# Patient Record
Sex: MALE | Race: WHITE | NOT HISPANIC OR LATINO | URBAN - METROPOLITAN AREA
[De-identification: names, ages, dates, MRNs, and addresses within clinical notes are randomized per-mention and may not be internally consistent; named-entity substitution may affect disease eponyms.]

---

## 2017-05-19 ENCOUNTER — INPATIENT (INPATIENT)
Facility: HOSPITAL | Age: 82
LOS: 6 days | Discharge: NURSING, REHAB OR LONG-TERM CARE | DRG: 603 | End: 2017-05-26
Attending: FAMILY MEDICINE | Admitting: FAMILY MEDICINE
Payer: COMMERCIAL

## 2017-05-19 VITALS
HEIGHT: 72 IN | TEMPERATURE: 98 F | HEART RATE: 88 BPM | SYSTOLIC BLOOD PRESSURE: 105 MMHG | DIASTOLIC BLOOD PRESSURE: 70 MMHG | WEIGHT: 190.04 LBS | OXYGEN SATURATION: 96 % | RESPIRATION RATE: 18 BRPM

## 2017-05-19 LAB
ALBUMIN SERPL ELPH-MCNC: 2.5 G/DL — LOW (ref 3.5–5)
ALP SERPL-CCNC: 81 U/L — SIGNIFICANT CHANGE UP (ref 40–120)
ALT FLD-CCNC: 17 U/L DA — SIGNIFICANT CHANGE UP (ref 10–60)
ANION GAP SERPL CALC-SCNC: 8 MMOL/L — SIGNIFICANT CHANGE UP (ref 5–17)
AST SERPL-CCNC: 18 U/L — SIGNIFICANT CHANGE UP (ref 10–40)
BILIRUB SERPL-MCNC: 0.3 MG/DL — SIGNIFICANT CHANGE UP (ref 0.2–1.2)
BUN SERPL-MCNC: 44 MG/DL — HIGH (ref 7–18)
CALCIUM SERPL-MCNC: 8.3 MG/DL — LOW (ref 8.4–10.5)
CHLORIDE SERPL-SCNC: 101 MMOL/L — SIGNIFICANT CHANGE UP (ref 96–108)
CO2 SERPL-SCNC: 26 MMOL/L — SIGNIFICANT CHANGE UP (ref 22–31)
CREAT SERPL-MCNC: 2.25 MG/DL — HIGH (ref 0.5–1.3)
GLUCOSE SERPL-MCNC: 130 MG/DL — HIGH (ref 70–99)
HCT VFR BLD CALC: 31.7 % — LOW (ref 39–50)
HGB BLD-MCNC: 11.2 G/DL — LOW (ref 13–17)
MCHC RBC-ENTMCNC: 30.7 PG — SIGNIFICANT CHANGE UP (ref 27–34)
MCHC RBC-ENTMCNC: 35.2 GM/DL — SIGNIFICANT CHANGE UP (ref 32–36)
MCV RBC AUTO: 87.1 FL — SIGNIFICANT CHANGE UP (ref 80–100)
PLATELET # BLD AUTO: 313 K/UL — SIGNIFICANT CHANGE UP (ref 150–400)
POTASSIUM SERPL-MCNC: 4.9 MMOL/L — SIGNIFICANT CHANGE UP (ref 3.5–5.3)
POTASSIUM SERPL-SCNC: 4.9 MMOL/L — SIGNIFICANT CHANGE UP (ref 3.5–5.3)
PROT SERPL-MCNC: 6.2 G/DL — SIGNIFICANT CHANGE UP (ref 6–8.3)
RBC # BLD: 3.64 M/UL — LOW (ref 4.2–5.8)
RBC # FLD: 12.7 % — SIGNIFICANT CHANGE UP (ref 10.3–14.5)
SODIUM SERPL-SCNC: 135 MMOL/L — SIGNIFICANT CHANGE UP (ref 135–145)
WBC # BLD: 12.6 K/UL — HIGH (ref 3.8–10.5)
WBC # FLD AUTO: 12.6 K/UL — HIGH (ref 3.8–10.5)

## 2017-05-19 NOTE — ED ADULT NURSE NOTE - OBJECTIVE STATEMENT
82 years old male received lying in bed alert and oriented x3, breathing spontaneously on room air. Patient unkempt, incontinent of feces. Relative at bedside reported generalized weakness x1 week, inability to move out of bed. Relative also indicated that patient has bed sores and same is getting worse. O/E sores noted to the buttocks and upper third of both of patient's legs and redness noted between the groin. Patient was tidied and left as comfortable as condition allows.

## 2017-05-19 NOTE — ED ADULT TRIAGE NOTE - CHIEF COMPLAINT QUOTE
BIBA for weakness, pt has been in sitting position for long period with incontinence and has developed ulcers

## 2017-05-20 DIAGNOSIS — Z29.9 ENCOUNTER FOR PROPHYLACTIC MEASURES, UNSPECIFIED: ICD-10-CM

## 2017-05-20 DIAGNOSIS — N17.9 ACUTE KIDNEY FAILURE, UNSPECIFIED: ICD-10-CM

## 2017-05-20 DIAGNOSIS — I48.91 UNSPECIFIED ATRIAL FIBRILLATION: ICD-10-CM

## 2017-05-20 DIAGNOSIS — R26.2 DIFFICULTY IN WALKING, NOT ELSEWHERE CLASSIFIED: ICD-10-CM

## 2017-05-20 DIAGNOSIS — L89.109 PRESSURE ULCER OF UNSPECIFIED PART OF BACK, UNSPECIFIED STAGE: ICD-10-CM

## 2017-05-20 DIAGNOSIS — R60.0 LOCALIZED EDEMA: ICD-10-CM

## 2017-05-20 DIAGNOSIS — G91.2 (IDIOPATHIC) NORMAL PRESSURE HYDROCEPHALUS: ICD-10-CM

## 2017-05-20 LAB
APTT BLD: 40.4 SEC — HIGH (ref 27.5–37.4)
CK SERPL-CCNC: 178 U/L — SIGNIFICANT CHANGE UP (ref 35–232)
HBA1C BLD-MCNC: 5.8 % — HIGH (ref 4–5.6)
INR BLD: 3.15 RATIO — HIGH (ref 0.88–1.16)
NT-PROBNP SERPL-SCNC: 1288 PG/ML — HIGH (ref 0–450)
PROTHROM AB SERPL-ACNC: 35.2 SEC — HIGH (ref 9.8–12.7)
TROPONIN I SERPL-MCNC: <0.015 NG/ML — SIGNIFICANT CHANGE UP (ref 0–0.04)

## 2017-05-20 PROCEDURE — 99285 EMERGENCY DEPT VISIT HI MDM: CPT

## 2017-05-20 RX ORDER — MUPIROCIN 20 MG/G
1 OINTMENT TOPICAL THREE TIMES A DAY
Refills: 0 | Status: DISCONTINUED | OUTPATIENT
Start: 2017-05-20 | End: 2017-05-21

## 2017-05-20 RX ORDER — ASCORBIC ACID 60 MG
500 TABLET,CHEWABLE ORAL DAILY
Refills: 0 | Status: DISCONTINUED | OUTPATIENT
Start: 2017-05-20 | End: 2017-05-26

## 2017-05-20 RX ORDER — VANCOMYCIN HCL 1 G
1000 VIAL (EA) INTRAVENOUS EVERY 24 HOURS
Refills: 0 | Status: DISCONTINUED | OUTPATIENT
Start: 2017-05-20 | End: 2017-05-22

## 2017-05-20 RX ORDER — SODIUM CHLORIDE 9 MG/ML
750 INJECTION INTRAMUSCULAR; INTRAVENOUS; SUBCUTANEOUS ONCE
Refills: 0 | Status: COMPLETED | OUTPATIENT
Start: 2017-05-20 | End: 2017-05-20

## 2017-05-20 RX ORDER — ACETAMINOPHEN 500 MG
650 TABLET ORAL EVERY 6 HOURS
Refills: 0 | Status: DISCONTINUED | OUTPATIENT
Start: 2017-05-20 | End: 2017-05-26

## 2017-05-20 RX ORDER — DOCUSATE SODIUM 100 MG
100 CAPSULE ORAL
Refills: 0 | Status: DISCONTINUED | OUTPATIENT
Start: 2017-05-20 | End: 2017-05-26

## 2017-05-20 RX ORDER — AZTREONAM 2 G
1000 VIAL (EA) INJECTION EVERY 12 HOURS
Refills: 0 | Status: DISCONTINUED | OUTPATIENT
Start: 2017-05-20 | End: 2017-05-21

## 2017-05-20 RX ORDER — DEXTROSE 50 % IN WATER 50 %
12.5 SYRINGE (ML) INTRAVENOUS ONCE
Refills: 0 | Status: DISCONTINUED | OUTPATIENT
Start: 2017-05-20 | End: 2017-05-26

## 2017-05-20 RX ORDER — ZINC OXIDE 200 MG/G
1 OINTMENT TOPICAL THREE TIMES A DAY
Refills: 0 | Status: DISCONTINUED | OUTPATIENT
Start: 2017-05-20 | End: 2017-05-26

## 2017-05-20 RX ORDER — ZINC GLUCONATE 30 MG
50 TABLET ORAL DAILY
Refills: 0 | Status: DISCONTINUED | OUTPATIENT
Start: 2017-05-20 | End: 2017-05-20

## 2017-05-20 RX ORDER — DEXTROSE 50 % IN WATER 50 %
1 SYRINGE (ML) INTRAVENOUS ONCE
Refills: 0 | Status: DISCONTINUED | OUTPATIENT
Start: 2017-05-20 | End: 2017-05-26

## 2017-05-20 RX ORDER — DEXTROSE 50 % IN WATER 50 %
25 SYRINGE (ML) INTRAVENOUS ONCE
Refills: 0 | Status: DISCONTINUED | OUTPATIENT
Start: 2017-05-20 | End: 2017-05-26

## 2017-05-20 RX ORDER — SENNA PLUS 8.6 MG/1
2 TABLET ORAL AT BEDTIME
Refills: 0 | Status: DISCONTINUED | OUTPATIENT
Start: 2017-05-20 | End: 2017-05-26

## 2017-05-20 RX ORDER — SODIUM CHLORIDE 9 MG/ML
1000 INJECTION, SOLUTION INTRAVENOUS
Refills: 0 | Status: DISCONTINUED | OUTPATIENT
Start: 2017-05-20 | End: 2017-05-26

## 2017-05-20 RX ORDER — AZTREONAM 2 G
1000 VIAL (EA) INJECTION ONCE
Refills: 0 | Status: COMPLETED | OUTPATIENT
Start: 2017-05-20 | End: 2017-05-20

## 2017-05-20 RX ORDER — MORPHINE SULFATE 50 MG/1
2 CAPSULE, EXTENDED RELEASE ORAL EVERY 6 HOURS
Refills: 0 | Status: DISCONTINUED | OUTPATIENT
Start: 2017-05-20 | End: 2017-05-24

## 2017-05-20 RX ORDER — INSULIN LISPRO 100/ML
VIAL (ML) SUBCUTANEOUS
Refills: 0 | Status: DISCONTINUED | OUTPATIENT
Start: 2017-05-20 | End: 2017-05-26

## 2017-05-20 RX ORDER — GLUCAGON INJECTION, SOLUTION 0.5 MG/.1ML
1 INJECTION, SOLUTION SUBCUTANEOUS ONCE
Refills: 0 | Status: DISCONTINUED | OUTPATIENT
Start: 2017-05-20 | End: 2017-05-26

## 2017-05-20 RX ADMIN — Medication 1: at 17:57

## 2017-05-20 RX ADMIN — Medication 100 MILLIGRAM(S): at 17:57

## 2017-05-20 RX ADMIN — Medication 500 MILLIGRAM(S): at 14:50

## 2017-05-20 RX ADMIN — ZINC OXIDE 1 APPLICATION(S): 200 OINTMENT TOPICAL at 14:49

## 2017-05-20 RX ADMIN — MUPIROCIN 1 APPLICATION(S): 20 OINTMENT TOPICAL at 08:07

## 2017-05-20 RX ADMIN — SODIUM CHLORIDE 750 MILLILITER(S): 9 INJECTION INTRAMUSCULAR; INTRAVENOUS; SUBCUTANEOUS at 01:20

## 2017-05-20 RX ADMIN — SENNA PLUS 2 TABLET(S): 8.6 TABLET ORAL at 21:49

## 2017-05-20 RX ADMIN — Medication 50 MILLIGRAM(S): at 01:19

## 2017-05-20 RX ADMIN — Medication 250 MILLIGRAM(S): at 07:02

## 2017-05-20 RX ADMIN — MUPIROCIN 1 APPLICATION(S): 20 OINTMENT TOPICAL at 21:49

## 2017-05-20 RX ADMIN — ZINC OXIDE 1 APPLICATION(S): 200 OINTMENT TOPICAL at 21:49

## 2017-05-20 RX ADMIN — MORPHINE SULFATE 2 MILLIGRAM(S): 50 CAPSULE, EXTENDED RELEASE ORAL at 21:52

## 2017-05-20 RX ADMIN — MORPHINE SULFATE 2 MILLIGRAM(S): 50 CAPSULE, EXTENDED RELEASE ORAL at 22:15

## 2017-05-20 RX ADMIN — MUPIROCIN 1 APPLICATION(S): 20 OINTMENT TOPICAL at 14:49

## 2017-05-20 RX ADMIN — Medication 50 MILLIGRAM(S): at 17:57

## 2017-05-20 NOTE — H&P ADULT - PROBLEM SELECTOR PLAN 1
infected bed soar  wbc count of 12.8  frequent turning  mupriocin ointment   c/w vancomycin and azactem for now to cover pseudomonas and vancocmyin  f/u blood culture  ID jess Lala

## 2017-05-20 NOTE — ED PROVIDER NOTE - CARE PLAN
Principal Discharge DX:	Ambulatory dysfunction  Secondary Diagnosis:	Pressure ulcer of back  Secondary Diagnosis:	NPH (normal pressure hydrocephalus)

## 2017-05-20 NOTE — ED PROVIDER NOTE - OBJECTIVE STATEMENT
81 y/o M w/ PMHx of NPH & Afib (on Coumadin) presents to the ED c/o generalized weakness x1 week. Pt states he is unable to get out of bed w/ worsening sores to his lower back & upper thighs. Pt denies fever, chills, vomiting, diarrhea, SOB, or any other complaints. Pt is allergic to Penicillin.

## 2017-05-20 NOTE — H&P ADULT - HISTORY OF PRESENT ILLNESS
81 y/o M w/ PMHx of NPH ( unsteady gait used to ambulate with walker now bedbound for a couple of months) & Afib (on Coumadin) presents to the ED c/o generalized weakness x1 week. Pt states he is unable to get out of bed w/ worsening sores to his lower back & upper thighs with wet discharge and pain . patient is AAO times 2 but is forgetful and poor histoiran , is unable to provide detailed information states his wife dropped him off to the hospital becuase the soars in his thighs and back were getting worse and she has been unable to help him . denies fever, chills, vomiting, diarrhea, SOB, or any other complaints , endorses chronic leg edema , does not remember his medications , states he believes he is on coumadin but cant recall dosage , tired to call wife multiple times without any response . ROS is negative except above.    in the ER patient's vs T  98 , hr 88 , rr 18 pulse ox of 98 on room air , labs were pertinent for wbc count of 12.6 , BUN/cr of 44/2,25 , INR 3.15 , ekg showed RBBBafib 80 bpm with PVC.patient was given  750 cc bolus and one dose of azactem in the ER.

## 2017-05-20 NOTE — H&P ADULT - PROBLEM SELECTOR PLAN 3
afib: INR 3.14 , Hold lopressor as blood pressure is borderline low  f/u iNR  Unknown coumadin dosage ekg rate controlled afib RBBB

## 2017-05-20 NOTE — PATIENT PROFILE ADULT. - LOCATION
Sacral sores on lower back buttocks and upper posterior tights, dry crusty feet and left heal wound noted (0.5 cm by .5 cm)

## 2017-05-20 NOTE — H&P ADULT - ASSESSMENT
81 y/o M w/ PMHx of NPH ( unsteady gait used to ambulate with walker now bedbound for a couple of months) & Afib (on Coumadin) presents to the ED c/o generalized weakness x1 week. Pt states he is unable to get out of bed w/ worsening sores to his lower back & upper thighs with wet discharge and pain is being  admitted for  infected bed ulcer with superimposed cellulitis

## 2017-05-21 LAB
ALBUMIN SERPL ELPH-MCNC: 2.4 G/DL — LOW (ref 3.5–5)
ALP SERPL-CCNC: 80 U/L — SIGNIFICANT CHANGE UP (ref 40–120)
ALT FLD-CCNC: 15 U/L DA — SIGNIFICANT CHANGE UP (ref 10–60)
ANION GAP SERPL CALC-SCNC: 10 MMOL/L — SIGNIFICANT CHANGE UP (ref 5–17)
APTT BLD: 34.1 SEC — SIGNIFICANT CHANGE UP (ref 27.5–37.4)
AST SERPL-CCNC: 15 U/L — SIGNIFICANT CHANGE UP (ref 10–40)
BASOPHILS # BLD AUTO: 0.1 K/UL — SIGNIFICANT CHANGE UP (ref 0–0.2)
BASOPHILS NFR BLD AUTO: 0.7 % — SIGNIFICANT CHANGE UP (ref 0–2)
BILIRUB SERPL-MCNC: 0.4 MG/DL — SIGNIFICANT CHANGE UP (ref 0.2–1.2)
BUN SERPL-MCNC: 27 MG/DL — HIGH (ref 7–18)
CALCIUM SERPL-MCNC: 8.5 MG/DL — SIGNIFICANT CHANGE UP (ref 8.4–10.5)
CHLORIDE SERPL-SCNC: 106 MMOL/L — SIGNIFICANT CHANGE UP (ref 96–108)
CO2 SERPL-SCNC: 25 MMOL/L — SIGNIFICANT CHANGE UP (ref 22–31)
CREAT SERPL-MCNC: 1.76 MG/DL — HIGH (ref 0.5–1.3)
EOSINOPHIL # BLD AUTO: 0.5 K/UL — SIGNIFICANT CHANGE UP (ref 0–0.5)
EOSINOPHIL NFR BLD AUTO: 6 % — SIGNIFICANT CHANGE UP (ref 0–6)
FOLATE SERPL-MCNC: 6.7 NG/ML — SIGNIFICANT CHANGE UP (ref 4.8–24.2)
GLUCOSE SERPL-MCNC: 110 MG/DL — HIGH (ref 70–99)
HCT VFR BLD CALC: 30.5 % — LOW (ref 39–50)
HGB BLD-MCNC: 10.3 G/DL — LOW (ref 13–17)
INR BLD: 1.99 RATIO — HIGH (ref 0.88–1.16)
LYMPHOCYTES # BLD AUTO: 2.9 K/UL — SIGNIFICANT CHANGE UP (ref 1–3.3)
LYMPHOCYTES # BLD AUTO: 34.6 % — SIGNIFICANT CHANGE UP (ref 13–44)
MAGNESIUM SERPL-MCNC: 1.6 MG/DL — SIGNIFICANT CHANGE UP (ref 1.6–2.6)
MCHC RBC-ENTMCNC: 29.7 PG — SIGNIFICANT CHANGE UP (ref 27–34)
MCHC RBC-ENTMCNC: 33.7 GM/DL — SIGNIFICANT CHANGE UP (ref 32–36)
MCV RBC AUTO: 88.3 FL — SIGNIFICANT CHANGE UP (ref 80–100)
MONOCYTES # BLD AUTO: 0.7 K/UL — SIGNIFICANT CHANGE UP (ref 0–0.9)
MONOCYTES NFR BLD AUTO: 8.6 % — SIGNIFICANT CHANGE UP (ref 2–14)
NEUTROPHILS # BLD AUTO: 4.2 K/UL — SIGNIFICANT CHANGE UP (ref 1.8–7.4)
NEUTROPHILS NFR BLD AUTO: 50 % — SIGNIFICANT CHANGE UP (ref 43–77)
PHOSPHATE SERPL-MCNC: 3.3 MG/DL — SIGNIFICANT CHANGE UP (ref 2.5–4.5)
PLATELET # BLD AUTO: 251 K/UL — SIGNIFICANT CHANGE UP (ref 150–400)
POTASSIUM SERPL-MCNC: 4.4 MMOL/L — SIGNIFICANT CHANGE UP (ref 3.5–5.3)
POTASSIUM SERPL-SCNC: 4.4 MMOL/L — SIGNIFICANT CHANGE UP (ref 3.5–5.3)
PROT SERPL-MCNC: 5.7 G/DL — LOW (ref 6–8.3)
PROTHROM AB SERPL-ACNC: 22 SEC — HIGH (ref 9.8–12.7)
RBC # BLD: 3.46 M/UL — LOW (ref 4.2–5.8)
RBC # FLD: 11.4 % — SIGNIFICANT CHANGE UP (ref 10.3–14.5)
SODIUM SERPL-SCNC: 141 MMOL/L — SIGNIFICANT CHANGE UP (ref 135–145)
TSH SERPL-MCNC: 0.71 UU/ML — SIGNIFICANT CHANGE UP (ref 0.34–4.82)
VIT B12 SERPL-MCNC: 385 PG/ML — SIGNIFICANT CHANGE UP (ref 243–894)
WBC # BLD: 8.3 K/UL — SIGNIFICANT CHANGE UP (ref 3.8–10.5)
WBC # FLD AUTO: 8.3 K/UL — SIGNIFICANT CHANGE UP (ref 3.8–10.5)

## 2017-05-21 RX ORDER — METOPROLOL TARTRATE 50 MG
50 TABLET ORAL
Refills: 0 | Status: DISCONTINUED | OUTPATIENT
Start: 2017-05-21 | End: 2017-05-25

## 2017-05-21 RX ORDER — WARFARIN SODIUM 2.5 MG/1
1 TABLET ORAL ONCE
Refills: 0 | Status: DISCONTINUED | OUTPATIENT
Start: 2017-05-21 | End: 2017-05-21

## 2017-05-21 RX ORDER — PANTOPRAZOLE SODIUM 20 MG/1
40 TABLET, DELAYED RELEASE ORAL
Refills: 0 | Status: DISCONTINUED | OUTPATIENT
Start: 2017-05-21 | End: 2017-05-26

## 2017-05-21 RX ORDER — CALAMINE 8% AND ZINC OXIDE 8% 160 MG/ML
1 LOTION TOPICAL
Refills: 0 | Status: DISCONTINUED | OUTPATIENT
Start: 2017-05-21 | End: 2017-05-23

## 2017-05-21 RX ORDER — FUROSEMIDE 40 MG
40 TABLET ORAL DAILY
Refills: 0 | Status: DISCONTINUED | OUTPATIENT
Start: 2017-05-21 | End: 2017-05-21

## 2017-05-21 RX ORDER — ZINC OXIDE 200 MG/G
1 OINTMENT TOPICAL
Refills: 0 | Status: DISCONTINUED | OUTPATIENT
Start: 2017-05-21 | End: 2017-05-21

## 2017-05-21 RX ORDER — WARFARIN SODIUM 2.5 MG/1
3 TABLET ORAL ONCE
Refills: 0 | Status: COMPLETED | OUTPATIENT
Start: 2017-05-21 | End: 2017-05-21

## 2017-05-21 RX ORDER — ZINC OXIDE 200 MG/G
1 OINTMENT TOPICAL
Refills: 0 | Status: DISCONTINUED | OUTPATIENT
Start: 2017-05-21 | End: 2017-05-23

## 2017-05-21 RX ORDER — LISINOPRIL 2.5 MG/1
10 TABLET ORAL DAILY
Refills: 0 | Status: DISCONTINUED | OUTPATIENT
Start: 2017-05-21 | End: 2017-05-21

## 2017-05-21 RX ADMIN — CALAMINE 8% AND ZINC OXIDE 8% 1 APPLICATION(S): 160 LOTION TOPICAL at 20:51

## 2017-05-21 RX ADMIN — Medication 100 MILLIGRAM(S): at 06:20

## 2017-05-21 RX ADMIN — Medication 500 MILLIGRAM(S): at 11:38

## 2017-05-21 RX ADMIN — Medication 50 MILLIGRAM(S): at 17:35

## 2017-05-21 RX ADMIN — PANTOPRAZOLE SODIUM 40 MILLIGRAM(S): 20 TABLET, DELAYED RELEASE ORAL at 22:19

## 2017-05-21 RX ADMIN — Medication 50 MILLIGRAM(S): at 06:20

## 2017-05-21 RX ADMIN — Medication 1: at 11:38

## 2017-05-21 RX ADMIN — Medication 100 MILLIGRAM(S): at 17:34

## 2017-05-21 RX ADMIN — MUPIROCIN 1 APPLICATION(S): 20 OINTMENT TOPICAL at 06:20

## 2017-05-21 RX ADMIN — WARFARIN SODIUM 3 MILLIGRAM(S): 2.5 TABLET ORAL at 22:19

## 2017-05-21 RX ADMIN — ZINC OXIDE 1 APPLICATION(S): 200 OINTMENT TOPICAL at 06:20

## 2017-05-21 RX ADMIN — SENNA PLUS 2 TABLET(S): 8.6 TABLET ORAL at 22:19

## 2017-05-21 RX ADMIN — Medication 250 MILLIGRAM(S): at 06:20

## 2017-05-21 NOTE — DIETITIAN INITIAL EVALUATION ADULT. - OTHER INFO
Nutrition consult requested for questions on prescribed diet. Patient from home lives with wife. Visited pt. reports, appetite fair, eats well some days & not so well on certain days due to his medical condition, denies nausea/vomiting or diarrhea PTA, stated MON412 Lbs >1 month & unsure of wt. gain/loss presently. Per pt. in house consuming 50% of meals & tolerating & stated dx. as diabetic 4 months age & on metformin at home, declined diet/diabetic education, received PTA. Pressure ulcer (stage II - sacrum) noted, pedal edema pitting 1+ noted.

## 2017-05-21 NOTE — CONSULT NOTE ADULT - ASSESSMENT
1. Cellulitis of buttocks and posterior aspect of thighs   2. Leukocytosis - improved    Plan:  Continue Vancomycin 1 gram IV q 24 hours  Continue Azactam 1 gram IV q 12 hours 1. Mild cellulitis of buttocks and posterior aspect of thighs   2. Leukocytosis - improved    Plan:  Continue Vancomycin 1 gram IV q 24 hours  discontinue Azactam 1 gram IV q 12 hours  Start zinc oxide 20% bid to buttocks and posterior aspect of thighs.

## 2017-05-21 NOTE — CONSULT NOTE ADULT - CONSULT REASON
Multiple skin excoriations / cellulitis of back Multiple skin excoriations / cellulitis of buttocks and thighs

## 2017-05-21 NOTE — CHART NOTE - NSCHARTNOTEFT_GEN_A_CORE
PGY1 on called asked to F/up INR  INR today is 1.99, just below theraputic range  patient dosent know home dose of coumadin and dosent know pharmacy either  will give 1mg of coumadin  Primary team to follow up  f/up INR in AM

## 2017-05-21 NOTE — CONSULT NOTE ADULT - RS GEN PE MLT RESP DETAILS PC
clear to auscultation bilaterally normal/breath sounds equal/good air movement/clear to auscultation bilaterally

## 2017-05-21 NOTE — DIETITIAN INITIAL EVALUATION ADULT. - FACTORS AFF FOOD INTAKE
Weakness, bedbound/difficulty with food procurement/preparation/pain/persistent constipation/Shinto/ethnic/cultural/personal food preferences/other (specify)

## 2017-05-21 NOTE — PROGRESS NOTE ADULT - SUBJECTIVE AND OBJECTIVE BOX
CHIEF COMPLAINT:Patient is a 82y old  Male who presents with a chief complaint of   	  REVIEW OF SYSTEMS:  CONSTITUTIONAL: No fever, weight loss, or fatigue  EYES: No eye pain, visual disturbances, or discharge  ENMT:  No difficulty hearing, tinnitus, vertigo; No sinus or throat pain  NECK: No pain or stiffness  RESPIRATORY: No cough, wheezing, chills or hemoptysis; No Shortness of Breath  CARDIOVASCULAR: No chest pain, palpitations, passing out, dizziness, or leg swelling  GASTROINTESTINAL: No abdominal or epigastric pain. No nausea, vomiting, or hematemesis; No diarrhea or constipation. No melena or hematochezia.  GENITOURINARY: No dysuria, frequency, hematuria, or incontinence  NEUROLOGICAL: No headaches, memory loss, loss of strength, numbness, or tremors  SKIN: No itching, burning, rashes, or lesions   LYMPH Nodes: No enlarged glands  ENDOCRINE: No heat or cold intolerance; No hair loss  MUSCULOSKELETAL: No joint pain or swelling; No muscle, back, or extremity pain  PSYCHIATRIC: No depression, anxiety, mood swings, or difficulty sleeping  HEME/LYMPH: No easy bruising, or bleeding gums  ALLERY AND IMMUNOLOGIC: No hives or eczema	    [ ] All others negative	  [ ] Unable to obtain    PHYSICAL EXAM:  T(C): 37.1, Max: 37.1 (05-21 @ 16:17)  HR: 88 (78 - 96)  BP: 111/58 (99/52 - 119/60)  RR: 16 (16 - 16)  SpO2: 98% (97% - 98%)  Wt(kg): --  I&O's Summary    I & Os for current day (as of 22 May 2017 00:17)  =============================================  IN: 0 ml / OUT: 1800 ml / NET: -1800 ml      Appearance: Normal	  HEENT:   Normal oral mucosa, PERRL, EOMI	  Lymphatic: No lymphadenopathy  Cardiovascular: Normal S1 S2, No JVD, No murmurs, No edema  Respiratory: Lungs clear to auscultation	  Psychiatry: A & O x 3, Mood & affect appropriate  Gastrointestinal:  Soft, Non-tender, + BS	  Skin: pressure ulcer on butock and upper thighs with mild serosanguinous 	discharge.  Neurologic: Non-focal  Extremities: Normal range of motion, +1 edema  Vascular: Peripheral pulses palpable 2+ bilaterally    MEDICATIONS  (STANDING):  insulin lispro (HumaLOG) corrective regimen sliding scale  SubCutaneous three times a day before meals  dextrose 5%. 1000milliLiter(s) IV Continuous <Continuous>  dextrose 50% Injectable 12.5Gram(s) IV Push once  dextrose 50% Injectable 25Gram(s) IV Push once  dextrose 50% Injectable 25Gram(s) IV Push once  ascorbic acid 500milliGRAM(s) Oral daily  docusate sodium 100milliGRAM(s) Oral two times a day  senna 2Tablet(s) Oral at bedtime  vancomycin  IVPB 1000milliGRAM(s) IV Intermittent every 24 hours  zinc oxide 20% Ointment 1Application(s) Topical three times a day  zinc oxide 20% Ointment 1Application(s) Topical two times a day  pantoprazole    Tablet 40milliGRAM(s) Oral before breakfast  metoprolol 50milliGRAM(s) Oral two times a day  calamine Lotion 1Application(s) Topical two times a day      TELEMETRY: 	    ECG:  	  RADIOLOGY:  OTHER: 	  	  LABS:	 	    CARDIAC MARKERS:  CARDIAC MARKERS ( 20 May 2017 07:02 )  <0.015 ng/mL / x     / 178 U/L / x     / x                                    10.3   8.3   )-----------( 251      ( 21 May 2017 06:55 )             30.5     05-21    141  |  106  |  27<H>  ----------------------------<  110<H>  4.4   |  25  |  1.76<H>    Ca    8.5      21 May 2017 06:55  Phos  3.3     05-21  Mg     1.6     05-21    TPro  5.7<L>  /  Alb  2.4<L>  /  TBili  0.4  /  DBili  x   /  AST  15  /  ALT  15  /  AlkPhos  80  05-21          HgA1c: Hemoglobin A1C, Whole Blood: 5.8 % (05-20 @ 09:38)    TSH: Thyroid Stimulating Hormone, Serum: 0.71 uU/mL (05-21 @ 06:55)

## 2017-05-21 NOTE — CONSULT NOTE ADULT - MUSCULOSKELETAL COMMENTS
pt complains that he no longer able to pull himself up and ambulate with walker, no longer can put lotion on his back, complains of generalized weakness in his body

## 2017-05-21 NOTE — CONSULT NOTE ADULT - SUBJECTIVE AND OBJECTIVE BOX
HPI:  83 y/o M w/ PMHx of NPH ( unsteady gait used to ambulate with walker now bedbound for a couple of months) & Afib (on Coumadin), Diabetes, Hypertension, CAD, dementia (reported by wife), hydrocephalus, presents to the ED c/o generalized weakness x1 week. Pt states he is unable to get out of bed w/ worsening sores to his lower back & upper thighs with wet discharge and pain . patient is A&O x 2 but is forgetful and poor historian, is unable to provide detailed information, states his wife brought him to the hospital due to worsening status of his bedsores of thighs and back and that his wife was no longer able to help him.     In ER patient's vs T  98 , hr 88 , respiratory rate 18 pulse ox of 98 on room air, labs were pertinent for wbc count of 12.6 , BUN/cr of 44/2,25 , INR 3.15 , EKG showed RBBB afib 80 bpm with PVC, patient was given  750 cc bolus and one dose of Azactam in the ER. 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 1000milliLiter(s) IV Continuous <Continuous>  dextrose 50% Injectable 12.5Gram(s) IV Push once  dextrose 50% Injectable 25Gram(s) IV Push once  dextrose 50% Injectable 25Gram(s) IV Push once  ascorbic acid 500milliGRAM(s) Oral daily  mupirocin 2% Ointment 1Application(s) Topical three times a day  docusate sodium 100milliGRAM(s) Oral two times a day  senna 2Tablet(s) Oral at bedtime  vancomycin  IVPB 1000milliGRAM(s) IV Intermittent every 24 hours  aztreonam  IVPB 1000milliGRAM(s) IV Intermittent every 12 hours  zinc oxide 20% Ointment 1Application(s) Topical three times a day  warfarin 1milliGRAM(s) Oral once    MEDICATIONS  (PRN):  dextrose Gel 1Dose(s) Oral once PRN Blood Glucose LESS THAN 70 milliGRAM(s)/deciLiter  glucagon  Injectable 1milliGRAM(s) IntraMuscular once PRN Glucose <70 milliGRAM(s)/deciLiter  morphine  - Injectable 2milliGRAM(s) IV Push every 6 hours PRN Severe Pain (7 - 10)  oxyCODONE  5 mG/acetaminophen 325 mG 1Tablet(s) Oral every 6 hours PRN Moderate Pain (4 - 6)  acetaminophen   Tablet. 650milliGRAM(s) Oral every 6 hours PRN Mild Pain (1 - 3)    SOCIAL HISTORY:  Pt is former smoker, quit 15 years ago, smoked for 25 years 1 ppd        PACK YEARS:    25              Family History: Mother - Alzheimer's disease, Father - cancer (type unknown)          VITALS:    Vital Signs Last 24 Hrs  T(C): 36.9, Max: 37.2 (05-20 @ 23:55)  T(F): 98.4, Max: 98.9 (05-20 @ 23:55)  HR: 78 (78 - 103)  BP: 117/50 (112/53 - 120/69)  BP(mean): --  RR: 16 (16 - 16)  SpO2: 98% (98% - 98%)    LABS/DIAGNOSTIC TESTS:                        10.3   8.3   )-----------( 251      ( 21 May 2017 06:55 )             30.5     WBC Count: 8.3 K/uL (05-21 @ 06:55)  WBC Count: 12.6 K/uL (05-19 @ 22:06)    05-21    141  |  106  |  27<H>  ----------------------------<  110<H>  4.4   |  25  |  1.76<H>    Ca    8.5      21 May 2017 06:55  Phos  3.3     05-21  Mg     1.6     05-21    TPro  5.7<L>  /  Alb  2.4<L>  /  TBili  0.4  /  DBili  x   /  AST  15  /  ALT  15  /  AlkPhos  80  05-21          LIVER FUNCTIONS - ( 21 May 2017 06:55 )  Alb: 2.4 g/dL / Pro: 5.7 g/dL / ALK PHOS: 80 U/L / ALT: 15 U/L DA / AST: 15 U/L / GGT: x             PT/INR - ( 21 May 2017 06:55 )   PT: 22.0 sec;   INR: 1.99 ratio         PTT - ( 21 May 2017 06:55 )  PTT:34.1 sec    Hemoglobin A1C, Whole Blood (05.20.17 @ 09:38)  Hemoglobin A1C, Whole Blood: 5.8      LACTATE:    ABG -     CULTURES:   .Blood Blood  05-20 @ 01:02   No growth to date.  --  --    ECHO pending reading HPI:  81 y/o M w/ PMHx of NPH ( unsteady gait used to ambulate with walker now bedbound for a couple of months) & Afib (on Coumadin), Diabetes, Hypertension, CAD, dementia, hydrocephalus, presents to the ED c/o generalized weakness x1 week. Pt states he is unable to get out of bed w/ worsening sores to his lower back & upper thighs with wet discharge and pain. Patient is A&O x 2 but is forgetful and poor historian, is unable to provide detailed information, states his wife brought him to the hospital due to worsening status of his bedsores of thighs and back and that his wife was no longer able to help him.     In ER patient's vs T  98 , hr 88 , respiratory rate 18 pulse ox of 98 on room air, labs were pertinent for wbc count of 12.6 , BUN/cr of 44/2,25 , INR 3.15 , EKG showed RBBB afib 80 bpm with PVC, patient was given  750 cc bolus and one dose of Azactam in the ER. 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 1000milliLiter(s) IV Continuous <Continuous>  dextrose 50% Injectable 12.5Gram(s) IV Push once  dextrose 50% Injectable 25Gram(s) IV Push once  dextrose 50% Injectable 25Gram(s) IV Push once  ascorbic acid 500milliGRAM(s) Oral daily  mupirocin 2% Ointment 1Application(s) Topical three times a day  docusate sodium 100milliGRAM(s) Oral two times a day  senna 2Tablet(s) Oral at bedtime  vancomycin  IVPB 1000milliGRAM(s) IV Intermittent every 24 hours  aztreonam  IVPB 1000milliGRAM(s) IV Intermittent every 12 hours  zinc oxide 20% Ointment 1Application(s) Topical three times a day  warfarin 1milliGRAM(s) Oral once    MEDICATIONS  (PRN):  dextrose Gel 1Dose(s) Oral once PRN Blood Glucose LESS THAN 70 milliGRAM(s)/deciLiter  glucagon  Injectable 1milliGRAM(s) IntraMuscular once PRN Glucose <70 milliGRAM(s)/deciLiter  morphine  - Injectable 2milliGRAM(s) IV Push every 6 hours PRN Severe Pain (7 - 10)  oxyCODONE  5 mG/acetaminophen 325 mG 1Tablet(s) Oral every 6 hours PRN Moderate Pain (4 - 6)  acetaminophen   Tablet. 650milliGRAM(s) Oral every 6 hours PRN Mild Pain (1 - 3)    SOCIAL HISTORY:  Pt is former smoker, quit 15 years ago, smoked for 25 years 1 ppd        PACK YEARS:    25              Family History: Mother - Alzheimer's disease, Father - cancer (type unknown)          VITALS:    Vital Signs Last 24 Hrs  T(C): 36.9, Max: 37.2 (05-20 @ 23:55)  T(F): 98.4, Max: 98.9 (05-20 @ 23:55)  HR: 78 (78 - 103)  BP: 117/50 (112/53 - 120/69)  BP(mean): --  RR: 16 (16 - 16)  SpO2: 98% (98% - 98%)    LABS/DIAGNOSTIC TESTS:                        10.3   8.3   )-----------( 251      ( 21 May 2017 06:55 )             30.5     WBC Count: 8.3 K/uL (05-21 @ 06:55)  WBC Count: 12.6 K/uL (05-19 @ 22:06)    05-21    141  |  106  |  27<H>  ----------------------------<  110<H>  4.4   |  25  |  1.76<H>    Ca    8.5      21 May 2017 06:55  Phos  3.3     05-21  Mg     1.6     05-21    TPro  5.7<L>  /  Alb  2.4<L>  /  TBili  0.4  /  DBili  x   /  AST  15  /  ALT  15  /  AlkPhos  80  05-21          LIVER FUNCTIONS - ( 21 May 2017 06:55 )  Alb: 2.4 g/dL / Pro: 5.7 g/dL / ALK PHOS: 80 U/L / ALT: 15 U/L DA / AST: 15 U/L / GGT: x             PT/INR - ( 21 May 2017 06:55 )   PT: 22.0 sec;   INR: 1.99 ratio         PTT - ( 21 May 2017 06:55 )  PTT:34.1 sec    Hemoglobin A1C, Whole Blood (05.20.17 @ 09:38)  Hemoglobin A1C, Whole Blood: 5.8      LACTATE:    ABG -     CULTURES:   .Blood Blood  05-20 @ 01:02   No growth to date.  --  --    ECHO pending reading

## 2017-05-21 NOTE — CONSULT NOTE ADULT - SKIN/BREAST COMMENTS
multiple bedsores on back and upper thighs, non-pruritic - noticed about a month ago, getting progressively worse, not addressed as an outpatient

## 2017-05-22 DIAGNOSIS — I09.9 RHEUMATIC HEART DISEASE, UNSPECIFIED: ICD-10-CM

## 2017-05-22 DIAGNOSIS — L03.90 CELLULITIS, UNSPECIFIED: ICD-10-CM

## 2017-05-22 LAB
24R-OH-CALCIDIOL SERPL-MCNC: 24.6 NG/ML — LOW (ref 30–100)
ALBUMIN SERPL ELPH-MCNC: 2.6 G/DL — LOW (ref 3.5–5)
ALP SERPL-CCNC: 82 U/L — SIGNIFICANT CHANGE UP (ref 40–120)
ALT FLD-CCNC: 17 U/L DA — SIGNIFICANT CHANGE UP (ref 10–60)
ANION GAP SERPL CALC-SCNC: 8 MMOL/L — SIGNIFICANT CHANGE UP (ref 5–17)
APTT BLD: 31.4 SEC — SIGNIFICANT CHANGE UP (ref 27.5–37.4)
AST SERPL-CCNC: 21 U/L — SIGNIFICANT CHANGE UP (ref 10–40)
BASOPHILS # BLD AUTO: 0.1 K/UL — SIGNIFICANT CHANGE UP (ref 0–0.2)
BASOPHILS NFR BLD AUTO: 1.3 % — SIGNIFICANT CHANGE UP (ref 0–2)
BILIRUB SERPL-MCNC: 0.3 MG/DL — SIGNIFICANT CHANGE UP (ref 0.2–1.2)
BUN SERPL-MCNC: 24 MG/DL — HIGH (ref 7–18)
CALCIUM SERPL-MCNC: 8.6 MG/DL — SIGNIFICANT CHANGE UP (ref 8.4–10.5)
CHLORIDE SERPL-SCNC: 105 MMOL/L — SIGNIFICANT CHANGE UP (ref 96–108)
CO2 SERPL-SCNC: 27 MMOL/L — SIGNIFICANT CHANGE UP (ref 22–31)
CREAT SERPL-MCNC: 1.71 MG/DL — HIGH (ref 0.5–1.3)
EOSINOPHIL # BLD AUTO: 0.6 K/UL — HIGH (ref 0–0.5)
EOSINOPHIL NFR BLD AUTO: 5.8 % — SIGNIFICANT CHANGE UP (ref 0–6)
GLUCOSE SERPL-MCNC: 128 MG/DL — HIGH (ref 70–99)
HCT VFR BLD CALC: 31.7 % — LOW (ref 39–50)
HGB BLD-MCNC: 10.7 G/DL — LOW (ref 13–17)
INR BLD: 1.47 RATIO — HIGH (ref 0.88–1.16)
LYMPHOCYTES # BLD AUTO: 3.6 K/UL — HIGH (ref 1–3.3)
LYMPHOCYTES # BLD AUTO: 33.7 % — SIGNIFICANT CHANGE UP (ref 13–44)
MAGNESIUM SERPL-MCNC: 1.7 MG/DL — SIGNIFICANT CHANGE UP (ref 1.6–2.6)
MCHC RBC-ENTMCNC: 29.5 PG — SIGNIFICANT CHANGE UP (ref 27–34)
MCHC RBC-ENTMCNC: 33.7 GM/DL — SIGNIFICANT CHANGE UP (ref 32–36)
MCV RBC AUTO: 87.6 FL — SIGNIFICANT CHANGE UP (ref 80–100)
MONOCYTES # BLD AUTO: 1 K/UL — HIGH (ref 0–0.9)
MONOCYTES NFR BLD AUTO: 9.3 % — SIGNIFICANT CHANGE UP (ref 2–14)
NEUTROPHILS # BLD AUTO: 5.3 K/UL — SIGNIFICANT CHANGE UP (ref 1.8–7.4)
NEUTROPHILS NFR BLD AUTO: 50 % — SIGNIFICANT CHANGE UP (ref 43–77)
PHOSPHATE SERPL-MCNC: 3.3 MG/DL — SIGNIFICANT CHANGE UP (ref 2.5–4.5)
PLATELET # BLD AUTO: 275 K/UL — SIGNIFICANT CHANGE UP (ref 150–400)
POTASSIUM SERPL-MCNC: 4.3 MMOL/L — SIGNIFICANT CHANGE UP (ref 3.5–5.3)
POTASSIUM SERPL-SCNC: 4.3 MMOL/L — SIGNIFICANT CHANGE UP (ref 3.5–5.3)
PROT SERPL-MCNC: 6.1 G/DL — SIGNIFICANT CHANGE UP (ref 6–8.3)
PROTHROM AB SERPL-ACNC: 16.2 SEC — HIGH (ref 9.8–12.7)
RBC # BLD: 3.62 M/UL — LOW (ref 4.2–5.8)
RBC # FLD: 11.8 % — SIGNIFICANT CHANGE UP (ref 10.3–14.5)
SODIUM SERPL-SCNC: 140 MMOL/L — SIGNIFICANT CHANGE UP (ref 135–145)
VANCOMYCIN TROUGH SERPL-MCNC: 10 UG/ML — SIGNIFICANT CHANGE UP (ref 10–20)
WBC # BLD: 10.6 K/UL — HIGH (ref 3.8–10.5)
WBC # FLD AUTO: 10.6 K/UL — HIGH (ref 3.8–10.5)

## 2017-05-22 RX ORDER — WARFARIN SODIUM 2.5 MG/1
4 TABLET ORAL ONCE
Refills: 0 | Status: COMPLETED | OUTPATIENT
Start: 2017-05-22 | End: 2017-05-22

## 2017-05-22 RX ORDER — VANCOMYCIN HCL 1 G
1250 VIAL (EA) INTRAVENOUS EVERY 24 HOURS
Refills: 0 | Status: DISCONTINUED | OUTPATIENT
Start: 2017-05-23 | End: 2017-05-25

## 2017-05-22 RX ORDER — ASPIRIN/CALCIUM CARB/MAGNESIUM 324 MG
81 TABLET ORAL DAILY
Refills: 0 | Status: DISCONTINUED | OUTPATIENT
Start: 2017-05-22 | End: 2017-05-26

## 2017-05-22 RX ADMIN — ZINC OXIDE 1 APPLICATION(S): 200 OINTMENT TOPICAL at 18:31

## 2017-05-22 RX ADMIN — Medication 100 MILLIGRAM(S): at 18:30

## 2017-05-22 RX ADMIN — Medication 2: at 09:32

## 2017-05-22 RX ADMIN — SENNA PLUS 2 TABLET(S): 8.6 TABLET ORAL at 21:20

## 2017-05-22 RX ADMIN — WARFARIN SODIUM 4 MILLIGRAM(S): 2.5 TABLET ORAL at 21:20

## 2017-05-22 RX ADMIN — Medication 100 MILLIGRAM(S): at 06:14

## 2017-05-22 RX ADMIN — CALAMINE 8% AND ZINC OXIDE 8% 1 APPLICATION(S): 160 LOTION TOPICAL at 06:15

## 2017-05-22 RX ADMIN — PANTOPRAZOLE SODIUM 40 MILLIGRAM(S): 20 TABLET, DELAYED RELEASE ORAL at 06:14

## 2017-05-22 RX ADMIN — Medication 250 MILLIGRAM(S): at 06:14

## 2017-05-22 RX ADMIN — ZINC OXIDE 1 APPLICATION(S): 200 OINTMENT TOPICAL at 13:18

## 2017-05-22 RX ADMIN — Medication 500 MILLIGRAM(S): at 13:18

## 2017-05-22 RX ADMIN — ZINC OXIDE 1 APPLICATION(S): 200 OINTMENT TOPICAL at 21:20

## 2017-05-22 RX ADMIN — CALAMINE 8% AND ZINC OXIDE 8% 1 APPLICATION(S): 160 LOTION TOPICAL at 18:30

## 2017-05-22 RX ADMIN — Medication 50 MILLIGRAM(S): at 18:30

## 2017-05-22 NOTE — PROGRESS NOTE ADULT - PROBLEM SELECTOR PLAN 4
leg edema: hold fluids  f/u proBNP and ECHO -Noted in TTE, may need outpatient cardio work-up  -Continue with cardiac meds.

## 2017-05-22 NOTE — PROGRESS NOTE ADULT - SUBJECTIVE AND OBJECTIVE BOX
Patient is a 82y old  Male who presents with a chief complaint of thigh and lower back pain and generalized weakness.    INTERVAL HPI/OVERNIGHT EVENTS: No specific complaints.    T(C): 36.5, Max: 37.1 (05-21 @ 16:17)  HR: 73 (73 - 105)  BP: 135/90 (91/60 - 135/90)  RR: 16 (16 - 16)  SpO2: 98% (97% - 100%)      I & Os for current day (as of 22 May 2017 15:29)  =============================================  IN: 0 ml / OUT: 1000 ml / NET: -1000 ml      LABS:                        10.7   10.6  )-----------( 275      ( 22 May 2017 05:17 )             31.7     05-22    140  |  105  |  24<H>  ----------------------------<  128<H>  4.3   |  27  |  1.71<H>    Ca    8.6      22 May 2017 05:17  Phos  3.3     05-22  Mg     1.7     05-22    TPro  6.1  /  Alb  2.6<L>  /  TBili  0.3  /  DBili  x   /  AST  21  /  ALT  17  /  AlkPhos  82  05-22    PT/INR - ( 22 May 2017 05:17 )   PT: 16.2 sec;   INR: 1.47 ratio         PTT - ( 22 May 2017 05:17 )  PTT:31.4 sec    CAPILLARY BLOOD GLUCOSE  115 (22 May 2017 12:02)  246 (22 May 2017 08:24)  210 (21 May 2017 21:19)  110 (21 May 2017 16:17)    LIVER FUNCTIONS - ( 22 May 2017 05:17 )  Alb: 2.6 g/dL / Pro: 6.1 g/dL / ALK PHOS: 82 U/L / ALT: 17 U/L DA / AST: 21 U/L / GGT: x                   MEDICATIONS  (STANDING):  insulin lispro (HumaLOG) corrective regimen sliding scale  SubCutaneous three times a day before meals  ascorbic acid 500milliGRAM(s) Oral daily  docusate sodium 100milliGRAM(s) Oral two times a day  senna 2Tablet(s) Oral at bedtime  zinc oxide 20% Ointment 1Application(s) Topical three times a day  zinc oxide 20% Ointment 1Application(s) Topical two times a day  pantoprazole    Tablet 40milliGRAM(s) Oral before breakfast  metoprolol 50milliGRAM(s) Oral two times a day  calamine Lotion 1Application(s) Topical two times a day  warfarin 4milliGRAM(s) Oral once    MEDICATIONS  (PRN):  dextrose Gel 1Dose(s) Oral once PRN Blood Glucose LESS THAN 70 milliGRAM(s)/deciLiter  glucagon  Injectable 1milliGRAM(s) IntraMuscular once PRN Glucose <70 milliGRAM(s)/deciLiter  morphine  - Injectable 2milliGRAM(s) IV Push every 6 hours PRN Severe Pain (7 - 10)  oxyCODONE  5 mG/acetaminophen 325 mG 1Tablet(s) Oral every 6 hours PRN Moderate Pain (4 - 6)  acetaminophen   Tablet. 650milliGRAM(s) Oral every 6 hours PRN Mild Pain (1 - 3)      RADIOLOGY & ADDITIONAL TESTS:    RO 5/21/17  CONCLUSIONS:  1. Doming and thickening of the tips of the mitral valve  consistent with rheumatic mitral valve disease. Mild mitral  regurgitation.  Moderate-severe stenosis.  2. Normal trileaflet aortic valve.  3. Normal aortic root.  4. Severe left atrial enlargement.  5. Normal left ventricular internal dimensions and wall  thicknesses.  6. Normal Left Ventricular Systolic Function,  (EF = 55 to  60%)  7. Grade II diastolic dysfunction  8. Normal right atrium.  9. Normal right ventricular size and function.  10. RA Pressure is 10 mm Hg.  11. RVS Pressure is 41 mm Hg. Mild pulmonary hypertension.  12. There is mild tricuspid regurgitation.  13. Normal pericardium with no pericardial effusion.  14. Consider further evaluation of the mitral valve      Imaging Personally Reviewed:  [x ] YES  [ ] NO    Consultant(s) Notes Reviewed:  [x ] YES  [ ] NO    REVIEW OF SYSTEMS:  CONSTITUTIONAL: No fever, weight loss, or fatigue  EYES: No eye pain, visual disturbances, or discharge  ENMT:  No difficulty hearing, tinnitus, vertigo; No sinus or throat pain  NECK: No pain or stiffness  BREASTS: No pain, masses, or nipple discharge  RESPIRATORY: No cough, wheezing, chills or hemoptysis; No shortness of breath  CARDIOVASCULAR: No chest pain, palpitations, dizziness, or leg swelling  GASTROINTESTINAL: No abdominal or epigastric pain. No nausea, vomiting, or hematemesis; No diarrhea or constipation. No melena or hematochezia.  GENITOURINARY: No dysuria, frequency, hematuria, or incontinence  NEUROLOGICAL: No headaches, memory loss, loss of strength, numbness, or tremors  SKIN: Dry skin on the buttock.  MUSCULOSKELETAL: No joint pain or swelling; No muscle, back, or extremity pain  PSYCHIATRIC: No depression, anxiety, mood swings, or difficulty sleeping      PHYSICAL EXAM:  GENERAL: NAD, long toenails noted, not groomed properly.  HEAD:  Atraumatic, Normocephalic  EYES: EOMI, PERRLA, conjunctiva and sclera clear  ENMT: No tonsillar erythema, exudates, or enlargement  NECK: Supple, No JVD, Normal thyroid  NERVOUS SYSTEM:  Alert & Oriented X3  CHEST/LUNG: Clear to percussion bilaterally; No rales, rhonchi, wheezing, or rubs  HEART: Irregular rate and rhythm; systolic murmurs, no rubs, or gallops  ABDOMEN: Soft, Nontender, Nondistended; Bowel sounds present  EXTREMITIES:  2+ Peripheral Pulses bilaterally, No clubbing, cyanosis, or edema  LYMPH: No lymphadenopathy noted  SKIN: No rashes or lesions    Care Discussed with Consultants/Other Providers [x ] YES  [ ] NO Patient is a 82y old  Male who presents with a chief complaint of thigh and lower back pain and generalized weakness.    INTERVAL HPI/OVERNIGHT EVENTS: No specific complaints.    T(C): 36.5, Max: 37.1 (05-21 @ 16:17)  HR: 73 (73 - 105)  BP: 135/90 (91/60 - 135/90)  RR: 16 (16 - 16)  SpO2: 98% (97% - 100%)      I & Os for current day (as of 22 May 2017 15:29)  =============================================  IN: 0 ml / OUT: 1000 ml / NET: -1000 ml      LABS:                        10.7   10.6  )-----------( 275      ( 22 May 2017 05:17 )             31.7     05-22    140  |  105  |  24<H>  ----------------------------<  128<H>  4.3   |  27  |  1.71<H>    Ca    8.6      22 May 2017 05:17  Phos  3.3     05-22  Mg     1.7     05-22    TPro  6.1  /  Alb  2.6<L>  /  TBili  0.3  /  DBili  x   /  AST  21  /  ALT  17  /  AlkPhos  82  05-22    PT/INR - ( 22 May 2017 05:17 )   PT: 16.2 sec;   INR: 1.47 ratio         PTT - ( 22 May 2017 05:17 )  PTT:31.4 sec    CAPILLARY BLOOD GLUCOSE  115 (22 May 2017 12:02)  246 (22 May 2017 08:24)  210 (21 May 2017 21:19)  110 (21 May 2017 16:17)    LIVER FUNCTIONS - ( 22 May 2017 05:17 )  Alb: 2.6 g/dL / Pro: 6.1 g/dL / ALK PHOS: 82 U/L / ALT: 17 U/L DA / AST: 21 U/L / GGT: x                   MEDICATIONS  (STANDING):  insulin lispro (HumaLOG) corrective regimen sliding scale  SubCutaneous three times a day before meals  ascorbic acid 500milliGRAM(s) Oral daily  docusate sodium 100milliGRAM(s) Oral two times a day  senna 2Tablet(s) Oral at bedtime  zinc oxide 20% Ointment 1Application(s) Topical three times a day  zinc oxide 20% Ointment 1Application(s) Topical two times a day  pantoprazole    Tablet 40milliGRAM(s) Oral before breakfast  metoprolol 50milliGRAM(s) Oral two times a day  calamine Lotion 1Application(s) Topical two times a day  warfarin 4milliGRAM(s) Oral once    MEDICATIONS  (PRN):  dextrose Gel 1Dose(s) Oral once PRN Blood Glucose LESS THAN 70 milliGRAM(s)/deciLiter  glucagon  Injectable 1milliGRAM(s) IntraMuscular once PRN Glucose <70 milliGRAM(s)/deciLiter  morphine  - Injectable 2milliGRAM(s) IV Push every 6 hours PRN Severe Pain (7 - 10)  oxyCODONE  5 mG/acetaminophen 325 mG 1Tablet(s) Oral every 6 hours PRN Moderate Pain (4 - 6)  acetaminophen   Tablet. 650milliGRAM(s) Oral every 6 hours PRN Mild Pain (1 - 3)      RADIOLOGY & ADDITIONAL TESTS:    RO 5/21/17  CONCLUSIONS:  1. Doming and thickening of the tips of the mitral valve  consistent with rheumatic mitral valve disease. Mild mitral  regurgitation.  Moderate-severe stenosis.  2. Normal trileaflet aortic valve.  3. Normal aortic root.  4. Severe left atrial enlargement.  5. Normal left ventricular internal dimensions and wall  thicknesses.  6. Normal Left Ventricular Systolic Function,  (EF = 55 to  60%)  7. Grade II diastolic dysfunction  8. Normal right atrium.  9. Normal right ventricular size and function.  10. RA Pressure is 10 mm Hg.  11. RVS Pressure is 41 mm Hg. Mild pulmonary hypertension.  12. There is mild tricuspid regurgitation.  13. Normal pericardium with no pericardial effusion.  14. Consider further evaluation of the mitral valve      Imaging Personally Reviewed:  [x ] YES  [ ] NO    Consultant(s) Notes Reviewed:  [x ] YES  [ ] NO    REVIEW OF SYSTEMS:  CONSTITUTIONAL: No fever, weight loss, or fatigue  EYES: No eye pain, visual disturbances, or discharge  ENMT:  No difficulty hearing, tinnitus, vertigo; No sinus or throat pain  NECK: No pain or stiffness  BREASTS: No pain, masses, or nipple discharge  RESPIRATORY: No cough, wheezing, chills or hemoptysis; No shortness of breath  CARDIOVASCULAR: No chest pain, palpitations, dizziness, or leg swelling  GASTROINTESTINAL: No abdominal or epigastric pain. No nausea, vomiting, or hematemesis; No diarrhea or constipation.   GENITOURINARY: No dysuria, frequency, hematuria, or incontinence  NEUROLOGICAL: No headaches, memory loss, loss of strength, numbness, or tremors  SKIN: Dry skin on the buttock.  MUSCULOSKELETAL: back pain      PHYSICAL EXAM:  GENERAL: NAD, long toenails noted, not groomed properly.  HEAD:  Atraumatic, Normocephalic  EYES: EOMI, PERRLA, conjunctiva and sclera clear  ENMT: No tonsillar erythema, exudates, or enlargement  NECK: Supple, No JVD, Normal thyroid  NERVOUS SYSTEM:  Alert & Oriented X3  CHEST/LUNG: Clear to percussion bilaterally; No rales, rhonchi, wheezing, or rubs  HEART: Irregular rate and rhythm; systolic murmurs, no rubs, or gallops  ABDOMEN: Soft, Nontender, Nondistended; Bowel sounds present  EXTREMITIES:  2+ Peripheral Pulses bilaterally, No clubbing, cyanosis, trace pitting edema on the legs B/L  LYMPH: No lymphadenopathy noted  SKIN: extensive excoriations with skin break noted on the posterior buttock and thigh.    Care Discussed with Consultants/Other Providers [x ] YES  [ ] NO

## 2017-05-22 NOTE — PROGRESS NOTE ADULT - PROBLEM SELECTOR PLAN 5
MATHEUS: bun/cr of 44/2.35   unknown baseline likely underlying CKD  hold lasix -Improving Cr level  -Adjust vancomycin dose for low trough, will monitor daily renal function and check vancomycin trough.

## 2017-05-22 NOTE — PHYSICAL THERAPY INITIAL EVALUATION ADULT - PLANNED THERAPY INTERVENTIONS, PT EVAL
balance training/bed mobility training/gait training/neuromuscular re-education/postural re-education/ROM/strengthening/transfer training

## 2017-05-22 NOTE — PHYSICAL THERAPY INITIAL EVALUATION ADULT - ADDITIONAL COMMENTS
Pt reports the last time he ambulated Independently was one month ago with rolling walker, both inside and outside of the home. Within the past month, pt has been mostly in bed with wife assisting in transfers and household ambulation distances with rolling walker. Pt reports no HHA. Pt owns shower chair; no additional equipment.

## 2017-05-22 NOTE — PROGRESS NOTE ADULT - PROBLEM SELECTOR PLAN 2
NPH: hx of NPH  fall precautions -PT evaluation and fall precautions.  -Will need long term care placement given insufficient family support.

## 2017-05-22 NOTE — PHYSICAL THERAPY INITIAL EVALUATION ADULT - GENERAL OBSERVATIONS, REHAB EVAL
Consult received, chart reviewed. Patient received supine in bed, NAD. Patient agreed to EVALUATION from Physical Therapist. Consult received, chart reviewed. Patient received supine in bed, NAD, +cabrera. Patient agreed to EVALUATION from Physical Therapist.

## 2017-05-22 NOTE — PROGRESS NOTE ADULT - PROBLEM SELECTOR PLAN 3
afib: INR 3.14 , Hold lopressor as blood pressure is borderline low  f/u iNR  Unknown coumadin dosage ekg rate controlled afib RBBB -Monitor VS, daily INR.  -Will give 4mg of warfarin today for low INR.

## 2017-05-22 NOTE — PROGRESS NOTE ADULT - PROBLEM SELECTOR PLAN 1
infected bed soar  wbc count of 12.8  frequent turning  mupriocin ointment   c/w vancomycin and azactem for now to cover pseudomonas and vancocmyin  f/u blood culture  ID jess Lala -Lesions looks more likely dermatitis than pressure ulcer.  -Patient was seen by wound care nurse, who recommends dermatology eval.  -Currently on Vanco only for suspected underlying cellulitis.  -Moisturize and apply zinc oint for the lesions, frequent turn.

## 2017-05-22 NOTE — CONSULT NOTE ADULT - SUBJECTIVE AND OBJECTIVE BOX
CHIEF COMPLAINT:Patient is a 82y old  Male who presents with a chief complaint of  generalized weakness and sob.    HPI:  81 y/o M w/ PMHx of NPH ( unsteady gait used to ambulate with walker now bedbound for a couple of months) & Afib (on Coumadin) presents to the ED c/o generalized weakness x1 week. Pt states he is unable to get out of bed w/ worsening sores to his lower back & upper thighs with wet discharge and pain . patient is AAO times 2 but is forgetful and poor histoiran , is unable to provide detailed information states his wife dropped him off to the hospital becuase the soars in his thighs and back were getting worse and she has been unable to help him . denies fever, chills, vomiting, diarrhea, SOB, or any other complaints , endorses chronic leg edema , does not remember his medications , states he believes he is on coumadin but cant recall dosage , tired to call wife multiple times without any response . ROS is negative except above.    in the ER patient's vs T  98 , hr 88 , rr 18 pulse ox of 98 on room air , labs were pertinent for wbc count of 12.6 , BUN/cr of 44/2,25 , INR 3.15 , ekg showed RBBBafib 80 bpm with PVC.patient was given  750 cc bolus and one dose of azactem in the ER. (20 May 2017 07:03)      PAST MEDICAL & SURGICAL HISTORY:  No pertinent past medical history  No significant past surgical history      MEDICATIONS  (STANDING):  insulin lispro (HumaLOG) corrective regimen sliding scale  SubCutaneous three times a day before meals  dextrose 5%. 1000milliLiter(s) IV Continuous <Continuous>  dextrose 50% Injectable 12.5Gram(s) IV Push once  dextrose 50% Injectable 25Gram(s) IV Push once  dextrose 50% Injectable 25Gram(s) IV Push once  ascorbic acid 500milliGRAM(s) Oral daily  docusate sodium 100milliGRAM(s) Oral two times a day  senna 2Tablet(s) Oral at bedtime  zinc oxide 20% Ointment 1Application(s) Topical three times a day  zinc oxide 20% Ointment 1Application(s) Topical two times a day  pantoprazole    Tablet 40milliGRAM(s) Oral before breakfast  metoprolol 50milliGRAM(s) Oral two times a day  calamine Lotion 1Application(s) Topical two times a day  warfarin 4milliGRAM(s) Oral once    MEDICATIONS  (PRN):  dextrose Gel 1Dose(s) Oral once PRN Blood Glucose LESS THAN 70 milliGRAM(s)/deciLiter  glucagon  Injectable 1milliGRAM(s) IntraMuscular once PRN Glucose <70 milliGRAM(s)/deciLiter  morphine  - Injectable 2milliGRAM(s) IV Push every 6 hours PRN Severe Pain (7 - 10)  oxyCODONE  5 mG/acetaminophen 325 mG 1Tablet(s) Oral every 6 hours PRN Moderate Pain (4 - 6)  acetaminophen   Tablet. 650milliGRAM(s) Oral every 6 hours PRN Mild Pain (1 - 3)      FAMILY HISTORY:      SOCIAL HISTORY:    [X ] Non-smoker  [ ] Smoker  [ ] Alcohol    Allergies    penicillin (Unknown)    Intolerances    	    REVIEW OF SYSTEMS:  CONSTITUTIONAL: No fever, weight loss, pos  fatigue,malaise  EYES: No eye pain, visual disturbances, or discharge  ENT:  No difficulty hearing, tinnitus, vertigo; No sinus or throat pain  NECK: No pain or stiffness  RESPIRATORY: No cough, wheezing, chills or hemoptysis; No Shortness of Breath  CARDIOVASCULAR: No chest pain, palpitations, passing out, dizziness, or leg swelling  GASTROINTESTINAL: No abdominal or epigastric pain. No nausea, vomiting, or hematemesis; No diarrhea or constipation. No melena or hematochezia.  GENITOURINARY: No dysuria, frequency, hematuria, or incontinence  NEUROLOGICAL: No headaches, memory loss, loss of strength, numbness, or tremors  SKIN: No itching, burning, rashes, or lesions   LYMPH Nodes: No enlarged glands  ENDOCRINE: No heat or cold intolerance; No hair loss  MUSCULOSKELETAL: No joint pain or swelling; No muscle, back, or extremity pain  PSYCHIATRIC: No depression, anxiety, mood swings, or difficulty sleeping  HEME/LYMPH: No easy bruising, or bleeding gums  ALLERGY AND IMMUNOLOGIC: No hives or eczema	    [ ] All others negative	  [ ] Unable to obtain    PHYSICAL EXAM:  T(C): 36.9, Max: 37.1 (05-21 @ 23:41)  HR: 99 (73 - 105)  BP: 114/57 (91/60 - 135/90)  RR: 18 (16 - 18)  SpO2: 97% (97% - 100%)  Wt(kg): --  I&O's Summary    I & Os for current day (as of 22 May 2017 16:22)  =============================================  IN: 0 ml / OUT: 1000 ml / NET: -1000 ml      Appearance: Normal	  HEENT:   Normal oral mucosa, PERRL, EOMI	  Lymphatic: No lymphadenopathy  Cardiovascular: Normal S1 S2, No JVD,diastolic  murmurs, No edema  Respiratory: barney rhonchi	  Psychiatry: A & O x 3, Mood & affect appropriate  Gastrointestinal:  Soft, Non-tender, + BS	  Skin: No rashes, No ecchymoses, No cyanosis	  Neurologic: Non-focal  Extremities: Normal range of motion, No clubbing, cyanosis or edema  Vascular: Peripheral pulses palpable 2+ bilaterally    TELEMETRY: 	    ECG:  	a fib,rbbb  RADIOLOGY:Right bundle branch block  Left anterior fascicular block  *** Bifascicular block ***  OTHER: 	  	  LABS:	 	    CARDIAC MARKERS:                              10.7   10.6  )-----------( 275      ( 22 May 2017 05:17 )             31.7     05-22    140  |  105  |  24<H>  ----------------------------<  128<H>  4.3   |  27  |  1.71<H>    Ca    8.6      22 May 2017 05:17  Phos  3.3     05-22  Mg     1.7     05-22    TPro  6.1  /  Alb  2.6<L>  /  TBili  0.3  /  DBili  x   /  AST  21  /  ALT  17  /  AlkPhos  82  05-22    proBNP:   Lipid Profile:   HgA1c:   TSH:     PREVIOUS DIAGNOSTIC TESTING:    [ ] Echocardiogram:  [ ]  Catheterization:  [ ] Stress Test:      pt with malaise,fatigue and decrease bp  ro sepsis  tsh  echo  sed rate,crp  fu inr  ivf fu renal function CHIEF COMPLAINT:Patient is a 82y old  Male who presents with a chief complaint of  generalized weakness and sob.    HPI:  83 y/o M w/ PMHx of NPH ( unsteady gait used to ambulate with walker now bedbound for a couple of months) & Afib (on Coumadin) presents to the ED c/o generalized weakness x1 week. Pt states he is unable to get out of bed w/ worsening sores to his lower back & upper thighs with wet discharge and pain . patient is AAO times 2 but is forgetful and poor histoiran , is unable to provide detailed information states his wife dropped him off to the hospital becuase the soars in his thighs and back were getting worse and she has been unable to help him . denies fever, chills, vomiting, diarrhea, SOB, or any other complaints , endorses chronic leg edema , does not remember his medications , states he believes he is on coumadin but cant recall dosage , tired to call wife multiple times without any response . ROS is negative except above.    in the ER patient's vs T  98 , hr 88 , rr 18 pulse ox of 98 on room air , labs were pertinent for wbc count of 12.6 , BUN/cr of 44/2,25 , INR 3.15 , ekg showed RBBBafib 80 bpm with PVC.patient was given  750 cc bolus and one dose of azactem in the ER. (20 May 2017 07:03)      PAST MEDICAL & SURGICAL HISTORY:  No pertinent past medical history  No significant past surgical history      MEDICATIONS  (STANDING):  insulin lispro (HumaLOG) corrective regimen sliding scale  SubCutaneous three times a day before meals  dextrose 5%. 1000milliLiter(s) IV Continuous <Continuous>  dextrose 50% Injectable 12.5Gram(s) IV Push once  dextrose 50% Injectable 25Gram(s) IV Push once  dextrose 50% Injectable 25Gram(s) IV Push once  ascorbic acid 500milliGRAM(s) Oral daily  docusate sodium 100milliGRAM(s) Oral two times a day  senna 2Tablet(s) Oral at bedtime  zinc oxide 20% Ointment 1Application(s) Topical three times a day  zinc oxide 20% Ointment 1Application(s) Topical two times a day  pantoprazole    Tablet 40milliGRAM(s) Oral before breakfast  metoprolol 50milliGRAM(s) Oral two times a day  calamine Lotion 1Application(s) Topical two times a day  warfarin 4milliGRAM(s) Oral once    MEDICATIONS  (PRN):  dextrose Gel 1Dose(s) Oral once PRN Blood Glucose LESS THAN 70 milliGRAM(s)/deciLiter  glucagon  Injectable 1milliGRAM(s) IntraMuscular once PRN Glucose <70 milliGRAM(s)/deciLiter  morphine  - Injectable 2milliGRAM(s) IV Push every 6 hours PRN Severe Pain (7 - 10)  oxyCODONE  5 mG/acetaminophen 325 mG 1Tablet(s) Oral every 6 hours PRN Moderate Pain (4 - 6)  acetaminophen   Tablet. 650milliGRAM(s) Oral every 6 hours PRN Mild Pain (1 - 3)      FAMILY HISTORY:      SOCIAL HISTORY:    [X ] Non-smoker  [ ] Smoker  [ ] Alcohol    Allergies    penicillin (Unknown)    Intolerances    	    REVIEW OF SYSTEMS:  CONSTITUTIONAL: No fever, weight loss, pos  fatigue,malaise  EYES: No eye pain, visual disturbances, or discharge  ENT:  No difficulty hearing, tinnitus, vertigo; No sinus or throat pain  NECK: No pain or stiffness  RESPIRATORY: No cough, wheezing, chills or hemoptysis; No Shortness of Breath  CARDIOVASCULAR: No chest pain, palpitations, passing out, dizziness, or leg swelling  GASTROINTESTINAL: No abdominal or epigastric pain. No nausea, vomiting, or hematemesis; No diarrhea or constipation. No melena or hematochezia.  GENITOURINARY: No dysuria, frequency, hematuria, or incontinence  NEUROLOGICAL: No headaches, memory loss, loss of strength, numbness, or tremors  SKIN: No itching, burning, rashes, or lesions   LYMPH Nodes: No enlarged glands  ENDOCRINE: No heat or cold intolerance; No hair loss  MUSCULOSKELETAL: No joint pain or swelling; No muscle, back, or extremity pain  PSYCHIATRIC: No depression, anxiety, mood swings, or difficulty sleeping  HEME/LYMPH: No easy bruising, or bleeding gums  ALLERGY AND IMMUNOLOGIC: No hives or eczema	    [ ] All others negative	  [ ] Unable to obtain    PHYSICAL EXAM:  T(C): 36.9, Max: 37.1 (05-21 @ 23:41)  HR: 99 (73 - 105)  BP: 114/57 (91/60 - 135/90)  RR: 18 (16 - 18)  SpO2: 97% (97% - 100%)  Wt(kg): --  I&O's Summary    I & Os for current day (as of 22 May 2017 16:22)  =============================================  IN: 0 ml / OUT: 1000 ml / NET: -1000 ml      Appearance: Normal	  HEENT:   Normal oral mucosa, PERRL, EOMI	  Lymphatic: No lymphadenopathy  Cardiovascular: Normal S1 S2, No JVD,diastolic  murmurs, No edema  Respiratory: barney rhonchi	  Psychiatry: A & O x 3, Mood & affect appropriate  Gastrointestinal:  Soft, Non-tender, + BS	  Skin: No rashes, No ecchymoses, No cyanosis	  Neurologic: Non-focal  Extremities: Normal range of motion, No clubbing, cyanosis or edema  Vascular: Peripheral pulses palpable 2+ bilaterally    TELEMETRY: 	    ECG:  	a fib,rbbb  RADIOLOGY:Right bundle branch block  Left anterior fascicular block  *** Bifascicular block ***  OTHER: 	  	  LABS:	 	    CARDIAC MARKERS:                              10.7   10.6  )-----------( 275      ( 22 May 2017 05:17 )             31.7     05-22    140  |  105  |  24<H>  ----------------------------<  128<H>  4.3   |  27  |  1.71<H>    Ca    8.6      22 May 2017 05:17  Phos  3.3     05-22  Mg     1.7     05-22    TPro  6.1  /  Alb  2.6<L>  /  TBili  0.3  /  DBili  x   /  AST  21  /  ALT  17  /  AlkPhos  82  05-22    proBNP:   Lipid Profile:   HgA1c:   TSH:     PREVIOUS DIAGNOSTIC TESTING:    [ ] Echocardiogram:  [ ]  Catheterization:  [ ] Stress Test:      pt with malaise,fatigue and decrease bp  ro sepsis  tsh  echo evidence of mitral stenosis continue beta blocker,fu hr will consider possible brittany  sed rate,crp  fu inr  ivf fu renal function

## 2017-05-22 NOTE — ADVANCED PRACTICE NURSE CONSULT - RECOMMEDATIONS
-Please consider a Dermatology Consult for further evaluation  -Clean the Bilateral Lower Extremity and Gluteal Fold wounds with warm water, mild soap, and pat dry  -Apply skin prep to the skin and apply Zinc Oxide Barrier Cream  -Encourage the patient not to scratch the site and reposition Q 2hrs

## 2017-05-22 NOTE — PHYSICAL THERAPY INITIAL EVALUATION ADULT - ACTIVE RANGE OF MOTION EXAMINATION, REHAB EVAL
b/l LE ~1/2 range/barney. upper extremity Active ROM was WNL (within normal limits)/bilateral  lower extremity Active ROM was WFL (within functional limits)

## 2017-05-22 NOTE — ADVANCED PRACTICE NURSE CONSULT - ASSESSMENT
This is a 82yr old male patient admitted for Difficulty Walking, presenting with open and intact Dermatological lesions to the Bilateral Lower Extremities and Gluteal Fold.

## 2017-05-23 LAB
ANION GAP SERPL CALC-SCNC: 9 MMOL/L — SIGNIFICANT CHANGE UP (ref 5–17)
BASOPHILS # BLD AUTO: 0.1 K/UL — SIGNIFICANT CHANGE UP (ref 0–0.2)
BASOPHILS NFR BLD AUTO: 0.8 % — SIGNIFICANT CHANGE UP (ref 0–2)
BUN SERPL-MCNC: 21 MG/DL — HIGH (ref 7–18)
CALCIUM SERPL-MCNC: 8.9 MG/DL — SIGNIFICANT CHANGE UP (ref 8.4–10.5)
CHLORIDE SERPL-SCNC: 105 MMOL/L — SIGNIFICANT CHANGE UP (ref 96–108)
CO2 SERPL-SCNC: 26 MMOL/L — SIGNIFICANT CHANGE UP (ref 22–31)
CREAT SERPL-MCNC: 1.7 MG/DL — HIGH (ref 0.5–1.3)
EOSINOPHIL # BLD AUTO: 0.6 K/UL — HIGH (ref 0–0.5)
EOSINOPHIL NFR BLD AUTO: 6.8 % — HIGH (ref 0–6)
GLUCOSE SERPL-MCNC: 117 MG/DL — HIGH (ref 70–99)
HCT VFR BLD CALC: 32.2 % — LOW (ref 39–50)
HGB BLD-MCNC: 10.7 G/DL — LOW (ref 13–17)
INR BLD: 1.43 RATIO — HIGH (ref 0.88–1.16)
LYMPHOCYTES # BLD AUTO: 2.8 K/UL — SIGNIFICANT CHANGE UP (ref 1–3.3)
LYMPHOCYTES # BLD AUTO: 31.3 % — SIGNIFICANT CHANGE UP (ref 13–44)
MAGNESIUM SERPL-MCNC: 1.8 MG/DL — SIGNIFICANT CHANGE UP (ref 1.6–2.6)
MCHC RBC-ENTMCNC: 29.4 PG — SIGNIFICANT CHANGE UP (ref 27–34)
MCHC RBC-ENTMCNC: 33.3 GM/DL — SIGNIFICANT CHANGE UP (ref 32–36)
MCV RBC AUTO: 88.2 FL — SIGNIFICANT CHANGE UP (ref 80–100)
MONOCYTES # BLD AUTO: 0.9 K/UL — SIGNIFICANT CHANGE UP (ref 0–0.9)
MONOCYTES NFR BLD AUTO: 9.4 % — SIGNIFICANT CHANGE UP (ref 2–14)
NEUTROPHILS # BLD AUTO: 4.7 K/UL — SIGNIFICANT CHANGE UP (ref 1.8–7.4)
NEUTROPHILS NFR BLD AUTO: 51.7 % — SIGNIFICANT CHANGE UP (ref 43–77)
PHOSPHATE SERPL-MCNC: 2.9 MG/DL — SIGNIFICANT CHANGE UP (ref 2.5–4.5)
PLATELET # BLD AUTO: 265 K/UL — SIGNIFICANT CHANGE UP (ref 150–400)
POTASSIUM SERPL-MCNC: 4.4 MMOL/L — SIGNIFICANT CHANGE UP (ref 3.5–5.3)
POTASSIUM SERPL-SCNC: 4.4 MMOL/L — SIGNIFICANT CHANGE UP (ref 3.5–5.3)
PROTHROM AB SERPL-ACNC: 15.7 SEC — HIGH (ref 9.8–12.7)
RBC # BLD: 3.65 M/UL — LOW (ref 4.2–5.8)
RBC # FLD: 11.2 % — SIGNIFICANT CHANGE UP (ref 10.3–14.5)
SODIUM SERPL-SCNC: 140 MMOL/L — SIGNIFICANT CHANGE UP (ref 135–145)
WBC # BLD: 9.1 K/UL — SIGNIFICANT CHANGE UP (ref 3.8–10.5)
WBC # FLD AUTO: 9.1 K/UL — SIGNIFICANT CHANGE UP (ref 3.8–10.5)

## 2017-05-23 PROCEDURE — 99223 1ST HOSP IP/OBS HIGH 75: CPT

## 2017-05-23 RX ORDER — ENOXAPARIN SODIUM 100 MG/ML
60 INJECTION SUBCUTANEOUS EVERY 12 HOURS
Refills: 0 | Status: DISCONTINUED | OUTPATIENT
Start: 2017-05-23 | End: 2017-05-25

## 2017-05-23 RX ORDER — WARFARIN SODIUM 2.5 MG/1
4 TABLET ORAL ONCE
Refills: 0 | Status: COMPLETED | OUTPATIENT
Start: 2017-05-23 | End: 2017-05-23

## 2017-05-23 RX ORDER — TAMSULOSIN HYDROCHLORIDE 0.4 MG/1
0.4 CAPSULE ORAL AT BEDTIME
Refills: 0 | Status: DISCONTINUED | OUTPATIENT
Start: 2017-05-23 | End: 2017-05-26

## 2017-05-23 RX ORDER — NYSTATIN CREAM 100000 [USP'U]/G
1 CREAM TOPICAL THREE TIMES A DAY
Refills: 0 | Status: DISCONTINUED | OUTPATIENT
Start: 2017-05-23 | End: 2017-05-26

## 2017-05-23 RX ADMIN — Medication 100 MILLIGRAM(S): at 06:20

## 2017-05-23 RX ADMIN — SENNA PLUS 2 TABLET(S): 8.6 TABLET ORAL at 23:01

## 2017-05-23 RX ADMIN — ZINC OXIDE 1 APPLICATION(S): 200 OINTMENT TOPICAL at 23:04

## 2017-05-23 RX ADMIN — Medication 1: at 09:06

## 2017-05-23 RX ADMIN — Medication 100 MILLIGRAM(S): at 18:23

## 2017-05-23 RX ADMIN — TAMSULOSIN HYDROCHLORIDE 0.4 MILLIGRAM(S): 0.4 CAPSULE ORAL at 23:02

## 2017-05-23 RX ADMIN — Medication 50 MILLIGRAM(S): at 18:23

## 2017-05-23 RX ADMIN — ENOXAPARIN SODIUM 60 MILLIGRAM(S): 100 INJECTION SUBCUTANEOUS at 18:23

## 2017-05-23 RX ADMIN — CALAMINE 8% AND ZINC OXIDE 8% 1 APPLICATION(S): 160 LOTION TOPICAL at 06:19

## 2017-05-23 RX ADMIN — NYSTATIN CREAM 1 APPLICATION(S): 100000 CREAM TOPICAL at 23:01

## 2017-05-23 RX ADMIN — Medication 81 MILLIGRAM(S): at 12:13

## 2017-05-23 RX ADMIN — Medication 166.67 MILLIGRAM(S): at 06:20

## 2017-05-23 RX ADMIN — WARFARIN SODIUM 4 MILLIGRAM(S): 2.5 TABLET ORAL at 23:01

## 2017-05-23 RX ADMIN — ZINC OXIDE 1 APPLICATION(S): 200 OINTMENT TOPICAL at 14:42

## 2017-05-23 RX ADMIN — PANTOPRAZOLE SODIUM 40 MILLIGRAM(S): 20 TABLET, DELAYED RELEASE ORAL at 06:20

## 2017-05-23 RX ADMIN — Medication 500 MILLIGRAM(S): at 12:13

## 2017-05-23 RX ADMIN — Medication 1: at 12:13

## 2017-05-23 NOTE — CONSULT NOTE ADULT - SUBJECTIVE AND OBJECTIVE BOX
CC:    HPI: HPI:  81 y/o M w/ PMHx of NPH ( unsteady gait used to ambulate with walker now bedbound for a couple of months) & Afib (on Coumadin) presents to the ED c/o generalized weakness x1 week. Pt states he is unable to get out of bed w/ worsening sores to his lower back & upper thighs with wet discharge and pain . patient is AAO times 2 but is forgetful and poor histoiran , is unable to provide detailed information states his wife dropped him off to the hospital becuase the soars in his thighs and back were getting worse and she has been unable to help him . denies fever, chills, vomiting, diarrhea, SOB, or any other complaints , endorses chronic leg edema , does not remember his medications , states he believes he is on coumadin but cant recall dosage   in the ER patient's vs T  98 , hr 88 , rr 18 pulse ox of 98 on room air , labs were pertinent for wbc count of 12.6 , BUN/cr of 44/2,25 , INR 3.15 , ekg showed RBBBafib 80 bpm with PVC.patient was given  750 cc bolus and one dose of azactem in the ER. (20 May 2017 07:03)      ROS:  Constitutional, Neurological, Psychiatric, Eyes, ENT, Cardiovascular, Respiratory, Gastrointestinal, Genitourinary, Musculoskeletal, Integumentary, Endocrine and Heme/Lymph are otherwise negative. Except for    Vital Signs Last 24 Hrs  T(C): 36.8, Max: 37.2 (05-23 @ 00:00)  T(F): 98.3, Max: 98.9 (05-23 @ 00:00)  HR: 89 (71 - 99)  BP: 113/69 (100/61 - 146/66)  BP(mean): --  RR: 18 (18 - 18)  SpO2: 98% (94% - 98%)    Physical Exam:  Constitutional: alert and in no acute distress.  Eyes: the sclera and conjunctiva were normal, pupils were equal in size, round, reactive to light, with normal accommodation and extraocular movements were intact.     Neuro Exam:  Orientation: oriented to person, oriented to place and oriented to time.   Attention: normal concentrating ability and visual attention was not decreased.   Language: no difficulty naming common objects, no difficulty repeating a phrase, no difficulty writing a sentence, fluency intact, comprehension intact and reading intact.   Fund of knowledge: displays adequate knowledge of personal past history.   Cranial Nerves: visual acuity intact bilaterally, visual fields full to confrontation, pupils equal round and reactive to light, extraocular motion intact, facial sensation intact symmetrically, face symmetrical, hearing was intact bilaterally, tongue and palate midline, head turning and shoulder shrug symmetric and there was no tongue deviation with protrusion.   Motor: muscle tone was normal in all four extremities, muscle strength was normal in all four extremities and normal bulk in all four extremities. mild right hand decreased facilitation of of fine movements.   Sensory exam: light touch was intact.   Coordination:. normal gait. balance was intact. there was no past-pointing. no tremor present.   Deep tendon reflexes:   Biceps right 2+. Biceps left 2+.    Triceps right 2+. Triceps left 2+.  LOC  Brachioradialis right 2+. Brachioradialis left 2+.    Patella right 2+. Patella left 2+.    Ankle jerk right 2+. Ankle jerk left 2+.   Plantar responses normal on the right, normal on the left.          Allergies    penicillin (Unknown)    Intolerances      MEDICATIONS  (STANDING):  insulin lispro (HumaLOG) corrective regimen sliding scale  SubCutaneous three times a day before meals  dextrose 5%. 1000milliLiter(s) IV Continuous <Continuous>  dextrose 50% Injectable 12.5Gram(s) IV Push once  dextrose 50% Injectable 25Gram(s) IV Push once  dextrose 50% Injectable 25Gram(s) IV Push once  ascorbic acid 500milliGRAM(s) Oral daily  docusate sodium 100milliGRAM(s) Oral two times a day  senna 2Tablet(s) Oral at bedtime  zinc oxide 20% Ointment 1Application(s) Topical three times a day  zinc oxide 20% Ointment 1Application(s) Topical two times a day  pantoprazole    Tablet 40milliGRAM(s) Oral before breakfast  metoprolol 50milliGRAM(s) Oral two times a day  calamine Lotion 1Application(s) Topical two times a day  vancomycin  IVPB 1250milliGRAM(s) IV Intermittent every 24 hours  aspirin enteric coated 81milliGRAM(s) Oral daily  warfarin 4milliGRAM(s) Oral once  enoxaparin Injectable 60milliGRAM(s) SubCutaneous every 12 hours    MEDICATIONS  (PRN):  dextrose Gel 1Dose(s) Oral once PRN Blood Glucose LESS THAN 70 milliGRAM(s)/deciLiter  glucagon  Injectable 1milliGRAM(s) IntraMuscular once PRN Glucose <70 milliGRAM(s)/deciLiter  morphine  - Injectable 2milliGRAM(s) IV Push every 6 hours PRN Severe Pain (7 - 10)  oxyCODONE  5 mG/acetaminophen 325 mG 1Tablet(s) Oral every 6 hours PRN Moderate Pain (4 - 6)  acetaminophen   Tablet. 650milliGRAM(s) Oral every 6 hours PRN Mild Pain (1 - 3)      LABS:                        10.7   9.1   )-----------( 265      ( 23 May 2017 06:43 )             32.2     05-23    140  |  105  |  21<H>  ----------------------------<  117<H>  4.4   |  26  |  1.70<H>    Ca    8.9      23 May 2017 06:43  Phos  2.9     05-23  Mg     1.8     05-23    TPro  6.1  /  Alb  2.6<L>  /  TBili  0.3  /  DBili  x   /  AST  21  /  ALT  17  /  AlkPhos  82  05-22    PT/INR - ( 23 May 2017 06:43 )   PT: 15.7 sec;   INR: 1.43 ratio         PTT - ( 22 May 2017 05:17 )  PTT:31.4 sec  Hemoglobin A1C, Whole Blood: 5.8 % (05-20 @ 09:38)        Neuro Imaging: CC:    HPI: HPI:  83 y/o M w/ PMHx of NPH ( unsteady gait used to ambulate with walker now bedbound for a couple of months) & Afib (on Coumadin) presents to the ED c/o generalized weakness x1 week. Pt states he is unable to get out of bed w/ worsening sores to his lower back & upper thighs with wet discharge and pain . patient is AAO times 2 but is forgetful and poor histoiran , is unable to provide detailed information states his wife dropped him off to the hospital becuase the soars in his thighs and back were getting worse and she has been unable to help him . denies fever, chills, vomiting, diarrhea, SOB, or any other complaints , endorses chronic leg edema , does not remember his medications , states he believes he is on coumadin but cant recall dosage   in the ER patient's vs T  98 , hr 88 , rr 18 pulse ox of 98 on room air , labs were pertinent for wbc count of 12.6 , BUN/cr of 44/2,25 , INR 3.15 , ekg showed RBBBafib 80 bpm with PVC.patient was given  750 cc bolus and one dose of azactem in the ER. (20 May 2017 07:03)      ROS:  Constitutional, Neurological, Psychiatric, Eyes, ENT, Cardiovascular, Respiratory, Gastrointestinal, Genitourinary, Musculoskeletal, Integumentary, Endocrine and Heme/Lymph are otherwise negative. Except for above mentioned in HPI    Vital Signs Last 24 Hrs  T(C): 36.8, Max: 37.2 (05-23 @ 00:00)  T(F): 98.3, Max: 98.9 (05-23 @ 00:00)  HR: 89 (71 - 99)  BP: 113/69 (100/61 - 146/66)  BP(mean): --  RR: 18 (18 - 18)  SpO2: 98% (94% - 98%)    Physical Exam:  Constitutional: alert and in no acute distress.  Eyes: the sclera and conjunctiva were normal, pupils were equal in size, round, reactive to light, with normal accommodation and extraocular movements were intact.     Neuro Exam:  Orientation: oriented to person, oriented to place and oriented to month.   Attention: normal concentrating ability and visual attention was not decreased.   Language: no difficulty naming common objects, no difficulty repeating a phrase, no difficulty writing a sentence, fluency intact, comprehension intact and reading intact.   Fund of knowledge: displays adequate knowledge of personal past history.   Cranial Nerves: visual acuity intact bilaterally, visual fields full to confrontation, pupils equal round and reactive to light, extraocular motion intact, facial sensation intact symmetrically, face symmetrical, hearing was intact bilaterally, tongue and palate midline, head turning and shoulder shrug symmetric and there was no tongue deviation with protrusion.   Motor: muscle tone was normal in all four extremities, muscle strength was normal in all four extremities and normal bulk in all four extremities. mild right hand decreased facilitation of of fine movements.   Sensory exam: light touch was intact.   Coordination:.deferred gait assessment balance was intact. there was no past-pointing. no tremor present.   Deep tendon reflexes:   Biceps right 2+. Biceps left 2+.    Triceps right 2+. Triceps left 2+.  LOC  Brachioradialis right 2+. Brachioradialis left 2+.    Patella right 2+. Patella left 2+.    Ankle jerk right 2+. Ankle jerk left 2+.   Plantar responses normal on the right, normal on the left.          Allergies    penicillin (Unknown)    Intolerances      MEDICATIONS  (STANDING):  insulin lispro (HumaLOG) corrective regimen sliding scale  SubCutaneous three times a day before meals  dextrose 5%. 1000milliLiter(s) IV Continuous <Continuous>  dextrose 50% Injectable 12.5Gram(s) IV Push once  dextrose 50% Injectable 25Gram(s) IV Push once  dextrose 50% Injectable 25Gram(s) IV Push once  ascorbic acid 500milliGRAM(s) Oral daily  docusate sodium 100milliGRAM(s) Oral two times a day  senna 2Tablet(s) Oral at bedtime  zinc oxide 20% Ointment 1Application(s) Topical three times a day  zinc oxide 20% Ointment 1Application(s) Topical two times a day  pantoprazole    Tablet 40milliGRAM(s) Oral before breakfast  metoprolol 50milliGRAM(s) Oral two times a day  calamine Lotion 1Application(s) Topical two times a day  vancomycin  IVPB 1250milliGRAM(s) IV Intermittent every 24 hours  aspirin enteric coated 81milliGRAM(s) Oral daily  warfarin 4milliGRAM(s) Oral once  enoxaparin Injectable 60milliGRAM(s) SubCutaneous every 12 hours    MEDICATIONS  (PRN):  dextrose Gel 1Dose(s) Oral once PRN Blood Glucose LESS THAN 70 milliGRAM(s)/deciLiter  glucagon  Injectable 1milliGRAM(s) IntraMuscular once PRN Glucose <70 milliGRAM(s)/deciLiter  morphine  - Injectable 2milliGRAM(s) IV Push every 6 hours PRN Severe Pain (7 - 10)  oxyCODONE  5 mG/acetaminophen 325 mG 1Tablet(s) Oral every 6 hours PRN Moderate Pain (4 - 6)  acetaminophen   Tablet. 650milliGRAM(s) Oral every 6 hours PRN Mild Pain (1 - 3)      LABS:                        10.7   9.1   )-----------( 265      ( 23 May 2017 06:43 )             32.2     05-23    140  |  105  |  21<H>  ----------------------------<  117<H>  4.4   |  26  |  1.70<H>    Ca    8.9      23 May 2017 06:43  Phos  2.9     05-23  Mg     1.8     05-23    TPro  6.1  /  Alb  2.6<L>  /  TBili  0.3  /  DBili  x   /  AST  21  /  ALT  17  /  AlkPhos  82  05-22    PT/INR - ( 23 May 2017 06:43 )   PT: 15.7 sec;   INR: 1.43 ratio         PTT - ( 22 May 2017 05:17 )  PTT:31.4 sec  Hemoglobin A1C, Whole Blood: 5.8 % (05-20 @ 09:38)        Neuro Imaging: No neuroimaging availabe

## 2017-05-23 NOTE — PROGRESS NOTE ADULT - PROBLEM SELECTOR PLAN 3
-Monitor VS, daily INR.  -Will give 4mg of warfarin today for low INR. -F/U with Dr. Bruce Navarrete.  -Will give 4mg of warfarin today for low INR, bridge with full dose Lovenox as per Dr. Herzog.  -F/U INR daily.

## 2017-05-23 NOTE — PROGRESS NOTE ADULT - SUBJECTIVE AND OBJECTIVE BOX
CHIEF COMPLAINT:Patient is a 82y old  Male who presents with a chief complaint of sob.  doing better,no complain.  	  REVIEW OF SYSTEMS:  CONSTITUTIONAL: No fever, weight loss, or fatigue  EYES: No eye pain, visual disturbances, or discharge  ENT:  No difficulty hearing, tinnitus, vertigo; No sinus or throat pain  NECK: No pain or stiffness  RESPIRATORY: No cough, wheezing, chills or hemoptysis; No Shortness of Breath  CARDIOVASCULAR: No chest pain, palpitations, passing out, dizziness, or leg swelling  GASTROINTESTINAL: No abdominal or epigastric pain. No nausea, vomiting, or hematemesis; No diarrhea or constipation. No melena or hematochezia.  GENITOURINARY: No dysuria, frequency, hematuria, or incontinence  NEUROLOGICAL: No headaches, memory loss, loss of strength, numbness, or tremors  SKIN: No itching, burning, rashes, or lesions   LYMPH Nodes: No enlarged glands  ENDOCRINE: No heat or cold intolerance; No hair loss  MUSCULOSKELETAL: No joint pain or swelling; No muscle, back, or extremity pain  PSYCHIATRIC: No depression, anxiety, mood swings, or difficulty sleeping  HEME/LYMPH: No easy bruising, or bleeding gums  ALLERGY AND IMMUNOLOGIC: No hives or eczema	    [ ] All others negative	  [ ] Unable to obtain    PHYSICAL EXAM:  T(C): 36.8, Max: 37.2 (05-23 @ 00:00)  HR: 89 (71 - 99)  BP: 113/69 (100/61 - 146/66)  RR: 18 (18 - 18)  SpO2: 98% (94% - 98%)  Wt(kg): --  I&O's Summary    I & Os for current day (as of 23 May 2017 08:11)  =============================================  IN: 0 ml / OUT: 1720 ml / NET: -1720 ml      Appearance: Normal	  HEENT:   Normal oral mucosa, PERRL, EOMI	  Lymphatic: No lymphadenopathy  Cardiovascular: Normal S1 S2, No JVD, No murmurs, No edema  Respiratory: Lungs clear to auscultation	  Psychiatry: A & O x 3, Mood & affect appropriate  Gastrointestinal:  Soft, Non-tender, + BS	  Skin: No rashes, No ecchymoses, No cyanosis	  Neurologic: Non-focal  Extremities: Normal range of motion, No clubbing, cyanosis or edema  Vascular: Peripheral pulses palpable 2+ bilaterally    MEDICATIONS  (STANDING):  insulin lispro (HumaLOG) corrective regimen sliding scale  SubCutaneous three times a day before meals  dextrose 5%. 1000milliLiter(s) IV Continuous <Continuous>  dextrose 50% Injectable 12.5Gram(s) IV Push once  dextrose 50% Injectable 25Gram(s) IV Push once  dextrose 50% Injectable 25Gram(s) IV Push once  ascorbic acid 500milliGRAM(s) Oral daily  docusate sodium 100milliGRAM(s) Oral two times a day  senna 2Tablet(s) Oral at bedtime  zinc oxide 20% Ointment 1Application(s) Topical three times a day  zinc oxide 20% Ointment 1Application(s) Topical two times a day  pantoprazole    Tablet 40milliGRAM(s) Oral before breakfast  metoprolol 50milliGRAM(s) Oral two times a day  calamine Lotion 1Application(s) Topical two times a day  vancomycin  IVPB 1250milliGRAM(s) IV Intermittent every 24 hours  aspirin enteric coated 81milliGRAM(s) Oral daily  warfarin 4milliGRAM(s) Oral once      TELEMETRY: 	    ECG:  	  RADIOLOGY:  OTHER: 	  	  LABS:	 	    CARDIAC MARKERS:                                10.7   9.1   )-----------( 265      ( 23 May 2017 06:43 )             32.2     05-23    140  |  105  |  21<H>  ----------------------------<  117<H>  4.4   |  26  |  1.70<H>    Ca    8.9      23 May 2017 06:43  Phos  2.9     05-23  Mg     1.8     05-23    TPro  6.1  /  Alb  2.6<L>  /  TBili  0.3  /  DBili  x   /  AST  21  /  ALT  17  /  AlkPhos  82  05-22    proBNP: Serum Pro-Brain Natriuretic Peptide: 1288 pg/mL (05-20 @ 07:02)    Lipid Profile:   HgA1c: Hemoglobin A1C, Whole Blood: 5.8 % (05-20 @ 09:38)    TSH: Thyroid Stimulating Hormone, Serum: 0.71 uU/mL (05-21 @ 06:55)

## 2017-05-23 NOTE — CONSULT NOTE ADULT - ASSESSMENT
1.NPH  2.Afib, on anticoagulation 1.NPH : based on h/o incontinence, gait ataxia  presumptive diagnosis, per primary team has NPH for which he has an appointment to see Neurology at [facility].  recommend CT non contrast if there is concern for new neurological findings.  will need a diagnostic tap, MRI Brain, Neuropsychological evaluation as a part of NPH work up    2.Afib, on anticoagulation - continue Coumadin

## 2017-05-23 NOTE — PROGRESS NOTE ADULT - PROBLEM SELECTOR PLAN 1
-Lesions looks more likely dermatitis than pressure ulcer.  -Patient was seen by wound care nurse, who recommends dermatology eval.  -Currently on Vanco only for suspected underlying cellulitis.  -Moisturize and apply zinc oint for the lesions, frequent turn. -Continue with Vancomycin, 1.25g q 24hrs IV day #4.  -ID Dr. Lala.  -Moisturize and apply zinc oint for the lesions, frequent position change  -Out of bed to chair. -Continue with Vancomycin, 1.25g q 24hrs IV day #4.  -F/U with ID Dr. Lala regarding antibiotics plan.  -Moisturize and apply zinc oint for the lesions, frequent position change  -Out of bed to chair.  -[place]rell[place] catheter was placed to help wound healing since [place] catheter does not fit well and patient leaks urine.

## 2017-05-23 NOTE — PROGRESS NOTE ADULT - PROBLEM SELECTOR PLAN 2
-PT evaluation and fall precautions.  -Will need long term care placement given insufficient family support. -PT evaluation: [facility]  -Neuro Dr. Kapadia was called for f/u NPH, as patient's wife's request.  -Will need long term care placement given insufficient family support. -PT evaluation: [facility]  -Neuro Dr. Kapadia was called for f/u NPH, as patient's wife's request.  -Arrange long term care placement given insufficient family support, f/u with social work.

## 2017-05-23 NOTE — PROGRESS NOTE ADULT - PROBLEM SELECTOR PLAN 4
-Noted in TTE, may need outpatient cardio work-up  -Continue with cardiac meds. -Noted in TTE, will need outpatient cardio work-up with RO.  -Continue with current cardiac meds.

## 2017-05-23 NOTE — PROGRESS NOTE ADULT - SUBJECTIVE AND OBJECTIVE BOX
Patient is a 82y old  Male who presents with a chief complaint of generalized weakness and back pain.    INTERVAL HPI/OVERNIGHT EVENTS: Back pain intermittently same from yesterday.  T(C): 36.8, Max: 37.2 (05-23 @ 00:00)  HR: 89 (71 - 99)  BP: 113/69 (100/61 - 146/66)  RR: 18 (18 - 18)  SpO2: 98% (94% - 98%)      I & Os for current day (as of 23 May 2017 08:54)  =============================================  IN: 0 ml / OUT: 1720 ml / NET: -1720 ml      LABS:                        10.7   9.1   )-----------( 265      ( 23 May 2017 06:43 )             32.2     05-23    140  |  105  |  21<H>  ----------------------------<  117<H>  4.4   |  26  |  1.70<H>    Ca    8.9      23 May 2017 06:43  Phos  2.9     05-23  Mg     1.8     05-23    TPro  6.1  /  Alb  2.6<L>  /  TBili  0.3  /  DBili  x   /  AST  21  /  ALT  17  /  AlkPhos  82  05-22    PT/INR - ( 23 May 2017 06:43 )   PT: 15.7 sec;   INR: 1.43 ratio         PTT - ( 22 May 2017 05:17 )  PTT:31.4 sec    CAPILLARY BLOOD GLUCOSE  168 (22 May 2017 21:50)  128 (22 May 2017 15:57)  115 (22 May 2017 12:02)              MEDICATIONS  (STANDING):  insulin lispro (HumaLOG) corrective regimen sliding scale  SubCutaneous three times a day before meals  ascorbic acid 500milliGRAM(s) Oral daily  docusate sodium 100milliGRAM(s) Oral two times a day  senna 2Tablet(s) Oral at bedtime  zinc oxide 20% Ointment 1Application(s) Topical three times a day  zinc oxide 20% Ointment 1Application(s) Topical two times a day  pantoprazole    Tablet 40milliGRAM(s) Oral before breakfast  metoprolol 50milliGRAM(s) Oral two times a day  calamine Lotion 1Application(s) Topical two times a day  vancomycin  IVPB 1250milliGRAM(s) IV Intermittent every 24 hours  aspirin enteric coated 81milliGRAM(s) Oral daily  warfarin 4milliGRAM(s) Oral once  enoxaparin Injectable 60milliGRAM(s) SubCutaneous every 12 hours    MEDICATIONS  (PRN):  dextrose Gel 1Dose(s) Oral once PRN Blood Glucose LESS THAN 70 milliGRAM(s)/deciLiter  glucagon  Injectable 1milliGRAM(s) IntraMuscular once PRN Glucose <70 milliGRAM(s)/deciLiter  morphine  - Injectable 2milliGRAM(s) IV Push every 6 hours PRN Severe Pain (7 - 10)  oxyCODONE  5 mG/acetaminophen 325 mG 1Tablet(s) Oral every 6 hours PRN Moderate Pain (4 - 6)  acetaminophen   Tablet. 650milliGRAM(s) Oral every 6 hours PRN Mild Pain (1 - 3)      RADIOLOGY & ADDITIONAL TESTS:    Imaging Personally Reviewed:  [x ] YES  [ ] NO    Consultant(s) Notes Reviewed:  [x ] YES  [ ] NO    REVIEW OF SYSTEMS:  CONSTITUTIONAL: No fever, weight loss, or fatigue  EYES: No eye pain, visual disturbances, or discharge  ENMT:  No difficulty hearing, tinnitus, vertigo; No sinus or throat pain  NECK: No pain or stiffness  BREASTS: No pain, masses, or nipple discharge  RESPIRATORY: No cough, wheezing, chills or hemoptysis; No shortness of breath  CARDIOVASCULAR: No chest pain, palpitations, dizziness, or leg swelling  GASTROINTESTINAL: No abdominal or epigastric pain. No nausea, vomiting, or hematemesis; No diarrhea or constipation. No melena or hematochezia.  GENITOURINARY: No dysuria, frequency, hematuria, or incontinence  NEUROLOGICAL: No headaches, memory loss, loss of strength, numbness, or tremors  SKIN: No itching, burning, rashes, or lesions   LYMPH NODES: No enlarged glands  ENDOCRINE: No heat or cold intolerance; No hair loss  MUSCULOSKELETAL: No joint pain or swelling; No muscle, back, or extremity pain  PSYCHIATRIC: No depression, anxiety, mood swings, or difficulty sleeping  HEME/LYMPH: No easy bruising, or bleeding gums  ALLERY AND IMMUNOLOGIC: No hives or eczema    PHYSICAL EXAM:  GENERAL: NAD, well-groomed, well-developed  HEAD:  Atraumatic, Normocephalic  EYES: EOMI, PERRLA, conjunctiva and sclera clear  ENMT: No tonsillar erythema, exudates, or enlargement; Moist mucous membranes, Good dentition, No lesions  NECK: Supple, No JVD, Normal thyroid  NERVOUS SYSTEM:  Alert & Oriented X3, Good concentration; Motor Strength 5/5 B/L upper and lower extremities; DTRs 2+ intact and symmetric  CHEST/LUNG: Clear to percussion bilaterally; No rales, rhonchi, wheezing, or rubs  HEART: Regular rate and rhythm; No murmurs, rubs, or gallops  ABDOMEN: Soft, Nontender, Nondistended; Bowel sounds present  EXTREMITIES:  2+ Peripheral Pulses bilaterally, No clubbing, cyanosis, or edema  LYMPH: No lymphadenopathy noted  SKIN: No rashes or lesions    Care Discussed with Consultants/Other Providers [ ] YES  [ ] NO Patient is a 82y old  Male who presents with a chief complaint of generalized weakness and back pain.    INTERVAL HPI/OVERNIGHT EVENTS: Back pain intermittently same from yesterday.  T(C): 36.8, Max: 37.2 (05-23 @ 00:00)  HR: 89 (71 - 99)  BP: 113/69 (100/61 - 146/66)  RR: 18 (18 - 18)  SpO2: 98% (94% - 98%)      I & Os for current day (as of 23 May 2017 08:54)  =============================================  IN: 0 ml / OUT: 1720 ml / NET: -1720 ml      LABS:                        10.7   9.1   )-----------( 265      ( 23 May 2017 06:43 )             32.2     05-23    140  |  105  |  21<H>  ----------------------------<  117<H>  4.4   |  26  |  1.70<H>    Ca    8.9      23 May 2017 06:43  Phos  2.9     05-23  Mg     1.8     05-23    TPro  6.1  /  Alb  2.6<L>  /  TBili  0.3  /  DBili  x   /  AST  21  /  ALT  17  /  AlkPhos  82  05-22    PT/INR - ( 23 May 2017 06:43 )   PT: 15.7 sec;   INR: 1.43 ratio         PTT - ( 22 May 2017 05:17 )  PTT:31.4 sec    CAPILLARY BLOOD GLUCOSE  168 (22 May 2017 21:50)  128 (22 May 2017 15:57)  115 (22 May 2017 12:02)          MEDICATIONS  (STANDING):  insulin lispro (HumaLOG) corrective regimen sliding scale  SubCutaneous three times a day before meals  ascorbic acid 500milliGRAM(s) Oral daily  docusate sodium 100milliGRAM(s) Oral two times a day  senna 2Tablet(s) Oral at bedtime  zinc oxide 20% Ointment 1Application(s) Topical three times a day  zinc oxide 20% Ointment 1Application(s) Topical two times a day  pantoprazole  Tablet 40milliGRAM(s) Oral before breakfast  metoprolol 50milliGRAM(s) Oral two times a day  calamine Lotion 1Application(s) Topical two times a day  vancomycin  IVPB 1250milliGRAM(s) IV Intermittent every 24 hours, total antibiotics day #4  aspirin enteric coated 81milliGRAM(s) Oral daily  warfarin 4milliGRAM(s) Oral once  enoxaparin Injectable 60milliGRAM(s) SubCutaneous every 12 hours    MEDICATIONS  (PRN):  dextrose Gel 1Dose(s) Oral once PRN Blood Glucose LESS THAN 70 milliGRAM(s)/deciLiter  glucagon  Injectable 1milliGRAM(s) IntraMuscular once PRN Glucose <70 milliGRAM(s)/deciLiter  morphine  - Injectable 2milliGRAM(s) IV Push every 6 hours PRN Severe Pain (7 - 10)  oxyCODONE  5 mG/acetaminophen 325 mG 1Tablet(s) Oral every 6 hours PRN Moderate Pain (4 - 6)  acetaminophen   Tablet. 650milliGRAM(s) Oral every 6 hours PRN Mild Pain (1 - 3)      RADIOLOGY & ADDITIONAL TESTS:    Imaging Personally Reviewed:  [x ] YES  [ ] NO    Consultant(s) Notes Reviewed:  [x ] YES  [ ] NO    REVIEW OF SYSTEMS:  CONSTITUTIONAL: No fever.  EYES: No eye pain, visual disturbances, or discharge  ENMT:  No difficulty hearing, tinnitus, vertigo; No sinus or throat pain  NECK: No pain or stiffness  BREASTS: No pain, masses, or nipple discharge  RESPIRATORY: No cough, wheezing, chills or hemoptysis; No shortness of breath  CARDIOVASCULAR: No chest pain, palpitations, dizziness, or leg swelling  GASTROINTESTINAL: No abdominal or epigastric pain. No nausea, vomiting, or hematemesis; No diarrhea or constipation. No melena or hematochezia.  GENITOURINARY: No dysuria, frequency, hematuria, or incontinence  NEUROLOGICAL: No headaches, memory loss, loss of strength, numbness, or tremors. Back pain.  SKIN: No itching, burning, rashes, or lesions   LYMPH NODES: No enlarged glands  MUSCULOSKELETAL: No joint pain or swelling; No muscle, back, or extremity pain  PSYCHIATRIC: No depression, anxiety, mood swings, or difficulty sleeping      PHYSICAL EXAM:  GENERAL: NAD, well-groomed, well-developed  HEAD:  Atraumatic, Normocephalic  EYES: EOMI, PERRLA, conjunctiva and sclera clear  ENMT: No tonsillar erythema, exudates, or enlargement; Moist mucous membranes, Good dentition, No lesions  NECK: Supple, No JVD, Normal thyroid  NERVOUS SYSTEM:  Alert & Oriented X3  CHEST/LUNG: Clear to percussion bilaterally; No rales, rhonchi, wheezing, or rubs  HEART: Irregular rate and rhythm; systolic murmurs, no rubs, or gallops  ABDOMEN: Soft, Nontender, Nondistended; Bowel sounds present  EXTREMITIES:  2+ Peripheral Pulses bilaterally, No clubbing, cyanosis, or edema  LYMPH: No lymphadenopathy noted  SKIN: Dermatitis with excoriations on the bilateral buttock    Care Discussed with Consultants/Other Providers [ ] YES  [ ] NO

## 2017-05-23 NOTE — PROGRESS NOTE ADULT - PROBLEM SELECTOR PLAN 5
-Improving Cr level  -Adjust vancomycin dose for low trough, will monitor daily renal function and check vancomycin trough. -Improving Cr level, 1.7  -monitor daily renal function and check vancomycin trough.

## 2017-05-24 LAB
ANION GAP SERPL CALC-SCNC: 7 MMOL/L — SIGNIFICANT CHANGE UP (ref 5–17)
BUN SERPL-MCNC: 18 MG/DL — SIGNIFICANT CHANGE UP (ref 7–18)
CALCIUM SERPL-MCNC: 8.8 MG/DL — SIGNIFICANT CHANGE UP (ref 8.4–10.5)
CHLORIDE SERPL-SCNC: 105 MMOL/L — SIGNIFICANT CHANGE UP (ref 96–108)
CO2 SERPL-SCNC: 27 MMOL/L — SIGNIFICANT CHANGE UP (ref 22–31)
CREAT SERPL-MCNC: 1.65 MG/DL — HIGH (ref 0.5–1.3)
GLUCOSE SERPL-MCNC: 137 MG/DL — HIGH (ref 70–99)
HCT VFR BLD CALC: 32 % — LOW (ref 39–50)
HGB BLD-MCNC: 10.7 G/DL — LOW (ref 13–17)
INR BLD: 1.65 RATIO — HIGH (ref 0.88–1.16)
MAGNESIUM SERPL-MCNC: 2 MG/DL — SIGNIFICANT CHANGE UP (ref 1.6–2.6)
MCHC RBC-ENTMCNC: 29.4 PG — SIGNIFICANT CHANGE UP (ref 27–34)
MCHC RBC-ENTMCNC: 33.4 GM/DL — SIGNIFICANT CHANGE UP (ref 32–36)
MCV RBC AUTO: 88 FL — SIGNIFICANT CHANGE UP (ref 80–100)
PHOSPHATE SERPL-MCNC: 2.8 MG/DL — SIGNIFICANT CHANGE UP (ref 2.5–4.5)
PLATELET # BLD AUTO: 283 K/UL — SIGNIFICANT CHANGE UP (ref 150–400)
POTASSIUM SERPL-MCNC: 4.5 MMOL/L — SIGNIFICANT CHANGE UP (ref 3.5–5.3)
POTASSIUM SERPL-SCNC: 4.5 MMOL/L — SIGNIFICANT CHANGE UP (ref 3.5–5.3)
PROTHROM AB SERPL-ACNC: 18.2 SEC — HIGH (ref 9.8–12.7)
RBC # BLD: 3.64 M/UL — LOW (ref 4.2–5.8)
RBC # FLD: 11.7 % — SIGNIFICANT CHANGE UP (ref 10.3–14.5)
SODIUM SERPL-SCNC: 139 MMOL/L — SIGNIFICANT CHANGE UP (ref 135–145)
WBC # BLD: 12.8 K/UL — HIGH (ref 3.8–10.5)
WBC # FLD AUTO: 12.8 K/UL — HIGH (ref 3.8–10.5)

## 2017-05-24 RX ORDER — NYSTATIN CREAM 100000 [USP'U]/G
1 CREAM TOPICAL
Qty: 0 | Refills: 0 | DISCHARGE
Start: 2017-05-24

## 2017-05-24 RX ORDER — WARFARIN SODIUM 2.5 MG/1
5 TABLET ORAL ONCE
Refills: 0 | Status: COMPLETED | OUTPATIENT
Start: 2017-05-24 | End: 2017-05-24

## 2017-05-24 RX ORDER — SENNA PLUS 8.6 MG/1
2 TABLET ORAL
Qty: 0 | Refills: 0 | DISCHARGE
Start: 2017-05-24

## 2017-05-24 RX ORDER — ASPIRIN/CALCIUM CARB/MAGNESIUM 324 MG
1 TABLET ORAL
Qty: 0 | Refills: 0 | DISCHARGE
Start: 2017-05-24

## 2017-05-24 RX ORDER — ASCORBIC ACID 60 MG
1 TABLET,CHEWABLE ORAL
Qty: 0 | Refills: 0 | DISCHARGE
Start: 2017-05-24

## 2017-05-24 RX ORDER — DOCUSATE SODIUM 100 MG
1 CAPSULE ORAL
Qty: 0 | Refills: 0 | DISCHARGE
Start: 2017-05-24

## 2017-05-24 RX ORDER — ZINC OXIDE 200 MG/G
1 OINTMENT TOPICAL
Qty: 0 | Refills: 0 | DISCHARGE
Start: 2017-05-24

## 2017-05-24 RX ADMIN — Medication 100 MILLIGRAM(S): at 06:43

## 2017-05-24 RX ADMIN — ZINC OXIDE 1 APPLICATION(S): 200 OINTMENT TOPICAL at 21:45

## 2017-05-24 RX ADMIN — TAMSULOSIN HYDROCHLORIDE 0.4 MILLIGRAM(S): 0.4 CAPSULE ORAL at 21:45

## 2017-05-24 RX ADMIN — Medication 500 MILLIGRAM(S): at 12:25

## 2017-05-24 RX ADMIN — PANTOPRAZOLE SODIUM 40 MILLIGRAM(S): 20 TABLET, DELAYED RELEASE ORAL at 06:43

## 2017-05-24 RX ADMIN — Medication 81 MILLIGRAM(S): at 12:25

## 2017-05-24 RX ADMIN — SENNA PLUS 2 TABLET(S): 8.6 TABLET ORAL at 21:45

## 2017-05-24 RX ADMIN — WARFARIN SODIUM 5 MILLIGRAM(S): 2.5 TABLET ORAL at 21:45

## 2017-05-24 RX ADMIN — ZINC OXIDE 1 APPLICATION(S): 200 OINTMENT TOPICAL at 06:44

## 2017-05-24 RX ADMIN — NYSTATIN CREAM 1 APPLICATION(S): 100000 CREAM TOPICAL at 18:13

## 2017-05-24 RX ADMIN — Medication 166.67 MILLIGRAM(S): at 06:45

## 2017-05-24 RX ADMIN — NYSTATIN CREAM 1 APPLICATION(S): 100000 CREAM TOPICAL at 21:45

## 2017-05-24 RX ADMIN — ENOXAPARIN SODIUM 60 MILLIGRAM(S): 100 INJECTION SUBCUTANEOUS at 18:59

## 2017-05-24 RX ADMIN — ENOXAPARIN SODIUM 60 MILLIGRAM(S): 100 INJECTION SUBCUTANEOUS at 06:43

## 2017-05-24 RX ADMIN — NYSTATIN CREAM 1 APPLICATION(S): 100000 CREAM TOPICAL at 06:44

## 2017-05-24 RX ADMIN — Medication 100 MILLIGRAM(S): at 18:12

## 2017-05-24 RX ADMIN — ZINC OXIDE 1 APPLICATION(S): 200 OINTMENT TOPICAL at 14:37

## 2017-05-24 NOTE — DISCHARGE NOTE ADULT - CARE PLAN
Principal Discharge DX:	Cellulitis  Secondary Diagnosis:	NPH (normal pressure hydrocephalus)  Secondary Diagnosis:	Rheumatic heart disease  Secondary Diagnosis:	Atrial fibrillation  Secondary Diagnosis:	MATHEUS (acute kidney injury) Principal Discharge DX:	Cellulitis  Goal:	Treat infection  Instructions for follow-up, activity and diet:	Take doxycycline 100mg 2 times a day for 3 more days orally and continue wound care daily.  Secondary Diagnosis:	NPH (normal pressure hydrocephalus)  Instructions for follow-up, activity and diet:	Continue with physical therapy.  Secondary Diagnosis:	Rheumatic heart disease  Instructions for follow-up, activity and diet:	Continue with physical therapy, pain control with Percocet PRN.  Secondary Diagnosis:	Atrial fibrillation  Goal:	Rate control and anticoagulation  Instructions for follow-up, activity and diet:	Continue with warfarin and metoprolol 50mg at home dose.  Secondary Diagnosis:	MATHEUS (acute kidney injury)  Instructions for follow-up, activity and diet:	Likely from dehydration. Cr improving, patient has unknown baseline creatinine level. Monitor renal function in a regular basis and avoid nephrotoxin. Maintain good oral hydration. Principal Discharge DX:	Cellulitis  Goal:	Treat infection  Instructions for follow-up, activity and diet:	Take doxycycline 100mg 2 times a day for 2 more days orally and continue wound care daily.  Secondary Diagnosis:	NPH (normal pressure hydrocephalus)  Instructions for follow-up, activity and diet:	Continue with physical therapy.  Secondary Diagnosis:	Rheumatic heart disease  Instructions for follow-up, activity and diet:	Continue with physical therapy, pain control with Percocet PRN.  Secondary Diagnosis:	Atrial fibrillation  Goal:	Rate control and anticoagulation  Instructions for follow-up, activity and diet:	Continue with warfarin 3mg once daily and metoprolol 25mg 2 times a day.  Secondary Diagnosis:	MATHEUS (acute kidney injury)  Instructions for follow-up, activity and diet:	Likely from dehydration. Cr improving, patient has unknown baseline creatinine level. Monitor renal function in a regular basis and avoid nephrotoxin. Maintain good oral hydration.  Continue with lasix 20mg once a day(dose decreased) for moderate pulmonary hypertension.

## 2017-05-24 NOTE — PROGRESS NOTE ADULT - PROBLEM SELECTOR PLAN 2
-PT evaluation: [facility]  -Neuro Dr. Kapadia was called for f/u NPH, as patient's wife's request: no acute intervention for now, outpatient neuro f/u and head CT if new neuro symptoms develop.  -Arrange long term care placement.

## 2017-05-24 NOTE — PROGRESS NOTE ADULT - PROBLEM SELECTOR PLAN 3
-F/U with Dr. Bruce Navarrete.  -Will give 5mg of warfarin today for low INR, bridge with full dose Lovenox as per Dr. Herzog.  -F/U INR daily.

## 2017-05-24 NOTE — PROGRESS NOTE ADULT - SUBJECTIVE AND OBJECTIVE BOX
Patient is a 82y old  Male who presents with a chief complaint of buttock and back pain    INTERVAL HPI/OVERNIGHT EVENTS:  T(C): 37.1, Max: 37.1 (05-24 @ 07:57)  HR: 110 (81 - 110)  BP: 103/55 (103/55 - 125/79)  RR: 18 (16 - 18)  SpO2: 98% (96% - 100%)    I & Os for current day (as of 24 May 2017 09:52)  =============================================  IN: 0 ml / OUT: 1750 ml / NET: -1750 ml      LABS:                        10.7   12.8  )-----------( 283      ( 24 May 2017 07:42 )             32.0     05-24    139  |  105  |  18  ----------------------------<  137<H>  4.5   |  27  |  1.65<H>    Ca    8.8      24 May 2017 07:42  Phos  2.8     05-24  Mg     2.0     05-24      PT/INR - ( 24 May 2017 07:42 )   PT: 18.2 sec;   INR: 1.65 ratio             CAPILLARY BLOOD GLUCOSE  235 (23 May 2017 21:04)  126 (23 May 2017 16:23)  167 (23 May 2017 12:00)              MEDICATIONS  (STANDING):  insulin lispro (HumaLOG) corrective regimen sliding scale  SubCutaneous three times a day before meals  ascorbic acid 500milliGRAM(s) Oral daily  docusate sodium 100milliGRAM(s) Oral two times a day  senna 2Tablet(s) Oral at bedtime  zinc oxide 20% Ointment 1Application(s) Topical three times a day  pantoprazole    Tablet 40milliGRAM(s) Oral before breakfast  metoprolol 50milliGRAM(s) Oral two times a day  vancomycin  IVPB 1250milliGRAM(s) IV Intermittent every 24 hours  aspirin enteric coated 81milliGRAM(s) Oral daily  enoxaparin Injectable 60milliGRAM(s) SubCutaneous every 12 hours  nystatin Powder 1Application(s) Topical three times a day  tamsulosin 0.4milliGRAM(s) Oral at bedtime  warfarin 5milliGRAM(s) Oral once    MEDICATIONS  (PRN):  dextrose Gel 1Dose(s) Oral once PRN Blood Glucose LESS THAN 70 milliGRAM(s)/deciLiter  glucagon  Injectable 1milliGRAM(s) IntraMuscular once PRN Glucose <70 milliGRAM(s)/deciLiter  oxyCODONE  5 mG/acetaminophen 325 mG 1Tablet(s) Oral every 6 hours PRN Moderate Pain (4 - 6)  acetaminophen   Tablet. 650milliGRAM(s) Oral every 6 hours PRN Mild Pain (1 - 3)      RADIOLOGY & ADDITIONAL TESTS:    Consultant(s) Notes Reviewed:  [x ] YES  [ ] NO    REVIEW OF SYSTEMS:  CONSTITUTIONAL: No fever, weight loss, or fatigue  EYES: No eye pain, visual disturbances, or discharge  ENMT:  No difficulty hearing, tinnitus, vertigo; No sinus or throat pain  NECK: No pain or stiffness  BREASTS: No pain, masses, or nipple discharge  RESPIRATORY: No cough, wheezing, chills or hemoptysis; No shortness of breath  CARDIOVASCULAR: No chest pain, palpitations, dizziness, or leg swelling  GASTROINTESTINAL: No abdominal or epigastric pain. No nausea, vomiting, or hematemesis; No diarrhea or constipation.  GENITOURINARY: [place](+)  NEUROLOGICAL: No headaches, memory loss, loss of strength, numbness, or tremors  SKIN: No itching, burning, rashes, or lesions   LYMPH NODES: No enlarged glands  ENDOCRINE: No heat or cold intolerance; No hair loss  MUSCULOSKELETAL: No joint pain or swelling; No muscle, back, or extremity pain  PSYCHIATRIC: No depression, anxiety, mood swings, or difficulty sleeping      PHYSICAL EXAM:  GENERAL: NAD  HEAD:  Atraumatic, Normocephalic  EYES: EOMI, PERRLA, conjunctiva and sclera clear  ENMT: No tonsillar erythema, exudates, or enlargement; Moist mucous membranes.  NECK: Supple, No JVD, Normal thyroid  NERVOUS SYSTEM:  Alert & Oriented X3  CHEST/LUNG: Clear to percussion bilaterally; No rales, rhonchi, wheezing, or rubs  HEART: Irregular rate and rhythm; No murmurs, rubs, or gallops  ABDOMEN: Soft, Nontender, Nondistended; Bowel sounds present  EXTREMITIES:  2+ Peripheral Pulses bilaterally, No clubbing, cyanosis, or edema  LYMPH: No lymphadenopathy noted  SKIN: Excoriations and eczematous lesion on the buttock    Care Discussed with Consultants/Other Providers [ x] YES  [ ] NO

## 2017-05-24 NOTE — DISCHARGE NOTE ADULT - PLAN OF CARE
Treat infection Take doxycycline 100mg 2 times a day for 3 more days orally and continue wound care daily. Continue with physical therapy. Continue with physical therapy, pain control with Percocet PRN. Rate control and anticoagulation Continue with warfarin and metoprolol 50mg at home dose. Likely from dehydration. Cr improving, patient has unknown baseline creatinine level. Monitor renal function in a regular basis and avoid nephrotoxin. Maintain good oral hydration. Take doxycycline 100mg 2 times a day for 2 more days orally and continue wound care daily. Continue with warfarin 3mg once daily and metoprolol 25mg 2 times a day. Likely from dehydration. Cr improving, patient has unknown baseline creatinine level. Monitor renal function in a regular basis and avoid nephrotoxin. Maintain good oral hydration.  Continue with lasix 20mg once a day(dose decreased) for moderate pulmonary hypertension.

## 2017-05-24 NOTE — PROGRESS NOTE ADULT - SUBJECTIVE AND OBJECTIVE BOX
no complain.  	  REVIEW OF SYSTEMS:  CONSTITUTIONAL: No fever, weight loss, or fatigue  EYES: No eye pain, visual disturbances, or discharge  ENT:  No difficulty hearing, tinnitus, vertigo; No sinus or throat pain  NECK: No pain or stiffness  RESPIRATORY: No cough, wheezing, chills or hemoptysis; No Shortness of Breath  CARDIOVASCULAR: No chest pain, palpitations, passing out, dizziness, or leg swelling  GASTROINTESTINAL: No abdominal or epigastric pain. No nausea, vomiting, or hematemesis; No diarrhea or constipation. No melena or hematochezia.  GENITOURINARY: No dysuria, frequency, hematuria, or incontinence  NEUROLOGICAL: No headaches, memory loss, loss of strength, numbness, or tremors  SKIN: No itching, burning, rashes, or lesions   LYMPH Nodes: No enlarged glands  ENDOCRINE: No heat or cold intolerance; No hair loss  MUSCULOSKELETAL: No joint pain or swelling; No muscle, back, or extremity pain  PSYCHIATRIC: No depression, anxiety, mood swings, or difficulty sleeping  HEME/LYMPH: No easy bruising, or bleeding gums  ALLERGY AND IMMUNOLOGIC: No hives or eczema	    [ ] All others negative	  [ ] Unable to obtain    PHYSICAL EXAM:  T(C): 37.1, Max: 37.1 (05-24 @ 07:57)  HR: 110 (81 - 110)  BP: 103/55 (103/55 - 125/79)  RR: 18 (16 - 18)  SpO2: 98% (96% - 100%)  Wt(kg): --  I&O's Summary    I & Os for current day (as of 24 May 2017 08:58)  =============================================  IN: 0 ml / OUT: 1750 ml / NET: -1750 ml      Appearance: Normal	  HEENT:   Normal oral mucosa, PERRL, EOMI	  Lymphatic: No lymphadenopathy  Cardiovascular: Normal S1 S2, No JVD, No murmurs, No edema  Respiratory: Lungs clear to auscultation	  Psychiatry: A & O x 3, Mood & affect appropriate  Gastrointestinal:  Soft, Non-tender, + BS	  Skin: No rashes, No ecchymoses, No cyanosis	  Neurologic: Non-focal  Extremities: Normal range of motion, No clubbing, cyanosis or edema  Vascular: Peripheral pulses palpable 2+ bilaterally    MEDICATIONS  (STANDING):  insulin lispro (HumaLOG) corrective regimen sliding scale  SubCutaneous three times a day before meals  dextrose 5%. 1000milliLiter(s) IV Continuous <Continuous>  dextrose 50% Injectable 12.5Gram(s) IV Push once  dextrose 50% Injectable 25Gram(s) IV Push once  dextrose 50% Injectable 25Gram(s) IV Push once  ascorbic acid 500milliGRAM(s) Oral daily  docusate sodium 100milliGRAM(s) Oral two times a day  senna 2Tablet(s) Oral at bedtime  zinc oxide 20% Ointment 1Application(s) Topical three times a day  pantoprazole    Tablet 40milliGRAM(s) Oral before breakfast  metoprolol 50milliGRAM(s) Oral two times a day  vancomycin  IVPB 1250milliGRAM(s) IV Intermittent every 24 hours  aspirin enteric coated 81milliGRAM(s) Oral daily  enoxaparin Injectable 60milliGRAM(s) SubCutaneous every 12 hours  nystatin Powder 1Application(s) Topical three times a day  tamsulosin 0.4milliGRAM(s) Oral at bedtime  warfarin 5milliGRAM(s) Oral once      TELEMETRY: 	    ECG:  	  RADIOLOGY:  OTHER: 	  	  LABS:	 	    CARDIAC MARKERS:                                10.7   x     )-----------( 283      ( 24 May 2017 07:42 )             32.0     05-24    139  |  105  |  18  ----------------------------<  137<H>  4.5   |  27  |  1.65<H>    Ca    8.8      24 May 2017 07:42  Phos  2.8     05-24  Mg     2.0     05-24      proBNP: Serum Pro-Brain Natriuretic Peptide: 1288 pg/mL (05-20 @ 07:02)    Lipid Profile:   HgA1c: Hemoglobin A1C, Whole Blood: 5.8 % (05-20 @ 09:38)    TSH: Thyroid Stimulating Hormone, Serum: 0.71 uU/mL (05-21 @ 06:55)

## 2017-05-24 NOTE — DISCHARGE NOTE ADULT - HOSPITAL COURSE
82 year-old Male with PMH of NPH (unsteady gait used to ambulate with walker now bedbound for a couple of months) & Afib (on Coumadin) presented to the ED c/o generalized weakness x1 week. Pt stated he was unable to get out of bed w/ worsening sores to his lower back & upper thighs with wet discharge and pain. His wife dropped him off to the hospital becuase the sores in his thighs and back were getting worse and she has been unable to help him.     In the ER patient's vs T  98 , hr 88 , rr 18 pulse ox of 98 on room air, labs were pertinent for wbc count of 12.6 , BUN/Cr of 44/2,25 , INR 3.15 , ekg showed RBBB afib 80 bpm with PVC. Patient was given  750 cc bolus and one dose of azactem in the ER. Admitted to the medical floor for suspected cellulitis of the buttocks.    ID Dr. Lala was consulted and patient was started on Vancomycin IV. Patient remained afebrile and vital signs were stable during hospital stay. Patient was also seen by wound care nurse, who said that patient has dermatitis. Zinc oxide ointment was applied daily. Orellana cath was placed to help wound healing, since condom catheter did not fit. Patient was also started on Flomax.    Patient also was found to have rheumatic heart disease by TTE. Dr. Bruce Navarrete was consulted and recommended further evaluation of rheumatic heart disease as an outpatient with RO. Currently patient will be remain on the same cardiac meds for Afib.    Neurology Dr. Kapadia was also consulted. **    Social work was consulted as patient and wife wished long term placement for better wound care. Patient was evaluated by physical therapy, who recommended subacute rehab. Will be discharged to *** upon discharge, with 3 more days of PO doxycycline. 82 year-old Male with PMH of NPH (unsteady gait used to ambulate with walker now bedbound for a couple of months) & Afib (on Coumadin) presented to the ED c/o generalized weakness x1 week. Pt stated he was unable to get out of bed w/ worsening sores to his lower back & upper thighs with wet discharge and pain. His wife dropped him off to the hospital becuase the sores in his thighs and back were getting worse and she has been unable to help him.     In the ER patient's vs T  98 , hr 88 , rr 18 pulse ox of 98 on room air, labs were pertinent for wbc count of 12.6 , BUN/Cr of 44/2,25 , INR 3.15 , ekg showed RBBB afib 80 bpm with PVC. Patient was given  750 cc bolus and one dose of azactem in the ER. Admitted to the medical floor for suspected cellulitis of the buttocks.    ID Dr. Lala was consulted and patient was started on Vancomycin IV. Patient remained afebrile and vital signs were stable during hospital stay. Patient was also seen by wound care nurse, who said that patient has dermatitis. Zinc oxide ointment was applied daily. Orellana cath was placed to help wound healing, since condom catheter did not fit. Patient was also started on Flomax.    Patient also was found to have rheumatic heart disease by TTE. Dr. Bruce Navarrete was consulted and recommended further evaluation of rheumatic heart disease as an outpatient with RO. Currently patient will be remain on the same cardiac meds for Afib.    Neurology Dr. Kapadia was also consulted. He recommended CT of head non contrast if there is concern for new neurological findings. Patient will need to follow up with primary neurologist however no acute intervention is required at this moment.     Social work was consulted as patient and wife wished long term placement for better wound care. Patient was evaluated by physical therapy, who recommended subacute rehab. Will be discharged to nursing home upon discharge, with 3 more days of PO doxycycline. 82 year-old Male with PMH of NPH (unsteady gait used to ambulate with walker now bedbound for a couple of months) & Afib (on Coumadin) presented to the ED c/o generalized weakness x1 week. Pt stated he was unable to get out of bed w/ worsening sores to his lower back & upper thighs with wet discharge and pain. His wife dropped him off to the hospital becuase the sores in his thighs and back were getting worse and she has been unable to help him.     In the ER patient's vs was stable with- T  98 , hr 88 , RR 18 pulse Ox of 98 on room air, labs were pertinent for wbc count of 12.6 , BUN/Cr of 44/2,25 , INR 3.15, EKG showed RBBB afib 80 bpm with PVC. Patient was given  750 cc bolus and one dose of Azactem in the ER. Admitted to the medical floor for suspected cellulitis of the buttocks.    ID Dr. Lala was consulted and patient was started on Vancomycin IV. Patient remained afebrile and vital signs were stable during hospital stay. Patient was also seen by wound care nurse, who said that patient has dermatitis. Zinc oxide ointment was applied daily. Orellana cath was placed to help wound healing, since condom catheter did not fit. Patient was also started on Flomax.    Patient also was found to have rheumatic heart disease by TTE. Dr. Bruce Navarrete was consulted and recommended further evaluation of rheumatic heart disease as an outpatient with RO. Currently patient will be remain on the same cardiac meds for Afib, including warfarin and metoprolol. Patient is considered to be at the high risk of thrombus, so warfarin was bridged with Lovenox when INR was lower than 2.0.     Neurology Dr. Kapadia was also consulted for h/o NPH. He recommended CT of head non contrast if there is concern for new neurological findings. Patient will need to follow up with primary neurologist however no acute intervention is required at this moment.     Social work was consulted as patient and wife wished long term care placement. Patient was evaluated by physical therapy, who recommended rehab. Will be discharged to nursing home, with 3 more days of PO doxycycline as recommended by ID.     Case discussed with attending physician upon discharge. 82 year-old Male with PMH of NPH (unsteady gait used to ambulate with walker now bedbound for a couple of months) & Afib (on Coumadin) presented to the ED c/o generalized weakness x1 week. Pt stated he was unable to get out of bed w/ worsening sores to his lower back & upper thighs with wet discharge and pain. His wife dropped him off to the hospital becuase the sores in his thighs and back were getting worse and she has been unable to help him.     In the ER patient's vs was stable with- T  98 , hr 88 , RR 18 pulse Ox of 98 on room air, labs were pertinent for wbc count of 12.6 , BUN/Cr of 44/2,25 , INR 3.15, EKG showed RBBB afib 80 bpm with PVC. Patient was given  750 cc bolus and one dose of Azactem in the ER. Admitted to the medical floor for suspected cellulitis of the buttocks.    ID Dr. Lala was consulted and patient was started on Vancomycin IV. Patient remained afebrile and vital signs were stable during hospital stay. Patient was also seen by wound care nurse, who said that patient has dermatitis. Zinc oxide ointment was applied daily. Orellana cath was placed to help wound healing, since condom catheter did not fit. Patient was also started on Flomax.    Patient also was found to have rheumatic heart disease by TTE. Dr. Bruce Navarrete was consulted and recommended further evaluation of rheumatic heart disease as an outpatient with RO. Currently patient will be remain on the same cardiac meds for Afib, including warfarin and metoprolol(25mg q 12hrs). Patient is considered to be at the high risk of thrombus, so warfarin was bridged with Lovenox when INR was lower than 2.0.     Neurology Dr. Kapadia was also consulted for h/o NPH. He recommended CT of head non contrast if there is concern for new neurological findings. Patient will need to follow up with primary neurologist however no acute intervention is required at this moment.     Social work was consulted as patient and wife wished long term care placement. Patient was evaluated by physical therapy, who recommended rehab. Will be discharged to nursing home, with 2 more days of PO doxycycline as recommended by ID.     Case discussed with attending physician upon discharge. 82 year-old Male with PMH of NPH (unsteady gait used to ambulate with walker now bedbound for a couple of months) & Afib (on Coumadin) presented to the ED c/o generalized weakness x1 week. Pt stated he was unable to get out of bed w/ worsening sores to his lower back & upper thighs with wet discharge and pain. His wife dropped him off to the hospital becuase the sores in his thighs and back were getting worse and she has been unable to help him.     In the ER patient's vs was stable with- T  98 , hr 88 , RR 18 pulse Ox of 98 on room air, labs were pertinent for wbc count of 12.6 , BUN/Cr of 44/2,25 , INR 3.15, EKG showed RBBB afib 80 bpm with PVC. Patient was given  750 cc bolus and one dose of Azactem in the ER. Admitted to the medical floor for suspected cellulitis of the buttocks.    ID Dr. Lala was consulted and patient was started on Vancomycin IV. Patient remained afebrile and vital signs were stable during hospital stay. Patient was also seen by wound care nurse, who said that patient has dermatitis. Zinc oxide ointment was applied daily. Orellana cath was placed to help wound healing, since condom catheter did not fit. Patient was also started on Flomax.    Patient also was found to have rheumatic heart disease by TTE. Dr. Bruce Navarrete was consulted and recommended further evaluation of rheumatic heart disease as an outpatient with RO. Currently patient will be remain on the same cardiac meds for Afib, including warfarin and metoprolol(25mg q 12hrs). Patient is considered to be at the high risk of thrombus, so warfarin was bridged with Lovenox when INR was lower than 2.0.     Neurology Dr. Kapadia was also consulted for h/o NPH. He recommended CT of head non contrast if there is concern for new neurological findings. Patient will need to follow up with primary neurologist however no acute intervention is required at this moment.     Social work was consulted as patient and wife wished long term care placement. Patient was evaluated by physical therapy, who recommended rehab. Will be discharged to nursing home, with 2 more days of PO doxycycline as recommended by ID. Orellana catheter was removed upon discharge, Flomax will be continued for 5 more days.    Case discussed with attending physician upon discharge.

## 2017-05-24 NOTE — PROGRESS NOTE ADULT - PROBLEM SELECTOR PLAN 1
-Continue with Vancomycin, 1.25g q 24hrs IV day #5.  -F/U with ID Dr. Lala - continue vanco now, can switch to PO Doxycyclin upon discharge, for 3 more days.  -Moisturize and apply zinc oint for the lesions, frequent position change  -Out of bed to chair.  -Orellana catheter was placed to help wound healing. Started Flomax yesterday.

## 2017-05-24 NOTE — DISCHARGE NOTE ADULT - MEDICATION SUMMARY - MEDICATIONS TO STOP TAKING
I will STOP taking the medications listed below when I get home from the hospital:    lisinopril  --  by mouth    lisinopril  -- 10 milligram(s) by mouth

## 2017-05-24 NOTE — DISCHARGE NOTE ADULT - MEDICATION SUMMARY - MEDICATIONS TO CHANGE
I will SWITCH the dose or number of times a day I take the medications listed below when I get home from the hospital:    Lasix 40 mg oral tablet  -- 1 tab(s) by mouth once a day    metoprolol  -- 50 milligram(s) by mouth 2 times a day

## 2017-05-24 NOTE — DISCHARGE NOTE ADULT - PATIENT PORTAL LINK FT
“You can access the [facility] Patient Portal, offered by [facility], by registering with the following website: [email or web address]”

## 2017-05-24 NOTE — DISCHARGE NOTE ADULT - SECONDARY DIAGNOSIS.
NPH (normal pressure hydrocephalus) Rheumatic heart disease Atrial fibrillation MATHEUS (acute kidney injury)

## 2017-05-25 LAB
ANION GAP SERPL CALC-SCNC: 7 MMOL/L — SIGNIFICANT CHANGE UP (ref 5–17)
BASOPHILS # BLD AUTO: 0.1 K/UL — SIGNIFICANT CHANGE UP (ref 0–0.2)
BASOPHILS NFR BLD AUTO: 1.1 % — SIGNIFICANT CHANGE UP (ref 0–2)
BUN SERPL-MCNC: 16 MG/DL — SIGNIFICANT CHANGE UP (ref 7–18)
CALCIUM SERPL-MCNC: 8.4 MG/DL — SIGNIFICANT CHANGE UP (ref 8.4–10.5)
CHLORIDE SERPL-SCNC: 105 MMOL/L — SIGNIFICANT CHANGE UP (ref 96–108)
CO2 SERPL-SCNC: 28 MMOL/L — SIGNIFICANT CHANGE UP (ref 22–31)
CREAT SERPL-MCNC: 1.53 MG/DL — HIGH (ref 0.5–1.3)
CULTURE RESULTS: SIGNIFICANT CHANGE UP
CULTURE RESULTS: SIGNIFICANT CHANGE UP
EOSINOPHIL # BLD AUTO: 0.6 K/UL — HIGH (ref 0–0.5)
EOSINOPHIL NFR BLD AUTO: 6.2 % — HIGH (ref 0–6)
GLUCOSE SERPL-MCNC: 138 MG/DL — HIGH (ref 70–99)
HCT VFR BLD CALC: 28.2 % — LOW (ref 39–50)
HGB BLD-MCNC: 9.3 G/DL — LOW (ref 13–17)
INR BLD: 1.92 RATIO — HIGH (ref 0.88–1.16)
LYMPHOCYTES # BLD AUTO: 2.5 K/UL — SIGNIFICANT CHANGE UP (ref 1–3.3)
LYMPHOCYTES # BLD AUTO: 26.9 % — SIGNIFICANT CHANGE UP (ref 13–44)
MAGNESIUM SERPL-MCNC: 1.9 MG/DL — SIGNIFICANT CHANGE UP (ref 1.6–2.6)
MCHC RBC-ENTMCNC: 29.3 PG — SIGNIFICANT CHANGE UP (ref 27–34)
MCHC RBC-ENTMCNC: 33.1 GM/DL — SIGNIFICANT CHANGE UP (ref 32–36)
MCV RBC AUTO: 88.5 FL — SIGNIFICANT CHANGE UP (ref 80–100)
MONOCYTES # BLD AUTO: 0.9 K/UL — SIGNIFICANT CHANGE UP (ref 0–0.9)
MONOCYTES NFR BLD AUTO: 9.2 % — SIGNIFICANT CHANGE UP (ref 2–14)
NEUTROPHILS # BLD AUTO: 5.3 K/UL — SIGNIFICANT CHANGE UP (ref 1.8–7.4)
NEUTROPHILS NFR BLD AUTO: 56.7 % — SIGNIFICANT CHANGE UP (ref 43–77)
PHOSPHATE SERPL-MCNC: 2.8 MG/DL — SIGNIFICANT CHANGE UP (ref 2.5–4.5)
PLATELET # BLD AUTO: 239 K/UL — SIGNIFICANT CHANGE UP (ref 150–400)
POTASSIUM SERPL-MCNC: 4.1 MMOL/L — SIGNIFICANT CHANGE UP (ref 3.5–5.3)
POTASSIUM SERPL-SCNC: 4.1 MMOL/L — SIGNIFICANT CHANGE UP (ref 3.5–5.3)
PROTHROM AB SERPL-ACNC: 21.2 SEC — HIGH (ref 9.8–12.7)
RBC # BLD: 3.19 M/UL — LOW (ref 4.2–5.8)
RBC # FLD: 11.4 % — SIGNIFICANT CHANGE UP (ref 10.3–14.5)
SODIUM SERPL-SCNC: 140 MMOL/L — SIGNIFICANT CHANGE UP (ref 135–145)
SPECIMEN SOURCE: SIGNIFICANT CHANGE UP
SPECIMEN SOURCE: SIGNIFICANT CHANGE UP
WBC # BLD: 9.4 K/UL — SIGNIFICANT CHANGE UP (ref 3.8–10.5)
WBC # FLD AUTO: 9.4 K/UL — SIGNIFICANT CHANGE UP (ref 3.8–10.5)

## 2017-05-25 RX ORDER — TAMSULOSIN HYDROCHLORIDE 0.4 MG/1
1 CAPSULE ORAL
Qty: 0 | Refills: 0 | DISCHARGE
Start: 2017-05-25

## 2017-05-25 RX ORDER — FUROSEMIDE 40 MG
20 TABLET ORAL DAILY
Refills: 0 | Status: DISCONTINUED | OUTPATIENT
Start: 2017-05-25 | End: 2017-05-26

## 2017-05-25 RX ORDER — WARFARIN SODIUM 2.5 MG/1
3 TABLET ORAL ONCE
Refills: 0 | Status: DISCONTINUED | OUTPATIENT
Start: 2017-05-25 | End: 2017-05-25

## 2017-05-25 RX ORDER — METOPROLOL TARTRATE 50 MG
25 TABLET ORAL
Refills: 0 | Status: DISCONTINUED | OUTPATIENT
Start: 2017-05-25 | End: 2017-05-26

## 2017-05-25 RX ORDER — OXYCODONE HYDROCHLORIDE 5 MG/1
1 TABLET ORAL
Qty: 0 | Refills: 0 | DISCHARGE
Start: 2017-05-25

## 2017-05-25 RX ORDER — WARFARIN SODIUM 2.5 MG/1
3 TABLET ORAL ONCE
Refills: 0 | Status: COMPLETED | OUTPATIENT
Start: 2017-05-25 | End: 2017-05-25

## 2017-05-25 RX ADMIN — ENOXAPARIN SODIUM 60 MILLIGRAM(S): 100 INJECTION SUBCUTANEOUS at 05:38

## 2017-05-25 RX ADMIN — Medication 100 MILLIGRAM(S): at 18:26

## 2017-05-25 RX ADMIN — SENNA PLUS 2 TABLET(S): 8.6 TABLET ORAL at 21:12

## 2017-05-25 RX ADMIN — Medication 81 MILLIGRAM(S): at 13:15

## 2017-05-25 RX ADMIN — WARFARIN SODIUM 3 MILLIGRAM(S): 2.5 TABLET ORAL at 21:12

## 2017-05-25 RX ADMIN — Medication 166.67 MILLIGRAM(S): at 05:38

## 2017-05-25 RX ADMIN — Medication 1: at 13:15

## 2017-05-25 RX ADMIN — ZINC OXIDE 1 APPLICATION(S): 200 OINTMENT TOPICAL at 17:37

## 2017-05-25 RX ADMIN — Medication 100 MILLIGRAM(S): at 05:37

## 2017-05-25 RX ADMIN — Medication 2: at 09:08

## 2017-05-25 RX ADMIN — NYSTATIN CREAM 1 APPLICATION(S): 100000 CREAM TOPICAL at 21:12

## 2017-05-25 RX ADMIN — PANTOPRAZOLE SODIUM 40 MILLIGRAM(S): 20 TABLET, DELAYED RELEASE ORAL at 06:40

## 2017-05-25 RX ADMIN — ZINC OXIDE 1 APPLICATION(S): 200 OINTMENT TOPICAL at 21:13

## 2017-05-25 RX ADMIN — ZINC OXIDE 1 APPLICATION(S): 200 OINTMENT TOPICAL at 05:39

## 2017-05-25 RX ADMIN — TAMSULOSIN HYDROCHLORIDE 0.4 MILLIGRAM(S): 0.4 CAPSULE ORAL at 21:12

## 2017-05-25 RX ADMIN — NYSTATIN CREAM 1 APPLICATION(S): 100000 CREAM TOPICAL at 17:37

## 2017-05-25 RX ADMIN — NYSTATIN CREAM 1 APPLICATION(S): 100000 CREAM TOPICAL at 05:37

## 2017-05-25 RX ADMIN — Medication 500 MILLIGRAM(S): at 13:15

## 2017-05-25 NOTE — PROGRESS NOTE ADULT - PROBLEM SELECTOR PLAN 2
-no acute intervention for now, outpatient neuro f/u and head CT if new neuro symptoms develop.  -Arrange long term care placement. Pending placement.

## 2017-05-25 NOTE — PROGRESS NOTE ADULT - PROBLEM SELECTOR PLAN 1
-Continue with Vancomycin, 1.25g q 24hrs IV day #6  -F/U with ID Dr. Lala - continue vanco now, can switch to PO Doxycyclin upon discharge, for 3 more days.  -Moisturize and apply zinc oint for the lesions, frequent position change  -Out of bed to chair.

## 2017-05-25 NOTE — PROGRESS NOTE ADULT - SUBJECTIVE AND OBJECTIVE BOX
Patient is a 82y old  Male who presents with a chief complaint of cellulitis of the buttock (24 May 2017 12:46)      INTERVAL HPI/OVERNIGHT EVENTS: No specific complaints    T(C): 36.6, Max: 37 (05-24 @ 23:49)  HR: 97 (52 - 97)  BP: 114/66 (103/61 - 122/56)  RR: 16 (16 - 18)  SpO2: 99% (96% - 99%)      I & Os for current day (as of 25 May 2017 11:21)  =============================================  IN: 0 ml / OUT: 2135 ml / NET: -2135 ml      LABS:                        9.3    9.4   )-----------( 239      ( 25 May 2017 06:54 )             28.2     05-25    140  |  105  |  16  ----------------------------<  138<H>  4.1   |  28  |  1.53<H>    Ca    8.4      25 May 2017 06:54  Phos  2.8     05-25  Mg     1.9     05-25      PT/INR - ( 25 May 2017 06:54 )   PT: 21.2 sec;   INR: 1.92 ratio         CAPILLARY BLOOD GLUCOSE  205 (25 May 2017 08:00)  292 (24 May 2017 20:58)  119 (24 May 2017 15:56)  135 (24 May 2017 12:37)              MEDICATIONS  (STANDING):  insulin lispro (HumaLOG) corrective regimen sliding scale  SubCutaneous three times a day before meals  ascorbic acid 500milliGRAM(s) Oral daily  docusate sodium 100milliGRAM(s) Oral two times a day  senna 2Tablet(s) Oral at bedtime  zinc oxide 20% Ointment 1Application(s) Topical three times a day  pantoprazole    Tablet 40milliGRAM(s) Oral before breakfast  metoprolol 50milliGRAM(s) Oral two times a day  vancomycin  IVPB 1250milliGRAM(s) IV Intermittent every 24 hours  aspirin enteric coated 81milliGRAM(s) Oral daily  nystatin Powder 1Application(s) Topical three times a day  tamsulosin 0.4milliGRAM(s) Oral at bedtime  furosemide    Tablet 20milliGRAM(s) Oral daily  warfarin 3milliGRAM(s) Oral once    MEDICATIONS  (PRN):  dextrose Gel 1Dose(s) Oral once PRN Blood Glucose LESS THAN 70 milliGRAM(s)/deciLiter  glucagon  Injectable 1milliGRAM(s) IntraMuscular once PRN Glucose <70 milliGRAM(s)/deciLiter  oxyCODONE  5 mG/acetaminophen 325 mG 1Tablet(s) Oral every 6 hours PRN Moderate Pain (4 - 6)  acetaminophen   Tablet. 650milliGRAM(s) Oral every 6 hours PRN Mild Pain (1 - 3)      RADIOLOGY & ADDITIONAL TESTS:    Imaging Personally Reviewed:  [x ] YES  [ ] NO    Consultant(s) Notes Reviewed:  [x ] YES  [ ] NO    REVIEW OF SYSTEMS:  CONSTITUTIONAL: No fever, weight loss, or fatigue  EYES: No eye pain, visual disturbances, or discharge  ENMT:  No difficulty hearing, tinnitus, vertigo; No sinus or throat pain  NECK: No pain or stiffness  BREASTS: No pain, masses, or nipple discharge  RESPIRATORY: No cough, wheezing, chills or hemoptysis; No shortness of breath  CARDIOVASCULAR: No chest pain, palpitations, dizziness, or leg swelling  GASTROINTESTINAL: No abdominal or epigastric pain. No nausea, vomiting, or hematemesis; No diarrhea or constipation.   NEUROLOGICAL: No headaches, memory loss, loss of strength, numbness, or tremors  SKIN: No itching, burning, rashes, or lesions   LYMPH NODES: No enlarged glands  MUSCULOSKELETAL: No joint pain or swelling; No muscle, back, or extremity pain  PSYCHIATRIC: No depression, anxiety, mood swings, or difficulty sleeping      PHYSICAL EXAM:  GENERAL: NAD, well-groomed, well-developed  HEAD:  Atraumatic, Normocephalic  EYES: EOMI, PERRLA, conjunctiva and sclera clear  ENMT: No tonsillar erythema, exudates, or enlargement; Moist mucous membranes, Good dentition, No lesions  NECK: Supple, No JVD, Normal thyroid  NERVOUS SYSTEM:  Alert & Oriented X3(does not know exact date but know this is May 2017).  CHEST/LUNG: Clear to percussion bilaterally; No rales, rhonchi, wheezing, or rubs  HEART: Irregular rate and rhythm; No murmurs, rubs, or gallops  ABDOMEN: Soft, Nontender, Nondistended; Bowel sounds present  EXTREMITIES:  2+ Peripheral Pulses bilaterally, No clubbing, cyanosis, or edema  LYMPH: No lymphadenopathy noted  SKIN: buttock multiple area skin excoriations    Care Discussed with Consultants/Other Providers [x ] YES  [ ] NO

## 2017-05-25 NOTE — PROGRESS NOTE ADULT - PROBLEM SELECTOR PLAN 3
-F/U with Dr. Bruce Navarrete.  -Will give 3mg of warfarin today for low INR, bridge with full dose Lovenox as per Dr. Herzog.  -F/U INR daily. -F/U with Dr. Bruce Navarrete.  -Will give 3mg of warfarin today for low INR, bridge with full dose Lovenox as per Dr. Herzog. Metoprolol dose was decreased since patient's BP runs low.  -F/U INR daily.

## 2017-05-25 NOTE — PROGRESS NOTE ADULT - SUBJECTIVE AND OBJECTIVE BOX
82y Male    Meds:  vancomycin  IVPB 1250milliGRAM(s) IV Intermittent every 24 hours    Allergies    penicillin (Unknown)    Intolerances        VITALS:  Vital Signs Last 24 Hrs  T(C): 36.9, Max: 37 (05-24 @ 23:49)  T(F): 98.5, Max: 98.6 (05-24 @ 23:49)  HR: 84 (52 - 97)  BP: 108/63 (106/83 - 124/54)  BP(mean): --  RR: 18 (16 - 18)  SpO2: 97% (96% - 99%)    LABS/DIAGNOSTIC TESTS:                          9.3    9.4   )-----------( 239      ( 25 May 2017 06:54 )             28.2         05-25    140  |  105  |  16  ----------------------------<  138<H>  4.1   |  28  |  1.53<H>    Ca    8.4      25 May 2017 06:54  Phos  2.8     05-25  Mg     1.9     05-25            CULTURES: .Blood Blood  05-20 @ 01:02   No growth at 5 days.  --  --            RADIOLOGY:      ROS:  [  ] UNABLE TO ELICIT 82y Male who is doing better as far as his cellulitis and buttock ulcers go, he has no fevers or chills. he has less itching of skin.    Meds:  vancomycin  IVPB 1250milliGRAM(s) IV Intermittent every 24 hours    Allergies    penicillin (Unknown)    Intolerances        VITALS:  Vital Signs Last 24 Hrs  T(C): 36.9, Max: 37 (05-24 @ 23:49)  T(F): 98.5, Max: 98.6 (05-24 @ 23:49)  HR: 84 (52 - 97)  BP: 108/63 (106/83 - 124/54)  BP(mean): --  RR: 18 (16 - 18)  SpO2: 97% (96% - 99%)    LABS/DIAGNOSTIC TESTS:                          9.3    9.4   )-----------( 239      ( 25 May 2017 06:54 )             28.2         05-25    140  |  105  |  16  ----------------------------<  138<H>  4.1   |  28  |  1.53<H>    Ca    8.4      25 May 2017 06:54  Phos  2.8     05-25  Mg     1.9     05-25            CULTURES: .Blood Blood  05-20 @ 01:02   No growth at 5 days.  --  --            RADIOLOGY:      ROS:  [  ] UNABLE TO ELICIT

## 2017-05-25 NOTE — PROGRESS NOTE ADULT - SUBJECTIVE AND OBJECTIVE BOX
CHIEF COMPLAINT:Patient is a 82y old  Male who presents with a chief complaint of cellulitis of the buttock (24 May 2017 12:46)  doing better no complain.  	  REVIEW OF SYSTEMS:  CONSTITUTIONAL: No fever, weight loss, or fatigue  EYES: No eye pain, visual disturbances, or discharge  ENT:  No difficulty hearing, tinnitus, vertigo; No sinus or throat pain  NECK: No pain or stiffness  RESPIRATORY: No cough, wheezing, chills or hemoptysis; No Shortness of Breath  CARDIOVASCULAR: No chest pain, palpitations, passing out, dizziness, or leg swelling  GASTROINTESTINAL: No abdominal or epigastric pain. No nausea, vomiting, or hematemesis; No diarrhea or constipation. No melena or hematochezia.  GENITOURINARY: No dysuria, frequency, hematuria, or incontinence  NEUROLOGICAL: No headaches, memory loss, loss of strength, numbness, or tremors  SKIN: No itching, burning, rashes, or lesions   LYMPH Nodes: No enlarged glands  ENDOCRINE: No heat or cold intolerance; No hair loss  MUSCULOSKELETAL: No joint pain or swelling; No muscle, back, or extremity pain  PSYCHIATRIC: No depression, anxiety, mood swings, or difficulty sleeping  HEME/LYMPH: No easy bruising, or bleeding gums  ALLERGY AND IMMUNOLOGIC: No hives or eczema	    [ ] All others negative	  [ ] Unable to obtain    PHYSICAL EXAM:  T(C): 36.6, Max: 37 (05-24 @ 23:49)  HR: 97 (52 - 97)  BP: 114/66 (103/61 - 122/56)  RR: 16 (16 - 18)  SpO2: 99% (96% - 99%)  Wt(kg): --  I&O's Summary    I & Os for current day (as of 25 May 2017 08:48)  =============================================  IN: 0 ml / OUT: 2135 ml / NET: -2135 ml      Appearance: Normal	  HEENT:   Normal oral mucosa, PERRL, EOMI	  Lymphatic: No lymphadenopathy  Cardiovascular: Normal S1 S2, No JVD, systolic myurmur loud s1, No edema  Respiratory: Lungs clear to auscultation	  Psychiatry: A & O x 3, Mood & affect appropriate  Gastrointestinal:  Soft, Non-tender, + BS	  Skin: No rashes, No ecchymoses, No cyanosis	  Neurologic: Non-focal  Extremities: Normal range of motion, No clubbing, cyanosis or edema  Vascular: Peripheral pulses palpable 2+ bilaterally    MEDICATIONS  (STANDING):  insulin lispro (HumaLOG) corrective regimen sliding scale  SubCutaneous three times a day before meals  dextrose 5%. 1000milliLiter(s) IV Continuous <Continuous>  dextrose 50% Injectable 12.5Gram(s) IV Push once  dextrose 50% Injectable 25Gram(s) IV Push once  dextrose 50% Injectable 25Gram(s) IV Push once  ascorbic acid 500milliGRAM(s) Oral daily  docusate sodium 100milliGRAM(s) Oral two times a day  senna 2Tablet(s) Oral at bedtime  zinc oxide 20% Ointment 1Application(s) Topical three times a day  pantoprazole    Tablet 40milliGRAM(s) Oral before breakfast  metoprolol 50milliGRAM(s) Oral two times a day  vancomycin  IVPB 1250milliGRAM(s) IV Intermittent every 24 hours  aspirin enteric coated 81milliGRAM(s) Oral daily  enoxaparin Injectable 60milliGRAM(s) SubCutaneous every 12 hours  nystatin Powder 1Application(s) Topical three times a day  tamsulosin 0.4milliGRAM(s) Oral at bedtime  warfarin 3milliGRAM(s) Oral once      TELEMETRY: 	    ECG:  	  RADIOLOGY:  OTHER: 	  	  LABS:	 	    CARDIAC MARKERS:                                9.3    9.4   )-----------( 239      ( 25 May 2017 06:54 )             28.2     05-25    140  |  105  |  16  ----------------------------<  138<H>  4.1   |  28  |  1.53<H>    Ca    8.4      25 May 2017 06:54  Phos  2.8     05-25  Mg     1.9     05-25      proBNP: Serum Pro-Brain Natriuretic Peptide: 1288 pg/mL (05-20 @ 07:02)    Lipid Profile:   HgA1c: Hemoglobin A1C, Whole Blood: 5.8 % (05-20 @ 09:38)    TSH: Thyroid Stimulating Hormone, Serum: 0.71 uU/mL (05-21 @ 06:55)

## 2017-05-25 NOTE — PROGRESS NOTE ADULT - ATTENDING COMMENTS
patient seen and examined. case fully discussed with medical resident. reviewed chief complaint. reviewed review of systems. reviewed list of medication,  reviewed physical exam. reviewed assessment and plan. agree with above. patient was accepted to [place] [facility] for short term to long term rehab. DVT prophylaxis. patient doing clinically better.
patient seen and examined. case fully discussed with medical resident. reviewed chief complaint. reviewed review of systems. reviewed list of medication,  reviewed physical exam. reviewed assessment and plan. agree with above. wife requests long term placement. continue antibiotics for cellulitis.
patient seen and examined. case fully discussed with medical resident. reviewed chief complaint. reviewed review of systems. reviewed list of medication,  reviewed physical exam. reviewed assessment and plan. agree with above. spoke to neuro attending today, patient with full neurology team at [facility] who have been following patient. patient is stable at this time and can follow up with neuro team as out patient. will follow up clinically. will speak with neuro and wife again tomorrow and follow up. as per wife patients neurological status has not changed since their last visit with their neurologist. continue antibiotics, will start lovenox until the INR is therapeutic. patient for long term placement as per wife.
patient seen and examined. case fully discussed with medical resident. reviewed chief complaint. reviewed review of systems. reviewed list of medication,  reviewed physical exam. reviewed assessment and plan. agree with full above note. spoke to NP for wound care, the are around the butock is not pressure ulcer, but dermatitis, will continue antibiotics for cellulites and follow up wound. spoke to wife who states its hard for her to take care of patient at home and is requesting [facility]. spoke to . DVT and GI prophylaxis.

## 2017-05-26 VITALS
TEMPERATURE: 98 F | RESPIRATION RATE: 18 BRPM | HEART RATE: 74 BPM | OXYGEN SATURATION: 99 % | DIASTOLIC BLOOD PRESSURE: 65 MMHG | SYSTOLIC BLOOD PRESSURE: 109 MMHG

## 2017-05-26 LAB
ANION GAP SERPL CALC-SCNC: 7 MMOL/L — SIGNIFICANT CHANGE UP (ref 5–17)
BUN SERPL-MCNC: 14 MG/DL — SIGNIFICANT CHANGE UP (ref 7–18)
CALCIUM SERPL-MCNC: 8.3 MG/DL — LOW (ref 8.4–10.5)
CHLORIDE SERPL-SCNC: 106 MMOL/L — SIGNIFICANT CHANGE UP (ref 96–108)
CO2 SERPL-SCNC: 26 MMOL/L — SIGNIFICANT CHANGE UP (ref 22–31)
CREAT SERPL-MCNC: 1.49 MG/DL — HIGH (ref 0.5–1.3)
GLUCOSE SERPL-MCNC: 118 MG/DL — HIGH (ref 70–99)
INR BLD: 1.95 RATIO — HIGH (ref 0.88–1.16)
MAGNESIUM SERPL-MCNC: 2.1 MG/DL — SIGNIFICANT CHANGE UP (ref 1.6–2.6)
PHOSPHATE SERPL-MCNC: 2.6 MG/DL — SIGNIFICANT CHANGE UP (ref 2.5–4.5)
POTASSIUM SERPL-MCNC: 4.1 MMOL/L — SIGNIFICANT CHANGE UP (ref 3.5–5.3)
POTASSIUM SERPL-SCNC: 4.1 MMOL/L — SIGNIFICANT CHANGE UP (ref 3.5–5.3)
PROTHROM AB SERPL-ACNC: 21.6 SEC — HIGH (ref 9.8–12.7)
SODIUM SERPL-SCNC: 139 MMOL/L — SIGNIFICANT CHANGE UP (ref 135–145)
VANCOMYCIN TROUGH SERPL-MCNC: 15.5 UG/ML — SIGNIFICANT CHANGE UP (ref 10–20)

## 2017-05-26 PROCEDURE — 83735 ASSAY OF MAGNESIUM: CPT

## 2017-05-26 PROCEDURE — 80053 COMPREHEN METABOLIC PANEL: CPT

## 2017-05-26 PROCEDURE — 97163 PT EVAL HIGH COMPLEX 45 MIN: CPT

## 2017-05-26 PROCEDURE — 83880 ASSAY OF NATRIURETIC PEPTIDE: CPT

## 2017-05-26 PROCEDURE — 84484 ASSAY OF TROPONIN QUANT: CPT

## 2017-05-26 PROCEDURE — 84100 ASSAY OF PHOSPHORUS: CPT

## 2017-05-26 PROCEDURE — 85730 THROMBOPLASTIN TIME PARTIAL: CPT

## 2017-05-26 PROCEDURE — 83036 HEMOGLOBIN GLYCOSYLATED A1C: CPT

## 2017-05-26 PROCEDURE — 82746 ASSAY OF FOLIC ACID SERUM: CPT

## 2017-05-26 PROCEDURE — 87040 BLOOD CULTURE FOR BACTERIA: CPT

## 2017-05-26 PROCEDURE — 85610 PROTHROMBIN TIME: CPT

## 2017-05-26 PROCEDURE — 85027 COMPLETE CBC AUTOMATED: CPT

## 2017-05-26 PROCEDURE — 84443 ASSAY THYROID STIM HORMONE: CPT

## 2017-05-26 PROCEDURE — 82306 VITAMIN D 25 HYDROXY: CPT

## 2017-05-26 PROCEDURE — 99285 EMERGENCY DEPT VISIT HI MDM: CPT | Mod: 25

## 2017-05-26 PROCEDURE — 82550 ASSAY OF CK (CPK): CPT

## 2017-05-26 PROCEDURE — 93306 TTE W/DOPPLER COMPLETE: CPT

## 2017-05-26 PROCEDURE — 80048 BASIC METABOLIC PNL TOTAL CA: CPT

## 2017-05-26 PROCEDURE — 97116 GAIT TRAINING THERAPY: CPT

## 2017-05-26 PROCEDURE — 82607 VITAMIN B-12: CPT

## 2017-05-26 PROCEDURE — 93005 ELECTROCARDIOGRAM TRACING: CPT

## 2017-05-26 PROCEDURE — 96374 THER/PROPH/DIAG INJ IV PUSH: CPT

## 2017-05-26 PROCEDURE — 97530 THERAPEUTIC ACTIVITIES: CPT

## 2017-05-26 PROCEDURE — 80202 ASSAY OF VANCOMYCIN: CPT

## 2017-05-26 RX ORDER — WARFARIN SODIUM 2.5 MG/1
1 TABLET ORAL
Qty: 0 | Refills: 0 | DISCHARGE
Start: 2017-05-26

## 2017-05-26 RX ORDER — WARFARIN SODIUM 2.5 MG/1
3 TABLET ORAL ONCE
Refills: 0 | Status: DISCONTINUED | OUTPATIENT
Start: 2017-05-26 | End: 2017-05-26

## 2017-05-26 RX ORDER — WARFARIN SODIUM 2.5 MG/1
3 TABLET ORAL DAILY
Refills: 0 | Status: DISCONTINUED | OUTPATIENT
Start: 2017-05-26 | End: 2017-05-26

## 2017-05-26 RX ADMIN — NYSTATIN CREAM 1 APPLICATION(S): 100000 CREAM TOPICAL at 05:48

## 2017-05-26 RX ADMIN — TAMSULOSIN HYDROCHLORIDE 0.4 MILLIGRAM(S): 0.4 CAPSULE ORAL at 21:01

## 2017-05-26 RX ADMIN — Medication 500 MILLIGRAM(S): at 12:28

## 2017-05-26 RX ADMIN — WARFARIN SODIUM 3 MILLIGRAM(S): 2.5 TABLET ORAL at 21:00

## 2017-05-26 RX ADMIN — ZINC OXIDE 1 APPLICATION(S): 200 OINTMENT TOPICAL at 05:49

## 2017-05-26 RX ADMIN — Medication 100 MILLIGRAM(S): at 16:59

## 2017-05-26 RX ADMIN — Medication 100 MILLIGRAM(S): at 16:58

## 2017-05-26 RX ADMIN — Medication 2: at 12:29

## 2017-05-26 RX ADMIN — NYSTATIN CREAM 1 APPLICATION(S): 100000 CREAM TOPICAL at 14:25

## 2017-05-26 RX ADMIN — Medication 2: at 09:23

## 2017-05-26 RX ADMIN — SENNA PLUS 2 TABLET(S): 8.6 TABLET ORAL at 21:00

## 2017-05-26 RX ADMIN — PANTOPRAZOLE SODIUM 40 MILLIGRAM(S): 20 TABLET, DELAYED RELEASE ORAL at 05:49

## 2017-05-26 RX ADMIN — Medication 81 MILLIGRAM(S): at 12:28

## 2017-05-26 RX ADMIN — Medication 100 MILLIGRAM(S): at 05:48

## 2017-05-26 RX ADMIN — Medication 20 MILLIGRAM(S): at 05:48

## 2017-05-26 RX ADMIN — ZINC OXIDE 1 APPLICATION(S): 200 OINTMENT TOPICAL at 14:26

## 2017-05-26 NOTE — PROGRESS NOTE ADULT - PROBLEM SELECTOR PROBLEM 2
NPH (normal pressure hydrocephalus)

## 2017-05-26 NOTE — PROGRESS NOTE ADULT - PROBLEM SELECTOR PLAN 6
-improve score 2, patient is on coumadin and full dose Lovenox. -improve score 2, patient is on coumadin with therapeutic level of INR 1.9.

## 2017-05-26 NOTE — PROGRESS NOTE ADULT - PROBLEM SELECTOR PLAN 1
-Patient received vanco for 6 days and switched to PO doxy as per Dr. Lala's recommendation.  -Moisturize and apply zinc oint for the lesions, frequent position change  -Out of bed to chair.  -Awaiting placement to long term care.

## 2017-05-26 NOTE — PROGRESS NOTE ADULT - PROBLEM SELECTOR PLAN 3
-F/U with Dr. Bruce Navarrete.  -Will give 3mg of warfarin today. Metoprolol dose was decreased since patient's BP runs low.  -F/U INR daily.

## 2017-05-26 NOTE — PROGRESS NOTE ADULT - SUBJECTIVE AND OBJECTIVE BOX
CHIEF COMPLAINT:Patient is a 82y old  Male who presents with a chief complaint of cellulitis of the buttock (24 May 2017 12:46)    	  REVIEW OF SYSTEMS:  CONSTITUTIONAL: No fever, weight loss, or fatigue  EYES: No eye pain, visual disturbances, or discharge  ENT:  No difficulty hearing, tinnitus, vertigo; No sinus or throat pain  NECK: No pain or stiffness  RESPIRATORY: No cough, wheezing, chills or hemoptysis; No Shortness of Breath  CARDIOVASCULAR: No chest pain, palpitations, passing out, dizziness, or leg swelling  GASTROINTESTINAL: No abdominal or epigastric pain. No nausea, vomiting, or hematemesis; No diarrhea or constipation. No melena or hematochezia.  GENITOURINARY: No dysuria, frequency, hematuria, or incontinence  NEUROLOGICAL: No headaches, memory loss, loss of strength, numbness, or tremors  SKIN: No itching, burning, rashes, or lesions   LYMPH Nodes: No enlarged glands  ENDOCRINE: No heat or cold intolerance; No hair loss  MUSCULOSKELETAL: No joint pain or swelling; No muscle, back, or extremity pain  PSYCHIATRIC: No depression, anxiety, mood swings, or difficulty sleeping  HEME/LYMPH: No easy bruising, or bleeding gums  ALLERGY AND IMMUNOLOGIC: No hives or eczema	    [ ] All others negative	  [ ] Unable to obtain    PHYSICAL EXAM:  T(C): 37.1, Max: 37.1 (05-26 @ 07:55)  HR: 91 (66 - 93)  BP: 119/72 (102/53 - 124/54)  RR: 18 (18 - 18)  SpO2: 98% (97% - 99%)  Wt(kg): --  I&O's Summary    I & Os for current day (as of 26 May 2017 09:19)  =============================================  IN: 0 ml / OUT: 600 ml / NET: -600 ml      Appearance: Normal	  HEENT:   Normal oral mucosa, PERRL, EOMI	  Lymphatic: No lymphadenopathy  Cardiovascular: Normal S1 S2, No JVD, systolic murmur, No edema  Respiratory: Lungs clear to auscultation	  Psychiatry: A & O x 3, Mood & affect appropriate  Gastrointestinal:  Soft, Non-tender, + BS	  Skin: No rashes, No ecchymoses, No cyanosis	  Neurologic: Non-focal  Extremities: Normal range of motion, No clubbing, cyanosis or edema  Vascular: Peripheral pulses palpable 2+ bilaterally    MEDICATIONS  (STANDING):  insulin lispro (HumaLOG) corrective regimen sliding scale  SubCutaneous three times a day before meals  dextrose 5%. 1000milliLiter(s) IV Continuous <Continuous>  dextrose 50% Injectable 12.5Gram(s) IV Push once  dextrose 50% Injectable 25Gram(s) IV Push once  dextrose 50% Injectable 25Gram(s) IV Push once  ascorbic acid 500milliGRAM(s) Oral daily  docusate sodium 100milliGRAM(s) Oral two times a day  senna 2Tablet(s) Oral at bedtime  zinc oxide 20% Ointment 1Application(s) Topical three times a day  pantoprazole    Tablet 40milliGRAM(s) Oral before breakfast  aspirin enteric coated 81milliGRAM(s) Oral daily  nystatin Powder 1Application(s) Topical three times a day  tamsulosin 0.4milliGRAM(s) Oral at bedtime  furosemide    Tablet 20milliGRAM(s) Oral daily  metoprolol 25milliGRAM(s) Oral two times a day  doxycycline hyclate Capsule 100milliGRAM(s) Oral every 12 hours  warfarin 3milliGRAM(s) Oral once      TELEMETRY: 	    ECG:  	  RADIOLOGY:  OTHER: 	  	  LABS:	 	    CARDIAC MARKERS:                                9.3    9.4   )-----------( 239      ( 25 May 2017 06:54 )             28.2     05-26    139  |  106  |  14  ----------------------------<  118<H>  4.1   |  26  |  1.49<H>    Ca    8.3<L>      26 May 2017 06:58  Phos  2.6     05-26  Mg     2.1     05-26      proBNP: Serum Pro-Brain Natriuretic Peptide: 1288 pg/mL (05-20 @ 07:02)    Lipid Profile:   HgA1c: Hemoglobin A1C, Whole Blood: 5.8 % (05-20 @ 09:38)    TSH: Thyroid Stimulating Hormone, Serum: 0.71 uU/mL (05-21 @ 06:55)

## 2017-05-26 NOTE — PROGRESS NOTE ADULT - SUBJECTIVE AND OBJECTIVE BOX
Patient is a 82y old  Male who presents with a chief complaint of cellulitis of the buttock (24 May 2017 12:46)      INTERVAL HPI/OVERNIGHT EVENTS: no specific complaint.  T(C): 37.1, Max: 37.1 (05-26 @ 07:55)  HR: 91 (66 - 93)  BP: 119/72 (102/53 - 124/54)  RR: 18 (18 - 18)  SpO2: 98% (97% - 99%)    I & Os for current day (as of 26 May 2017 09:58)  =============================================  IN: 0 ml / OUT: 600 ml / NET: -600 ml      LABS:                        9.3    9.4   )-----------( 239      ( 25 May 2017 06:54 )             28.2     05-26    139  |  106  |  14  ----------------------------<  118<H>  4.1   |  26  |  1.49<H>    Ca    8.3<L>      26 May 2017 06:58  Phos  2.6     05-26  Mg     2.1     05-26      PT/INR - ( 26 May 2017 06:58 )   PT: 21.6 sec;   INR: 1.95 ratio             CAPILLARY BLOOD GLUCOSE  216 (26 May 2017 08:32)  192 (25 May 2017 22:45)  125 (25 May 2017 16:24)  189 (25 May 2017 11:29)        MEDICATIONS  (STANDING):  insulin lispro (HumaLOG) corrective regimen sliding scale  SubCutaneous three times a day before meals  ascorbic acid 500milliGRAM(s) Oral daily  docusate sodium 100milliGRAM(s) Oral two times a day  senna 2Tablet(s) Oral at bedtime  zinc oxide 20% Ointment 1Application(s) Topical three times a day  pantoprazole    Tablet 40milliGRAM(s) Oral before breakfast  aspirin enteric coated 81milliGRAM(s) Oral daily  nystatin Powder 1Application(s) Topical three times a day  tamsulosin 0.4milliGRAM(s) Oral at bedtime  furosemide    Tablet 20milliGRAM(s) Oral daily  metoprolol 25milliGRAM(s) Oral two times a day  doxycycline hyclate Capsule 100milliGRAM(s) Oral every 12 hours  warfarin 3milliGRAM(s) Oral once    MEDICATIONS  (PRN):  dextrose Gel 1Dose(s) Oral once PRN Blood Glucose LESS THAN 70 milliGRAM(s)/deciLiter  glucagon  Injectable 1milliGRAM(s) IntraMuscular once PRN Glucose <70 milliGRAM(s)/deciLiter  oxyCODONE  5 mG/acetaminophen 325 mG 1Tablet(s) Oral every 6 hours PRN Moderate Pain (4 - 6)  acetaminophen   Tablet. 650milliGRAM(s) Oral every 6 hours PRN Mild Pain (1 - 3)      RADIOLOGY & ADDITIONAL TESTS:    Consultant(s) Notes Reviewed:  [x ] YES  [ ] NO    REVIEW OF SYSTEMS:  CONSTITUTIONAL: No fever, weight loss, or fatigue  EYES: No eye pain, visual disturbances, or discharge  ENMT:  No difficulty hearing, tinnitus, vertigo; No sinus or throat pain  NECK: No pain or stiffness  BREASTS: No pain, masses, or nipple discharge  RESPIRATORY: No cough, wheezing, chills or hemoptysis; No shortness of breath  CARDIOVASCULAR: No chest pain, palpitations, dizziness, or leg swelling  GASTROINTESTINAL: No abdominal or epigastric pain. No nausea, vomiting, or hematemesis.  GENITOURINARY: No dysuria, frequency, hematuria, or incontinence  NEUROLOGICAL: No headaches, memory loss, loss of strength, numbness, or tremors  SKIN: No itching, burning, rashes, or lesions   LYMPH NODES: No enlarged glands  ENDOCRINE: No heat or cold intolerance; No hair loss  MUSCULOSKELETAL: No joint pain or swelling; No muscle, back, or extremity pain      PHYSICAL EXAM:  GENERAL: NAD.  HEAD:  Atraumatic, Normocephalic  EYES: EOMI, PERRLA, conjunctiva and sclera clear  ENMT: No tonsillar erythema, exudates, or enlargement; Moist mucous membranes.  NECK: Supple, No JVD, Normal thyroid  NERVOUS SYSTEM:  Alert & Oriented X3.  CHEST/LUNG: Clear to percussion bilaterally; No rales, rhonchi, wheezing, or rubs  HEART: Regular rate and rhythm; No murmurs, rubs, or gallops  ABDOMEN: Soft, Nontender, Nondistended; Bowel sounds present  EXTREMITIES:  2+ Peripheral Pulses bilaterally, No clubbing, cyanosis, or edema  LYMPH: No lymphadenopathy noted  SKIN: dermatitis, excoriation is improving.    Care Discussed with Consultants/Other Providers [x ] YES  [ ] NO

## 2017-05-26 NOTE — PROGRESS NOTE ADULT - PROBLEM SELECTOR PROBLEM 5
MATHEUS (acute kidney injury)

## 2017-05-26 NOTE — PROGRESS NOTE ADULT - ASSESSMENT
RHD/MS  continue current meds  ac dc lovenox when inr>2  fu h/h  wound consult  abx/id
81 y/o M w/ PMHx of NPH ( unsteady gait used to ambulate with walker now bedbound for a couple of months) & Afib (on Coumadin) presents to the ED c/o generalized weakness x1 week. Pt states he is unable to get out of bed w/ worsening sores to his lower back & upper thighs with wet discharge and pain is being  admitted for  infected bed ulcer with superimposed cellulitis
81 y/o M w/ PMHx of NPH ( unsteady gait used to ambulate with walker now bedbound for a couple of months) & Afib (on Coumadin) presents to the ED c/o generalized weakness x1 week. Pt states he is unable to get out of bed w/ worsening sores to his lower back & upper thighs with wet discharge and pain is being  admitted for  infected bed ulcer with superimposed cellulitis
83 y/o M w/ PMHx of NPH ( unsteady gait used to ambulate with walker now bedbound for a couple of months) & Afib (on Coumadin) presents to the ED c/o generalized weakness x1 week. Pt states he is unable to get out of bed w/ worsening sores to his lower back & upper thighs with wet discharge and pain is being  admitted for  infected bed ulcer with superimposed cellulitis
83 y/o M w/ PMHx of NPH ( unsteady gait used to ambulate with walker now bedbound for a couple of months) & Afib (on Coumadin) presents to the ED c/o generalized weakness x1 week. Pt states he is unable to get out of bed w/ worsening sores to his lower back & upper thighs with wet discharge and pain is being  admitted for  infected bed ulcer with superimposed cellulitis
RHD mitral stenosis  start lovenx till inr is theraputic  continue beta blocker  will consider RO as an outpt
RHD/MS  continue ac ,beta blocker  renal function is improving.
RHD/MS/A FIB CHRONIC  CONTINUE LOVENOX TILL INR IS THERA PUTIC  CONTINUE CURRENT MEDS  NEEDS FURTHER CARDIAC BARON AS AN OUTPT.
cellulitis - buttocks and posterior aspect of thighs bilaterally.  Continue Vancomycin 1 gram IV q 24 hours  discontinued  Azactam 1 gram IV q 12 hours  Start zinc oxide 20% bid to buttocks and posterior aspect of thighs.  will get physical therapy consult and follow up. spoke with wife . she is unable to take care to him at home and is requesting long term placement or home with home care.
cellulitis of buttocks and right thigh- mostly resolved  d/c vanco  start doxycycline 100mgs po bid x 3 days  reconsult prn
83 y/o M w/ PMHx of NPH ( unsteady gait used to ambulate with walker now bedbound for a couple of months) & Afib (on Coumadin) presents to the ED c/o generalized weakness x1 week. Pt states he is unable to get out of bed w/ worsening sores to his lower back & upper thighs with wet discharge and pain is being  admitted for  infected bed ulcer with superimposed cellulitis

## 2017-05-31 DIAGNOSIS — I48.2 CHRONIC ATRIAL FIBRILLATION: ICD-10-CM

## 2017-05-31 DIAGNOSIS — Z79.01 LONG TERM (CURRENT) USE OF ANTICOAGULANTS: ICD-10-CM

## 2017-05-31 DIAGNOSIS — G91.2 (IDIOPATHIC) NORMAL PRESSURE HYDROCEPHALUS: ICD-10-CM

## 2017-05-31 DIAGNOSIS — L03.317 CELLULITIS OF BUTTOCK: ICD-10-CM

## 2017-05-31 DIAGNOSIS — I09.9 RHEUMATIC HEART DISEASE, UNSPECIFIED: ICD-10-CM

## 2017-05-31 DIAGNOSIS — L89.109 PRESSURE ULCER OF UNSPECIFIED PART OF BACK, UNSPECIFIED STAGE: ICD-10-CM

## 2017-05-31 DIAGNOSIS — N17.9 ACUTE KIDNEY FAILURE, UNSPECIFIED: ICD-10-CM

## 2017-05-31 DIAGNOSIS — R60.0 LOCALIZED EDEMA: ICD-10-CM

## 2017-05-31 DIAGNOSIS — Z74.01 BED CONFINEMENT STATUS: ICD-10-CM

## 2017-05-31 DIAGNOSIS — D72.829 ELEVATED WHITE BLOOD CELL COUNT, UNSPECIFIED: ICD-10-CM

## 2017-10-05 ENCOUNTER — INPATIENT (INPATIENT)
Facility: HOSPITAL | Age: 82
LOS: 20 days | Discharge: NURSING, REHAB OR LONG-TERM CARE | DRG: 853 | End: 2017-10-26
Attending: INTERNAL MEDICINE | Admitting: INTERNAL MEDICINE
Payer: COMMERCIAL

## 2017-10-05 VITALS
WEIGHT: 179.9 LBS | HEIGHT: 70 IN | HEART RATE: 113 BPM | TEMPERATURE: 103 F | RESPIRATION RATE: 20 BRPM | OXYGEN SATURATION: 98 % | DIASTOLIC BLOOD PRESSURE: 69 MMHG | SYSTOLIC BLOOD PRESSURE: 108 MMHG

## 2017-10-05 DIAGNOSIS — I10 ESSENTIAL (PRIMARY) HYPERTENSION: ICD-10-CM

## 2017-10-05 DIAGNOSIS — T14.90 INJURY, UNSPECIFIED: ICD-10-CM

## 2017-10-05 DIAGNOSIS — D64.9 ANEMIA, UNSPECIFIED: ICD-10-CM

## 2017-10-05 DIAGNOSIS — J18.9 PNEUMONIA, UNSPECIFIED ORGANISM: ICD-10-CM

## 2017-10-05 DIAGNOSIS — I48.91 UNSPECIFIED ATRIAL FIBRILLATION: ICD-10-CM

## 2017-10-05 DIAGNOSIS — I50.9 HEART FAILURE, UNSPECIFIED: ICD-10-CM

## 2017-10-05 DIAGNOSIS — E11.9 TYPE 2 DIABETES MELLITUS WITHOUT COMPLICATIONS: ICD-10-CM

## 2017-10-05 DIAGNOSIS — A41.9 SEPSIS, UNSPECIFIED ORGANISM: ICD-10-CM

## 2017-10-05 DIAGNOSIS — Z29.9 ENCOUNTER FOR PROPHYLACTIC MEASURES, UNSPECIFIED: ICD-10-CM

## 2017-10-05 LAB
ABO RH CONFIRMATION: SIGNIFICANT CHANGE UP
ALBUMIN SERPL ELPH-MCNC: 2.1 G/DL — LOW (ref 3.5–5)
ALP SERPL-CCNC: 107 U/L — SIGNIFICANT CHANGE UP (ref 40–120)
ALT FLD-CCNC: 23 U/L DA — SIGNIFICANT CHANGE UP (ref 10–60)
ANION GAP SERPL CALC-SCNC: 11 MMOL/L — SIGNIFICANT CHANGE UP (ref 5–17)
APPEARANCE UR: CLEAR — SIGNIFICANT CHANGE UP
APTT BLD: 54 SEC — HIGH (ref 27.5–37.4)
AST SERPL-CCNC: 10 U/L — SIGNIFICANT CHANGE UP (ref 10–40)
BILIRUB SERPL-MCNC: 0.3 MG/DL — SIGNIFICANT CHANGE UP (ref 0.2–1.2)
BILIRUB UR-MCNC: NEGATIVE — SIGNIFICANT CHANGE UP
BUN SERPL-MCNC: 35 MG/DL — HIGH (ref 7–18)
CALCIUM SERPL-MCNC: 8.6 MG/DL — SIGNIFICANT CHANGE UP (ref 8.4–10.5)
CHLORIDE SERPL-SCNC: 103 MMOL/L — SIGNIFICANT CHANGE UP (ref 96–108)
CO2 SERPL-SCNC: 22 MMOL/L — SIGNIFICANT CHANGE UP (ref 22–31)
COLOR SPEC: YELLOW — SIGNIFICANT CHANGE UP
CREAT SERPL-MCNC: 1.1 MG/DL — SIGNIFICANT CHANGE UP (ref 0.5–1.3)
DIFF PNL FLD: ABNORMAL
GLUCOSE SERPL-MCNC: 118 MG/DL — HIGH (ref 70–99)
GLUCOSE UR QL: NEGATIVE — SIGNIFICANT CHANGE UP
HCT VFR BLD CALC: 25.1 % — LOW (ref 39–50)
HGB BLD-MCNC: 7.4 G/DL — LOW (ref 13–17)
INR BLD: 6.34 RATIO — CRITICAL HIGH (ref 0.88–1.16)
IRON SATN MFR SERPL: 19 UG/DL — LOW (ref 65–170)
IRON SATN MFR SERPL: 7 % — LOW (ref 20–55)
KETONES UR-MCNC: NEGATIVE — SIGNIFICANT CHANGE UP
LACTATE SERPL-SCNC: 1.9 MMOL/L — SIGNIFICANT CHANGE UP (ref 0.7–2)
LDH SERPL L TO P-CCNC: 212 U/L — SIGNIFICANT CHANGE UP (ref 120–225)
LEUKOCYTE ESTERASE UR-ACNC: ABNORMAL
LYMPHOCYTES # BLD AUTO: 8 % — LOW (ref 13–44)
MCHC RBC-ENTMCNC: 22.2 PG — LOW (ref 27–34)
MCHC RBC-ENTMCNC: 29.3 GM/DL — LOW (ref 32–36)
MCV RBC AUTO: 75.6 FL — LOW (ref 80–100)
MONOCYTES NFR BLD AUTO: 5 % — SIGNIFICANT CHANGE UP (ref 2–14)
NEUTROPHILS NFR BLD AUTO: 87 % — HIGH (ref 43–77)
NITRITE UR-MCNC: POSITIVE
OB PNL STL: NEGATIVE — SIGNIFICANT CHANGE UP
PH UR: 8 — SIGNIFICANT CHANGE UP (ref 5–8)
PLATELET # BLD AUTO: 497 K/UL — HIGH (ref 150–400)
POTASSIUM SERPL-MCNC: 4.4 MMOL/L — SIGNIFICANT CHANGE UP (ref 3.5–5.3)
POTASSIUM SERPL-SCNC: 4.4 MMOL/L — SIGNIFICANT CHANGE UP (ref 3.5–5.3)
PROT SERPL-MCNC: 6.4 G/DL — SIGNIFICANT CHANGE UP (ref 6–8.3)
PROT UR-MCNC: 30 MG/DL
PROTHROM AB SERPL-ACNC: 71.8 SEC — HIGH (ref 9.8–12.7)
RBC # BLD: 3.32 M/UL — LOW (ref 4.2–5.8)
RBC # FLD: 16.2 % — HIGH (ref 10.3–14.5)
SODIUM SERPL-SCNC: 136 MMOL/L — SIGNIFICANT CHANGE UP (ref 135–145)
SP GR SPEC: 1.01 — SIGNIFICANT CHANGE UP (ref 1.01–1.02)
TIBC SERPL-MCNC: 275 UG/DL — SIGNIFICANT CHANGE UP (ref 250–450)
UIBC SERPL-MCNC: 256 UG/DL — SIGNIFICANT CHANGE UP (ref 110–370)
UROBILINOGEN FLD QL: NEGATIVE — SIGNIFICANT CHANGE UP
WBC # BLD: 20.6 K/UL — HIGH (ref 3.8–10.5)
WBC # FLD AUTO: 20.6 K/UL — HIGH (ref 3.8–10.5)

## 2017-10-05 PROCEDURE — 70450 CT HEAD/BRAIN W/O DYE: CPT | Mod: 26

## 2017-10-05 PROCEDURE — 71010: CPT | Mod: 26

## 2017-10-05 PROCEDURE — 99291 CRITICAL CARE FIRST HOUR: CPT

## 2017-10-05 RX ORDER — PHYTONADIONE (VIT K1) 5 MG
5 TABLET ORAL ONCE
Refills: 0 | Status: COMPLETED | OUTPATIENT
Start: 2017-10-05 | End: 2017-10-05

## 2017-10-05 RX ORDER — POLYETHYLENE GLYCOL 3350 17 G/17G
17 POWDER, FOR SOLUTION ORAL DAILY
Refills: 0 | Status: DISCONTINUED | OUTPATIENT
Start: 2017-10-05 | End: 2017-10-09

## 2017-10-05 RX ORDER — SODIUM CHLORIDE 9 MG/ML
1000 INJECTION INTRAMUSCULAR; INTRAVENOUS; SUBCUTANEOUS ONCE
Refills: 0 | Status: COMPLETED | OUTPATIENT
Start: 2017-10-05 | End: 2017-10-05

## 2017-10-05 RX ORDER — ASPIRIN/CALCIUM CARB/MAGNESIUM 324 MG
81 TABLET ORAL DAILY
Refills: 0 | Status: DISCONTINUED | OUTPATIENT
Start: 2017-10-05 | End: 2017-10-09

## 2017-10-05 RX ORDER — TAMSULOSIN HYDROCHLORIDE 0.4 MG/1
0.4 CAPSULE ORAL AT BEDTIME
Refills: 0 | Status: DISCONTINUED | OUTPATIENT
Start: 2017-10-05 | End: 2017-10-09

## 2017-10-05 RX ORDER — VANCOMYCIN HCL 1 G
1000 VIAL (EA) INTRAVENOUS ONCE
Refills: 0 | Status: COMPLETED | OUTPATIENT
Start: 2017-10-05 | End: 2017-10-05

## 2017-10-05 RX ORDER — ACETAMINOPHEN 500 MG
650 TABLET ORAL EVERY 6 HOURS
Refills: 0 | Status: DISCONTINUED | OUTPATIENT
Start: 2017-10-05 | End: 2017-10-05

## 2017-10-05 RX ORDER — AZITHROMYCIN 500 MG/1
500 TABLET, FILM COATED ORAL ONCE
Refills: 0 | Status: COMPLETED | OUTPATIENT
Start: 2017-10-05 | End: 2017-10-05

## 2017-10-05 RX ORDER — AZTREONAM 2 G
2000 VIAL (EA) INJECTION ONCE
Refills: 0 | Status: COMPLETED | OUTPATIENT
Start: 2017-10-05 | End: 2017-10-05

## 2017-10-05 RX ORDER — FERROUS SULFATE 325(65) MG
325 TABLET ORAL DAILY
Refills: 0 | Status: DISCONTINUED | OUTPATIENT
Start: 2017-10-05 | End: 2017-10-09

## 2017-10-05 RX ORDER — SODIUM CHLORIDE 9 MG/ML
500 INJECTION INTRAMUSCULAR; INTRAVENOUS; SUBCUTANEOUS ONCE
Refills: 0 | Status: COMPLETED | OUTPATIENT
Start: 2017-10-05 | End: 2017-10-05

## 2017-10-05 RX ORDER — CEFEPIME 1 G/1
1000 INJECTION, POWDER, FOR SOLUTION INTRAMUSCULAR; INTRAVENOUS ONCE
Refills: 0 | Status: COMPLETED | OUTPATIENT
Start: 2017-10-05 | End: 2017-10-05

## 2017-10-05 RX ORDER — AZTREONAM 2 G
VIAL (EA) INJECTION
Refills: 0 | Status: DISCONTINUED | OUTPATIENT
Start: 2017-10-05 | End: 2017-10-06

## 2017-10-05 RX ORDER — ACETAMINOPHEN 500 MG
650 TABLET ORAL EVERY 6 HOURS
Refills: 0 | Status: DISCONTINUED | OUTPATIENT
Start: 2017-10-05 | End: 2017-10-09

## 2017-10-05 RX ORDER — VANCOMYCIN HCL 1 G
1000 VIAL (EA) INTRAVENOUS EVERY 12 HOURS
Refills: 0 | Status: DISCONTINUED | OUTPATIENT
Start: 2017-10-05 | End: 2017-10-09

## 2017-10-05 RX ORDER — IPRATROPIUM/ALBUTEROL SULFATE 18-103MCG
3 AEROSOL WITH ADAPTER (GRAM) INHALATION EVERY 6 HOURS
Refills: 0 | Status: DISCONTINUED | OUTPATIENT
Start: 2017-10-05 | End: 2017-10-07

## 2017-10-05 RX ORDER — AZTREONAM 2 G
2000 VIAL (EA) INJECTION EVERY 8 HOURS
Refills: 0 | Status: DISCONTINUED | OUTPATIENT
Start: 2017-10-06 | End: 2017-10-06

## 2017-10-05 RX ORDER — PIPERACILLIN AND TAZOBACTAM 4; .5 G/20ML; G/20ML
3.38 INJECTION, POWDER, LYOPHILIZED, FOR SOLUTION INTRAVENOUS EVERY 8 HOURS
Refills: 0 | Status: DISCONTINUED | OUTPATIENT
Start: 2017-10-05 | End: 2017-10-05

## 2017-10-05 RX ORDER — DOCUSATE SODIUM 100 MG
100 CAPSULE ORAL
Refills: 0 | Status: DISCONTINUED | OUTPATIENT
Start: 2017-10-05 | End: 2017-10-09

## 2017-10-05 RX ADMIN — SODIUM CHLORIDE 4000 MILLILITER(S): 9 INJECTION INTRAMUSCULAR; INTRAVENOUS; SUBCUTANEOUS at 17:00

## 2017-10-05 RX ADMIN — AZITHROMYCIN 250 MILLIGRAM(S): 500 TABLET, FILM COATED ORAL at 17:12

## 2017-10-05 RX ADMIN — Medication 101 MILLIGRAM(S): at 19:32

## 2017-10-05 RX ADMIN — Medication 100 MILLIGRAM(S): at 23:35

## 2017-10-05 RX ADMIN — CEFEPIME 100 MILLIGRAM(S): 1 INJECTION, POWDER, FOR SOLUTION INTRAMUSCULAR; INTRAVENOUS at 17:12

## 2017-10-05 RX ADMIN — Medication 250 MILLIGRAM(S): at 16:59

## 2017-10-05 RX ADMIN — TAMSULOSIN HYDROCHLORIDE 0.4 MILLIGRAM(S): 0.4 CAPSULE ORAL at 23:35

## 2017-10-05 RX ADMIN — Medication 650 MILLIGRAM(S): at 20:30

## 2017-10-05 RX ADMIN — SODIUM CHLORIDE 2000 MILLILITER(S): 9 INJECTION INTRAMUSCULAR; INTRAVENOUS; SUBCUTANEOUS at 17:28

## 2017-10-05 RX ADMIN — SODIUM CHLORIDE 4000 MILLILITER(S): 9 INJECTION INTRAMUSCULAR; INTRAVENOUS; SUBCUTANEOUS at 16:45

## 2017-10-05 NOTE — H&P ADULT - PROBLEM SELECTOR PLAN 4
-Patient has h/o CHF   -Echo in may 2017: EF = 55 to 60%. Grade II diastolic dysfunction.  -Hold Lasix because of sepsis -Patient has h/o CHF   -Echo in may 2017: EF = 55 to 60%. Grade II diastolic dysfunction.  -Hold Lasix because of sepsis  -Patient received 2.5L bolus in ED. Advice to be careful with fluids. -Patient has sacral wound, looks infected with yellowish discharge seen.   -F/u Wound care  -Please call surgical consult Dr Nichols in AM -Hold antihypertensives as patient is septic  -Restart as appropiate

## 2017-10-05 NOTE — ED PROVIDER NOTE - OBJECTIVE STATEMENT
81 y/o M pt with PMHx of Afib, CHF, and DM BIB EMS to ED from [facility]. Per EMS, pt has low hemoglobin levels. HPI limited due to unresponsiveness. Pt is allergic to penicillin.

## 2017-10-05 NOTE — H&P ADULT - ATTENDING COMMENTS
Patient seen and examined in ER, d/W ER MD, later on admitting resident and also with wife at bedside in detail  To continue azactam and iv vancomycin.

## 2017-10-05 NOTE — ED ADULT TRIAGE NOTE - BANDS:
REVIEW OF SYSTEMS: GENERAL:  Patient denies fevers, chills, night sweats, anorexia, weight loss, but complains of:  Fatigue. Patient states, My appetite is not there at all\"  ALLERGIC/IMMUNOLOGIC:Reviewed  EYES:  Patient denies  significant visual difficulties, diplopia, but complains of:   Lens implant on right eye in 2016   ENT/MOUTH:  Patient denies problems with hearing, sore throat, sinus drainage, mouth sores  ENDOCRINE:  Patient denies diabetes, thyroid disease, hormone replacement, hot flashes or night sweats  HEMATOLOGIC/LYMPHATIC: Patient denies easy bruising or bleeding, tender or palpable lymph nodes  RESPIRATORY:  Patient denies dyspnea on exertion, chest pain, cough, hemoptysis  CARDIOVASCULAR:  Patient denies anginal chest pain, palpitations, orthopnea, peripheral edema   GASTROINTESTINAL: Patient denies nausea, vomiting, diarrhea, GI bleeding, change in bowel habits, early satiety, but complains of:  constipation   (MALE): Patient denies hematuria, dysuria, increased frequency, urgency, hesitancy, incontinence, but complains of:   Patient complains of a slow stream when voiding  MUSCULOSKELETAL:  Patient denies swelling or redness, decreased range of motion, leg or ankle swelling, but complains of:  joint pain  SKIN:  Patient denies chronic rashes, inflammation, ulcerations or skin changes  NEUROLOGIC:  Patient denies headache, blurred vision, sensory problems, but complains of:  areas of focal weakness or numbness and abnormal gait as patient does use a can for balance  PSYCHIATRIC: Patient denies shyanne or mood swings, psychotropic drugs, but complains of:  insomnia and depression   Name band;

## 2017-10-05 NOTE — H&P ADULT - ASSESSMENT
83 y/o M from [facility] w/ PMHx of Rheumatic heart disease, NPH ( unsteady gait used to ambulate with walker now bedbound for a couple of months) & Afib (on Coumadin) was sent from [facility] for low Hb. 81 y/o M from [facility] w/ PMHx of Rheumatic heart disease, NPH ( unsteady gait used to ambulate with walker now bedbound for a couple of months) & Afib (on Coumadin) was sent from [facility] for low Hb. and pneumonia

## 2017-10-05 NOTE — H&P ADULT - PROBLEM SELECTOR PLAN 5
-Continue sliding scale  -Follow Hba1c -Continue sliding scale  -No basal insulin ordered as Blood glucose is 118.  -Follow Hba1c -Patient has h/o CHF   -Echo in may 2017: EF = 55 to 60%. Grade II diastolic dysfunction.  -Hold Lasix because of sepsis  -Patient received 2.5L bolus in ED. Advice to be careful with fluids. -Patient has sacral wound, looks infected with yellowish discharge seen.   -F/u Wound care  -Please call surgical consult Dr Cueva in AM

## 2017-10-05 NOTE — ED PROVIDER NOTE - CRITICAL CARE PROVIDED
direct patient care (not related to procedure)/additional history taking/interpretation of diagnostic studies/documentation/consultation with other physicians/consult w/ pt's family directly relating to pts condition/telephone consultation with the patient's family

## 2017-10-05 NOTE — ED PROVIDER NOTE - MUSCULOSKELETAL, MLM
Spine appears normal, range of motion is not limited, no muscle or joint tenderness. No radha tenderness to palpation. Cogwheel rigidity with contractions.

## 2017-10-05 NOTE — H&P ADULT - PROBLEM SELECTOR PLAN 2
-Hb 7.4. Baseline Hb is around 9-10  -FOBT -ve  -Follow Anemia panel, Reticulocyte cont, haptoglobin and LDH  -Transfuse 1 PRBC, follow post transfusion Hb -Hb 7.4. Baseline Hb is around 9-10  -FOBT -ve  -Follow Anemia panel, Reticulocyte cont, haptoglobin and LDH  -F/u CT abdomen with iv and oral contrast to rule out retroperitoneal hemorrhage in setting of increase INR and low Hb.   -Transfuse 1 PRBC, follow post transfusion Hb

## 2017-10-05 NOTE — H&P ADULT - NSHPLABSRESULTS_GEN_ALL_CORE
83 y/o M from [facility] w/ PMHx of Rheumatic heart disease, NPH ( unsteady gait used to ambulate with walker now bedbound for a couple of months) & Afib (on Coumadin) was sent from [facility] for low Hb.

## 2017-10-05 NOTE — H&P ADULT - HISTORY OF PRESENT ILLNESS
81 y/o M from [facility] w/ PMHx of Rheumatic heart disease, NPH ( unsteady gait used to ambulate with walker now bedbound for a couple of months) & Afib (on Coumadin) was sent from [facility] for low Hb.   Patient is poor historian, no evidence of blood in stool, blood in urine, sputum or dark color stool. Patient is being treated for pneumonia in NH.     SH: Non smoker, non alcoholic, denies illicit drug use. Lives at NH, mostly bed bound. 83 y/o M from [facility] w/ PMHx of Rheumatic heart disease, NPH ( unsteady gait used to ambulate with walker now bedbound for a couple of months) & Afib (on Coumadin) was sent from [facility] for low Hb.   Patient is poor historian and source of information is wife at bedside. Wife states that patient is having cough with yellowish sputum from last 4 weeks with occasional temp spikes. Denies h/o blood in stool, blood in urine, sputum or dark color stool.     Patient is being treated for pneumonia in NH.   SH: Non smoker, non alcoholic, denies illicit drug use. Lives at NH, mostly bed bound.   Fh: Not significant as per wife  Goals of care: Patient is full code. Wife Daly Tracy is HCP who can be reached at Ph. [phone]  Ed Course: Patient is AxO x1, unable to interact. T 102.8, Hr 113, /69. S/p Vancomycni/Cefepime and Zithromax

## 2017-10-05 NOTE — H&P ADULT - PROBLEM SELECTOR PLAN 6
-Improve score 2, no DVt prophylaxis because of low Hb. -Continue sliding scale  -No basal insulin ordered as Blood glucose is 118.  -Follow Hba1c -Patient has h/o CHF   -Echo in may 2017: EF = 55 to 60%. Grade II diastolic dysfunction.  -Hold Lasix because of sepsis  -Patient received 2.5L bolus in ED. Advice to be careful with fluids.

## 2017-10-05 NOTE — H&P ADULT - PROBLEM SELECTOR PLAN 1
-Patient had fever of 102.8  -SIRS 3/4, positive for HR, Temp and WBC  -Source of infection: Pneumonia  -Lactate 1.9, S/p 2.5 L fluids and Vanco/Cefepime/Azithro in ED.   -Will Treat as Health care associated Pneumonia as patient is from NH  -Start Vancomycin and Zosyn  -F/u Blood Cx -Patient had fever of 102.8  -SIRS 3/4, positive for HR, Temp and WBC  -Source of infection: Pneumonia  -CXR:  Interstitial prominence and opacity at the left lung base  -Lactate 1.9, S/p 2.5 L fluids and Vanco/Cefepime/Azithro in ED.   -Will Treat as Health care associated Pneumonia as patient is from NH  -Start Vancomycin and Azactam (penicillin allergic)  -F/u Blood Cx -Patient had fever of 102.8  -SIRS 3/4, positive for HR, Temp and WBC  -Source of infection: Pneumonia and UTI  -CXR:  Interstitial prominence and opacity at the left lung base  -Lactate 1.9, S/p 2.5 L fluids and Vanco/Cefepime/Azithro in ED.   -Will Treat as Health care associated Pneumonia as patient is from NH  -F/u Legionella antigen and strep antigen  -Start Vancomycin and Azactam (penicillin allergic)  -ID Dr Lala  -F/u Blood Cx -Patient had fever of 102.8  -SIRS 3/4, positive for HR, Temp and WBC  -Source of infection: Pneumonia and UTI  -CXR:  Interstitial prominence and opacity at the left lung base  -Lactate 1.9, S/p 2.5 L fluids and Vanco/Cefepime/Azithro in ED.   -Will Treat as Health care associated Pneumonia as patient is from NH  -F/u Legionella antigen and strep antigen  -Start Vancomycin and Azactam (penicillin allergic)  -ID Dr Lala  -F/u Speech and swallow for risk of aspiration  -F/u Blood Cx

## 2017-10-05 NOTE — ED ADULT NURSE NOTE - ED STAT RN HANDOFF DETAILS
received  pt.in bed at 1910  pt.is awake and responsive.denies pain. admitted to medicine for pneumonia.febrile T101.8 tylenol 650 mg supp given. urine sent to lab. pm labs done. transfer to  402 report given to amarjit diaz,.not in  distress

## 2017-10-05 NOTE — ED PROVIDER NOTE - MEDICAL DECISION MAKING DETAILS
sepsis / anemia, failing outpt abx, no gi bleeding, labs, ekg, xr chest, abx - re-eval, transfuse, sepsis bolus, reverse ac

## 2017-10-05 NOTE — ED PROVIDER NOTE - CONSTITUTIONAL, MLM
normal... Pt is not responsive to verbal stimuli, eyes open, nods yes or no to questions but it is uncertain if pt is answering correctly.

## 2017-10-05 NOTE — ED PROVIDER NOTE - PROGRESS NOTE DETAILS
rectal - brown stool tel consent to blood from wife. d/w her for weeks / months he has been less verbal, has hydrocephalus and buttock ulcer. last week was on abx. [facility] talked to her - did not mention fever.  At this point I don't think he is having significant active bleeding. will give only 1 unit prbc and ffp, no kcentra. will give vit k stable for floor admit

## 2017-10-05 NOTE — H&P ADULT - PROBLEM SELECTOR PLAN 7
-Improve score 2, no DVt prophylaxis because of low Hb. -Continue sliding scale  -No basal insulin ordered as Blood glucose is 118.  -Follow Hba1c

## 2017-10-05 NOTE — ED ADULT NURSE NOTE - OBJECTIVE STATEMENT
pt is here for h/h 6.7 from [facility].  pt calm at the bedside, no distress noted, alert but non verbal to the question.

## 2017-10-05 NOTE — ED PROVIDER NOTE - CARE PLAN
Principal Discharge DX:	Pneumonia due to infectious organism, unspecified laterality, unspecified part of lung  Secondary Diagnosis:	Anemia, unspecified type  Secondary Diagnosis:	INR (international normal ratio) abnormal

## 2017-10-05 NOTE — H&P ADULT - PROBLEM SELECTOR PLAN 3
-Patient was on warfarin with supra therapeutic INR, likely secondary to recent antibiotic use.   -Hold warfarin  -Goal INR 2-3  -Restart as indicated -Patient was on warfarin with supra therapeutic INR, likely secondary to recent antibiotic use.   -S/p 5 mg iv Vitamin K and 1 unit FFP in ED  -Hold warfarin  -Goal INR 2-3  -Restart as indicated

## 2017-10-06 DIAGNOSIS — Z71.89 OTHER SPECIFIED COUNSELING: ICD-10-CM

## 2017-10-06 DIAGNOSIS — R79.1 ABNORMAL COAGULATION PROFILE: ICD-10-CM

## 2017-10-06 DIAGNOSIS — N39.0 URINARY TRACT INFECTION, SITE NOT SPECIFIED: ICD-10-CM

## 2017-10-06 LAB
ANION GAP SERPL CALC-SCNC: 10 MMOL/L — SIGNIFICANT CHANGE UP (ref 5–17)
BASOPHILS # BLD AUTO: 0.1 K/UL — SIGNIFICANT CHANGE UP (ref 0–0.2)
BASOPHILS NFR BLD AUTO: 0.5 % — SIGNIFICANT CHANGE UP (ref 0–2)
BUN SERPL-MCNC: 25 MG/DL — HIGH (ref 7–18)
CALCIUM SERPL-MCNC: 8.4 MG/DL — SIGNIFICANT CHANGE UP (ref 8.4–10.5)
CHLORIDE SERPL-SCNC: 107 MMOL/L — SIGNIFICANT CHANGE UP (ref 96–108)
CHOLEST SERPL-MCNC: 127 MG/DL — SIGNIFICANT CHANGE UP (ref 10–199)
CO2 SERPL-SCNC: 22 MMOL/L — SIGNIFICANT CHANGE UP (ref 22–31)
CREAT SERPL-MCNC: 1.08 MG/DL — SIGNIFICANT CHANGE UP (ref 0.5–1.3)
EOSINOPHIL # BLD AUTO: 0 K/UL — SIGNIFICANT CHANGE UP (ref 0–0.5)
EOSINOPHIL NFR BLD AUTO: 0.2 % — SIGNIFICANT CHANGE UP (ref 0–6)
FERRITIN SERPL-MCNC: 55 NG/ML — SIGNIFICANT CHANGE UP (ref 30–400)
GLUCOSE SERPL-MCNC: 153 MG/DL — HIGH (ref 70–99)
GRAM STN FLD: SIGNIFICANT CHANGE UP
HAPTOGLOB SERPL-MCNC: 302 MG/DL — HIGH (ref 34–200)
HBA1C BLD-MCNC: 6.3 % — HIGH (ref 4–5.6)
HCT VFR BLD CALC: 25.7 % — LOW (ref 39–50)
HDLC SERPL-MCNC: 50 MG/DL — SIGNIFICANT CHANGE UP (ref 40–125)
HGB BLD-MCNC: 8 G/DL — LOW (ref 13–17)
INR BLD: 1.46 RATIO — HIGH (ref 0.88–1.16)
LACTATE SERPL-SCNC: 1.5 MMOL/L — SIGNIFICANT CHANGE UP (ref 0.7–2)
LIPID PNL WITH DIRECT LDL SERPL: 55 MG/DL — SIGNIFICANT CHANGE UP
LYMPHOCYTES # BLD AUTO: 0.8 K/UL — LOW (ref 1–3.3)
LYMPHOCYTES # BLD AUTO: 5.1 % — LOW (ref 13–44)
MAGNESIUM SERPL-MCNC: 1.8 MG/DL — SIGNIFICANT CHANGE UP (ref 1.6–2.6)
MCHC RBC-ENTMCNC: 24 PG — LOW (ref 27–34)
MCHC RBC-ENTMCNC: 31.1 GM/DL — LOW (ref 32–36)
MCV RBC AUTO: 77.4 FL — LOW (ref 80–100)
METHOD TYPE: SIGNIFICANT CHANGE UP
MONOCYTES # BLD AUTO: 0.5 K/UL — SIGNIFICANT CHANGE UP (ref 0–0.9)
MONOCYTES NFR BLD AUTO: 3.4 % — SIGNIFICANT CHANGE UP (ref 2–14)
NEUTROPHILS # BLD AUTO: 14.6 K/UL — HIGH (ref 1.8–7.4)
NEUTROPHILS NFR BLD AUTO: 90.8 % — HIGH (ref 43–77)
PHOSPHATE SERPL-MCNC: 2.9 MG/DL — SIGNIFICANT CHANGE UP (ref 2.5–4.5)
PLATELET # BLD AUTO: 398 K/UL — SIGNIFICANT CHANGE UP (ref 150–400)
POTASSIUM SERPL-MCNC: 4.1 MMOL/L — SIGNIFICANT CHANGE UP (ref 3.5–5.3)
POTASSIUM SERPL-SCNC: 4.1 MMOL/L — SIGNIFICANT CHANGE UP (ref 3.5–5.3)
PROTEUS SP DNA BLD POS QL NAA+NON-PROBE: SIGNIFICANT CHANGE UP
PROTHROM AB SERPL-ACNC: 16 SEC — HIGH (ref 9.8–12.7)
RBC # BLD: 3.22 M/UL — LOW (ref 4.2–5.8)
RBC # BLD: 3.32 M/UL — LOW (ref 4.2–5.8)
RBC # FLD: 16.8 % — HIGH (ref 10.3–14.5)
RETICS #: 94.1 K/UL — SIGNIFICANT CHANGE UP (ref 25–125)
RETICS/RBC NFR: 3 % — HIGH (ref 0.5–2.5)
SODIUM SERPL-SCNC: 139 MMOL/L — SIGNIFICANT CHANGE UP (ref 135–145)
SPECIMEN SOURCE: SIGNIFICANT CHANGE UP
TOTAL CHOLESTEROL/HDL RATIO MEASUREMENT: 2.5 RATIO — LOW (ref 3.4–9.6)
TRANSFERRIN SERPL-MCNC: 216 MG/DL — SIGNIFICANT CHANGE UP (ref 200–360)
TRIGL SERPL-MCNC: 111 MG/DL — SIGNIFICANT CHANGE UP (ref 10–149)
TSH SERPL-MCNC: 0.58 UU/ML — SIGNIFICANT CHANGE UP (ref 0.34–4.82)
WBC # BLD: 16.1 K/UL — HIGH (ref 3.8–10.5)
WBC # FLD AUTO: 16.1 K/UL — HIGH (ref 3.8–10.5)

## 2017-10-06 PROCEDURE — 74176 CT ABD & PELVIS W/O CONTRAST: CPT | Mod: 26

## 2017-10-06 PROCEDURE — 99497 ADVNCD CARE PLAN 30 MIN: CPT

## 2017-10-06 PROCEDURE — 71250 CT THORAX DX C-: CPT | Mod: 26

## 2017-10-06 RX ORDER — METRONIDAZOLE 500 MG
500 TABLET ORAL EVERY 8 HOURS
Refills: 0 | Status: DISCONTINUED | OUTPATIENT
Start: 2017-10-06 | End: 2017-10-09

## 2017-10-06 RX ORDER — DEXTROSE 50 % IN WATER 50 %
25 SYRINGE (ML) INTRAVENOUS ONCE
Refills: 0 | Status: DISCONTINUED | OUTPATIENT
Start: 2017-10-06 | End: 2017-10-09

## 2017-10-06 RX ORDER — ASCORBIC ACID 60 MG
500 TABLET,CHEWABLE ORAL DAILY
Refills: 0 | Status: DISCONTINUED | OUTPATIENT
Start: 2017-10-06 | End: 2017-10-09

## 2017-10-06 RX ORDER — SODIUM CHLORIDE 9 MG/ML
1000 INJECTION INTRAMUSCULAR; INTRAVENOUS; SUBCUTANEOUS
Refills: 0 | Status: DISCONTINUED | OUTPATIENT
Start: 2017-10-06 | End: 2017-10-09

## 2017-10-06 RX ORDER — DEXTROSE 50 % IN WATER 50 %
1 SYRINGE (ML) INTRAVENOUS ONCE
Refills: 0 | Status: DISCONTINUED | OUTPATIENT
Start: 2017-10-06 | End: 2017-10-09

## 2017-10-06 RX ORDER — ZINC SULFATE TAB 220 MG (50 MG ZINC EQUIVALENT) 220 (50 ZN) MG
220 TAB ORAL DAILY
Refills: 0 | Status: DISCONTINUED | OUTPATIENT
Start: 2017-10-06 | End: 2017-10-09

## 2017-10-06 RX ORDER — INFLUENZA VIRUS VACCINE 15; 15; 15; 15 UG/.5ML; UG/.5ML; UG/.5ML; UG/.5ML
0.5 SUSPENSION INTRAMUSCULAR ONCE
Refills: 0 | Status: COMPLETED | OUTPATIENT
Start: 2017-10-06 | End: 2017-10-23

## 2017-10-06 RX ORDER — GLUCAGON INJECTION, SOLUTION 0.5 MG/.1ML
1 INJECTION, SOLUTION SUBCUTANEOUS ONCE
Refills: 0 | Status: DISCONTINUED | OUTPATIENT
Start: 2017-10-06 | End: 2017-10-09

## 2017-10-06 RX ORDER — SODIUM CHLORIDE 9 MG/ML
1000 INJECTION, SOLUTION INTRAVENOUS
Refills: 0 | Status: DISCONTINUED | OUTPATIENT
Start: 2017-10-06 | End: 2017-10-09

## 2017-10-06 RX ORDER — AZTREONAM 2 G
VIAL (EA) INJECTION
Refills: 0 | Status: DISCONTINUED | OUTPATIENT
Start: 2017-10-06 | End: 2017-10-09

## 2017-10-06 RX ORDER — INSULIN LISPRO 100/ML
VIAL (ML) SUBCUTANEOUS
Refills: 0 | Status: DISCONTINUED | OUTPATIENT
Start: 2017-10-06 | End: 2017-10-09

## 2017-10-06 RX ORDER — WARFARIN SODIUM 2.5 MG/1
2 TABLET ORAL ONCE
Refills: 0 | Status: COMPLETED | OUTPATIENT
Start: 2017-10-06 | End: 2017-10-06

## 2017-10-06 RX ORDER — AZTREONAM 2 G
1000 VIAL (EA) INJECTION EVERY 8 HOURS
Refills: 0 | Status: DISCONTINUED | OUTPATIENT
Start: 2017-10-06 | End: 2017-10-09

## 2017-10-06 RX ORDER — AZTREONAM 2 G
1000 VIAL (EA) INJECTION ONCE
Refills: 0 | Status: COMPLETED | OUTPATIENT
Start: 2017-10-06 | End: 2017-10-06

## 2017-10-06 RX ORDER — DEXTROSE 50 % IN WATER 50 %
12.5 SYRINGE (ML) INTRAVENOUS ONCE
Refills: 0 | Status: DISCONTINUED | OUTPATIENT
Start: 2017-10-06 | End: 2017-10-09

## 2017-10-06 RX ADMIN — Medication 100 MILLIGRAM(S): at 05:50

## 2017-10-06 RX ADMIN — TAMSULOSIN HYDROCHLORIDE 0.4 MILLIGRAM(S): 0.4 CAPSULE ORAL at 22:12

## 2017-10-06 RX ADMIN — Medication 100 MILLIGRAM(S): at 17:15

## 2017-10-06 RX ADMIN — Medication 50 MILLIGRAM(S): at 22:12

## 2017-10-06 RX ADMIN — Medication 3 MILLILITER(S): at 02:19

## 2017-10-06 RX ADMIN — Medication 100 MILLIGRAM(S): at 14:16

## 2017-10-06 RX ADMIN — WARFARIN SODIUM 2 MILLIGRAM(S): 2.5 TABLET ORAL at 22:12

## 2017-10-06 RX ADMIN — POLYETHYLENE GLYCOL 3350 17 GRAM(S): 17 POWDER, FOR SOLUTION ORAL at 12:38

## 2017-10-06 RX ADMIN — Medication 250 MILLIGRAM(S): at 05:59

## 2017-10-06 RX ADMIN — Medication 81 MILLIGRAM(S): at 12:38

## 2017-10-06 RX ADMIN — Medication 3 MILLILITER(S): at 08:27

## 2017-10-06 RX ADMIN — Medication 3 MILLILITER(S): at 15:10

## 2017-10-06 RX ADMIN — Medication 1: at 17:14

## 2017-10-06 RX ADMIN — Medication 250 MILLIGRAM(S): at 17:21

## 2017-10-06 RX ADMIN — Medication 100 MILLIGRAM(S): at 05:51

## 2017-10-06 RX ADMIN — SODIUM CHLORIDE 40 MILLILITER(S): 9 INJECTION INTRAMUSCULAR; INTRAVENOUS; SUBCUTANEOUS at 12:37

## 2017-10-06 RX ADMIN — Medication 3 MILLILITER(S): at 20:01

## 2017-10-06 RX ADMIN — Medication 325 MILLIGRAM(S): at 12:38

## 2017-10-06 RX ADMIN — Medication 500 MILLIGRAM(S): at 22:12

## 2017-10-06 NOTE — DIETITIAN INITIAL EVALUATION ADULT. - PROBLEM SELECTOR PLAN 6
-Patient has h/o CHF   -Echo in may 2017: EF = 55 to 60%. Grade II diastolic dysfunction.  -Hold Lasix because of sepsis  -Patient received 2.5L bolus in ED. Advice to be careful with fluids.

## 2017-10-06 NOTE — CONSULT NOTE ADULT - SUBJECTIVE AND OBJECTIVE BOX
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   Patient is poor historian and source of information is wife at bedside. Wife states that patient is having cough with yellowish sputum from last 4 weeks with occasional temp spikes. Denies h/o blood in stool, blood in urine, sputum or dark color stool.     INTERVAL HPI:   History as above. Pt is unable to provide any additional history.     SH: Non smoker, non alcoholic, denies illicit drug use. Lives at NH, mostly bed bound.   Fh: Not significant as per wife  Goals of care: Patient is full code. Wife Daly Tracy is HCP who can be reached at . [phone]  Ed Course: Patient is AxO x1, unable to interact. T 102.8, Hr 113, /69. S/p Vancomycni/Cefepime and Zithromax (05 Oct 2017 19:36)      PAST MEDICAL & SURGICAL HISTORY:  DM (diabetes mellitus)  Afib  CHF (congestive heart failure)  No significant past surgical history      Allergies    penicillin (Unknown)          MEDICATIONS  (STANDING):  ALBUTerol/ipratropium for Nebulization 3 milliLiter(s) Nebulizer every 6 hours  aspirin enteric coated 81 milliGRAM(s) Oral daily  aztreonam  IVPB 2000 milliGRAM(s) IV Intermittent every 8 hours  aztreonam  IVPB      dextrose 5%. 1000 milliLiter(s) (50 mL/Hr) IV Continuous <Continuous>  dextrose 50% Injectable 12.5 Gram(s) IV Push once  dextrose 50% Injectable 25 Gram(s) IV Push once  dextrose 50% Injectable 25 Gram(s) IV Push once  docusate sodium 100 milliGRAM(s) Oral two times a day  ferrous    sulfate 325 milliGRAM(s) Oral daily  insulin lispro (HumaLOG) corrective regimen sliding scale   SubCutaneous three times a day before meals  polyethylene glycol 3350 17 Gram(s) Oral daily  sodium chloride 0.9%. 1000 milliLiter(s) (40 mL/Hr) IV Continuous <Continuous>  tamsulosin 0.4 milliGRAM(s) Oral at bedtime  vancomycin  IVPB 1000 milliGRAM(s) IV Intermittent every 12 hours  warfarin 2 milliGRAM(s) Oral once    MEDICATIONS  (PRN):  acetaminophen  Suppository 650 milliGRAM(s) Rectal every 6 hours PRN For Temp greater than 38 C (100.4 F)  dextrose Gel 1 Dose(s) Oral once PRN Blood Glucose LESS THAN 70 milliGRAM(s)/deciLiter  glucagon  Injectable 1 milliGRAM(s) IntraMuscular once PRN Glucose <70 milliGRAM(s)/deciLiter      Vital Signs Last 24 Hrs  T(C): 37.9 (06 Oct 2017 12:56), Max: 39.3 (05 Oct 2017 16:02)  T(F): 100.3 (06 Oct 2017 12:56), Max: 102.8 (05 Oct 2017 16:02)  HR: 122 (06 Oct 2017 12:56) (104 - 122)  BP: 105/72 (06 Oct 2017 12:56) (90/56 - 115/83)  BP(mean): --  RR: 24 (06 Oct 2017 12:56) (14 - 24)  SpO2: 95% (06 Oct 2017 12:56) (95% - 99%)    Physical Exam:    Gen: lethargic  Abd: soft NT ND, u  Back:   Pelvic:  Ext:    Labs:                          8.0    16.1  )-----------( 398      ( 06 Oct 2017 11:03 )             25.7     10-06    139  |  107  |  25<H>  ----------------------------<  153<H>  4.1   |  22  |  1.08    Ca    8.4      06 Oct 2017 11:03  Phos  2.9     10-06  Mg     1.8     10-06    TPro  6.4  /  Alb  2.1<L>  /  TBili  0.3  /  DBili  x   /  AST  10  /  ALT  23  /  AlkPhos  107  10-05    PT/INR - ( 06 Oct 2017 11:03 )   PT: 16.0 sec;   INR: 1.46 ratio         PTT - ( 05 Oct 2017 16:56 )  PTT:54.0 sec  Urinalysis Basic - ( 05 Oct 2017 20:42 )    Color: Yellow / Appearance: Clear / S.010 / pH: x  Gluc: x / Ketone: Negative  / Bili: Negative / Urobili: Negative   Blood: x / Protein: 30 mg/dL / Nitrite: Positive   Leuk Esterase: Moderate / RBC: 2-5 /HPF / WBC 6-10 /HPF   Sq Epi: x / Non Sq Epi: Few / Bacteria: Many /HPF        Cultures:    Radiological Exams: Service:  Consultation Note    Patient is a 82y old  Male who presents with a chief complaint of low hb level in NH (05 Oct 2017 19:36)      HPI:  81 y/o M from [facility] w/ PMHx of Rheumatic heart disease, NPH ( unsteady gait used to ambulate with walker now bedbound for a couple of months) & Afib (on Coumadin) was sent from [facility] for low Hb.   Patient is poor historian and source of information is wife at bedside. Wife states that patient is having cough with yellowish sputum from last 4 weeks with occasional temp spikes. Denies h/o blood in stool, blood in urine, sputum or dark color stool.     INTERVAL HPI:   History as above. Pt is unable to provide any additional history.     SH: Non smoker, non alcoholic, denies illicit drug use. Lives at NH, mostly bed bound.   Fh: Not significant as per wife  Goals of care: Patient is full code. Wife Daly Tracy is HCP who can be reached at . [phone]  Ed Course: Patient is AxO x1, unable to interact. T 102.8, Hr 113, /69. S/p Vancomycni/Cefepime and Zithromax (05 Oct 2017 19:36)      PAST MEDICAL & SURGICAL HISTORY:  DM (diabetes mellitus)  Afib  CHF (congestive heart failure)  No significant past surgical history      Allergies    penicillin (Unknown)          MEDICATIONS  (STANDING):  ALBUTerol/ipratropium for Nebulization 3 milliLiter(s) Nebulizer every 6 hours  aspirin enteric coated 81 milliGRAM(s) Oral daily  aztreonam  IVPB 2000 milliGRAM(s) IV Intermittent every 8 hours  aztreonam  IVPB      dextrose 5%. 1000 milliLiter(s) (50 mL/Hr) IV Continuous <Continuous>  dextrose 50% Injectable 12.5 Gram(s) IV Push once  dextrose 50% Injectable 25 Gram(s) IV Push once  dextrose 50% Injectable 25 Gram(s) IV Push once  docusate sodium 100 milliGRAM(s) Oral two times a day  ferrous    sulfate 325 milliGRAM(s) Oral daily  insulin lispro (HumaLOG) corrective regimen sliding scale   SubCutaneous three times a day before meals  polyethylene glycol 3350 17 Gram(s) Oral daily  sodium chloride 0.9%. 1000 milliLiter(s) (40 mL/Hr) IV Continuous <Continuous>  tamsulosin 0.4 milliGRAM(s) Oral at bedtime  vancomycin  IVPB 1000 milliGRAM(s) IV Intermittent every 12 hours  warfarin 2 milliGRAM(s) Oral once    MEDICATIONS  (PRN):  acetaminophen  Suppository 650 milliGRAM(s) Rectal every 6 hours PRN For Temp greater than 38 C (100.4 F)  dextrose Gel 1 Dose(s) Oral once PRN Blood Glucose LESS THAN 70 milliGRAM(s)/deciLiter  glucagon  Injectable 1 milliGRAM(s) IntraMuscular once PRN Glucose <70 milliGRAM(s)/deciLiter      Vital Signs Last 24 Hrs  T(C): 37.9 (06 Oct 2017 12:56), Max: 39.3 (05 Oct 2017 16:02)  T(F): 100.3 (06 Oct 2017 12:56), Max: 102.8 (05 Oct 2017 16:02)  HR: 122 (06 Oct 2017 12:56) (104 - 122)  BP: 105/72 (06 Oct 2017 12:56) (90/56 - 115/83)  BP(mean): --  RR: 24 (06 Oct 2017 12:56) (14 - 24)  SpO2: 95% (06 Oct 2017 12:56) (95% - 99%)    Physical Exam:    Gen: lethargic  Abd: soft NT ND, no suprapubic fullness, unable to elicit tenderness  Back:  unable to elicit CVA tenderness  Pelvic: nl genitalia, surrounding purple pad with urine    Labs:                          8.0    16.1  )-----------( 398      ( 06 Oct 2017 11:03 )             25.7     10-06    139  |  107  |  25<H>  ----------------------------<  153<H>  4.1   |  22  |  1.08    Ca    8.4      06 Oct 2017 11:03  Phos  2.9     10-06  Mg     1.8     10-    TPro  6.4  /  Alb  2.1<L>  /  TBili  0.3  /  DBili  x   /  AST  10  /  ALT  23  /  AlkPhos  107  10-05    PT/INR - ( 06 Oct 2017 11:03 )   PT: 16.0 sec;   INR: 1.46 ratio         PTT - ( 05 Oct 2017 16:56 )  PTT:54.0 sec  Urinalysis Basic - ( 05 Oct 2017 20:42 )    Color: Yellow / Appearance: Clear / S.010 / pH: x  Gluc: x / Ketone: Negative  / Bili: Negative / Urobili: Negative   Blood: x / Protein: 30 mg/dL / Nitrite: Positive   Leuk Esterase: Moderate / RBC: 2-5 /HPF / WBC 6-10 /HPF   Sq Epi: x / Non Sq Epi: Few / Bacteria: Many /HPF    Radiological Exams:    < from: CT Abdomen and Pelvis No Cont (10.06.17 @ 10:22) >  Asymmetric mild mural thickening at the right urinary bladder. Urinary   bladder cancer cannot be excluded. If clinically indicated, cystoscopy   may be pursued for further evaluation.      < end of copied text >

## 2017-10-06 NOTE — SWALLOW BEDSIDE ASSESSMENT ADULT - SLP PERTINENT HISTORY OF CURRENT PROBLEM
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 Patient is poor historian. Denies h/o blood in stool, blood in urine, sputum or dark color stool. CT Head (+) Chronic small vessel ischemic disease and age related parenchymal atrophy. Chest XR (+) Interstitial prominence and opacity at the left lung base.

## 2017-10-06 NOTE — DIETITIAN INITIAL EVALUATION ADULT. - PROBLEM SELECTOR PLAN 2
-Hb 7.4. Baseline Hb is around 9-10  -FOBT -ve  -Follow Anemia panel, Reticulocyte cont, haptoglobin and LDH  -F/u CT abdomen with iv and oral contrast to rule out retroperitoneal hemorrhage in setting of increase INR and low Hb.   -Transfuse 1 PRBC, follow post transfusion Hb

## 2017-10-06 NOTE — PROGRESS NOTE ADULT - PROBLEM SELECTOR PLAN 2
Hb 7.4. Baseline Hb is around 9-10  -FOBT -ve  -Pt given one unit of PRBC  -H&H monitored  -Hapatoglobin elevated at 302  -Ferrous sulfate s/p anemia panel  -CT abd/pelvis to r/o retroperitoneal hemorrhage showed:  "Impression: Small bilateral pleural effusions.   Small right lower lobe lung nodules.   Indeterminate small left renal lesions. Asymmetric mild mural thickening at the right urinary bladder. Urinary bladder cancer cannot be excluded. If clinically indicated, cystoscopy   may be pursued for further evaluation."  Dr. Yates, urology, consulted and recommended no acute urologic intervention at this time.  Pt to followup with Dr. Yates as outpatient for cystoscopy.

## 2017-10-06 NOTE — CONSULT NOTE ADULT - SUBJECTIVE AND OBJECTIVE BOX
83 y/o M from [facility] w/ PMHx of Rheumatic heart disease, NPH ( unsteady gait used to ambulate with walker now bedbound for a couple of months) & Afib (on Coumadin) was sent from [facility] for low Hb found to be septic with Pneumonia, UTI and unstageble and stage 3 sacral ulcer. Patient very lethargic, not answering questions or following commands.  At Baseline per wife (as per NP).       PMH/PSH: DM (diabetes mellitus)  Afib  CHF (congestive heart failure)  No pertinent past medical history  No significant past surgical history  No significant past surgical history  ,   Allergy: penicillin (Unknown)  ,   Meds at home:  Meds in hospital:  acetaminophen  Suppository 650 milliGRAM(s) Rectal every 6 hours PRN  ALBUTerol/ipratropium for Nebulization 3 milliLiter(s) Nebulizer every 6 hours  aspirin enteric coated 81 milliGRAM(s) Oral daily  aztreonam  IVPB 2000 milliGRAM(s) IV Intermittent every 8 hours  aztreonam  IVPB      docusate sodium 100 milliGRAM(s) Oral two times a day  ferrous    sulfate 325 milliGRAM(s) Oral daily  polyethylene glycol 3350 17 Gram(s) Oral daily  tamsulosin 0.4 milliGRAM(s) Oral at bedtime  vancomycin  IVPB 1000 milliGRAM(s) IV Intermittent every 12 hours      VITALS:  T(F): 98.4 (10-06-17 @ 05:14), Max: 102.8 (10-05-17 @ 16:02)  HR: 107 (10-06-17 @ 05:14)  BP: 94/70 (10-06-17 @ 05:14)  RR: 24 (10-06-17 @ 05:14)  SpO2: 99% (10-06-17 @ 05:14)  Wt(kg): --    10-05 @ 07:01  -  10-06 @ 07:00  --------------------------------------------------------  IN: 1147 mL / OUT: 0 mL / NET: 1147 mL      Height (cm): 177.8 (10-05 @ 16:02)  Weight (kg): 81.6 (10-05 @ 16:02)  BMI (kg/m2): 25.8 (10-05 @ 16:02)  BSA (m2): 2 (10-05 @ 16:02)    LABS:                        8.0    16.1  )-----------( 398      ( 06 Oct 2017 11:03 )             25.7     10-05    136  |  103  |  35<H>  ----------------------------<  118<H>  4.4   |  22  |  1.10    Ca    8.6      05 Oct 2017 16:56    TPro  6.4  /  Alb  2.1<L>  /  TBili  0.3  /  DBili  x   /  AST  10  /  ALT  23  /  AlkPhos  107  10-05    PT/INR - ( 06 Oct 2017 11:03 )   PT: 16.0 sec;   INR: 1.46 ratio         PTT - ( 05 Oct 2017 16:56 )  PTT:54.0 sec  Lactate, Blood: 1.9 mmol/L (10-05 @ 16:56)    CAPILLARY BLOOD GLUCOSE  157 (06 Oct 2017 11:25)  179 (06 Oct 2017 08:17)  175 (05 Oct 2017 21:22)    Urinalysis Basic - ( 05 Oct 2017 20:42 )    Color: Yellow / Appearance: Clear / S.010 / pH: x  Gluc: x / Ketone: Negative  / Bili: Negative / Urobili: Negative   Blood: x / Protein: 30 mg/dL / Nitrite: Positive   Leuk Esterase: Moderate / RBC: 2-5 /HPF / WBC 6-10 /HPF   Sq Epi: x / Non Sq Epi: Few / Bacteria: Many /HPF          REVIEW OF SYSTEMS: unable to get       RADIOLOGY & ADDITIONAL TESTS:    1. CXR:  Interstitial prominence and opacity atthe left lung base.     3. CT abdomen: mpression: Small bilateral pleural effusions.     2 small right lower lobe lung nodules. Dedicated chest CT may be pursued   for complete evaluation of both lungs.    Possible tiny gallstone in the gallbladder.    Indeterminate small left renal lesions. If clinically indicated,   abdominal MR without and with IV contrast may be pursued for further   evaluation on a nonemergent outpatient basis.    No evidence for a retroperitoneal hemorrhage.    Asymmetric mild mural thickening at the right urinary bladder. Urinary   bladder cancer cannot be excluded. If clinically indicated, cystoscopy   may be pursued for further evaluation.            Imaging Personally Reviewed:  [+ ] YES  [ ] NO    Consultant(s) Notes Reviewed:  [+ ] YES  [ ] NO    PHYSICAL EXAM: limited due to patient's contracted state.   GENERAL: patient sleeping comfortably in bed.   HEAD:  Atraumatic, Normocephalic  EYES: PERRL, conjunctiva and sclera clear  ENMT: unable to assess   NECK: Supple, No JVD, Normal thyroid  NERVOUS SYSTEM: lethargic, not following instructions   CHEST/LUNG: occasional rhonchi. No wheeze   HEART: Regular rate and rhythm;   ABDOMEN: Soft, Nontender, Nondistended;   EXTREMITIES:  2+ Peripheral Pulses, No clubbing, cyanosis, or edema  SKIN: Unstageable 16cm x 9cm purulent sacral wound with stage 3; 1x1cm ulcer present. Also b/l DTI present         Care Discussed with Consultants/Other Providers [ ] YES  [ ] NO 83 y/o M from [facility] w/ PMHx of Rheumatic heart disease, NPH ( unsteady gait used to ambulate with walker now bedbound for a couple of months) & Afib (on Coumadin) was sent from [facility] for low Hb found to be septic with Pneumonia, UTI and unstageble and stage 3 sacral ulcer. Patient very lethargic, not answering questions or following commands.  At Baseline per wife (as per NP).       PMH/PSH: DM (diabetes mellitus)  Afib  CHF (congestive heart failure)  No pertinent past medical history  No significant past surgical history  No significant past surgical history  ,   Allergy: penicillin (Unknown)  ,   Meds at home:  Meds in hospital:  acetaminophen  Suppository 650 milliGRAM(s) Rectal every 6 hours PRN  ALBUTerol/ipratropium for Nebulization 3 milliLiter(s) Nebulizer every 6 hours  aspirin enteric coated 81 milliGRAM(s) Oral daily  aztreonam  IVPB 2000 milliGRAM(s) IV Intermittent every 8 hours  aztreonam  IVPB      docusate sodium 100 milliGRAM(s) Oral two times a day  ferrous    sulfate 325 milliGRAM(s) Oral daily  polyethylene glycol 3350 17 Gram(s) Oral daily  tamsulosin 0.4 milliGRAM(s) Oral at bedtime  vancomycin  IVPB 1000 milliGRAM(s) IV Intermittent every 12 hours      VITALS:  T(F): 98.4 (10-06-17 @ 05:14), Max: 102.8 (10-05-17 @ 16:02)  HR: 107 (10-06-17 @ 05:14)  BP: 94/70 (10-06-17 @ 05:14)  RR: 24 (10-06-17 @ 05:14)  SpO2: 99% (10-06-17 @ 05:14)  Wt(kg): --    10-05 @ 07:01  -  10-06 @ 07:00  --------------------------------------------------------  IN: 1147 mL / OUT: 0 mL / NET: 1147 mL      Height (cm): 177.8 (10-05 @ 16:02)  Weight (kg): 81.6 (10-05 @ 16:02)  BMI (kg/m2): 25.8 (10-05 @ 16:02)  BSA (m2): 2 (10-05 @ 16:02)    LABS:                        8.0    16.1  )-----------( 398      ( 06 Oct 2017 11:03 )             25.7     10-05    136  |  103  |  35<H>  ----------------------------<  118<H>  4.4   |  22  |  1.10    Ca    8.6      05 Oct 2017 16:56    TPro  6.4  /  Alb  2.1<L>  /  TBili  0.3  /  DBili  x   /  AST  10  /  ALT  23  /  AlkPhos  107  10-05    PT/INR - ( 06 Oct 2017 11:03 )   PT: 16.0 sec;   INR: 1.46 ratio         PTT - ( 05 Oct 2017 16:56 )  PTT:54.0 sec  Lactate, Blood: 1.9 mmol/L (10-05 @ 16:56)    CAPILLARY BLOOD GLUCOSE  157 (06 Oct 2017 11:25)  179 (06 Oct 2017 08:17)  175 (05 Oct 2017 21:22)    Urinalysis Basic - ( 05 Oct 2017 20:42 )    Color: Yellow / Appearance: Clear / S.010 / pH: x  Gluc: x / Ketone: Negative  / Bili: Negative / Urobili: Negative   Blood: x / Protein: 30 mg/dL / Nitrite: Positive   Leuk Esterase: Moderate / RBC: 2-5 /HPF / WBC 6-10 /HPF   Sq Epi: x / Non Sq Epi: Few / Bacteria: Many /HPF          REVIEW OF SYSTEMS: unable to get       RADIOLOGY & ADDITIONAL TESTS:    1. CXR:  Interstitial prominence and opacity atthe left lung base.     3. CT abdomen: mpression: Small bilateral pleural effusions.     2 small right lower lobe lung nodules. Dedicated chest CT may be pursued   for complete evaluation of both lungs.    Possible tiny gallstone in the gallbladder.    Indeterminate small left renal lesions. If clinically indicated,   abdominal MR without and with IV contrast may be pursued for further   evaluation on a nonemergent outpatient basis.    No evidence for a retroperitoneal hemorrhage.    Asymmetric mild mural thickening at the right urinary bladder. Urinary   bladder cancer cannot be excluded. If clinically indicated, cystoscopy   may be pursued for further evaluation.            Imaging Personally Reviewed:  [+ ] YES  [ ] NO    Consultant(s) Notes Reviewed:  [+ ] YES  [ ] NO    PHYSICAL EXAM: limited due to patient's contracted state.   GENERAL: patient sleeping comfortably in bed.   HEAD:  Atraumatic, Normocephalic  EYES: PERRL, conjunctiva and sclera clear  ENMT: unable to assess   NECK: Supple, No JVD, Normal thyroid  NERVOUS SYSTEM: lethargic, not following instructions   CHEST/LUNG: occasional rhonchi. No wheeze   HEART: Irregular Regular rate and rhythm;   ABDOMEN: Soft, Nontender, Nondistended;   EXTREMITIES:  2+ Peripheral Pulses, No clubbing, cyanosis, or edema  SKIN: Unstageable 16cm x 9cm purulent sacral wound with stage 3; 1x1cm ulcer present. Also b/l DTI present         Care Discussed with Consultants/Other Providers [ ] YES  [ ] NO 81 y/o M from [facility] w/ PMHx of Rheumatic heart disease, NPH ( unsteady gait used to ambulate with walker now bedbound for a couple of months) & Afib (on Coumadin) was sent [facility] for low Hb found to be septic with Pneumonia, UTI and unstageble and stage 3 sacral ulcer. Patient very lethargic, pt very quiet but answers some questions, he denies having any coughing or sob, no chest pain, he is demented and offers very little  history, he came in with high fevers. He does have severe back when we examine him.      PMH/PSH: DM (diabetes mellitus)  Afib  CHF (congestive heart failure)  No pertinent past medical history  No significant past surgical history  No significant past surgical history  ,   Allergy: penicillin (Unknown)  ,   Meds at home:  Meds in hospital:  acetaminophen  Suppository 650 milliGRAM(s) Rectal every 6 hours PRN  ALBUTerol/ipratropium for Nebulization 3 milliLiter(s) Nebulizer every 6 hours  aspirin enteric coated 81 milliGRAM(s) Oral daily  aztreonam  IVPB 2000 milliGRAM(s) IV Intermittent every 8 hours  aztreonam  IVPB      docusate sodium 100 milliGRAM(s) Oral two times a day  ferrous    sulfate 325 milliGRAM(s) Oral daily  polyethylene glycol 3350 17 Gram(s) Oral daily  tamsulosin 0.4 milliGRAM(s) Oral at bedtime  vancomycin  IVPB 1000 milliGRAM(s) IV Intermittent every 12 hours      VITALS:  T(F): 98.4 (10-06-17 @ 05:14), Max: 102.8 (10-05-17 @ 16:02)  HR: 107 (10-06-17 @ 05:14)  BP: 94/70 (10-06-17 @ 05:14)  RR: 24 (10-06-17 @ 05:14)  SpO2: 99% (10-06-17 @ 05:14)  Wt(kg): --    10-05 @ 07:01  -  10-06 @ 07:00  --------------------------------------------------------  IN: 1147 mL / OUT: 0 mL / NET: 1147 mL      Height (cm): 177.8 (10-05 @ 16:02)  Weight (kg): 81.6 (10-05 @ 16:02)  BMI (kg/m2): 25.8 (10-05 @ 16:02)  BSA (m2): 2 (10-05 @ 16:02)    LABS:                        8.0    16.1  )-----------( 398      ( 06 Oct 2017 11:03 )             25.7     10-05    136  |  103  |  35<H>  ----------------------------<  118<H>  4.4   |  22  |  1.10    Ca    8.6      05 Oct 2017 16:56    TPro  6.4  /  Alb  2.1<L>  /  TBili  0.3  /  DBili  x   /  AST  10  /  ALT  23  /  AlkPhos  107  1005    PT/INR - ( 06 Oct 2017 11:03 )   PT: 16.0 sec;   INR: 1.46 ratio         PTT - ( 05 Oct 2017 16:56 )  PTT:54.0 sec  Lactate, Blood: 1.9 mmol/L (10-05 @ 16:56)    CAPILLARY BLOOD GLUCOSE  157 (06 Oct 2017 11:25)  179 (06 Oct 2017 08:17)  175 (05 Oct 2017 21:22)    Urinalysis Basic - ( 05 Oct 2017 20:42 )    Color: Yellow / Appearance: Clear / S.010 / pH: x  Gluc: x / Ketone: Negative  / Bili: Negative / Urobili: Negative   Blood: x / Protein: 30 mg/dL / Nitrite: Positive   Leuk Esterase: Moderate / RBC: 2-5 /HPF / WBC 6-10 /HPF   Sq Epi: x / Non Sq Epi: Few / Bacteria: Many /HPF          REVIEW OF SYSTEMS: very limited because of pts dementia      RADIOLOGY & ADDITIONAL TESTS:    1. CXR:  Interstitial prominence and opacity atthe left lung base.     3. CT abdomen: mpression: Small bilateral pleural effusions.     2 small right lower lobe lung nodules. Dedicated chest CT may be pursued   for complete evaluation of both lungs.    Possible tiny gallstone in the gallbladder.    Indeterminate small left renal lesions. If clinically indicated,   abdominal MR without and with IV contrast may be pursued for further   evaluation on a nonemergent outpatient basis.    No evidence for a retroperitoneal hemorrhage.    Asymmetric mild mural thickening at the right urinary bladder. Urinary   bladder cancer cannot be excluded. If clinically indicated, cystoscopy   may be pursued for further evaluation.            Imaging Personally Reviewed:  [+ ] YES  [ ] NO    Consultant(s) Notes Reviewed:  [+ ] YES  [ ] NO    PHYSICAL EXAM: limited due to patient's contracted state.   GENERAL: patient lying comfortably in bed.   HEAD:  Atraumatic, Normocephalic  EYES: PERRL, conjunctiva and sclera clear  ENMT: neg  NECK: Supple, No JVD, Normal thyroid  NERVOUS SYSTEM: awake, confused , follows some simple commands  CHEST/LUNG: occasional rhonchi. No wheeze   HEART: Irregular Regular rate and rhythm;   ABDOMEN: Soft, Nontender, Nondistended;   EXTREMITIES:  2+ Peripheral Pulses, No clubbing, cyanosis, or edema  SKIN: Unstageable 16cm x 9cm purulent sacral wound with has purulent drainage and is very foul smelling ; 1x1cm ulcer present over left hip. Also b/l DTI present         Care Discussed with Consultants/Other Providers [ ] YES  [ ] NO

## 2017-10-06 NOTE — CONSULT NOTE ADULT - ASSESSMENT
81 y/o M from [facility] w/ PMHx of Rheumatic heart disease, NPH ( unsteady gait used to ambulate with walker now bedbound for a couple of months) & Afib (on Coumadin) was sent [facility] for low Hb found to be septic with Pneumonia, UTI and unstageble and stage 3 sacral ulcer.   1. Sepsis   - resolved at present   2. Health care acquired LL Pneumonia  - continue vancomycin 1g q 12  and Azactam 2g q 8 hours   - f/u legionella, blood cultures and urine cultures   - Tmax 99.1   3. UTI   - continue  Azactam 2g q 8 hours   - f/u urine cultures   4. Sacral wound   - continue antibiotics and wound care; consider surgical debridement as patient is full code   5. leucocytosis: trended down 1 infected sacral decubitus  2. fever  3. leukocytosis - improving  no signs of pna or uti  plan - cont vanco 1 gm iv q12 hrs  decrease azactam to 1 gm iv q8hrs  start flagyl 500mgs po q8 hrs  needs sacral debridement on monday

## 2017-10-06 NOTE — SWALLOW BEDSIDE ASSESSMENT ADULT - COMMENTS
Pt received supine, HOB elevated to 45 degrees, awake, but unable to follow directives (i.e., open mouth) or verbally respond to queries, mostly communicated via nonverbal gestures (i.e., head shakes). Pt received supine, HOB elevated to 45 degrees, awake, but unable to follow directives during oral motor exam or verbally respond to queries; however, maintained eye contact w/ SLP during exam.

## 2017-10-06 NOTE — PROGRESS NOTE ADULT - ASSESSMENT
83 y/o M from [facility] w/ PMHx of Rheumatic heart disease, NPH  (unsteady gait used to ambulate with walker now bedbound for a couple of months) & Afib (on Coumadin) was sent from [facility] for low Hb.     Patient is poor historian and source of information is wife. Wife stated that patient is having cough with yellowish sputum for last 4 weeks with occasional temp spikes.    In the ED, patient is AxO x1, unable to interact. T 102.8, Hr 113, /69. S/p Vancomycni/Cefepime and Zithromax.  CXR showed interstitial prominence and opacity at the left lung base.     Patient was admitted to the medical floor with Sepsis 2/2 PNA vs. UTI

## 2017-10-06 NOTE — PROGRESS NOTE ADULT - SUBJECTIVE AND OBJECTIVE BOX
NP Note discussed with  Primary Attending    Patient is a 82y old  Male who presents with a chief complaint of low hb level in NH (05 Oct 2017 19:36)      INTERVAL HPI/OVERNIGHT EVENTS: no new complaints    MEDICATIONS  (STANDING):  ALBUTerol/ipratropium for Nebulization 3 milliLiter(s) Nebulizer every 6 hours  aspirin enteric coated 81 milliGRAM(s) Oral daily  aztreonam  IVPB      dextrose 5%. 1000 milliLiter(s) (50 mL/Hr) IV Continuous <Continuous>  dextrose 50% Injectable 12.5 Gram(s) IV Push once  dextrose 50% Injectable 25 Gram(s) IV Push once  dextrose 50% Injectable 25 Gram(s) IV Push once  docusate sodium 100 milliGRAM(s) Oral two times a day  ferrous    sulfate 325 milliGRAM(s) Oral daily  insulin lispro (HumaLOG) corrective regimen sliding scale   SubCutaneous three times a day before meals  metroNIDAZOLE    Tablet 500 milliGRAM(s) Oral every 8 hours  polyethylene glycol 3350 17 Gram(s) Oral daily  sodium chloride 0.9%. 1000 milliLiter(s) (40 mL/Hr) IV Continuous <Continuous>  tamsulosin 0.4 milliGRAM(s) Oral at bedtime  vancomycin  IVPB 1000 milliGRAM(s) IV Intermittent every 12 hours  warfarin 2 milliGRAM(s) Oral once    MEDICATIONS  (PRN):  acetaminophen  Suppository 650 milliGRAM(s) Rectal every 6 hours PRN For Temp greater than 38 C (100.4 F)  dextrose Gel 1 Dose(s) Oral once PRN Blood Glucose LESS THAN 70 milliGRAM(s)/deciLiter  glucagon  Injectable 1 milliGRAM(s) IntraMuscular once PRN Glucose <70 milliGRAM(s)/deciLiter        Vital Signs Last 24 Hrs  T(C): 37.9 (06 Oct 2017 12:56), Max: 38.8 (05 Oct 2017 20:10)  T(F): 100.3 (06 Oct 2017 12:56), Max: 101.8 (05 Oct 2017 20:10)  HR: 122 (06 Oct 2017 12:56) (104 - 122)  BP: 105/72 (06 Oct 2017 12:56) (90/56 - 115/83)  BP(mean): --  RR: 24 (06 Oct 2017 12:56) (14 - 24)  SpO2: 95% (06 Oct 2017 12:56) (95% - 99%)    ________________________________________________  PHYSICAL EXAM:  GENERAL: NAD  CHEST/LUNG: Posterior rhonchi to auscultation  HEART: S1 S2  regular; no murmurs, gallops or rubs  ABDOMEN: Soft, Nontender, Nondistended; Bowel sounds present  EXTREMITIES: no cyanosis; no edema; no calf tenderness  SKIN: warm and dry; no rash  NERVOUS SYSTEM:  Awake and lethargic    _________________________________________________  LABS:                        8.0    16.1  )-----------( 398      ( 06 Oct 2017 11:03 )             25.7     10-    139  |  107  |  25<H>  ----------------------------<  153<H>  4.1   |  22  |  1.08    Ca    8.4      06 Oct 2017 11:03  Phos  2.9     10-  Mg     1.8     10-    TPro  6.4  /  Alb  2.1<L>  /  TBili  0.3  /  DBili  x   /  AST  10  /  ALT  23  /  AlkPhos  107  10-05    PT/INR - ( 06 Oct 2017 11:03 )   PT: 16.0 sec;   INR: 1.46 ratio         PTT - ( 05 Oct 2017 16:56 )  PTT:54.0 sec  Urinalysis Basic - ( 05 Oct 2017 20:42 )    Color: Yellow / Appearance: Clear / S.010 / pH: x  Gluc: x / Ketone: Negative  / Bili: Negative / Urobili: Negative   Blood: x / Protein: 30 mg/dL / Nitrite: Positive   Leuk Esterase: Moderate / RBC: 2-5 /HPF / WBC 6-10 /HPF   Sq Epi: x / Non Sq Epi: Few / Bacteria: Many /HPF      CAPILLARY BLOOD GLUCOSE  157 (06 Oct 2017 11:25)  179 (06 Oct 2017 08:17)  175 (05 Oct 2017 21:22)            RADIOLOGY & ADDITIONAL TESTS:    Imaging Personally Reviewed:  YES/NO    Consultant(s) Notes Reviewed:   YES/ No    Care Discussed with Consultants :     Plan of care was discussed with patient and /or primary care giver; all questions and concerns were addressed and care was aligned with patient's wishes.

## 2017-10-06 NOTE — SWALLOW BEDSIDE ASSESSMENT ADULT - MODE OF PRESENTATION
w/ moderate assistance/cup/spoon/self fed w/ moderate assistance/cup/straw/self fed x3/spoon/self fed w/ moderate assistance/spoon/self fed x2/self fed

## 2017-10-06 NOTE — CONSULT NOTE ADULT - SUBJECTIVE AND OBJECTIVE BOX
82y Male with PMHx of DM, CHF, afib on coumadin admitted from nursing home with cough. He was also found to have a sacral decubitus ulcer. Pt is a poor historian.         pmhx: as above  pshx: none    meds:  acetaminophen  Suppository 650 milliGRAM(s) Rectal every 6 hours PRN  ALBUTerol/ipratropium for Nebulization 3 milliLiter(s) Nebulizer every 6 hours  aspirin enteric coated 81 milliGRAM(s) Oral daily  aztreonam  IVPB 2000 milliGRAM(s) IV Intermittent every 8 hours  aztreonam  IVPB      dextrose 5%. 1000 milliLiter(s) IV Continuous <Continuous>  dextrose 50% Injectable 12.5 Gram(s) IV Push once  dextrose 50% Injectable 25 Gram(s) IV Push once  dextrose 50% Injectable 25 Gram(s) IV Push once  dextrose Gel 1 Dose(s) Oral once PRN  docusate sodium 100 milliGRAM(s) Oral two times a day  ferrous    sulfate 325 milliGRAM(s) Oral daily  glucagon  Injectable 1 milliGRAM(s) IntraMuscular once PRN  insulin lispro (HumaLOG) corrective regimen sliding scale   SubCutaneous three times a day before meals  polyethylene glycol 3350 17 Gram(s) Oral daily  sodium chloride 0.9%. 1000 milliLiter(s) IV Continuous <Continuous>  tamsulosin 0.4 milliGRAM(s) Oral at bedtime  vancomycin  IVPB 1000 milliGRAM(s) IV Intermittent every 12 hours  warfarin 2 milliGRAM(s) Oral once    Allergies: penicillin (Unknown)    Gen:A&O x1, NAD      vitals:  T(C): 37.9 (10-06-17 @ 12:56), Max: 39.3 (10-05-17 @ 16:02)  HR: 122 (10-06-17 @ 12:56) (104 - 122)  BP: 105/72 (10-06-17 @ 12:56) (90/56 - 115/83)  RR: 24 (10-06-17 @ 12:56) (14 - 24)  SpO2: 95% (10-06-17 @ 12:56) (95% - 99%)    Back: Unstageable sacral ulcer(16cm x 9cm) slough present and purulent drainage present, +odor  Abdomen: soft, nontender, nondistended, no masses, no guarding, no rebound tenderness  Lower Extremities: no edema, no skin discoloration, nontender, warm extremities barney, no ulcers, palpable pedal pulses                          8.0    16.1  )-----------( 398      ( 06 Oct 2017 11:03 )             25.7   10-06    139  |  107  |  25<H>  ----------------------------<  153<H>  4.1   |  22  |  1.08    Ca    8.4      06 Oct 2017 11:03  Phos  2.9     10-06  Mg     1.8     10-06    TPro  6.4  /  Alb  2.1<L>  /  TBili  0.3  /  DBili  x   /  AST  10  /  ALT  23  /  AlkPhos  107  10-05

## 2017-10-06 NOTE — CHART NOTE - NSCHARTNOTEFT_GEN_A_CORE
Upon Nutritional Assessment by the Registered Dietitian your patient was determined to meet criteria / has evidence of the following diagnosis/diagnoses:          [ ]  Mild Protein Calorie Malnutrition        [x ]  Moderate Protein Calorie Malnutrition        [ ] Severe Protein Calorie Malnutrition        [ ] Unspecified Protein Calorie Malnutrition        [ ] Underweight / BMI <19        [ ] Morbid Obesity / BMI > 40      Findings as based on:  •  Comprehensive nutrition assessment and consultation  •  Calorie counts (nutrient intake analysis)  •  Food acceptance and intake status from observations by staff  •  Follow up  •  Patient education  •  Intervention secondary to interdisciplinary rounds  •   concerns      Treatment:    The following diet has been recommended:  Glucerna shakes tid.  Mvim,,vit c 500 mg bid,znso4 220 mg once a day.      PROVIDER Section:     By signing this assessment you are acknowledging and agree with the diagnosis/diagnoses assigned by the Registered Dietitian    Comments:

## 2017-10-06 NOTE — SWALLOW BEDSIDE ASSESSMENT ADULT - PHARYNGEAL PHASE
Delayed pharyngeal swallow/Decreased laryngeal elevation/Multiple swallows Delayed pharyngeal swallow/Decreased laryngeal elevation

## 2017-10-06 NOTE — SWALLOW BEDSIDE ASSESSMENT ADULT - ORAL PHASE
mild bolus holding/Decreased anterior-posterior movement of the bolus/Delayed oral transit time Decreased anterior-posterior movement of the bolus/Delayed oral transit time

## 2017-10-06 NOTE — ADVANCED PRACTICE NURSE CONSULT - RECOMMEDATIONS
-Clean all wounds with normal saline and apply skin prep to the surrounding skin  -Please consult Surgery for possible debridement of the Sacrococcyx Area wound  -Until then, apply saline moistened gauze to the wound bed and cover with a Foam dressing Daily PRN  -Apply Calcium Alginate (Aquacel) to the L. Hip wound bed and cover with a Foam dressing Q 72hrs PRN  -Leave the Bilateral Feet wounds open to air  -Elevate/float the patients heels using heel protectors and reposition the patient Q 2hrs using wedges or pillows

## 2017-10-06 NOTE — SWALLOW BEDSIDE ASSESSMENT ADULT - CONSISTENCIES ADMINISTERED
Water/nectar thick water/thin liquid applesauce/puree applesauce & juliana cracker/mech soft juliana cracker/soft solid

## 2017-10-06 NOTE — DIETITIAN INITIAL EVALUATION ADULT. - PROBLEM SELECTOR PLAN 5
-Patient has sacral wound, looks infected with yellowish discharge seen.   -F/u Wound care  -Please call surgical consult Dr Cueva in AM

## 2017-10-06 NOTE — DIETITIAN INITIAL EVALUATION ADULT. - PROBLEM SELECTOR PLAN 3
-Patient was on warfarin with supra therapeutic INR, likely secondary to recent antibiotic use.   -S/p 5 mg iv Vitamin K and 1 unit FFP in ED  -Hold warfarin  -Goal INR 2-3  -Restart as indicated

## 2017-10-06 NOTE — DIETITIAN INITIAL EVALUATION ADULT. - PROBLEM SELECTOR PLAN 1
-Patient had fever of 102.8  -SIRS 3/4, positive for HR, Temp and WBC  -Source of infection: Pneumonia and UTI  -CXR:  Interstitial prominence and opacity at the left lung base  -Lactate 1.9, S/p 2.5 L fluids and Vanco/Cefepime/Azithro in ED.   -Will Treat as Health care associated Pneumonia as patient is from NH  -F/u Legionella antigen and strep antigen  -Start Vancomycin and Azactam (penicillin allergic)  -ID Dr Lala  -F/u Speech and swallow for risk of aspiration  -F/u Blood Cx

## 2017-10-06 NOTE — ADVANCED PRACTICE NURSE CONSULT - ASSESSMENT
This is a 82yr old male patient admitted for Pneumonia, presenting with the following:  -There is an Unstageable Pressure Injury to the Sacrococcyx Area (16cm x 9cm) with adherent slough covering the entire wound bed and purulent drainage present  -There is a Stage 3 pressure Injury to the L. Hip (1cm x 1cm) with pink tissue, drainage, and hypopigmentation to the surrounding skin  -There is an intact DTI to the L. Lat Foot (0.5cm x 0.5cm) and R. Lat Foot (1cm x 5cm) without drainage

## 2017-10-06 NOTE — DIETITIAN INITIAL EVALUATION ADULT. - OTHER INFO
Pt visited.D/W daughter at bedside.Pt is from NH was on reg thin liquids,nora,ncs,low chol diet. per daughter pt appetite is fair 2 weeks.+ weight loss but not significant. Food choices  obtained..Pt with multiple pressure ulcer..Unsteagble @ sacrum,3 @ L hip,,suspected DTI @ r lat foot,stage 1 @ L foot..S/P SLp eval -Mech soft thin liquids .Offered Glucerna shakes daughter agreed to try.

## 2017-10-06 NOTE — PROGRESS NOTE ADULT - PROBLEM SELECTOR PLAN 1
Sepsis, likely 2/2 sacral wound vs. HCAP?  (from NH) vs. UTI   -SIRS 3/4, positive for HR, Temp and WBC  -Lactate 1.9, S/p 2.5 L fluids and Vanco/Cefepime/Azithro in ED.   -Patient spiking temps  -Repeat Lactate 1.5  -Legionella antigen pending  -Pt continued on Vancomycin and started on Azactam (penicillin allergy)  -Blood CX pending results  -UA negative/UC pending  -Swallow eval recommended mechanical soft diet with thin liuqds   -CT Chest showed:  "1.  Mild intralobular septal thickening and groundglass opacities may   reflect mild pulmonary edema.  2.  Indeterminate 1.2 cm and 0.8 cm nodular opacities in the right lower   lobe, as well as a 1 cm right apical nodule. Additional scattered   subcentimeter nodules bilaterally. Follow-up CT or PET/CT is recommended   in 3 months to ensure stability."  -Dr. Lala, ID, to consulted, and added Flagyl.

## 2017-10-07 LAB
-  AMIKACIN: SIGNIFICANT CHANGE UP
-  AMPICILLIN/SULBACTAM: SIGNIFICANT CHANGE UP
-  AMPICILLIN: SIGNIFICANT CHANGE UP
-  AZTREONAM: SIGNIFICANT CHANGE UP
-  CEFAZOLIN: SIGNIFICANT CHANGE UP
-  CEFEPIME: SIGNIFICANT CHANGE UP
-  CEFOXITIN: SIGNIFICANT CHANGE UP
-  CEFTAZIDIME: SIGNIFICANT CHANGE UP
-  CEFTRIAXONE: SIGNIFICANT CHANGE UP
-  CIPROFLOXACIN: SIGNIFICANT CHANGE UP
-  ERTAPENEM: SIGNIFICANT CHANGE UP
-  GENTAMICIN: SIGNIFICANT CHANGE UP
-  LEVOFLOXACIN: SIGNIFICANT CHANGE UP
-  MEROPENEM: SIGNIFICANT CHANGE UP
-  NITROFURANTOIN: SIGNIFICANT CHANGE UP
-  PIPERACILLIN/TAZOBACTAM: SIGNIFICANT CHANGE UP
-  STREPTOCOCCUS SP. (NOT GRP A, B OR S PNEUMONIAE): SIGNIFICANT CHANGE UP
-  TOBRAMYCIN: SIGNIFICANT CHANGE UP
-  TRIMETHOPRIM/SULFAMETHOXAZOLE: SIGNIFICANT CHANGE UP
ANION GAP SERPL CALC-SCNC: 9 MMOL/L — SIGNIFICANT CHANGE UP (ref 5–17)
APTT BLD: 28.6 SEC — SIGNIFICANT CHANGE UP (ref 27.5–37.4)
BASOPHILS # BLD AUTO: 0.1 K/UL — SIGNIFICANT CHANGE UP (ref 0–0.2)
BASOPHILS NFR BLD AUTO: 0.9 % — SIGNIFICANT CHANGE UP (ref 0–2)
BUN SERPL-MCNC: 24 MG/DL — HIGH (ref 7–18)
CALCIUM SERPL-MCNC: 8 MG/DL — LOW (ref 8.4–10.5)
CHLORIDE SERPL-SCNC: 105 MMOL/L — SIGNIFICANT CHANGE UP (ref 96–108)
CO2 SERPL-SCNC: 23 MMOL/L — SIGNIFICANT CHANGE UP (ref 22–31)
CREAT SERPL-MCNC: 1.02 MG/DL — SIGNIFICANT CHANGE UP (ref 0.5–1.3)
CULTURE RESULTS: SIGNIFICANT CHANGE UP
EOSINOPHIL # BLD AUTO: 0.1 K/UL — SIGNIFICANT CHANGE UP (ref 0–0.5)
EOSINOPHIL NFR BLD AUTO: 1 % — SIGNIFICANT CHANGE UP (ref 0–6)
GLUCOSE SERPL-MCNC: 171 MG/DL — HIGH (ref 70–99)
GRAM STN FLD: SIGNIFICANT CHANGE UP
GRAM STN FLD: SIGNIFICANT CHANGE UP
HCT VFR BLD CALC: 24.2 % — LOW (ref 39–50)
HGB BLD-MCNC: 7.2 G/DL — LOW (ref 13–17)
INR BLD: 1.28 RATIO — HIGH (ref 0.88–1.16)
LEGIONELLA AG UR QL: NEGATIVE — SIGNIFICANT CHANGE UP
LYMPHOCYTES # BLD AUTO: 1.1 K/UL — SIGNIFICANT CHANGE UP (ref 1–3.3)
LYMPHOCYTES # BLD AUTO: 12 % — LOW (ref 13–44)
MCHC RBC-ENTMCNC: 22.9 PG — LOW (ref 27–34)
MCHC RBC-ENTMCNC: 29.6 GM/DL — LOW (ref 32–36)
MCV RBC AUTO: 77.4 FL — LOW (ref 80–100)
METHOD TYPE: SIGNIFICANT CHANGE UP
METHOD TYPE: SIGNIFICANT CHANGE UP
MONOCYTES # BLD AUTO: 0.8 K/UL — SIGNIFICANT CHANGE UP (ref 0–0.9)
MONOCYTES NFR BLD AUTO: 8 % — SIGNIFICANT CHANGE UP (ref 2–14)
NEUTROPHILS # BLD AUTO: 7.4 K/UL — SIGNIFICANT CHANGE UP (ref 1.8–7.4)
NEUTROPHILS NFR BLD AUTO: 78.2 % — HIGH (ref 43–77)
ORGANISM # SPEC MICROSCOPIC CNT: SIGNIFICANT CHANGE UP
ORGANISM # SPEC MICROSCOPIC CNT: SIGNIFICANT CHANGE UP
PLATELET # BLD AUTO: 349 K/UL — SIGNIFICANT CHANGE UP (ref 150–400)
POTASSIUM SERPL-MCNC: 3.5 MMOL/L — SIGNIFICANT CHANGE UP (ref 3.5–5.3)
POTASSIUM SERPL-SCNC: 3.5 MMOL/L — SIGNIFICANT CHANGE UP (ref 3.5–5.3)
PROTHROM AB SERPL-ACNC: 14 SEC — HIGH (ref 9.8–12.7)
RBC # BLD: 3.13 M/UL — LOW (ref 4.2–5.8)
RBC # FLD: 17 % — HIGH (ref 10.3–14.5)
S PNEUM AG SER QL: SIGNIFICANT CHANGE UP
SODIUM SERPL-SCNC: 137 MMOL/L — SIGNIFICANT CHANGE UP (ref 135–145)
SPECIMEN SOURCE: SIGNIFICANT CHANGE UP
WBC # BLD: 9.4 K/UL — SIGNIFICANT CHANGE UP (ref 3.8–10.5)
WBC # FLD AUTO: 9.4 K/UL — SIGNIFICANT CHANGE UP (ref 3.8–10.5)

## 2017-10-07 RX ORDER — ALBUTEROL 90 UG/1
2 AEROSOL, METERED ORAL EVERY 6 HOURS
Refills: 0 | Status: DISCONTINUED | OUTPATIENT
Start: 2017-10-07 | End: 2017-10-09

## 2017-10-07 RX ORDER — TIOTROPIUM BROMIDE 18 UG/1
1 CAPSULE ORAL; RESPIRATORY (INHALATION) DAILY
Refills: 0 | Status: DISCONTINUED | OUTPATIENT
Start: 2017-10-07 | End: 2017-10-09

## 2017-10-07 RX ADMIN — ALBUTEROL 2 PUFF(S): 90 AEROSOL, METERED ORAL at 22:16

## 2017-10-07 RX ADMIN — Medication 500 MILLIGRAM(S): at 13:44

## 2017-10-07 RX ADMIN — Medication 250 MILLIGRAM(S): at 18:11

## 2017-10-07 RX ADMIN — Medication 2: at 11:30

## 2017-10-07 RX ADMIN — POLYETHYLENE GLYCOL 3350 17 GRAM(S): 17 POWDER, FOR SOLUTION ORAL at 11:31

## 2017-10-07 RX ADMIN — Medication 325 MILLIGRAM(S): at 11:32

## 2017-10-07 RX ADMIN — Medication 50 MILLIGRAM(S): at 05:04

## 2017-10-07 RX ADMIN — Medication 500 MILLIGRAM(S): at 22:16

## 2017-10-07 RX ADMIN — Medication 250 MILLIGRAM(S): at 05:54

## 2017-10-07 RX ADMIN — Medication 3 MILLILITER(S): at 08:23

## 2017-10-07 RX ADMIN — Medication 1 TABLET(S): at 11:31

## 2017-10-07 RX ADMIN — Medication 100 MILLIGRAM(S): at 05:05

## 2017-10-07 RX ADMIN — Medication 81 MILLIGRAM(S): at 11:31

## 2017-10-07 RX ADMIN — Medication 50 MILLIGRAM(S): at 22:15

## 2017-10-07 RX ADMIN — Medication 2: at 08:27

## 2017-10-07 RX ADMIN — Medication 500 MILLIGRAM(S): at 05:05

## 2017-10-07 RX ADMIN — Medication 1: at 18:12

## 2017-10-07 RX ADMIN — Medication 100 MILLIGRAM(S): at 18:12

## 2017-10-07 RX ADMIN — ZINC SULFATE TAB 220 MG (50 MG ZINC EQUIVALENT) 220 MILLIGRAM(S): 220 (50 ZN) TAB at 11:31

## 2017-10-07 RX ADMIN — TAMSULOSIN HYDROCHLORIDE 0.4 MILLIGRAM(S): 0.4 CAPSULE ORAL at 22:16

## 2017-10-07 RX ADMIN — ALBUTEROL 2 PUFF(S): 90 AEROSOL, METERED ORAL at 18:11

## 2017-10-07 RX ADMIN — Medication 500 MILLIGRAM(S): at 11:31

## 2017-10-07 RX ADMIN — Medication 50 MILLIGRAM(S): at 13:44

## 2017-10-07 NOTE — PROVIDER CONTACT NOTE (CRITICAL VALUE NOTIFICATION) - TEST AND RESULT REPORTED:
Blood C&S on 10/5 Growth in aerobic gram Neg rods, growth in anerobic gram positive cocci in pairs & chains

## 2017-10-07 NOTE — PROGRESS NOTE ADULT - SUBJECTIVE AND OBJECTIVE BOX
Attending follow up:    Patient is a 82y old  Male who presents with a chief complaint of low hb level in NH (05 Oct 2017 19:36)      INTERVAL HPI/OVERNIGHT EVENTS: no new complaints    MEDICATIONS  (STANDING):  ALBUTerol/ipratropium for Nebulization 3 milliLiter(s) Nebulizer every 6 hours  aspirin enteric coated 81 milliGRAM(s) Oral daily  aztreonam  IVPB      dextrose 5%. 1000 milliLiter(s) (50 mL/Hr) IV Continuous <Continuous>  dextrose 50% Injectable 12.5 Gram(s) IV Push once  dextrose 50% Injectable 25 Gram(s) IV Push once  dextrose 50% Injectable 25 Gram(s) IV Push once  docusate sodium 100 milliGRAM(s) Oral two times a day  ferrous    sulfate 325 milliGRAM(s) Oral daily  insulin lispro (HumaLOG) corrective regimen sliding scale   SubCutaneous three times a day before meals  metroNIDAZOLE    Tablet 500 milliGRAM(s) Oral every 8 hours  polyethylene glycol 3350 17 Gram(s) Oral daily  sodium chloride 0.9%. 1000 milliLiter(s) (40 mL/Hr) IV Continuous <Continuous>  tamsulosin 0.4 milliGRAM(s) Oral at bedtime  vancomycin  IVPB 1000 milliGRAM(s) IV Intermittent every 12 hours  warfarin 2 milliGRAM(s) Oral once    MEDICATIONS  (PRN):  acetaminophen  Suppository 650 milliGRAM(s) Rectal every 6 hours PRN For Temp greater than 38 C (100.4 F)  dextrose Gel 1 Dose(s) Oral once PRN Blood Glucose LESS THAN 70 milliGRAM(s)/deciLiter  glucagon  Injectable 1 milliGRAM(s) IntraMuscular once PRN Glucose <70 milliGRAM(s)/deciLiter      Vital Signs Last 24 Hrs  T(C): 36.6 (07 Oct 2017 20:40), Max: 37.2 (07 Oct 2017 00:18)  T(F): 97.9 (07 Oct 2017 20:40), Max: 99 (07 Oct 2017 00:18)  HR: 99 (07 Oct 2017 20:40) (51 - 99)  BP: 107/68 (07 Oct 2017 20:40) (95/64 - 112/76)  BP(mean): --  RR: 18 (07 Oct 2017 20:40) (16 - 18)  SpO2: 97% (07 Oct 2017 20:40) (97% - 100%)    ________________________________________________  PHYSICAL EXAM:  GENERAL: NAD  CHEST/LUNG: Posterior rhonchi to auscultation  HEART: S1 S2  regular; no murmurs, gallops or rubs  ABDOMEN: Soft, Nontender, Nondistended; Bowel sounds present  EXTREMITIES: no cyanosis; no edema; no calf tenderness  SKIN: warm and dry; no rash  NERVOUS SYSTEM:  Awake, alert.    _________________________________________________  LABS:                                   7.2    9.4   )-----------( 349      ( 07 Oct 2017 08:12 )             24.2   10-07    137  |  105  |  24<H>  ----------------------------<  171<H>  3.5   |  23  |  1.02    Ca    8.0<L>      07 Oct 2017 08:12  Phos  2.9     10-06  Mg     1.8     10-06    CBC in am, if further decrease in h/h, may need PRBC tomorrow.    TPro  6.4  /  Alb  2.1<L>  /  TBili  0.3  /  DBili  x   /  AST  10  /  ALT  23  /  AlkPhos  107  10-05    PT/INR - ( 06 Oct 2017 11:03 )   PT: 16.0 sec;   INR: 1.46 ratio         PTT - ( 05 Oct 2017 16:56 )  PTT:54.0 sec  Urinalysis Basic - ( 05 Oct 2017 20:42 )    Color: Yellow / Appearance: Clear / S.010 / pH: x  Gluc: x / Ketone: Negative  / Bili: Negative / Urobili: Negative   Blood: x / Protein: 30 mg/dL / Nitrite: Positive   Leuk Esterase: Moderate / RBC: 2-5 /HPF / WBC 6-10 /HPF   Sq Epi: x / Non Sq Epi: Few / Bacteria: Many /HPF      CAPILLARY BLOOD GLUCOSE  157 (06 Oct 2017 11:25)  179 (06 Oct 2017 08:17)  175 (05 Oct 2017 21:22)            RADIOLOGY & ADDITIONAL TESTS:    Imaging Personally Reviewed:  YES/NO    Consultant(s) Notes Reviewed:   YES/ No    Care Discussed with Consultants :     Plan of care was discussed with patient and /or primary care giver; all questions and concerns were addressed and care was aligned with patient's wishes.

## 2017-10-08 LAB
-  AMIKACIN: SIGNIFICANT CHANGE UP
-  AMPICILLIN/SULBACTAM: SIGNIFICANT CHANGE UP
-  AMPICILLIN: SIGNIFICANT CHANGE UP
-  AZTREONAM: SIGNIFICANT CHANGE UP
-  CEFAZOLIN: SIGNIFICANT CHANGE UP
-  CEFEPIME: SIGNIFICANT CHANGE UP
-  CEFOXITIN: SIGNIFICANT CHANGE UP
-  CEFTAZIDIME: SIGNIFICANT CHANGE UP
-  CEFTRIAXONE: SIGNIFICANT CHANGE UP
-  CIPROFLOXACIN: SIGNIFICANT CHANGE UP
-  ERTAPENEM: SIGNIFICANT CHANGE UP
-  GENTAMICIN: SIGNIFICANT CHANGE UP
-  LEVOFLOXACIN: SIGNIFICANT CHANGE UP
-  MEROPENEM: SIGNIFICANT CHANGE UP
-  PIPERACILLIN/TAZOBACTAM: SIGNIFICANT CHANGE UP
-  TOBRAMYCIN: SIGNIFICANT CHANGE UP
-  TRIMETHOPRIM/SULFAMETHOXAZOLE: SIGNIFICANT CHANGE UP
ANION GAP SERPL CALC-SCNC: 10 MMOL/L — SIGNIFICANT CHANGE UP (ref 5–17)
APTT BLD: 32.1 SEC — SIGNIFICANT CHANGE UP (ref 27.5–37.4)
BASOPHILS # BLD AUTO: 0.1 K/UL — SIGNIFICANT CHANGE UP (ref 0–0.2)
BASOPHILS NFR BLD AUTO: 1 % — SIGNIFICANT CHANGE UP (ref 0–2)
BUN SERPL-MCNC: 24 MG/DL — HIGH (ref 7–18)
CALCIUM SERPL-MCNC: 8.4 MG/DL — SIGNIFICANT CHANGE UP (ref 8.4–10.5)
CHLORIDE SERPL-SCNC: 105 MMOL/L — SIGNIFICANT CHANGE UP (ref 96–108)
CO2 SERPL-SCNC: 23 MMOL/L — SIGNIFICANT CHANGE UP (ref 22–31)
CREAT SERPL-MCNC: 0.9 MG/DL — SIGNIFICANT CHANGE UP (ref 0.5–1.3)
EOSINOPHIL # BLD AUTO: 0.1 K/UL — SIGNIFICANT CHANGE UP (ref 0–0.5)
EOSINOPHIL NFR BLD AUTO: 1 % — SIGNIFICANT CHANGE UP (ref 0–6)
GLUCOSE SERPL-MCNC: 151 MG/DL — HIGH (ref 70–99)
HCT VFR BLD CALC: 25.3 % — LOW (ref 39–50)
HGB BLD-MCNC: 7.8 G/DL — LOW (ref 13–17)
INR BLD: 1.39 RATIO — HIGH (ref 0.88–1.16)
LYMPHOCYTES # BLD AUTO: 1.7 K/UL — SIGNIFICANT CHANGE UP (ref 1–3.3)
LYMPHOCYTES # BLD AUTO: 16.4 % — SIGNIFICANT CHANGE UP (ref 13–44)
MCHC RBC-ENTMCNC: 24.2 PG — LOW (ref 27–34)
MCHC RBC-ENTMCNC: 30.8 GM/DL — LOW (ref 32–36)
MCV RBC AUTO: 78.3 FL — LOW (ref 80–100)
METHOD TYPE: SIGNIFICANT CHANGE UP
MONOCYTES # BLD AUTO: 0.6 K/UL — SIGNIFICANT CHANGE UP (ref 0–0.9)
MONOCYTES NFR BLD AUTO: 6.1 % — SIGNIFICANT CHANGE UP (ref 2–14)
NEUTROPHILS # BLD AUTO: 7.9 K/UL — HIGH (ref 1.8–7.4)
NEUTROPHILS NFR BLD AUTO: 75.5 % — SIGNIFICANT CHANGE UP (ref 43–77)
PLATELET # BLD AUTO: 344 K/UL — SIGNIFICANT CHANGE UP (ref 150–400)
POTASSIUM SERPL-MCNC: 3.8 MMOL/L — SIGNIFICANT CHANGE UP (ref 3.5–5.3)
POTASSIUM SERPL-SCNC: 3.8 MMOL/L — SIGNIFICANT CHANGE UP (ref 3.5–5.3)
PROTHROM AB SERPL-ACNC: 15.3 SEC — HIGH (ref 9.8–12.7)
RBC # BLD: 3.23 M/UL — LOW (ref 4.2–5.8)
RBC # FLD: 18.1 % — HIGH (ref 10.3–14.5)
SODIUM SERPL-SCNC: 138 MMOL/L — SIGNIFICANT CHANGE UP (ref 135–145)
VANCOMYCIN TROUGH SERPL-MCNC: 21.6 UG/ML — HIGH (ref 10–20)
WBC # BLD: 10.4 K/UL — SIGNIFICANT CHANGE UP (ref 3.8–10.5)
WBC # FLD AUTO: 10.4 K/UL — SIGNIFICANT CHANGE UP (ref 3.8–10.5)

## 2017-10-08 RX ORDER — HEPARIN SODIUM 5000 [USP'U]/ML
5000 INJECTION INTRAVENOUS; SUBCUTANEOUS EVERY 12 HOURS
Refills: 0 | Status: DISCONTINUED | OUTPATIENT
Start: 2017-10-08 | End: 2017-10-09

## 2017-10-08 RX ORDER — WARFARIN SODIUM 2.5 MG/1
2 TABLET ORAL ONCE
Refills: 0 | Status: DISCONTINUED | OUTPATIENT
Start: 2017-10-08 | End: 2017-10-08

## 2017-10-08 RX ADMIN — Medication 500 MILLIGRAM(S): at 12:33

## 2017-10-08 RX ADMIN — Medication 50 MILLIGRAM(S): at 22:45

## 2017-10-08 RX ADMIN — Medication 1: at 18:32

## 2017-10-08 RX ADMIN — Medication 2: at 12:33

## 2017-10-08 RX ADMIN — SODIUM CHLORIDE 40 MILLILITER(S): 9 INJECTION INTRAMUSCULAR; INTRAVENOUS; SUBCUTANEOUS at 22:57

## 2017-10-08 RX ADMIN — ALBUTEROL 2 PUFF(S): 90 AEROSOL, METERED ORAL at 09:25

## 2017-10-08 RX ADMIN — Medication 81 MILLIGRAM(S): at 12:33

## 2017-10-08 RX ADMIN — Medication 100 MILLIGRAM(S): at 18:30

## 2017-10-08 RX ADMIN — Medication 50 MILLIGRAM(S): at 06:40

## 2017-10-08 RX ADMIN — Medication 325 MILLIGRAM(S): at 12:33

## 2017-10-08 RX ADMIN — Medication 1: at 08:50

## 2017-10-08 RX ADMIN — ALBUTEROL 2 PUFF(S): 90 AEROSOL, METERED ORAL at 22:45

## 2017-10-08 RX ADMIN — ZINC SULFATE TAB 220 MG (50 MG ZINC EQUIVALENT) 220 MILLIGRAM(S): 220 (50 ZN) TAB at 12:33

## 2017-10-08 RX ADMIN — Medication 100 MILLIGRAM(S): at 06:40

## 2017-10-08 RX ADMIN — Medication 500 MILLIGRAM(S): at 22:45

## 2017-10-08 RX ADMIN — Medication 1 TABLET(S): at 12:33

## 2017-10-08 RX ADMIN — Medication 500 MILLIGRAM(S): at 06:40

## 2017-10-08 RX ADMIN — HEPARIN SODIUM 5000 UNIT(S): 5000 INJECTION INTRAVENOUS; SUBCUTANEOUS at 18:30

## 2017-10-08 RX ADMIN — Medication 50 MILLIGRAM(S): at 14:00

## 2017-10-08 RX ADMIN — Medication 500 MILLIGRAM(S): at 14:00

## 2017-10-08 RX ADMIN — ALBUTEROL 2 PUFF(S): 90 AEROSOL, METERED ORAL at 15:30

## 2017-10-08 RX ADMIN — POLYETHYLENE GLYCOL 3350 17 GRAM(S): 17 POWDER, FOR SOLUTION ORAL at 12:33

## 2017-10-08 RX ADMIN — TAMSULOSIN HYDROCHLORIDE 0.4 MILLIGRAM(S): 0.4 CAPSULE ORAL at 22:45

## 2017-10-08 NOTE — PROGRESS NOTE ADULT - SUBJECTIVE AND OBJECTIVE BOX
Pre Op Note    Diagnosis: Sacral Decubitus ulcer   Procedure: Debridement   Surgeon: Dr Ramsey                           7.8    10.4  )-----------( 344      ( 08 Oct 2017 06:11 )             25.3     10-08    138  |  105  |  24<H>  ----------------------------<  151<H>  3.8   |  23  |  0.90    Ca    8.4      08 Oct 2017 06:11      PT/INR - ( 08 Oct 2017 06:11 )   PT: 15.3 sec;   INR: 1.39 ratio         PTT - ( 08 Oct 2017 06:11 )  PTT:32.1 sec

## 2017-10-08 NOTE — PROGRESS NOTE ADULT - ASSESSMENT
82y Male with sacral decubitus ulcer to go for debridement        -NPO after midnight   -IVF while NPO  -Pre Op Labs in AM   -Type and Screen in AM   -PT/PTT/INR in AM   -F/u medical clearance, please note in chart   -Consent obtained

## 2017-10-08 NOTE — PROGRESS NOTE ADULT - SUBJECTIVE AND OBJECTIVE BOX
Attending follow up:    Patient is a 82y old  Male who presents with a chief complaint of low hb level in NH (05 Oct 2017 19:36)    DU for debridment tomorrow.  INTERVAL HPI/OVERNIGHT EVENTS: no new complaints    MEDICATIONS  (STANDING):  ALBUTerol/ipratropium for Nebulization 3 milliLiter(s) Nebulizer every 6 hours  aspirin enteric coated 81 milliGRAM(s) Oral daily  aztreonam  IVPB      dextrose 5%. 1000 milliLiter(s) (50 mL/Hr) IV Continuous <Continuous>  dextrose 50% Injectable 12.5 Gram(s) IV Push once  dextrose 50% Injectable 25 Gram(s) IV Push once  dextrose 50% Injectable 25 Gram(s) IV Push once  docusate sodium 100 milliGRAM(s) Oral two times a day  ferrous    sulfate 325 milliGRAM(s) Oral daily  insulin lispro (HumaLOG) corrective regimen sliding scale   SubCutaneous three times a day before meals  metroNIDAZOLE    Tablet 500 milliGRAM(s) Oral every 8 hours  polyethylene glycol 3350 17 Gram(s) Oral daily  sodium chloride 0.9%. 1000 milliLiter(s) (40 mL/Hr) IV Continuous <Continuous>  tamsulosin 0.4 milliGRAM(s) Oral at bedtime  vancomycin  IVPB 1000 milliGRAM(s) IV Intermittent every 12 hours  warfarin 2 milliGRAM(s) Oral once    MEDICATIONS  (PRN):  acetaminophen  Suppository 650 milliGRAM(s) Rectal every 6 hours PRN For Temp greater than 38 C (100.4 F)  dextrose Gel 1 Dose(s) Oral once PRN Blood Glucose LESS THAN 70 milliGRAM(s)/deciLiter  glucagon  Injectable 1 milliGRAM(s) IntraMuscular once PRN Glucose <70 milliGRAM(s)/deciLiter      Vital Signs Last 24 Hrs  T(C): 36.8 (08 Oct 2017 06:07), Max: 37.1 (07 Oct 2017 13:44)  T(F): 98.2 (08 Oct 2017 06:07), Max: 98.7 (07 Oct 2017 13:44)  HR: 69 (08 Oct 2017 06:07) (69 - 99)  BP: 116/76 (08 Oct 2017 06:07) (107/68 - 116/76)  BP(mean): --  RR: 16 (08 Oct 2017 06:07) (16 - 18)  SpO2: 100% (08 Oct 2017 06:07) (97% - 100%)  ________________________________________________  PHYSICAL EXAM:  GENERAL: NAD  CHEST/LUNG: Posterior rhonchi to auscultation  HEART: S1 S2  regular; no murmurs, gallops or rubs  ABDOMEN: Soft, Nontender, Nondistended; Bowel sounds present  EXTREMITIES: no cyanosis; no edema; no calf tenderness  SKIN: warm and dry; no rash  NERVOUS SYSTEM:  Awake, alert.    _________________________________________________  LABS:                                              7.8    10.4  )-----------( 344      ( 08 Oct 2017 06:11 )             25.3   10-08    138  |  105  |  24<H>  ----------------------------<  151<H>  3.8   |  23  |  0.90    PT/INR - ( 08 Oct 2017 06:11 )   PT: 15.3 sec;   INR: 1.39 ratio         PTT - ( 08 Oct 2017 06:11 )  PTT:32.1 sec        CBC in am, if further decrease in h/h, may need PRBC tomorrow.    TPro  6.4  /  Alb  2.1<L>  /  TBili  0.3  /  DBili  x   /  AST  10  /  ALT  23  /  AlkPhos  107  10-05    PT/INR - ( 06 Oct 2017 11:03 )   PT: 16.0 sec;   INR: 1.46 ratio         PTT - ( 05 Oct 2017 16:56 )  PTT:54.0 sec  Urinalysis Basic - ( 05 Oct 2017 20:42 )    Color: Yellow / Appearance: Clear / S.010 / pH: x  Gluc: x / Ketone: Negative  / Bili: Negative / Urobili: Negative   Blood: x / Protein: 30 mg/dL / Nitrite: Positive   Leuk Esterase: Moderate / RBC: 2-5 /HPF / WBC 6-10 /HPF   Sq Epi: x / Non Sq Epi: Few / Bacteria: Many /HPF      CAPILLARY BLOOD GLUCOSE  157 (06 Oct 2017 11:25)  179 (06 Oct 2017 08:17)  175 (05 Oct 2017 21:22)            RADIOLOGY & ADDITIONAL TESTS:    Imaging Personally Reviewed:  YES/NO    Consultant(s) Notes Reviewed:   YES/ No    Care Discussed with Consultants :     Plan of care was discussed with patient and /or primary care giver; all questions and concerns were addressed and care was aligned with patient's wishes.

## 2017-10-08 NOTE — CHART NOTE - NSCHARTNOTEFT_GEN_A_CORE
Patient is for surgical debridement tomorrow. Discussed with Dr garcia regarding anticogulation. will Hold warfarin one dose today. Start patient on heparin subcutaneous before surgery. Hold on the morning of procedure tomorrow. Discussed with RN and Dr garcia thanks

## 2017-10-09 ENCOUNTER — RESULT REVIEW (OUTPATIENT)
Age: 82
End: 2017-10-09

## 2017-10-09 LAB
-  CEFTRIAXONE: SIGNIFICANT CHANGE UP
-  CLINDAMYCIN: SIGNIFICANT CHANGE UP
-  ERYTHROMYCIN: SIGNIFICANT CHANGE UP
-  PENICILLIN: SIGNIFICANT CHANGE UP
-  VANCOMYCIN: SIGNIFICANT CHANGE UP
ANION GAP SERPL CALC-SCNC: 9 MMOL/L — SIGNIFICANT CHANGE UP (ref 5–17)
APTT BLD: 29.9 SEC — SIGNIFICANT CHANGE UP (ref 27.5–37.4)
BASOPHILS # BLD AUTO: 0.1 K/UL — SIGNIFICANT CHANGE UP (ref 0–0.2)
BASOPHILS NFR BLD AUTO: 0.8 % — SIGNIFICANT CHANGE UP (ref 0–2)
BUN SERPL-MCNC: 24 MG/DL — HIGH (ref 7–18)
CALCIUM SERPL-MCNC: 8.3 MG/DL — LOW (ref 8.4–10.5)
CHLORIDE SERPL-SCNC: 106 MMOL/L — SIGNIFICANT CHANGE UP (ref 96–108)
CO2 SERPL-SCNC: 22 MMOL/L — SIGNIFICANT CHANGE UP (ref 22–31)
CREAT SERPL-MCNC: 0.9 MG/DL — SIGNIFICANT CHANGE UP (ref 0.5–1.3)
CULTURE RESULTS: SIGNIFICANT CHANGE UP
CULTURE RESULTS: SIGNIFICANT CHANGE UP
EOSINOPHIL # BLD AUTO: 0.1 K/UL — SIGNIFICANT CHANGE UP (ref 0–0.5)
EOSINOPHIL NFR BLD AUTO: 0.5 % — SIGNIFICANT CHANGE UP (ref 0–6)
GLUCOSE SERPL-MCNC: 150 MG/DL — HIGH (ref 70–99)
HCT VFR BLD CALC: 26.4 % — LOW (ref 39–50)
HGB BLD-MCNC: 8.1 G/DL — LOW (ref 13–17)
INR BLD: 1.41 RATIO — HIGH (ref 0.88–1.16)
LYMPHOCYTES # BLD AUTO: 18.6 % — SIGNIFICANT CHANGE UP (ref 13–44)
LYMPHOCYTES # BLD AUTO: 2.1 K/UL — SIGNIFICANT CHANGE UP (ref 1–3.3)
M TB TUBERC IFN-G BLD QL: 0.09 IU/ML — SIGNIFICANT CHANGE UP
M TB TUBERC IFN-G BLD QL: 0.68 IU/ML — SIGNIFICANT CHANGE UP
M TB TUBERC IFN-G BLD QL: ABNORMAL
MAGNESIUM SERPL-MCNC: 2.1 MG/DL — SIGNIFICANT CHANGE UP (ref 1.6–2.6)
MCHC RBC-ENTMCNC: 23.9 PG — LOW (ref 27–34)
MCHC RBC-ENTMCNC: 30.6 GM/DL — LOW (ref 32–36)
MCV RBC AUTO: 77.9 FL — LOW (ref 80–100)
METHOD TYPE: SIGNIFICANT CHANGE UP
METHOD TYPE: SIGNIFICANT CHANGE UP
MITOGEN IGNF BCKGRD COR BLD-ACNC: 0.4 IU/ML — SIGNIFICANT CHANGE UP
MONOCYTES # BLD AUTO: 0.7 K/UL — SIGNIFICANT CHANGE UP (ref 0–0.9)
MONOCYTES NFR BLD AUTO: 6.2 % — SIGNIFICANT CHANGE UP (ref 2–14)
NEUTROPHILS # BLD AUTO: 8.4 K/UL — HIGH (ref 1.8–7.4)
NEUTROPHILS NFR BLD AUTO: 73.9 % — SIGNIFICANT CHANGE UP (ref 43–77)
ORGANISM # SPEC MICROSCOPIC CNT: SIGNIFICANT CHANGE UP
PHOSPHATE SERPL-MCNC: 3 MG/DL — SIGNIFICANT CHANGE UP (ref 2.5–4.5)
PLATELET # BLD AUTO: 378 K/UL — SIGNIFICANT CHANGE UP (ref 150–400)
POTASSIUM SERPL-MCNC: 4.1 MMOL/L — SIGNIFICANT CHANGE UP (ref 3.5–5.3)
POTASSIUM SERPL-SCNC: 4.1 MMOL/L — SIGNIFICANT CHANGE UP (ref 3.5–5.3)
PROTHROM AB SERPL-ACNC: 15.5 SEC — HIGH (ref 9.8–12.7)
RBC # BLD: 3.39 M/UL — LOW (ref 4.2–5.8)
RBC # FLD: 18.3 % — HIGH (ref 10.3–14.5)
SODIUM SERPL-SCNC: 137 MMOL/L — SIGNIFICANT CHANGE UP (ref 135–145)
SPECIMEN SOURCE: SIGNIFICANT CHANGE UP
SPECIMEN SOURCE: SIGNIFICANT CHANGE UP
WBC # BLD: 11.4 K/UL — HIGH (ref 3.8–10.5)
WBC # FLD AUTO: 11.4 K/UL — HIGH (ref 3.8–10.5)

## 2017-10-09 PROCEDURE — 88304 TISSUE EXAM BY PATHOLOGIST: CPT | Mod: 26

## 2017-10-09 RX ORDER — GLUCAGON INJECTION, SOLUTION 0.5 MG/.1ML
1 INJECTION, SOLUTION SUBCUTANEOUS ONCE
Refills: 0 | Status: DISCONTINUED | OUTPATIENT
Start: 2017-10-09 | End: 2017-10-26

## 2017-10-09 RX ORDER — TAMSULOSIN HYDROCHLORIDE 0.4 MG/1
0.4 CAPSULE ORAL AT BEDTIME
Refills: 0 | Status: DISCONTINUED | OUTPATIENT
Start: 2017-10-09 | End: 2017-10-26

## 2017-10-09 RX ORDER — ERTAPENEM SODIUM 1 G/1
INJECTION, POWDER, LYOPHILIZED, FOR SOLUTION INTRAMUSCULAR; INTRAVENOUS
Refills: 0 | Status: DISCONTINUED | OUTPATIENT
Start: 2017-10-09 | End: 2017-10-16

## 2017-10-09 RX ORDER — WARFARIN SODIUM 2.5 MG/1
2 TABLET ORAL ONCE
Refills: 0 | Status: COMPLETED | OUTPATIENT
Start: 2017-10-09 | End: 2017-10-09

## 2017-10-09 RX ORDER — METRONIDAZOLE 500 MG
500 TABLET ORAL EVERY 8 HOURS
Refills: 0 | Status: DISCONTINUED | OUTPATIENT
Start: 2017-10-09 | End: 2017-10-09

## 2017-10-09 RX ORDER — ERTAPENEM SODIUM 1 G/1
1000 INJECTION, POWDER, LYOPHILIZED, FOR SOLUTION INTRAMUSCULAR; INTRAVENOUS EVERY 24 HOURS
Refills: 0 | Status: DISCONTINUED | OUTPATIENT
Start: 2017-10-10 | End: 2017-10-16

## 2017-10-09 RX ORDER — DOCUSATE SODIUM 100 MG
100 CAPSULE ORAL
Refills: 0 | Status: DISCONTINUED | OUTPATIENT
Start: 2017-10-09 | End: 2017-10-15

## 2017-10-09 RX ORDER — DEXTROSE 50 % IN WATER 50 %
1 SYRINGE (ML) INTRAVENOUS ONCE
Refills: 0 | Status: DISCONTINUED | OUTPATIENT
Start: 2017-10-09 | End: 2017-10-26

## 2017-10-09 RX ORDER — HEPARIN SODIUM 5000 [USP'U]/ML
5000 INJECTION INTRAVENOUS; SUBCUTANEOUS EVERY 12 HOURS
Refills: 0 | Status: DISCONTINUED | OUTPATIENT
Start: 2017-10-09 | End: 2017-10-14

## 2017-10-09 RX ORDER — ASPIRIN/CALCIUM CARB/MAGNESIUM 324 MG
81 TABLET ORAL DAILY
Refills: 0 | Status: DISCONTINUED | OUTPATIENT
Start: 2017-10-09 | End: 2017-10-26

## 2017-10-09 RX ORDER — POLYETHYLENE GLYCOL 3350 17 G/17G
17 POWDER, FOR SOLUTION ORAL DAILY
Refills: 0 | Status: DISCONTINUED | OUTPATIENT
Start: 2017-10-09 | End: 2017-10-26

## 2017-10-09 RX ORDER — DEXTROSE 50 % IN WATER 50 %
12.5 SYRINGE (ML) INTRAVENOUS ONCE
Refills: 0 | Status: DISCONTINUED | OUTPATIENT
Start: 2017-10-09 | End: 2017-10-26

## 2017-10-09 RX ORDER — TIOTROPIUM BROMIDE 18 UG/1
1 CAPSULE ORAL; RESPIRATORY (INHALATION) DAILY
Refills: 0 | Status: DISCONTINUED | OUTPATIENT
Start: 2017-10-09 | End: 2017-10-15

## 2017-10-09 RX ORDER — FERROUS SULFATE 325(65) MG
325 TABLET ORAL DAILY
Refills: 0 | Status: DISCONTINUED | OUTPATIENT
Start: 2017-10-09 | End: 2017-10-13

## 2017-10-09 RX ORDER — ALBUTEROL 90 UG/1
2 AEROSOL, METERED ORAL EVERY 6 HOURS
Refills: 0 | Status: DISCONTINUED | OUTPATIENT
Start: 2017-10-09 | End: 2017-10-14

## 2017-10-09 RX ORDER — SODIUM CHLORIDE 9 MG/ML
1000 INJECTION INTRAMUSCULAR; INTRAVENOUS; SUBCUTANEOUS
Refills: 0 | Status: DISCONTINUED | OUTPATIENT
Start: 2017-10-09 | End: 2017-10-12

## 2017-10-09 RX ORDER — ERTAPENEM SODIUM 1 G/1
1000 INJECTION, POWDER, LYOPHILIZED, FOR SOLUTION INTRAMUSCULAR; INTRAVENOUS ONCE
Refills: 0 | Status: COMPLETED | OUTPATIENT
Start: 2017-10-09 | End: 2017-10-09

## 2017-10-09 RX ORDER — ACETAMINOPHEN 500 MG
1000 TABLET ORAL ONCE
Refills: 0 | Status: DISCONTINUED | OUTPATIENT
Start: 2017-10-09 | End: 2017-10-15

## 2017-10-09 RX ORDER — AZTREONAM 2 G
1000 VIAL (EA) INJECTION EVERY 8 HOURS
Refills: 0 | Status: DISCONTINUED | OUTPATIENT
Start: 2017-10-09 | End: 2017-10-09

## 2017-10-09 RX ORDER — ASCORBIC ACID 60 MG
500 TABLET,CHEWABLE ORAL DAILY
Refills: 0 | Status: DISCONTINUED | OUTPATIENT
Start: 2017-10-09 | End: 2017-10-26

## 2017-10-09 RX ORDER — ZINC SULFATE TAB 220 MG (50 MG ZINC EQUIVALENT) 220 (50 ZN) MG
220 TAB ORAL DAILY
Refills: 0 | Status: DISCONTINUED | OUTPATIENT
Start: 2017-10-09 | End: 2017-10-26

## 2017-10-09 RX ORDER — SODIUM CHLORIDE 9 MG/ML
1000 INJECTION, SOLUTION INTRAVENOUS
Refills: 0 | Status: DISCONTINUED | OUTPATIENT
Start: 2017-10-09 | End: 2017-10-26

## 2017-10-09 RX ADMIN — WARFARIN SODIUM 2 MILLIGRAM(S): 2.5 TABLET ORAL at 21:39

## 2017-10-09 RX ADMIN — Medication 500 MILLIGRAM(S): at 11:35

## 2017-10-09 RX ADMIN — ALBUTEROL 2 PUFF(S): 90 AEROSOL, METERED ORAL at 21:39

## 2017-10-09 RX ADMIN — POLYETHYLENE GLYCOL 3350 17 GRAM(S): 17 POWDER, FOR SOLUTION ORAL at 11:35

## 2017-10-09 RX ADMIN — ALBUTEROL 2 PUFF(S): 90 AEROSOL, METERED ORAL at 14:25

## 2017-10-09 RX ADMIN — ZINC SULFATE TAB 220 MG (50 MG ZINC EQUIVALENT) 220 MILLIGRAM(S): 220 (50 ZN) TAB at 11:35

## 2017-10-09 RX ADMIN — TAMSULOSIN HYDROCHLORIDE 0.4 MILLIGRAM(S): 0.4 CAPSULE ORAL at 21:39

## 2017-10-09 RX ADMIN — SODIUM CHLORIDE 40 MILLILITER(S): 9 INJECTION INTRAMUSCULAR; INTRAVENOUS; SUBCUTANEOUS at 14:23

## 2017-10-09 RX ADMIN — Medication 100 MILLIGRAM(S): at 17:55

## 2017-10-09 RX ADMIN — Medication 81 MILLIGRAM(S): at 11:35

## 2017-10-09 RX ADMIN — Medication 325 MILLIGRAM(S): at 11:35

## 2017-10-09 RX ADMIN — SODIUM CHLORIDE 40 MILLILITER(S): 9 INJECTION INTRAMUSCULAR; INTRAVENOUS; SUBCUTANEOUS at 06:08

## 2017-10-09 RX ADMIN — Medication 1 TABLET(S): at 11:35

## 2017-10-09 RX ADMIN — ERTAPENEM SODIUM 120 MILLIGRAM(S): 1 INJECTION, POWDER, LYOPHILIZED, FOR SOLUTION INTRAMUSCULAR; INTRAVENOUS at 13:12

## 2017-10-09 RX ADMIN — Medication 50 MILLIGRAM(S): at 06:06

## 2017-10-09 RX ADMIN — Medication 250 MILLIGRAM(S): at 06:07

## 2017-10-09 RX ADMIN — TIOTROPIUM BROMIDE 1 CAPSULE(S): 18 CAPSULE ORAL; RESPIRATORY (INHALATION) at 11:35

## 2017-10-09 RX ADMIN — Medication 500 MILLIGRAM(S): at 06:07

## 2017-10-09 RX ADMIN — HEPARIN SODIUM 5000 UNIT(S): 5000 INJECTION INTRAVENOUS; SUBCUTANEOUS at 17:55

## 2017-10-09 NOTE — PROGRESS NOTE ADULT - SUBJECTIVE AND OBJECTIVE BOX
postop note  no complaints. pt sleeping comfortably. Nurse said he has had no issues s/p surgery    Vital Signs:  T(C): 36.8 (10-09-17 @ 12:46), Max: 36.9 (10-08-17 @ 16:06)  HR: 112 (10-09-17 @ 12:46) (97 - 112)  BP: 135/89 (10-09-17 @ 12:46) (98/61 - 135/89)  RR: 18 (10-09-17 @ 12:46) (18 - 25)  SpO2: 99% (10-09-17 @ 12:46) (98% - 100%)  Wt(kg): --    Physical Exam:  General: sleeping, NAD  Sacrum: dressing C/D/I, no active bleeding noted

## 2017-10-09 NOTE — PROGRESS NOTE ADULT - ASSESSMENT
1. ESBL Proteus bacteremia 2/2 to UTI - switch to ertapenem 1g q 24 hours, repeat Bcx tomorrow   2. ESBL Proteus UTI - continue ertapenem 1g q 24 hours  3. leucocytosis- trended up   3. Infected sacral decubitus- s/p surgery   - f/u surgical swab 1. ESBL Proteus bacteremia 2/2 to UTI - switch to ertapenem 1g q 24 hours, repeat Bcx on wednesday  bacteremia with strep anginosis also in 4 of 4 bottles likely from sacral decub   2. ESBL Proteus UTI - continue ertapenem 1g q 24 hours  3. leucocytosis- trended up   3. Infected sacral decubitus- s/p surgery   - f/u surgical swab   if repeat blood cults are negative then PICC line on friday

## 2017-10-09 NOTE — PROGRESS NOTE ADULT - ASSESSMENT
81 y/o M from [facility] w/ PMHx of Rheumatic heart disease, NPH  (unsteady gait used to ambulate with walker now bedbound for a couple of months) & Afib (on Coumadin) was sent from [facility] for low Hb.     Patient is poor historian and source of information is wife. Wife stated that patient is having cough with yellowish sputum for last 4 weeks with occasional temp spikes.    In the ED, patient is AxO x1, unable to interact. T 102.8, Hr 113, /69. S/p Vancomycni/Cefepime and Zithromax.  CXR showed interstitial prominence and opacity at the left lung base.     Patient was admitted to the medical floor with Sepsis 2/2 PNA vs. UTI

## 2017-10-09 NOTE — PROGRESS NOTE ADULT - SUBJECTIVE AND OBJECTIVE BOX
81 y/o M from [facility] w/ PMHx of Rheumatic heart disease, NPH , Afib (on Coumadin) admitted for septic with Pneumonia, UTI and unstageble and stage 3 sacral ulcer had debridement surgery today. The surgery was uncomplicated and patient tolerated the procedure well. Patient's seen lying comfortably in bed denying any complaints (nods heads back pain and chest pain). Per daughter he didn't complain of anything and looks comfortable.   Patient received 1upRBC and hb has been stable. Vancomycin trough was high hence was held today. Afebrile, leucocytosis had resolved but slightly elevated today.        MEDICATIONS  (STANDING):  acetaminophen  IVPB. 1000 milliGRAM(s) IV Intermittent once  ALBUTerol    90 MICROgram(s) HFA Inhaler 2 Puff(s) Inhalation every 6 hours  ascorbic acid 500 milliGRAM(s) Oral daily  aspirin enteric coated 81 milliGRAM(s) Oral daily  10-09    137  |  106  |  24<H>  ----------------------------<  150<H>  4.1   |  22  |  0.90    Ca    8.3<L>      09 Oct 2017 07:25  Phos  3.0     10-09  Mg     2.1     10-09    dextrose 5%. 1000 milliLiter(s) (50 mL/Hr) IV Continuous <Continuous>  10-09    137  |  106  |  24<H>  ----------------------------<  150<H>  4.1   |  22  |  0.90    Ca    8.3<L>      09 Oct 2017 07:25  Phos  3.0     10-09  Mg     2.1     10-09    dextrose 50% Injectable 12.5 Gram(s) IV Push once  docusate sodium 100 milliGRAM(s) Oral two times a day  ertapenem  IVPB      ertapenem  IVPB 1000 milliGRAM(s) IV Intermittent once  ferrous    sulfate 325 milliGRAM(s) Oral daily  heparin  Injectable 5000 Unit(s) SubCutaneous every 12 hours  influenza   Vaccine 0.5 milliLiter(s) IntraMuscular once  multivitamin 1 Tablet(s) Oral daily  polyethylene glycol 3350 17 Gram(s) Oral daily  sodium chloride 0.9%. 1000 milliLiter(s) (40 mL/Hr) IV Continuous <Continuous>  tamsulosin 0.4 milliGRAM(s) Oral at bedtime  tiotropium 18 MICROgram(s) Capsule 1 Capsule(s) Inhalation daily  warfarin 2 milliGRAM(s) Oral once  zinc sulfate 220 milliGRAM(s) Oral daily    MEDICATIONS  (PRN):  dextrose Gel 1 Dose(s) Oral once PRN Blood Glucose LESS THAN 70 milliGRAM(s)/deciLiter  glucagon  Injectable 1 milliGRAM(s) IntraMuscular once PRN Glucose <70 milliGRAM(s)/deciLiter    Vital Signs Last 24 Hrs  T(C): 36.6 (09 Oct 2017 09:36), Max: 36.9 (08 Oct 2017 16:06)  T(F): 97.9 (09 Oct 2017 09:36), Max: 98.5 (08 Oct 2017 16:06)  HR: 99 (09 Oct 2017 09:36) (51 - 112)  BP: 110/73 (09 Oct 2017 09:36) (98/61 - 115/75)  BP(mean): 81 (09 Oct 2017 09:36) (81 - 99)  RR: 19 (09 Oct 2017 09:36) (18 - 25)  Culture - Blood (10.05.17 @ 23:07)    Gram Stain:   Growth in anaerobic bottle: Gram Positive Cocci in Pairs and Chains    Specimen Source: .Blood Blood    Culture Results:   Growth in anaerobic bottle: Gram Positive Cocci in Pairs and Chains    SpO2: 100% (09 Oct 2017 09:36) (98% - 100%)      REVIEW OF SYSTEMS: Patient alert awake, limited ROS due to dementia   PHYSICAL EXAM:    GENERAL: patient comfortable in bed, NAD, well-groomed, well-developed  HEAD:  Atraumatic, Normocephalic  EYES: PERRL, conjunctiva and sclera clear  ENMT:  Moist mucous membranes  NECK: Supple, No JVD, Normal thyroid  NERVOUS SYSTEM:  Alert and awake   CHEST/LUNG: Clear to percussion bilaterally; No rales, rhonchi, wheezing, or rubs  HEART: Regular rate and rhythm; No murmurs, rubs, or gallops  ABDOMEN: Soft, Nontender, Nondistended; Bowel sounds present  EXTREMITIES:  2+ Peripheral Pulses, No clubbing, cyanosis, or edema  SKIN: sacral ulcer, b/l feet DTIs      LABS:                        8.1    11.4  )-----------( 378      ( 09 Oct 2017 07:25 )               26.4   Culture - Urine (10.05.17 @ 23:11)    -  Amikacin: S <=8    -  Ampicillin: R >16    -  Ampicillin/Sulbactam: R <=4/2    -  Aztreonam: R <=4    -  Cefazolin: R >16    -  Cefepime: R >16    -  Cefoxitin: S <=4    -  Ceftazidime: R <=1    -  Ceftriaxone: R >32    -  Ciprofloxacin: R >2    -  Ertapenem: S <=0.5    -  Gentamicin: R >8    -  Levofloxacin: R >4    -  Meropenem: S <=1    -  Nitrofurantoin: R >64    -  Piperacillin/Tazobactam: R <=8    -  Tobramycin: R >8    -  Trimethoprim/Sulfamethoxazole: R >2/38    Specimen Source: .Urine Clean Catch (Midstream)    Culture Results:   50,000 - 99,000 CFU/mL Proteus mirabilis ESBL    Organism Identification: Proteus mirabilis ESBL    Organism: Proteus mirabilis ESBL    Method Type: SHARONA 81 y/o M from [facility] w/ PMHx of Rheumatic heart disease, NPH , Afib (on Coumadin) admitted for septic with Pneumonia, UTI and unstageble and stage 3 sacral ulcer had debridement surgery today. The surgery was uncomplicated and patient tolerated the procedure well. Patient's seen lying comfortably in bed denying any complaints (nods heads back pain and chest pain). Per daughter he didn't complain of anything and looks comfortable.   Patient received 1upRBC and hb has been stable. Vancomycin trough was high hence was held today. Afebrile, leucocytosis had resolved but slightly elevated today.    Pt is growing out strep anginosis ESBL proteus in blood.      MEDICATIONS  (STANDING):  acetaminophen  IVPB. 1000 milliGRAM(s) IV Intermittent once  ALBUTerol    90 MICROgram(s) HFA Inhaler 2 Puff(s) Inhalation every 6 hours  ascorbic acid 500 milliGRAM(s) Oral daily  aspirin enteric coated 81 milliGRAM(s) Oral daily  10-09    137  |  106  |  24<H>  ----------------------------<  150<H>  4.1   |  22  |  0.90    Ca    8.3<L>      09 Oct 2017 07:25  Phos  3.0     10-09  Mg     2.1     10-09    dextrose 5%. 1000 milliLiter(s) (50 mL/Hr) IV Continuous <Continuous>  10-09    137  |  106  |  24<H>  ----------------------------<  150<H>  4.1   |  22  |  0.90    Ca    8.3<L>      09 Oct 2017 07:25  Phos  3.0     10-09  Mg     2.1     10-09    dextrose 50% Injectable 12.5 Gram(s) IV Push once  docusate sodium 100 milliGRAM(s) Oral two times a day  ertapenem  IVPB      ertapenem  IVPB 1000 milliGRAM(s) IV Intermittent once  ferrous    sulfate 325 milliGRAM(s) Oral daily  heparin  Injectable 5000 Unit(s) SubCutaneous every 12 hours  influenza   Vaccine 0.5 milliLiter(s) IntraMuscular once  multivitamin 1 Tablet(s) Oral daily  polyethylene glycol 3350 17 Gram(s) Oral daily  sodium chloride 0.9%. 1000 milliLiter(s) (40 mL/Hr) IV Continuous <Continuous>  tamsulosin 0.4 milliGRAM(s) Oral at bedtime  tiotropium 18 MICROgram(s) Capsule 1 Capsule(s) Inhalation daily  warfarin 2 milliGRAM(s) Oral once  zinc sulfate 220 milliGRAM(s) Oral daily    MEDICATIONS  (PRN):  dextrose Gel 1 Dose(s) Oral once PRN Blood Glucose LESS THAN 70 milliGRAM(s)/deciLiter  glucagon  Injectable 1 milliGRAM(s) IntraMuscular once PRN Glucose <70 milliGRAM(s)/deciLiter    Vital Signs Last 24 Hrs  T(C): 36.6 (09 Oct 2017 09:36), Max: 36.9 (08 Oct 2017 16:06)  T(F): 97.9 (09 Oct 2017 09:36), Max: 98.5 (08 Oct 2017 16:06)  HR: 99 (09 Oct 2017 09:36) (51 - 112)  BP: 110/73 (09 Oct 2017 09:36) (98/61 - 115/75)  BP(mean): 81 (09 Oct 2017 09:36) (81 - 99)  RR: 19 (09 Oct 2017 09:36) (18 - 25)  Culture - Blood (10.05.17 @ 23:07)    Gram Stain:   Growth in anaerobic bottle: Gram Positive Cocci in Pairs and Chains    Specimen Source: .Blood Blood    Culture Results:   Growth in anaerobic bottle: Gram Positive Cocci in Pairs and Chains    SpO2: 100% (09 Oct 2017 09:36) (98% - 100%)      REVIEW OF SYSTEMS: Patient alert awake, limited ROS due to dementia   PHYSICAL EXAM:    GENERAL: patient comfortable in bed, NAD, well-groomed, well-developed  HEAD:  Atraumatic, Normocephalic  EYES: PERRL, conjunctiva and sclera clear  ENMT:  Moist mucous membranes  NECK: Supple, No JVD, Normal thyroid  NERVOUS SYSTEM:  Alert and awake   CHEST/LUNG: Clear to percussion bilaterally; No rales, rhonchi, wheezing, or rubs  HEART: Regular rate and rhythm; No murmurs, rubs, or gallops  ABDOMEN: Soft, Nontender, Nondistended; Bowel sounds present  EXTREMITIES:  2+ Peripheral Pulses, No clubbing, cyanosis, or edema  SKIN: sacral ulcer, b/l feet DTIs      LABS:                        8.1    11.4  )-----------( 378      ( 09 Oct 2017 07:25 )               26.4   Culture - Urine (10.05.17 @ 23:11)    -  Amikacin: S <=8    -  Ampicillin: R >16    -  Ampicillin/Sulbactam: R <=4/2    -  Aztreonam: R <=4    -  Cefazolin: R >16    -  Cefepime: R >16    -  Cefoxitin: S <=4    -  Ceftazidime: R <=1    -  Ceftriaxone: R >32    -  Ciprofloxacin: R >2    -  Ertapenem: S <=0.5    -  Gentamicin: R >8    -  Levofloxacin: R >4    -  Meropenem: S <=1    -  Nitrofurantoin: R >64    -  Piperacillin/Tazobactam: R <=8    -  Tobramycin: R >8    -  Trimethoprim/Sulfamethoxazole: R >2/38    Specimen Source: .Urine Clean Catch (Midstream)    Culture Results:   50,000 - 99,000 CFU/mL Proteus mirabilis ESBL    Organism Identification: Proteus mirabilis ESBL    Organism: Proteus mirabilis ESBL    Method Type: SHARONA

## 2017-10-09 NOTE — PROGRESS NOTE ADULT - SUBJECTIVE AND OBJECTIVE BOX
NP Note discussed with  Primary Attending    Patient is a 82y old  Male who presents with a chief complaint of low hb level in NH (05 Oct 2017 19:36)      INTERVAL HPI/OVERNIGHT EVENTS: no new complaints    MEDICATIONS  (STANDING):  acetaminophen  IVPB. 1000 milliGRAM(s) IV Intermittent once  ALBUTerol    90 MICROgram(s) HFA Inhaler 2 Puff(s) Inhalation every 6 hours  ascorbic acid 500 milliGRAM(s) Oral daily  aspirin enteric coated 81 milliGRAM(s) Oral daily  dextrose 5%. 1000 milliLiter(s) (50 mL/Hr) IV Continuous <Continuous>  dextrose 50% Injectable 12.5 Gram(s) IV Push once  docusate sodium 100 milliGRAM(s) Oral two times a day  ertapenem  IVPB      ferrous    sulfate 325 milliGRAM(s) Oral daily  heparin  Injectable 5000 Unit(s) SubCutaneous every 12 hours  influenza   Vaccine 0.5 milliLiter(s) IntraMuscular once  multivitamin 1 Tablet(s) Oral daily  polyethylene glycol 3350 17 Gram(s) Oral daily  sodium chloride 0.9%. 1000 milliLiter(s) (40 mL/Hr) IV Continuous <Continuous>  tamsulosin 0.4 milliGRAM(s) Oral at bedtime  tiotropium 18 MICROgram(s) Capsule 1 Capsule(s) Inhalation daily  warfarin 2 milliGRAM(s) Oral once  zinc sulfate 220 milliGRAM(s) Oral daily    MEDICATIONS  (PRN):  dextrose Gel 1 Dose(s) Oral once PRN Blood Glucose LESS THAN 70 milliGRAM(s)/deciLiter  glucagon  Injectable 1 milliGRAM(s) IntraMuscular once PRN Glucose <70 milliGRAM(s)/deciLiter      __________________________________________________  REVIEW OF SYSTEMS:    CONSTITUTIONAL: No fever,   EYES: no acute visual disturbances  NECK: No pain or stiffness  RESPIRATORY: No cough; No shortness of breath  CARDIOVASCULAR: No chest pain, no palpitations  GASTROINTESTINAL: No pain. No nausea or vomiting; No diarrhea   NEUROLOGICAL: No headache or numbness, no tremors  MUSCULOSKELETAL: No joint pain, no muscle pain  GENITOURINARY: no dysuria, no frequency, no hesitancy  PSYCHIATRY: no depression , no anxiety  ALL OTHER  ROS negative        Vital Signs Last 24 Hrs  T(C): 36.8 (09 Oct 2017 13:54), Max: 36.8 (09 Oct 2017 12:46)  T(F): 98.3 (09 Oct 2017 13:54), Max: 98.3 (09 Oct 2017 12:46)  HR: 98 (09 Oct 2017 13:54) (97 - 112)  BP: 103/67 (09 Oct 2017 13:54) (102/76 - 135/89)  BP(mean): 81 (09 Oct 2017 09:36) (81 - 99)  RR: 18 (09 Oct 2017 13:54) (18 - 25)  SpO2: 99% (09 Oct 2017 13:54) (98% - 100%)    ________________________________________________  PHYSICAL EXAM:  GENERAL: NAD  HEENT: Normocephalic;  conjunctivae and sclerae clear; moist mucous membranes;   NECK : supple  CHEST/LUNG: Clear to auscultation bilaterally with good air entry   HEART: S1 S2  regular; no murmurs, gallops or rubs  ABDOMEN: Soft, Nontender, Nondistended; Bowel sounds present  EXTREMITIES: no cyanosis; no edema; no calf tenderness  SKIN: warm and dry; no rash  NERVOUS SYSTEM:  Awake and alert; Oriented  to place, person and time ; no new deficits    _________________________________________________  LABS:                        8.1    11.4  )-----------( 378      ( 09 Oct 2017 07:25 )             26.4     10-09    137  |  106  |  24<H>  ----------------------------<  150<H>  4.1   |  22  |  0.90    Ca    8.3<L>      09 Oct 2017 07:25  Phos  3.0     10-09  Mg     2.1     10-09      PT/INR - ( 09 Oct 2017 07:25 )   PT: 15.5 sec;   INR: 1.41 ratio         PTT - ( 09 Oct 2017 07:25 )  PTT:29.9 sec    CAPILLARY BLOOD GLUCOSE  158 (09 Oct 2017 16:39)  171 (09 Oct 2017 11:18)  161 (09 Oct 2017 07:33)  165 (08 Oct 2017 20:49)            RADIOLOGY & ADDITIONAL TESTS:    Imaging Personally Reviewed:  YES    Consultant(s) Notes Reviewed:   YES    Care Discussed with Consultants :     Plan of care was discussed with patient and /or primary care giver; all questions and concerns were addressed and care was aligned with patient's wishes.

## 2017-10-09 NOTE — PROGRESS NOTE ADULT - PROBLEM SELECTOR PLAN 1
- Leukocytosis trending up  - Surgical debridement of sacral wound today/Surgical swab pending  - ESBL Proteus bacteremia 2/2 to UTI and bacteremia with strep anginosis also in 4 of 4 bottles likely from sacral decub  - Dr. Lala discontinued ABT's and started pt on Ertapenem 1 g q24 hours with repeat BC's on Wednesday.   -PICC for Friday if repeat BC are negative

## 2017-10-10 LAB
ANION GAP SERPL CALC-SCNC: 9 MMOL/L — SIGNIFICANT CHANGE UP (ref 5–17)
APTT BLD: 30.6 SEC — SIGNIFICANT CHANGE UP (ref 27.5–37.4)
BASOPHILS # BLD AUTO: 0.1 K/UL — SIGNIFICANT CHANGE UP (ref 0–0.2)
BASOPHILS NFR BLD AUTO: 1.2 % — SIGNIFICANT CHANGE UP (ref 0–2)
BUN SERPL-MCNC: 22 MG/DL — HIGH (ref 7–18)
CALCIUM SERPL-MCNC: 8.5 MG/DL — SIGNIFICANT CHANGE UP (ref 8.4–10.5)
CHLORIDE SERPL-SCNC: 112 MMOL/L — HIGH (ref 96–108)
CO2 SERPL-SCNC: 23 MMOL/L — SIGNIFICANT CHANGE UP (ref 22–31)
CREAT SERPL-MCNC: 0.86 MG/DL — SIGNIFICANT CHANGE UP (ref 0.5–1.3)
EOSINOPHIL # BLD AUTO: 0.1 K/UL — SIGNIFICANT CHANGE UP (ref 0–0.5)
EOSINOPHIL NFR BLD AUTO: 1.6 % — SIGNIFICANT CHANGE UP (ref 0–6)
GLUCOSE BLDC GLUCOMTR-MCNC: 165 MG/DL — HIGH (ref 70–99)
GLUCOSE BLDC GLUCOMTR-MCNC: 217 MG/DL — HIGH (ref 70–99)
GLUCOSE SERPL-MCNC: 140 MG/DL — HIGH (ref 70–99)
HCT VFR BLD CALC: 23.6 % — LOW (ref 39–50)
HGB BLD-MCNC: 7.4 G/DL — LOW (ref 13–17)
INR BLD: 1.48 RATIO — HIGH (ref 0.88–1.16)
LYMPHOCYTES # BLD AUTO: 1.8 K/UL — SIGNIFICANT CHANGE UP (ref 1–3.3)
LYMPHOCYTES # BLD AUTO: 20.4 % — SIGNIFICANT CHANGE UP (ref 13–44)
MCHC RBC-ENTMCNC: 24.4 PG — LOW (ref 27–34)
MCHC RBC-ENTMCNC: 31.2 GM/DL — LOW (ref 32–36)
MCV RBC AUTO: 78.1 FL — LOW (ref 80–100)
MONOCYTES # BLD AUTO: 0.6 K/UL — SIGNIFICANT CHANGE UP (ref 0–0.9)
MONOCYTES NFR BLD AUTO: 6.6 % — SIGNIFICANT CHANGE UP (ref 2–14)
NEUTROPHILS # BLD AUTO: 6.2 K/UL — SIGNIFICANT CHANGE UP (ref 1.8–7.4)
NEUTROPHILS NFR BLD AUTO: 70.2 % — SIGNIFICANT CHANGE UP (ref 43–77)
PLATELET # BLD AUTO: 311 K/UL — SIGNIFICANT CHANGE UP (ref 150–400)
POTASSIUM SERPL-MCNC: 4.3 MMOL/L — SIGNIFICANT CHANGE UP (ref 3.5–5.3)
POTASSIUM SERPL-SCNC: 4.3 MMOL/L — SIGNIFICANT CHANGE UP (ref 3.5–5.3)
PROTHROM AB SERPL-ACNC: 16.3 SEC — HIGH (ref 9.8–12.7)
RBC # BLD: 3.02 M/UL — LOW (ref 4.2–5.8)
RBC # FLD: 19.8 % — HIGH (ref 10.3–14.5)
SODIUM SERPL-SCNC: 144 MMOL/L — SIGNIFICANT CHANGE UP (ref 135–145)
SURGICAL PATHOLOGY FINAL REPORT - CH: SIGNIFICANT CHANGE UP
WBC # BLD: 8.9 K/UL — SIGNIFICANT CHANGE UP (ref 3.8–10.5)
WBC # FLD AUTO: 8.9 K/UL — SIGNIFICANT CHANGE UP (ref 3.8–10.5)

## 2017-10-10 RX ORDER — WARFARIN SODIUM 2.5 MG/1
2 TABLET ORAL ONCE
Refills: 0 | Status: COMPLETED | OUTPATIENT
Start: 2017-10-10 | End: 2017-10-10

## 2017-10-10 RX ADMIN — Medication 100 MILLIGRAM(S): at 17:08

## 2017-10-10 RX ADMIN — TAMSULOSIN HYDROCHLORIDE 0.4 MILLIGRAM(S): 0.4 CAPSULE ORAL at 21:51

## 2017-10-10 RX ADMIN — SODIUM CHLORIDE 40 MILLILITER(S): 9 INJECTION INTRAMUSCULAR; INTRAVENOUS; SUBCUTANEOUS at 05:47

## 2017-10-10 RX ADMIN — ERTAPENEM SODIUM 120 MILLIGRAM(S): 1 INJECTION, POWDER, LYOPHILIZED, FOR SOLUTION INTRAMUSCULAR; INTRAVENOUS at 13:40

## 2017-10-10 RX ADMIN — Medication 81 MILLIGRAM(S): at 13:36

## 2017-10-10 RX ADMIN — ALBUTEROL 2 PUFF(S): 90 AEROSOL, METERED ORAL at 08:06

## 2017-10-10 RX ADMIN — WARFARIN SODIUM 2 MILLIGRAM(S): 2.5 TABLET ORAL at 21:51

## 2017-10-10 RX ADMIN — Medication 325 MILLIGRAM(S): at 13:34

## 2017-10-10 RX ADMIN — Medication 1 TABLET(S): at 13:35

## 2017-10-10 RX ADMIN — Medication 500 MILLIGRAM(S): at 13:34

## 2017-10-10 RX ADMIN — ZINC SULFATE TAB 220 MG (50 MG ZINC EQUIVALENT) 220 MILLIGRAM(S): 220 (50 ZN) TAB at 13:35

## 2017-10-10 RX ADMIN — ALBUTEROL 2 PUFF(S): 90 AEROSOL, METERED ORAL at 21:51

## 2017-10-10 RX ADMIN — Medication 100 MILLIGRAM(S): at 05:46

## 2017-10-10 RX ADMIN — HEPARIN SODIUM 5000 UNIT(S): 5000 INJECTION INTRAVENOUS; SUBCUTANEOUS at 17:08

## 2017-10-10 RX ADMIN — ALBUTEROL 2 PUFF(S): 90 AEROSOL, METERED ORAL at 17:08

## 2017-10-10 RX ADMIN — TIOTROPIUM BROMIDE 1 CAPSULE(S): 18 CAPSULE ORAL; RESPIRATORY (INHALATION) at 13:36

## 2017-10-10 RX ADMIN — POLYETHYLENE GLYCOL 3350 17 GRAM(S): 17 POWDER, FOR SOLUTION ORAL at 13:34

## 2017-10-10 NOTE — PROGRESS NOTE ADULT - SUBJECTIVE AND OBJECTIVE BOX
83 y/o M from [facility] w/ PMHx of Rheumatic heart disease, NPH , Afib (on Coumadin) admitted for septic with Pneumonia, UTI and unstageble and stage 3 sacral ulcer had debridement surgery yesterday. Patient is doing very well and was seen alert and oriented ( x 2) today. Denies any pain, fever, cough, dyspnea, nausea, vomiting and was not sure about last BM but denies abdominal pain.  Afebrile, leucocytosis resolved.    Pt is growing out strep anginosis ESBL proteus in blood.      MEDICATIONS  (STANDING):  acetaminophen  IVPB. 1000 milliGRAM(s) IV Intermittent once  ALBUTerol    90 MICROgram(s) HFA Inhaler 2 Puff(s) Inhalation every 6 hours  ascorbic acid 500 milliGRAM(s) Oral daily  aspirin enteric coated 81 milliGRAM(s) Oral daily  dextrose 5%. 1000 milliLiter(s) (50 mL/Hr) IV Continuous <Continuous>  dextrose 50% Injectable 12.5 Gram(s) IV Push once  docusate sodium 100 milliGRAM(s) Oral two times a day  ertapenem  IVPB      ertapenem  IVPB 1000 milliGRAM(s) IV Intermittent every 24 hours  ferrous    sulfate 325 milliGRAM(s) Oral daily  heparin  Injectable 5000 Unit(s) SubCutaneous every 12 hours  influenza   Vaccine 0.5 milliLiter(s) IntraMuscular once  multivitamin 1 Tablet(s) Oral daily  polyethylene glycol 3350 17 Gram(s) Oral daily  sodium chloride 0.9%. 1000 milliLiter(s) (40 mL/Hr) IV Continuous <Continuous>  tamsulosin 0.4 milliGRAM(s) Oral at bedtime  tiotropium 18 MICROgram(s) Capsule 1 Capsule(s) Inhalation daily  zinc sulfate 220 milliGRAM(s) Oral daily    MEDICATIONS  (PRN):  dextrose Gel 1 Dose(s) Oral once PRN Blood Glucose LESS THAN 70 milliGRAM(s)/deciLiter  glucagon  Injectable 1 milliGRAM(s) IntraMuscular once PRN Glucose <70 milliGRAM(s)/deciLiter                          7.4    8.9   )-----------( 311      ( 10 Oct 2017 07:47 )             23.6   10-10    144  |  112<H>  |  22<H>  ----------------------------<  140<H>  4.3   |  23  |  0.86    Ca    8.5      10 Oct 2017 07:47  Phos  3.0     10-09  Mg     2.1     10-09        137  |  106  |  24<H>  ----------------------------<  150<H>  4.1   |  22  |  0.90    Ca    8.3<L>      09 Oct 2017 07:25  Phos  3.0     10-09  Mg     2.1     10-09    Vital Signs Last 24 Hrs  Vital Signs Last 24 Hrs  T(C): 36.4 (10 Oct 2017 05:51), Max: 36.8 (09 Oct 2017 12:46)  T(F): 97.6 (10 Oct 2017 05:51), Max: 98.3 (09 Oct 2017 12:46)  HR: 101 (10 Oct 2017 05:51) (98 - 112)  BP: 108/78 (10 Oct 2017 05:51) (96/55 - 135/89)  BP(mean): --  RR: 14 (10 Oct 2017 05:51) (14 - 18)  SpO2: 100% (10 Oct 2017 05:51) (98% - 100%)    Culture - Blood (10.05.17 @ 23:07)    Gram Stain:   Growth in anaerobic bottle: Gram Positive Cocci in Pairs and Chains    Specimen Source: .Blood Blood    Culture Results:   Growth in anaerobic bottle: Gram Positive Cocci in Pairs and Chains    SpO2: 100% (09 Oct 2017 09:36) (98% - 100%)      REVIEW OF SYSTEMS:  CONSTITUTIONAL: No fever, weight loss, or fatigue  EYES: No eye pain,  discharge  ENMT:  No difficulty hearing,  No sinus or throat pain  RESPIRATORY: No cough,  No shortness of breath  CARDIOVASCULAR: No chest pain, palpitations,   GASTROINTESTINAL: No abdominal or epigastric pain. No nausea, vomiting, ; No diarrhea or constipation.   GENITOURINARY: No urinary complaints  NEUROLOGICAL: No headaches,   SKIN: sacral ulcer, ? lesion in the right lower quadrant of the abdomen was draining.       PHYSICAL EXAM:    GENERAL: patient comfortable in bed, NAD, well-groomed, well-developed  HEAD:  Atraumatic, Normocephalic  EYES: PERRL, conjunctiva and sclera clear  ENMT:  Moist mucous membranes  NECK: Supple, No JVD, Normal thyroid  NERVOUS SYSTEM:  Alert and awake and oriented to name and place. Follows simple commands   CHEST/LUNG: Clear to percussion bilaterally; No rales, rhonchi, wheezing, or rubs  HEART: Regular rate and rhythm; No murmurs, rubs, or gallops  ABDOMEN: Soft, Nontender, Nondistended; Bowel sounds present.   EXTREMITIES:  2+ Peripheral Pulses, No clubbing, cyanosis, or edema  SKIN: sacral ulcer, b/l feet DTIs      LABS:                        7.4    8.9   )-----------( 311      ( 10 Oct 2017 07:47 )             23.6   10-10    144  |  112<H>  |  22<H>  ----------------------------<  140<H>  4.3   |  23  |  0.86    Ca    8.5      10 Oct 2017 07:47  Phos  3.0     10-09  Mg     2.1     10-09                   Culture - Urine (10.05.17 @ 23:11)    -  Amikacin: S <=8    -  Ampicillin: R >16    -  Ampicillin/Sulbactam: R <=4/2    -  Aztreonam: R <=4    -  Cefazolin: R >16    -  Cefepime: R >16    -  Cefoxitin: S <=4    -  Ceftazidime: R <=1    -  Ceftriaxone: R >32    -  Ciprofloxacin: R >2    -  Ertapenem: S <=0.5    -  Gentamicin: R >8    -  Levofloxacin: R >4    -  Meropenem: S <=1    -  Nitrofurantoin: R >64    -  Piperacillin/Tazobactam: R <=8    -  Tobramycin: R >8    -  Trimethoprim/Sulfamethoxazole: R >2/38    Specimen Source: .Urine Clean Catch (Midstream)    Culture Results:   50,000 - 99,000 CFU/mL Proteus mirabilis ESBL    Organism Identification: Proteus mirabilis ESBL    Organism: Proteus mirabilis ESBL    Method Type: SHARONA    Culture - Blood (10.05.17 @ 23:07)    Gram Stain:   Growth in anaerobic bottle: Gram Positive Cocci in Pairs and Chains    Specimen Source: .Blood Blood    Culture Results:   Growth in anaerobic bottle: Streptococcus anginosus  See previous culture [id number] 83 y/o M from [facility] w/ PMHx of Rheumatic heart disease, NPH , Afib (on Coumadin) admitted for septic with Pneumonia, UTI and unstageble and stage 3 sacral ulcer had debridement surgery yesterday. Patient is doing very well and was seen alert and oriented ( x 2) today. Denies any pain, fever, cough, dyspnea, nausea, vomiting and was not sure about last BM but denies abdominal pain.  Afebrile, leucocytosis resolved.    Pt is growing out strep anginosis and  ESBL proteus in blood likely source being his sacral decubitus.      MEDICATIONS  (STANDING):  acetaminophen  IVPB. 1000 milliGRAM(s) IV Intermittent once  ALBUTerol    90 MICROgram(s) HFA Inhaler 2 Puff(s) Inhalation every 6 hours  ascorbic acid 500 milliGRAM(s) Oral daily  aspirin enteric coated 81 milliGRAM(s) Oral daily  dextrose 5%. 1000 milliLiter(s) (50 mL/Hr) IV Continuous <Continuous>  dextrose 50% Injectable 12.5 Gram(s) IV Push once  docusate sodium 100 milliGRAM(s) Oral two times a day  ertapenem  IVPB      ertapenem  IVPB 1000 milliGRAM(s) IV Intermittent every 24 hours  ferrous    sulfate 325 milliGRAM(s) Oral daily  heparin  Injectable 5000 Unit(s) SubCutaneous every 12 hours  influenza   Vaccine 0.5 milliLiter(s) IntraMuscular once  multivitamin 1 Tablet(s) Oral daily  polyethylene glycol 3350 17 Gram(s) Oral daily  sodium chloride 0.9%. 1000 milliLiter(s) (40 mL/Hr) IV Continuous <Continuous>  tamsulosin 0.4 milliGRAM(s) Oral at bedtime  tiotropium 18 MICROgram(s) Capsule 1 Capsule(s) Inhalation daily  zinc sulfate 220 milliGRAM(s) Oral daily    MEDICATIONS  (PRN):  dextrose Gel 1 Dose(s) Oral once PRN Blood Glucose LESS THAN 70 milliGRAM(s)/deciLiter  glucagon  Injectable 1 milliGRAM(s) IntraMuscular once PRN Glucose <70 milliGRAM(s)/deciLiter                          7.4    8.9   )-----------( 311      ( 10 Oct 2017 07:47 )             23.6   10-10    144  |  112<H>  |  22<H>  ----------------------------<  140<H>  4.3   |  23  |  0.86    Ca    8.5      10 Oct 2017 07:47  Phos  3.0     10-09  Mg     2.1     10-09        137  |  106  |  24<H>  ----------------------------<  150<H>  4.1   |  22  |  0.90    Ca    8.3<L>      09 Oct 2017 07:25  Phos  3.0     10-09  Mg     2.1     10-09    Vital Signs Last 24 Hrs  Vital Signs Last 24 Hrs  T(C): 36.4 (10 Oct 2017 05:51), Max: 36.8 (09 Oct 2017 12:46)  T(F): 97.6 (10 Oct 2017 05:51), Max: 98.3 (09 Oct 2017 12:46)  HR: 101 (10 Oct 2017 05:51) (98 - 112)  BP: 108/78 (10 Oct 2017 05:51) (96/55 - 135/89)  BP(mean): --  RR: 14 (10 Oct 2017 05:51) (14 - 18)  SpO2: 100% (10 Oct 2017 05:51) (98% - 100%)    Culture - Blood (10.05.17 @ 23:07)    Gram Stain:   Growth in anaerobic bottle: Gram Positive Cocci in Pairs and Chains    Specimen Source: .Blood Blood    Culture Results:   Growth in anaerobic bottle: Gram Positive Cocci in Pairs and Chains    SpO2: 100% (09 Oct 2017 09:36) (98% - 100%)      REVIEW OF SYSTEMS:  CONSTITUTIONAL: No fever, weight loss, or fatigue  EYES: No eye pain,  discharge  ENMT:  No difficulty hearing,  No sinus or throat pain  RESPIRATORY: No cough,  No shortness of breath  CARDIOVASCULAR: No chest pain, palpitations,   GASTROINTESTINAL: No abdominal or epigastric pain. No nausea, vomiting, ; No diarrhea or constipation.   GENITOURINARY: No urinary complaints  NEUROLOGICAL: No headaches,   SKIN: sacral ulcer, ? lesion in the right lower quadrant of the abdomen was draining.       PHYSICAL EXAM:    GENERAL: patient comfortable in bed, NAD, well-groomed, well-developed  HEAD:  Atraumatic, Normocephalic  EYES: PERRL, conjunctiva and sclera clear  ENMT:  Moist mucous membranes  NECK: Supple, No JVD, Normal thyroid  NERVOUS SYSTEM:  Alert and awake and oriented to name and place. Follows simple commands   CHEST/LUNG: Clear to percussion bilaterally; No rales, rhonchi, wheezing, or rubs  HEART: Regular rate and rhythm; No murmurs, rubs, or gallops  ABDOMEN: Soft, Nontender, Nondistended; Bowel sounds present.   EXTREMITIES:  2+ Peripheral Pulses, No clubbing, cyanosis, or edema  SKIN: sacral ulcer, b/l feet DTIs      LABS:                        7.4    8.9   )-----------( 311      ( 10 Oct 2017 07:47 )             23.6   10-10    144  |  112<H>  |  22<H>  ----------------------------<  140<H>  4.3   |  23  |  0.86    Ca    8.5      10 Oct 2017 07:47  Phos  3.0     10-09  Mg     2.1     10-09                   Culture - Urine (10.05.17 @ 23:11)    -  Amikacin: S <=8    -  Ampicillin: R >16    -  Ampicillin/Sulbactam: R <=4/2    -  Aztreonam: R <=4    -  Cefazolin: R >16    -  Cefepime: R >16    -  Cefoxitin: S <=4    -  Ceftazidime: R <=1    -  Ceftriaxone: R >32    -  Ciprofloxacin: R >2    -  Ertapenem: S <=0.5    -  Gentamicin: R >8    -  Levofloxacin: R >4    -  Meropenem: S <=1    -  Nitrofurantoin: R >64    -  Piperacillin/Tazobactam: R <=8    -  Tobramycin: R >8    -  Trimethoprim/Sulfamethoxazole: R >2/38    Specimen Source: .Urine Clean Catch (Midstream)    Culture Results:   50,000 - 99,000 CFU/mL Proteus mirabilis ESBL    Organism Identification: Proteus mirabilis ESBL    Organism: Proteus mirabilis ESBL    Method Type: SHARONA    Culture - Blood (10.05.17 @ 23:07)    Gram Stain:   Growth in anaerobic bottle: Gram Positive Cocci in Pairs and Chains    Specimen Source: .Blood Blood    Culture Results:   Growth in anaerobic bottle: Streptococcus anginosus  See previous culture [id number]

## 2017-10-10 NOTE — PROGRESS NOTE ADULT - PROBLEM SELECTOR PLAN 2
Pt to followup with Dr. Yates as outpatient for cystoscopy  Ferrous sulfate  H&H decreased 7.4/23.6 and received one unit PRBC  Followup CBC in am

## 2017-10-10 NOTE — PROGRESS NOTE ADULT - SUBJECTIVE AND OBJECTIVE BOX
NP Note discussed with  Primary Attending    Patient is a 82y old  Male who presents with a chief complaint of low hb level in NH (05 Oct 2017 19:36)      INTERVAL HPI/OVERNIGHT EVENTS: no new complaints    MEDICATIONS  (STANDING):  acetaminophen  IVPB. 1000 milliGRAM(s) IV Intermittent once  ALBUTerol    90 MICROgram(s) HFA Inhaler 2 Puff(s) Inhalation every 6 hours  ascorbic acid 500 milliGRAM(s) Oral daily  aspirin enteric coated 81 milliGRAM(s) Oral daily  dextrose 5%. 1000 milliLiter(s) (50 mL/Hr) IV Continuous <Continuous>  dextrose 50% Injectable 12.5 Gram(s) IV Push once  docusate sodium 100 milliGRAM(s) Oral two times a day  ertapenem  IVPB      ertapenem  IVPB 1000 milliGRAM(s) IV Intermittent every 24 hours  ferrous    sulfate 325 milliGRAM(s) Oral daily  heparin  Injectable 5000 Unit(s) SubCutaneous every 12 hours  influenza   Vaccine 0.5 milliLiter(s) IntraMuscular once  multivitamin 1 Tablet(s) Oral daily  polyethylene glycol 3350 17 Gram(s) Oral daily  sodium chloride 0.9%. 1000 milliLiter(s) (40 mL/Hr) IV Continuous <Continuous>  tamsulosin 0.4 milliGRAM(s) Oral at bedtime  tiotropium 18 MICROgram(s) Capsule 1 Capsule(s) Inhalation daily  zinc sulfate 220 milliGRAM(s) Oral daily    MEDICATIONS  (PRN):  dextrose Gel 1 Dose(s) Oral once PRN Blood Glucose LESS THAN 70 milliGRAM(s)/deciLiter  glucagon  Injectable 1 milliGRAM(s) IntraMuscular once PRN Glucose <70 milliGRAM(s)/deciLiter    Vital Signs Last 24 Hrs  T(C): 36.8 (10 Oct 2017 12:30), Max: 36.8 (10 Oct 2017 12:30)  T(F): 98.3 (10 Oct 2017 12:30), Max: 98.3 (10 Oct 2017 12:30)  HR: 109 (10 Oct 2017 12:30) (101 - 109)  BP: 117/83 (10 Oct 2017 12:30) (96/55 - 117/83)  BP(mean): --  RR: 18 (10 Oct 2017 12:30) (14 - 18)  SpO2: 100% (10 Oct 2017 12:30) (98% - 100%)    ________________________________________________  PHYSICAL EXAM:  GENERAL: NAD  HEENT: Normocephalic;  conjunctivae and sclerae clear; moist mucous membranes;   NECK : supple  CHEST/LUNG: Clear to auscultation bilaterally with good air entry   HEART: S1 S2  regular; no murmurs, gallops or rubs  ABDOMEN: Soft, Nontender, Nondistended; Bowel sounds present  EXTREMITIES: no cyanosis; no edema; no calf tenderness  SKIN: warm and dry; no rash  NERVOUS SYSTEM:  Awake and alert X 2.      _________________________________________________  LABS:                        7.4    8.9   )-----------( 311      ( 10 Oct 2017 07:47 )             23.6     10-10    144  |  112<H>  |  22<H>  ----------------------------<  140<H>  4.3   |  23  |  0.86    Ca    8.5      10 Oct 2017 07:47  Phos  3.0     10-09  Mg     2.1     10-09      PT/INR - ( 10 Oct 2017 07:47 )   PT: 16.3 sec;   INR: 1.48 ratio         PTT - ( 10 Oct 2017 07:47 )  PTT:30.6 sec    CAPILLARY BLOOD GLUCOSE  168 (10 Oct 2017 13:37)  152 (10 Oct 2017 07:40)  240 (09 Oct 2017 20:40)  158 (09 Oct 2017 16:39)            RADIOLOGY & ADDITIONAL TESTS:    Imaging Personally Reviewed:  YES/NO    Consultant(s) Notes Reviewed:   YES/ No    Care Discussed with Consultants :     Plan of care was discussed with patient and /or primary care giver; all questions and concerns were addressed and care was aligned with patient's wishes.

## 2017-10-10 NOTE — PROGRESS NOTE ADULT - ASSESSMENT
81 y/o M from [facility] w/ PMHx of Rheumatic heart disease, NPH  (unsteady gait used to ambulate with walker now bedbound for a couple of months) & Afib (on Coumadin) was [facility] for low Hb.     Patient is poor historian and source of information is wife. Wife stated that patient is having cough with yellowish sputum for last 4 weeks with occasional temp spikes.    In the ED, patient is AxO x1, unable to interact. T 102.8, Hr 113, /69. S/p Vancomycni/Cefepime and Zithromax.  CXR showed interstitial prominence and opacity at the left lung base.     Patient was admitted to the medical floor with Sepsis 2/2 PNA vs. UTI

## 2017-10-10 NOTE — PROGRESS NOTE ADULT - ASSESSMENT
1. ESBL Proteus bacteremia 2/2 to UTI - switch to ertapenem 1g q 24 hours, repeat Bcx on wednesday  bacteremia with strep anginosis also in 4 of 4 bottles likely from sacral decub   2. ESBL Proteus UTI - continue ertapenem 1g q 24 hours  3. leucocytosis- trended down  3. Infected sacral decubitus- s/p surgery 10/9  - f/u surgical swab culture    if repeat blood cults are negative then PICC line on friday 1. ESBL Proteus bacteremia 2/2 to UTI - switch to ertapenem 1g q 24 hours, repeat Bcx on wednesday  bacteremia with strep anginosis also in 4 of 4 bottles likely from sacral decub   2. ESBL Proteus UTI - continue ertapenem 1g q 24 hours  3. leucocytosis- resolved  3. Infected sacral decubitus- s/p surgery 10/9  - f/u surgical swab culture    if repeat blood cults are negative then PICC line on friday

## 2017-10-10 NOTE — PROGRESS NOTE ADULT - PROBLEM SELECTOR PLAN 1
- Leukocytosis trending up  - Surgical debridement of sacral wound today/Surgical swab pending  - ESBL Proteus bacteremia 2/2 to UTI and bacteremia with strep anginosis also in 4 of 4 bottles likely from sacral decub  - Dr. Lala discontinued ABT's and started pt on Ertapenem 1 g q24 hours with repeat BC's on Wednesday.   -PICC for Friday if repeat BC are negative  -Pt with improvement in mental status today

## 2017-10-10 NOTE — PROVIDER CONTACT NOTE (CRITICAL VALUE NOTIFICATION) - TEST AND RESULT REPORTED:
FINAL RESULT FOR BLOOD CULTURE 10/5 SHOWS GROWTH IN AEROBIC BOTTLE PROTEUS MIRABILIS ESBL. GROWTH IN ANAEROBIC BOTTLE STREPTOCUCCUS ANGINOSUS

## 2017-10-11 LAB
-  AMIKACIN: SIGNIFICANT CHANGE UP
-  AMPICILLIN/SULBACTAM: SIGNIFICANT CHANGE UP
-  AMPICILLIN: SIGNIFICANT CHANGE UP
-  AMPICILLIN: SIGNIFICANT CHANGE UP
-  AZTREONAM: SIGNIFICANT CHANGE UP
-  CEFAZOLIN: SIGNIFICANT CHANGE UP
-  CEFEPIME: SIGNIFICANT CHANGE UP
-  CEFOXITIN: SIGNIFICANT CHANGE UP
-  CEFTAZIDIME: SIGNIFICANT CHANGE UP
-  CEFTRIAXONE: SIGNIFICANT CHANGE UP
-  CIPROFLOXACIN: SIGNIFICANT CHANGE UP
-  CIPROFLOXACIN: SIGNIFICANT CHANGE UP
-  ERTAPENEM: SIGNIFICANT CHANGE UP
-  GENTAMICIN: SIGNIFICANT CHANGE UP
-  IMIPENEM: SIGNIFICANT CHANGE UP
-  LEVOFLOXACIN: SIGNIFICANT CHANGE UP
-  LINEZOLID: SIGNIFICANT CHANGE UP
-  MEROPENEM: SIGNIFICANT CHANGE UP
-  PIPERACILLIN/TAZOBACTAM: SIGNIFICANT CHANGE UP
-  TETRACYCLINE: SIGNIFICANT CHANGE UP
-  TOBRAMYCIN: SIGNIFICANT CHANGE UP
-  TRIMETHOPRIM/SULFAMETHOXAZOLE: SIGNIFICANT CHANGE UP
-  VANCOMYCIN: SIGNIFICANT CHANGE UP
BASOPHILS # BLD AUTO: 0.2 K/UL — SIGNIFICANT CHANGE UP (ref 0–0.2)
BASOPHILS NFR BLD AUTO: 1.4 % — SIGNIFICANT CHANGE UP (ref 0–2)
EOSINOPHIL # BLD AUTO: 0.1 K/UL — SIGNIFICANT CHANGE UP (ref 0–0.5)
EOSINOPHIL NFR BLD AUTO: 1.4 % — SIGNIFICANT CHANGE UP (ref 0–6)
GLUCOSE BLDC GLUCOMTR-MCNC: 161 MG/DL — HIGH (ref 70–99)
GLUCOSE BLDC GLUCOMTR-MCNC: 171 MG/DL — HIGH (ref 70–99)
GLUCOSE BLDC GLUCOMTR-MCNC: 226 MG/DL — HIGH (ref 70–99)
HCT VFR BLD CALC: 26.5 % — LOW (ref 39–50)
HCT VFR BLD CALC: 28 % — LOW (ref 39–50)
HGB BLD-MCNC: 8.1 G/DL — LOW (ref 13–17)
HGB BLD-MCNC: 8.7 G/DL — LOW (ref 13–17)
INR BLD: 1.5 RATIO — HIGH (ref 0.88–1.16)
LYMPHOCYTES # BLD AUTO: 18.9 % — SIGNIFICANT CHANGE UP (ref 13–44)
LYMPHOCYTES # BLD AUTO: 2 K/UL — SIGNIFICANT CHANGE UP (ref 1–3.3)
MCHC RBC-ENTMCNC: 24.4 PG — LOW (ref 27–34)
MCHC RBC-ENTMCNC: 24.9 PG — LOW (ref 27–34)
MCHC RBC-ENTMCNC: 30.8 GM/DL — LOW (ref 32–36)
MCHC RBC-ENTMCNC: 31.2 GM/DL — LOW (ref 32–36)
MCV RBC AUTO: 79.2 FL — LOW (ref 80–100)
MCV RBC AUTO: 79.8 FL — LOW (ref 80–100)
METHOD TYPE: SIGNIFICANT CHANGE UP
METHOD TYPE: SIGNIFICANT CHANGE UP
MONOCYTES # BLD AUTO: 0.7 K/UL — SIGNIFICANT CHANGE UP (ref 0–0.9)
MONOCYTES NFR BLD AUTO: 6.5 % — SIGNIFICANT CHANGE UP (ref 2–14)
NEUTROPHILS # BLD AUTO: 7.7 K/UL — HIGH (ref 1.8–7.4)
NEUTROPHILS NFR BLD AUTO: 71.8 % — SIGNIFICANT CHANGE UP (ref 43–77)
PLATELET # BLD AUTO: 308 K/UL — SIGNIFICANT CHANGE UP (ref 150–400)
PLATELET # BLD AUTO: 329 K/UL — SIGNIFICANT CHANGE UP (ref 150–400)
PROTHROM AB SERPL-ACNC: 16.5 SEC — HIGH (ref 9.8–12.7)
RBC # BLD: 3.34 M/UL — LOW (ref 4.2–5.8)
RBC # BLD: 3.5 M/UL — LOW (ref 4.2–5.8)
RBC # FLD: 18.4 % — HIGH (ref 10.3–14.5)
RBC # FLD: 18.8 % — HIGH (ref 10.3–14.5)
WBC # BLD: 10.8 K/UL — HIGH (ref 3.8–10.5)
WBC # BLD: 11.2 K/UL — HIGH (ref 3.8–10.5)
WBC # FLD AUTO: 10.8 K/UL — HIGH (ref 3.8–10.5)
WBC # FLD AUTO: 11.2 K/UL — HIGH (ref 3.8–10.5)

## 2017-10-11 RX ORDER — IPRATROPIUM/ALBUTEROL SULFATE 18-103MCG
3 AEROSOL WITH ADAPTER (GRAM) INHALATION ONCE
Refills: 0 | Status: COMPLETED | OUTPATIENT
Start: 2017-10-11 | End: 2017-10-11

## 2017-10-11 RX ORDER — WARFARIN SODIUM 2.5 MG/1
5 TABLET ORAL ONCE
Refills: 0 | Status: DISCONTINUED | OUTPATIENT
Start: 2017-10-11 | End: 2017-10-11

## 2017-10-11 RX ORDER — INSULIN LISPRO 100/ML
VIAL (ML) SUBCUTANEOUS
Refills: 0 | Status: DISCONTINUED | OUTPATIENT
Start: 2017-10-11 | End: 2017-10-15

## 2017-10-11 RX ADMIN — Medication 500 MILLIGRAM(S): at 11:22

## 2017-10-11 RX ADMIN — Medication 100 MILLIGRAM(S): at 05:49

## 2017-10-11 RX ADMIN — Medication 1 TABLET(S): at 11:23

## 2017-10-11 RX ADMIN — HEPARIN SODIUM 5000 UNIT(S): 5000 INJECTION INTRAVENOUS; SUBCUTANEOUS at 05:49

## 2017-10-11 RX ADMIN — POLYETHYLENE GLYCOL 3350 17 GRAM(S): 17 POWDER, FOR SOLUTION ORAL at 11:23

## 2017-10-11 RX ADMIN — Medication 81 MILLIGRAM(S): at 11:22

## 2017-10-11 RX ADMIN — Medication 1: at 17:07

## 2017-10-11 RX ADMIN — HEPARIN SODIUM 5000 UNIT(S): 5000 INJECTION INTRAVENOUS; SUBCUTANEOUS at 17:07

## 2017-10-11 RX ADMIN — TIOTROPIUM BROMIDE 1 CAPSULE(S): 18 CAPSULE ORAL; RESPIRATORY (INHALATION) at 11:23

## 2017-10-11 RX ADMIN — Medication 100 MILLIGRAM(S): at 17:07

## 2017-10-11 RX ADMIN — ZINC SULFATE TAB 220 MG (50 MG ZINC EQUIVALENT) 220 MILLIGRAM(S): 220 (50 ZN) TAB at 11:22

## 2017-10-11 RX ADMIN — TAMSULOSIN HYDROCHLORIDE 0.4 MILLIGRAM(S): 0.4 CAPSULE ORAL at 22:27

## 2017-10-11 RX ADMIN — Medication 325 MILLIGRAM(S): at 11:23

## 2017-10-11 RX ADMIN — ERTAPENEM SODIUM 120 MILLIGRAM(S): 1 INJECTION, POWDER, LYOPHILIZED, FOR SOLUTION INTRAMUSCULAR; INTRAVENOUS at 11:23

## 2017-10-11 RX ADMIN — ALBUTEROL 2 PUFF(S): 90 AEROSOL, METERED ORAL at 17:08

## 2017-10-11 RX ADMIN — ALBUTEROL 2 PUFF(S): 90 AEROSOL, METERED ORAL at 22:27

## 2017-10-11 RX ADMIN — ALBUTEROL 2 PUFF(S): 90 AEROSOL, METERED ORAL at 09:49

## 2017-10-11 RX ADMIN — Medication 3 MILLILITER(S): at 19:15

## 2017-10-11 RX ADMIN — Medication 2: at 13:48

## 2017-10-11 NOTE — PROGRESS NOTE ADULT - PROBLEM SELECTOR PLAN 1
Repeat BC drawn today/results pending  -PICC for Friday if repeat BC are negative  2. ESBL Proteus UTI/Ertapenem  3. Infected sacral decubitus- s/p surgery   -Surgical path results positive for rare proteus mirabilis ESBL,   and few enterococcus (vanco resistant).  Sensitive to Ertapenem.

## 2017-10-11 NOTE — PROGRESS NOTE ADULT - SUBJECTIVE AND OBJECTIVE BOX
Attending follow up:    Patient is a 82y old  Male who presents with a chief complaint of low hb level in NH (05 Oct 2017 19:36)      INTERVAL HPI/OVERNIGHT EVENTS: no new complaints    MEDICATIONS  (STANDING):  acetaminophen  IVPB. 1000 milliGRAM(s) IV Intermittent once  ALBUTerol    90 MICROgram(s) HFA Inhaler 2 Puff(s) Inhalation every 6 hours  ascorbic acid 500 milliGRAM(s) Oral daily  aspirin enteric coated 81 milliGRAM(s) Oral daily  dextrose 5%. 1000 milliLiter(s) (50 mL/Hr) IV Continuous <Continuous>  dextrose 50% Injectable 12.5 Gram(s) IV Push once  docusate sodium 100 milliGRAM(s) Oral two times a day  ertapenem  IVPB      ertapenem  IVPB 1000 milliGRAM(s) IV Intermittent every 24 hours  ferrous    sulfate 325 milliGRAM(s) Oral daily  heparin  Injectable 5000 Unit(s) SubCutaneous every 12 hours  influenza   Vaccine 0.5 milliLiter(s) IntraMuscular once  multivitamin 1 Tablet(s) Oral daily  polyethylene glycol 3350 17 Gram(s) Oral daily  sodium chloride 0.9%. 1000 milliLiter(s) (40 mL/Hr) IV Continuous <Continuous>  tamsulosin 0.4 milliGRAM(s) Oral at bedtime  tiotropium 18 MICROgram(s) Capsule 1 Capsule(s) Inhalation daily  zinc sulfate 220 milliGRAM(s) Oral daily    MEDICATIONS  (PRN):  dextrose Gel 1 Dose(s) Oral once PRN Blood Glucose LESS THAN 70 milliGRAM(s)/deciLiter  glucagon  Injectable 1 milliGRAM(s) IntraMuscular once PRN Glucose <70 milliGRAM(s)/deciLiter    Vital Signs Last 24 Hrs  T(C): 36.8 (10 Oct 2017 12:30), Max: 36.8 (10 Oct 2017 12:30)  T(F): 98.3 (10 Oct 2017 12:30), Max: 98.3 (10 Oct 2017 12:30)  HR: 109 (10 Oct 2017 12:30) (101 - 109)  BP: 117/83 (10 Oct 2017 12:30) (96/55 - 117/83)  BP(mean): --  RR: 18 (10 Oct 2017 12:30) (14 - 18)  SpO2: 100% (10 Oct 2017 12:30) (98% - 100%)    ________________________________________________  PHYSICAL EXAM:  GENERAL: NAD  HEENT: Normocephalic;  conjunctivae and sclerae clear; moist mucous membranes;   NECK : supple  CHEST/LUNG: Clear to auscultation bilaterally with good air entry   HEART: S1 S2  regular; no murmurs, gallops or rubs  ABDOMEN: Soft, Nontender, Nondistended; Bowel sounds present  EXTREMITIES: no cyanosis; no edema; no calf tenderness  SKIN: warm and dry; no rash  NERVOUS SYSTEM:  Awake and alert X 2.      _________________________________________________  LABS:                                            8.1    10.8  )-----------( 308      ( 11 Oct 2017 08:43 )             26.5   10-10    144  |  112<H>  |  22<H>  ----------------------------<  140<H>  4.3   |  23  |  0.86    Ca    8.5      10 Oct 2017 07:47            PT/INR - ( 10 Oct 2017 07:47 )   PT: 16.3 sec;   INR: 1.48 ratio         PTT - ( 10 Oct 2017 07:47 )  PTT:30.6 sec    CAPILLARY BLOOD GLUCOSE  168 (10 Oct 2017 13:37)  152 (10 Oct 2017 07:40)  240 (09 Oct 2017 20:40)  158 (09 Oct 2017 16:39)            RADIOLOGY & ADDITIONAL TESTS:    Imaging Personally Reviewed:  YES/NO    Consultant(s) Notes Reviewed:   YES/ No    Care Discussed with Consultants :     Plan of care was discussed with patient and /or primary care giver; all questions and concerns were addressed and care was aligned with patient's wishes.

## 2017-10-11 NOTE — CONSULT NOTE ADULT - ASSESSMENT
82M w/multiple medical comorbidities has chronic anemia.  -unlikely that there is chronic GI blood loss as pt is FOBT (-)  -will schedule pt for EGD w/bx to evaluate UGIT in setting elevated BUN/Cr ratio  -per pt's wife and bedside nurse, pt is unable to tolerate thin liquids (i.e., drink Golytely) in order to prepare for the colonoscopy; the alternative of placing an NGT for preparation for colonoscopy was adamantly refused by the pt's wife; as pt had a colonoscopy <5 yrs ago, pt's wife was asked to obtain prior colonoscopy results for further evaluation; decision to do a repeat colonoscopy will depend on the results of the prior colonoscopy)  -NPO after midnight  -check CBC daily

## 2017-10-11 NOTE — PROGRESS NOTE ADULT - ASSESSMENT
81 y/o M from [facility] w/ PMHx of Rheumatic heart disease, NPH  (unsteady gait used to ambulate with walker now bedbound for a couple of months) & Afib (on Coumadin) was sent [facility] for low Hb.     Patient is poor historian and source of information is wife. Wife stated that patient is having cough with yellowish sputum for last 4 weeks with occasional temp spikes.    In the ED, patient is AxO x1, unable to interact. T 102.8, Hr 113, /69. S/p Vancomycni/Cefepime and Zithromax.  CXR showed interstitial prominence and opacity at the left lung base.     Patient was admitted to the medical floor with Sepsis 2/2 PNA vs. UTI

## 2017-10-11 NOTE — PROGRESS NOTE ADULT - ASSESSMENT
83 y/o M from [facility] w/ PMHx of Rheumatic heart disease, NPH  (unsteady gait used to ambulate with walker now bedbound for a couple of months) & Afib (on Coumadin) was sent from [facility] for low Hb.     Patient is poor historian and source of information is wife. Wife stated that patient is having cough with yellowish sputum for last 4 weeks with occasional temp spikes.    In the ED, patient is AxO x1, unable to interact. T 102.8, Hr 113, /69. S/p Vancomycni/Cefepime and Zithromax.  CXR showed interstitial prominence and opacity at the left lung base.     Patient was admitted to the medical floor with Sepsis 2/2 PNA vs. UTI

## 2017-10-11 NOTE — PROGRESS NOTE ADULT - SUBJECTIVE AND OBJECTIVE BOX
NP Note discussed with  Primary Attending    Patient is a 82y old  Male who presents with a chief complaint of low hb level in NH (05 Oct 2017 19:36)      INTERVAL HPI/OVERNIGHT EVENTS: no new complaints    MEDICATIONS  (STANDING):  acetaminophen  IVPB. 1000 milliGRAM(s) IV Intermittent once  ALBUTerol    90 MICROgram(s) HFA Inhaler 2 Puff(s) Inhalation every 6 hours  ascorbic acid 500 milliGRAM(s) Oral daily  aspirin enteric coated 81 milliGRAM(s) Oral daily  dextrose 5%. 1000 milliLiter(s) (50 mL/Hr) IV Continuous <Continuous>  dextrose 50% Injectable 12.5 Gram(s) IV Push once  docusate sodium 100 milliGRAM(s) Oral two times a day  ertapenem  IVPB      ertapenem  IVPB 1000 milliGRAM(s) IV Intermittent every 24 hours  ferrous    sulfate 325 milliGRAM(s) Oral daily  heparin  Injectable 5000 Unit(s) SubCutaneous every 12 hours  influenza   Vaccine 0.5 milliLiter(s) IntraMuscular once  insulin lispro (HumaLOG) corrective regimen sliding scale   SubCutaneous three times a day before meals  multivitamin 1 Tablet(s) Oral daily  polyethylene glycol 3350 17 Gram(s) Oral daily  sodium chloride 0.9%. 1000 milliLiter(s) (40 mL/Hr) IV Continuous <Continuous>  tamsulosin 0.4 milliGRAM(s) Oral at bedtime  tiotropium 18 MICROgram(s) Capsule 1 Capsule(s) Inhalation daily  zinc sulfate 220 milliGRAM(s) Oral daily    MEDICATIONS  (PRN):  dextrose Gel 1 Dose(s) Oral once PRN Blood Glucose LESS THAN 70 milliGRAM(s)/deciLiter  glucagon  Injectable 1 milliGRAM(s) IntraMuscular once PRN Glucose <70 milliGRAM(s)/deciLiter      __________________________________________________  REVIEW OF SYSTEMS:    CONSTITUTIONAL: No fever,   EYES: no acute visual disturbances  NECK: No pain or stiffness  RESPIRATORY: No cough; No shortness of breath  CARDIOVASCULAR: No chest pain, no palpitations  GASTROINTESTINAL: No pain. No nausea or vomiting; No diarrhea   NEUROLOGICAL: No headache or numbness, no tremors  MUSCULOSKELETAL: No joint pain, no muscle pain  GENITOURINARY: no dysuria, no frequency, no hesitancy  PSYCHIATRY: no depression , no anxiety  ALL OTHER  ROS negative        Vital Signs Last 24 Hrs  T(C): 37 (11 Oct 2017 13:19), Max: 37 (11 Oct 2017 13:19)  T(F): 98.6 (11 Oct 2017 13:19), Max: 98.6 (11 Oct 2017 13:19)  HR: 99 (11 Oct 2017 13:19) (99 - 108)  BP: 115/74 (11 Oct 2017 13:19) (115/74 - 120/64)  BP(mean): --  RR: 16 (11 Oct 2017 13:19) (16 - 16)  SpO2: 100% (11 Oct 2017 13:19) (98% - 100%)    ________________________________________________  PHYSICAL EXAM:  GENERAL: NAD  HEENT: Normocephalic;  conjunctivae and sclerae clear; moist mucous membranes;   NECK : supple  CHEST/LUNG: Clear to auscultation bilaterally with good air entry   HEART: S1 S2  regular; no murmurs, gallops or rubs  ABDOMEN: Soft, Nontender, Nondistended; Bowel sounds present  SACRUM:  Sacral ulcer site with clean dry dressing/changed by surgical team  EXTREMITIES: no cyanosis; no edema; no calf tenderness  SKIN: warm and dry; no rash  NERVOUS SYSTEM:  Awake and alert; Oriented  to place, person and time.    _________________________________________________  LABS:                        8.1    10.8  )-----------( 308      ( 11 Oct 2017 08:43 )             26.5     10-10    144  |  112<H>  |  22<H>  ----------------------------<  140<H>  4.3   |  23  |  0.86    Ca    8.5      10 Oct 2017 07:47      PT/INR - ( 11 Oct 2017 08:43 )   PT: 16.5 sec;   INR: 1.50 ratio         PTT - ( 10 Oct 2017 07:47 )  PTT:30.6 sec    CAPILLARY BLOOD GLUCOSE  226 (11 Oct 2017 11:04)  161 (11 Oct 2017 07:31)      POCT Blood Glucose.: 226 mg/dL (11 Oct 2017 11:02)  POCT Blood Glucose.: 161 mg/dL (11 Oct 2017 07:30)  POCT Blood Glucose.: 217 mg/dL (10 Oct 2017 20:37)  POCT Blood Glucose.: 165 mg/dL (10 Oct 2017 16:41)        RADIOLOGY & ADDITIONAL TESTS:    Imaging Personally Reviewed:  YES    Consultant(s) Notes Reviewed:   YES    Care Discussed with Consultants :     Plan of care was discussed with patient and /or primary care giver; all questions and concerns were addressed and care was aligned with patient's wishes.

## 2017-10-11 NOTE — BRIEF OPERATIVE NOTE - PROCEDURE
<<-----Click on this checkbox to enter Procedure Debridement of decubitus ulcer of sacrum  10/11/2017    Active  [id number]

## 2017-10-11 NOTE — BRIEF OPERATIVE NOTE - OPERATION/FINDINGS
necrotic skin, fat and muscle in sacral region, sacral fascia intact, no exposed bone, fat necrosis, purulent fluid present, culture taken

## 2017-10-11 NOTE — PROGRESS NOTE ADULT - PROBLEM SELECTOR PLAN 1
1) daily dressing change with wet to dry dressing  2) frequent turning  3) continue medical management  4) no further surgical intervention at this time

## 2017-10-11 NOTE — CONSULT NOTE ADULT - SUBJECTIVE AND OBJECTIVE BOX
GI INITIAL CONSULT    HPI: 82M w/stated PMH [facility] from [facility] for [facility] and was found to have ESBL infection of sacral decubitus ulcer s/p debridement during this admission. Unable to obtain information from pt; history obtain from chart, nurse and pt's wife. No reported melena, hematochezia, or hematuria. Per pt's wife, pt was always anemic and is on chronic iron therapy. Pt had a colonoscopy <5 yrs ago (pt's wife does not remember results).    PMH: NPH, AFib on AC, rheumatic heart disease    Meds: MEDICATIONS  (STANDING):  acetaminophen  IVPB. 1000 milliGRAM(s) IV Intermittent once  ALBUTerol    90 MICROgram(s) HFA Inhaler 2 Puff(s) Inhalation every 6 hours  ALBUTerol/ipratropium for Nebulization. 3 milliLiter(s) Nebulizer once  ascorbic acid 500 milliGRAM(s) Oral daily  aspirin enteric coated 81 milliGRAM(s) Oral daily  dextrose 5%. 1000 milliLiter(s) (50 mL/Hr) IV Continuous <Continuous>  dextrose 50% Injectable 12.5 Gram(s) IV Push once  docusate sodium 100 milliGRAM(s) Oral two times a day  ertapenem  IVPB      ertapenem  IVPB 1000 milliGRAM(s) IV Intermittent every 24 hours  ferrous    sulfate 325 milliGRAM(s) Oral daily  heparin  Injectable 5000 Unit(s) SubCutaneous every 12 hours  influenza   Vaccine 0.5 milliLiter(s) IntraMuscular once  insulin lispro (HumaLOG) corrective regimen sliding scale   SubCutaneous three times a day before meals  multivitamin 1 Tablet(s) Oral daily  polyethylene glycol 3350 17 Gram(s) Oral daily  sodium chloride 0.9%. 1000 milliLiter(s) (40 mL/Hr) IV Continuous <Continuous>  tamsulosin 0.4 milliGRAM(s) Oral at bedtime  tiotropium 18 MICROgram(s) Capsule 1 Capsule(s) Inhalation daily  zinc sulfate 220 milliGRAM(s) Oral daily    SH: unable to obtain  FH: unable to obtain  ROS: unable to obtain    Vitals: T(C): 37 (10-11-17 @ 13:19)  T(F): 98.6 (10-11-17 @ 13:19), Max: 98.6 (10-11-17 @ 13:19)  HR: 99 (10-11-17 @ 13:19) (99 - 108)  BP: 115/74 (10-11-17 @ 13:19) (115/74 - 120/64)    Gen: NAD  CVS: S1/S2  Chest: coarse BS B/L  Abd: S/NT/ND                        8.1    10.8  )-----------( 308      ( 11 Oct 2017 08:43 )             26.5   10-10    144  |  112<H>  |  22<H>  ----------------------------<  140<H>  4.3   |  23  |  0.86    Ca    8.5      10 Oct 2017 07:47    FOBT (-)

## 2017-10-12 ENCOUNTER — RESULT REVIEW (OUTPATIENT)
Age: 82
End: 2017-10-12

## 2017-10-12 LAB
ANION GAP SERPL CALC-SCNC: 9 MMOL/L — SIGNIFICANT CHANGE UP (ref 5–17)
BASOPHILS # BLD AUTO: 0.1 K/UL — SIGNIFICANT CHANGE UP (ref 0–0.2)
BASOPHILS NFR BLD AUTO: 1 % — SIGNIFICANT CHANGE UP (ref 0–2)
BUN SERPL-MCNC: 24 MG/DL — HIGH (ref 7–18)
CALCIUM SERPL-MCNC: 9 MG/DL — SIGNIFICANT CHANGE UP (ref 8.4–10.5)
CHLORIDE SERPL-SCNC: 111 MMOL/L — HIGH (ref 96–108)
CO2 SERPL-SCNC: 24 MMOL/L — SIGNIFICANT CHANGE UP (ref 22–31)
CREAT SERPL-MCNC: 0.88 MG/DL — SIGNIFICANT CHANGE UP (ref 0.5–1.3)
CULTURE RESULTS: SIGNIFICANT CHANGE UP
EOSINOPHIL # BLD AUTO: 0.1 K/UL — SIGNIFICANT CHANGE UP (ref 0–0.5)
EOSINOPHIL NFR BLD AUTO: 0.9 % — SIGNIFICANT CHANGE UP (ref 0–6)
GLUCOSE BLDC GLUCOMTR-MCNC: 128 MG/DL — HIGH (ref 70–99)
GLUCOSE BLDC GLUCOMTR-MCNC: 134 MG/DL — HIGH (ref 70–99)
GLUCOSE BLDC GLUCOMTR-MCNC: 153 MG/DL — HIGH (ref 70–99)
GLUCOSE BLDC GLUCOMTR-MCNC: 155 MG/DL — HIGH (ref 70–99)
GLUCOSE BLDC GLUCOMTR-MCNC: 161 MG/DL — HIGH (ref 70–99)
GLUCOSE SERPL-MCNC: 156 MG/DL — HIGH (ref 70–99)
HCT VFR BLD CALC: 26.8 % — LOW (ref 39–50)
HGB BLD-MCNC: 8.4 G/DL — LOW (ref 13–17)
INR BLD: 1.51 RATIO — HIGH (ref 0.88–1.16)
LYMPHOCYTES # BLD AUTO: 1.9 K/UL — SIGNIFICANT CHANGE UP (ref 1–3.3)
LYMPHOCYTES # BLD AUTO: 13.9 % — SIGNIFICANT CHANGE UP (ref 13–44)
MCHC RBC-ENTMCNC: 25.2 PG — LOW (ref 27–34)
MCHC RBC-ENTMCNC: 31.3 GM/DL — LOW (ref 32–36)
MCV RBC AUTO: 80.7 FL — SIGNIFICANT CHANGE UP (ref 80–100)
MONOCYTES # BLD AUTO: 0.6 K/UL — SIGNIFICANT CHANGE UP (ref 0–0.9)
MONOCYTES NFR BLD AUTO: 4.2 % — SIGNIFICANT CHANGE UP (ref 2–14)
NEUTROPHILS # BLD AUTO: 11 K/UL — HIGH (ref 1.8–7.4)
NEUTROPHILS NFR BLD AUTO: 80 % — HIGH (ref 43–77)
ORGANISM # SPEC MICROSCOPIC CNT: SIGNIFICANT CHANGE UP
PLATELET # BLD AUTO: 271 K/UL — SIGNIFICANT CHANGE UP (ref 150–400)
POTASSIUM SERPL-MCNC: 4.5 MMOL/L — SIGNIFICANT CHANGE UP (ref 3.5–5.3)
POTASSIUM SERPL-SCNC: 4.5 MMOL/L — SIGNIFICANT CHANGE UP (ref 3.5–5.3)
PROTHROM AB SERPL-ACNC: 16.6 SEC — HIGH (ref 9.8–12.7)
RBC # BLD: 3.32 M/UL — LOW (ref 4.2–5.8)
RBC # FLD: 19.5 % — HIGH (ref 10.3–14.5)
SODIUM SERPL-SCNC: 144 MMOL/L — SIGNIFICANT CHANGE UP (ref 135–145)
SPECIMEN SOURCE: SIGNIFICANT CHANGE UP
WBC # BLD: 13.7 K/UL — HIGH (ref 3.8–10.5)
WBC # FLD AUTO: 13.7 K/UL — HIGH (ref 3.8–10.5)

## 2017-10-12 PROCEDURE — 88312 SPECIAL STAINS GROUP 1: CPT | Mod: 26

## 2017-10-12 PROCEDURE — 88305 TISSUE EXAM BY PATHOLOGIST: CPT | Mod: 26

## 2017-10-12 RX ORDER — PANTOPRAZOLE SODIUM 20 MG/1
40 TABLET, DELAYED RELEASE ORAL
Refills: 0 | Status: DISCONTINUED | OUTPATIENT
Start: 2017-10-12 | End: 2017-10-14

## 2017-10-12 RX ORDER — WARFARIN SODIUM 2.5 MG/1
5 TABLET ORAL ONCE
Refills: 0 | Status: COMPLETED | OUTPATIENT
Start: 2017-10-12 | End: 2017-10-12

## 2017-10-12 RX ORDER — SUCRALFATE 1 G
1 TABLET ORAL EVERY 6 HOURS
Refills: 0 | Status: DISCONTINUED | OUTPATIENT
Start: 2017-10-12 | End: 2017-10-26

## 2017-10-12 RX ADMIN — Medication 100 MILLIGRAM(S): at 17:02

## 2017-10-12 RX ADMIN — TIOTROPIUM BROMIDE 1 CAPSULE(S): 18 CAPSULE ORAL; RESPIRATORY (INHALATION) at 11:43

## 2017-10-12 RX ADMIN — Medication 1: at 08:54

## 2017-10-12 RX ADMIN — Medication 500 MILLIGRAM(S): at 14:43

## 2017-10-12 RX ADMIN — Medication 81 MILLIGRAM(S): at 14:40

## 2017-10-12 RX ADMIN — ERTAPENEM SODIUM 120 MILLIGRAM(S): 1 INJECTION, POWDER, LYOPHILIZED, FOR SOLUTION INTRAMUSCULAR; INTRAVENOUS at 11:42

## 2017-10-12 RX ADMIN — WARFARIN SODIUM 5 MILLIGRAM(S): 2.5 TABLET ORAL at 22:02

## 2017-10-12 RX ADMIN — ALBUTEROL 2 PUFF(S): 90 AEROSOL, METERED ORAL at 22:02

## 2017-10-12 RX ADMIN — HEPARIN SODIUM 5000 UNIT(S): 5000 INJECTION INTRAVENOUS; SUBCUTANEOUS at 06:18

## 2017-10-12 RX ADMIN — ALBUTEROL 2 PUFF(S): 90 AEROSOL, METERED ORAL at 11:43

## 2017-10-12 RX ADMIN — POLYETHYLENE GLYCOL 3350 17 GRAM(S): 17 POWDER, FOR SOLUTION ORAL at 14:44

## 2017-10-12 RX ADMIN — Medication 1 TABLET(S): at 14:41

## 2017-10-12 RX ADMIN — Medication 1 GRAM(S): at 23:21

## 2017-10-12 RX ADMIN — HEPARIN SODIUM 5000 UNIT(S): 5000 INJECTION INTRAVENOUS; SUBCUTANEOUS at 17:02

## 2017-10-12 RX ADMIN — Medication 100 MILLIGRAM(S): at 06:18

## 2017-10-12 RX ADMIN — TAMSULOSIN HYDROCHLORIDE 0.4 MILLIGRAM(S): 0.4 CAPSULE ORAL at 22:02

## 2017-10-12 RX ADMIN — Medication 325 MILLIGRAM(S): at 14:43

## 2017-10-12 RX ADMIN — ALBUTEROL 2 PUFF(S): 90 AEROSOL, METERED ORAL at 14:47

## 2017-10-12 RX ADMIN — Medication 1 GRAM(S): at 18:15

## 2017-10-12 RX ADMIN — ZINC SULFATE TAB 220 MG (50 MG ZINC EQUIVALENT) 220 MILLIGRAM(S): 220 (50 ZN) TAB at 14:41

## 2017-10-12 NOTE — PROVIDER CONTACT NOTE (CRITICAL VALUE NOTIFICATION) - RECOMMENDATIONS
Pt on iv antibiotics with notify covering residence.
Continue current antibiotics
Pt already on antibiotic.

## 2017-10-12 NOTE — PROVIDER CONTACT NOTE (CRITICAL VALUE NOTIFICATION) - TEST AND RESULT REPORTED:
Surgical swab culture from sacral decub 10/9/17 - Rare Proteus Mirabilis ESBL. Few Enterococcus Faecalis. Few Bacteroides Thetaiotaomicron

## 2017-10-12 NOTE — CHART NOTE - NSCHARTNOTEFT_GEN_A_CORE
Wound cultures from surgical swab revealed rare proteus mirabalis (ESBL), Few enterococcus facaelis, and bacterioides thetalofaomicron

## 2017-10-12 NOTE — PROGRESS NOTE ADULT - ASSESSMENT
81 y/o M from [facility] w/ PMHx of Rheumatic heart disease, NPH  (unsteady gait used to ambulate with walker now bedbound for a couple of months) & Afib (on Coumadin) [facility].     Patient is poor historian and source of information is wife. Wife stated that patient is having cough with yellowish sputum for last 4 weeks with occasional temp spikes.    In the ED, patient is AxO x1, unable to interact. T 102.8, Hr 113, /69. S/p Vancomycni/Cefepime and Zithromax.  CXR showed interstitial prominence and opacity at the left lung base.     Patient was admitted to the medical floor with Sepsis 2/2 PNA vs. UTI

## 2017-10-12 NOTE — PROGRESS NOTE ADULT - PROBLEM SELECTOR PLAN 2
-Dr. Menendez performed EGD today and found erosive duodenitis and ordered Carafate, PPI, and resumed diet and Coumadin.

## 2017-10-12 NOTE — PROGRESS NOTE ADULT - SUBJECTIVE AND OBJECTIVE BOX
NP Note discussed with  Primary Attending    Patient is a 82y old  Male who presents with a chief complaint of low hb level in NH (05 Oct 2017 19:36)      INTERVAL HPI/OVERNIGHT EVENTS: no new complaints    MEDICATIONS  (STANDING):  acetaminophen  IVPB. 1000 milliGRAM(s) IV Intermittent once  ALBUTerol    90 MICROgram(s) HFA Inhaler 2 Puff(s) Inhalation every 6 hours  ascorbic acid 500 milliGRAM(s) Oral daily  aspirin enteric coated 81 milliGRAM(s) Oral daily  dextrose 5%. 1000 milliLiter(s) (50 mL/Hr) IV Continuous <Continuous>  dextrose 50% Injectable 12.5 Gram(s) IV Push once  docusate sodium 100 milliGRAM(s) Oral two times a day  ertapenem  IVPB      ertapenem  IVPB 1000 milliGRAM(s) IV Intermittent every 24 hours  ferrous    sulfate 325 milliGRAM(s) Oral daily  heparin  Injectable 5000 Unit(s) SubCutaneous every 12 hours  influenza   Vaccine 0.5 milliLiter(s) IntraMuscular once  insulin lispro (HumaLOG) corrective regimen sliding scale   SubCutaneous three times a day before meals  multivitamin 1 Tablet(s) Oral daily  pantoprazole    Tablet 40 milliGRAM(s) Oral before breakfast  polyethylene glycol 3350 17 Gram(s) Oral daily  sucralfate suspension 1 Gram(s) Oral every 6 hours  tamsulosin 0.4 milliGRAM(s) Oral at bedtime  tiotropium 18 MICROgram(s) Capsule 1 Capsule(s) Inhalation daily  warfarin 5 milliGRAM(s) Oral once  zinc sulfate 220 milliGRAM(s) Oral daily    MEDICATIONS  (PRN):  dextrose Gel 1 Dose(s) Oral once PRN Blood Glucose LESS THAN 70 milliGRAM(s)/deciLiter  glucagon  Injectable 1 milliGRAM(s) IntraMuscular once PRN Glucose <70 milliGRAM(s)/deciLiter      __________________________________________________  REVIEW OF SYSTEMS:    CONSTITUTIONAL: No fever,   EYES: no acute visual disturbances  NECK: No pain or stiffness  RESPIRATORY: No cough; No shortness of breath  CARDIOVASCULAR: No chest pain, no palpitations  GASTROINTESTINAL: No pain. No nausea or vomiting; No diarrhea   NEUROLOGICAL: No headache or numbness, no tremors  MUSCULOSKELETAL: No joint pain, no muscle pain  GENITOURINARY: no dysuria, no frequency, no hesitancy  PSYCHIATRY: no depression , no anxiety  ALL OTHER  ROS negative        Vital Signs Last 24 Hrs  T(C): 36.4 (12 Oct 2017 14:57), Max: 36.9 (12 Oct 2017 05:33)  T(F): 97.6 (12 Oct 2017 14:57), Max: 98.4 (12 Oct 2017 05:33)  HR: 99 (12 Oct 2017 14:57) (82 - 107)  BP: 99/59 (12 Oct 2017 14:57) (99/59 - 120/73)  BP(mean): --  RR: 18 (12 Oct 2017 14:57) (14 - 18)  SpO2: 99% (12 Oct 2017 14:57) (99% - 100%)    ________________________________________________  PHYSICAL EXAM:  GENERAL: NAD  HEENT: Normocephalic;  conjunctivae and sclerae clear; moist mucous membranes;   NECK : supple  CHEST/LUNG: Clear to auscultation bilaterally with good air entry   HEART: S1 S2  regular; no murmurs, gallops or rubs  ABDOMEN: Soft, Nontender, Nondistended; Bowel sounds present.  Sacral dressing changed/in place  EXTREMITIES: no cyanosis; no edema; no calf tenderness  SKIN: warm and dry; no rash  NERVOUS SYSTEM:  Awake and alert; Oriented  to place, person and time ; no new deficits    _________________________________________________  LABS:                        8.4    13.7  )-----------( 271      ( 12 Oct 2017 08:24 )             26.8     10-12    144  |  111<H>  |  24<H>  ----------------------------<  156<H>  4.5   |  24  |  0.88    Ca    9.0      12 Oct 2017 08:24      PT/INR - ( 12 Oct 2017 08:24 )   PT: 16.6 sec;   INR: 1.51 ratio             CAPILLARY BLOOD GLUCOSE  161 (11 Oct 2017 23:54)      POCT Blood Glucose.: 128 mg/dL (12 Oct 2017 16:14)  POCT Blood Glucose.: 134 mg/dL (12 Oct 2017 12:02)  POCT Blood Glucose.: 155 mg/dL (12 Oct 2017 08:29)  POCT Blood Glucose.: 161 mg/dL (12 Oct 2017 00:15)        RADIOLOGY & ADDITIONAL TESTS:    Imaging Personally Reviewed:  YES    Consultant(s) Notes Reviewed:   YES    Care Discussed with Consultants :     Plan of care was discussed with patient and /or primary care giver; all questions and concerns were addressed and care was aligned with patient's wishes.

## 2017-10-12 NOTE — PROGRESS NOTE ADULT - PROBLEM SELECTOR PLAN 1
-PICC for Friday if repeat BC are negative  2. ESBL Proteus UTI/Ertapenem  3. Infected sacral decubitus- s/p surgery   -Surgical path results positive for rare proteus mirabilis ESBL,   and few enterococcus (vanco resistant).  Sensitive to Ertapenem.

## 2017-10-13 LAB
ANION GAP SERPL CALC-SCNC: 9 MMOL/L — SIGNIFICANT CHANGE UP (ref 5–17)
BASOPHILS # BLD AUTO: 0.1 K/UL — SIGNIFICANT CHANGE UP (ref 0–0.2)
BASOPHILS NFR BLD AUTO: 1.3 % — SIGNIFICANT CHANGE UP (ref 0–2)
BUN SERPL-MCNC: 26 MG/DL — HIGH (ref 7–18)
CALCIUM SERPL-MCNC: 9 MG/DL — SIGNIFICANT CHANGE UP (ref 8.4–10.5)
CHLORIDE SERPL-SCNC: 111 MMOL/L — HIGH (ref 96–108)
CO2 SERPL-SCNC: 25 MMOL/L — SIGNIFICANT CHANGE UP (ref 22–31)
CREAT SERPL-MCNC: 0.96 MG/DL — SIGNIFICANT CHANGE UP (ref 0.5–1.3)
EOSINOPHIL # BLD AUTO: 0.1 K/UL — SIGNIFICANT CHANGE UP (ref 0–0.5)
EOSINOPHIL NFR BLD AUTO: 1.2 % — SIGNIFICANT CHANGE UP (ref 0–6)
GLUCOSE BLDC GLUCOMTR-MCNC: 169 MG/DL — HIGH (ref 70–99)
GLUCOSE BLDC GLUCOMTR-MCNC: 200 MG/DL — HIGH (ref 70–99)
GLUCOSE BLDC GLUCOMTR-MCNC: 203 MG/DL — HIGH (ref 70–99)
GLUCOSE SERPL-MCNC: 148 MG/DL — HIGH (ref 70–99)
HCT VFR BLD CALC: 28.5 % — LOW (ref 39–50)
HGB BLD-MCNC: 8.4 G/DL — LOW (ref 13–17)
INR BLD: 1.5 RATIO — HIGH (ref 0.88–1.16)
LYMPHOCYTES # BLD AUTO: 2.2 K/UL — SIGNIFICANT CHANGE UP (ref 1–3.3)
LYMPHOCYTES # BLD AUTO: 21 % — SIGNIFICANT CHANGE UP (ref 13–44)
MCHC RBC-ENTMCNC: 24 PG — LOW (ref 27–34)
MCHC RBC-ENTMCNC: 29.3 GM/DL — LOW (ref 32–36)
MCV RBC AUTO: 81.8 FL — SIGNIFICANT CHANGE UP (ref 80–100)
MONOCYTES # BLD AUTO: 0.7 K/UL — SIGNIFICANT CHANGE UP (ref 0–0.9)
MONOCYTES NFR BLD AUTO: 6.2 % — SIGNIFICANT CHANGE UP (ref 2–14)
NEUTROPHILS # BLD AUTO: 7.5 K/UL — HIGH (ref 1.8–7.4)
NEUTROPHILS NFR BLD AUTO: 70.3 % — SIGNIFICANT CHANGE UP (ref 43–77)
PLATELET # BLD AUTO: 295 K/UL — SIGNIFICANT CHANGE UP (ref 150–400)
POTASSIUM SERPL-MCNC: 4.7 MMOL/L — SIGNIFICANT CHANGE UP (ref 3.5–5.3)
POTASSIUM SERPL-SCNC: 4.7 MMOL/L — SIGNIFICANT CHANGE UP (ref 3.5–5.3)
PROTHROM AB SERPL-ACNC: 16.5 SEC — HIGH (ref 9.8–12.7)
RBC # BLD: 3.48 M/UL — LOW (ref 4.2–5.8)
RBC # FLD: 20.5 % — HIGH (ref 10.3–14.5)
SODIUM SERPL-SCNC: 145 MMOL/L — SIGNIFICANT CHANGE UP (ref 135–145)
WBC # BLD: 10.6 K/UL — HIGH (ref 3.8–10.5)
WBC # FLD AUTO: 10.6 K/UL — HIGH (ref 3.8–10.5)

## 2017-10-13 PROCEDURE — 77001 FLUOROGUIDE FOR VEIN DEVICE: CPT | Mod: 26

## 2017-10-13 PROCEDURE — 76937 US GUIDE VASCULAR ACCESS: CPT | Mod: 26

## 2017-10-13 PROCEDURE — 36569 INSJ PICC 5 YR+ W/O IMAGING: CPT

## 2017-10-13 RX ORDER — FERROUS SULFATE 325(65) MG
325 TABLET ORAL
Refills: 0 | Status: DISCONTINUED | OUTPATIENT
Start: 2017-10-13 | End: 2017-10-15

## 2017-10-13 RX ORDER — FOLIC ACID 0.8 MG
1 TABLET ORAL DAILY
Refills: 0 | Status: DISCONTINUED | OUTPATIENT
Start: 2017-10-13 | End: 2017-10-26

## 2017-10-13 RX ORDER — WARFARIN SODIUM 2.5 MG/1
5 TABLET ORAL ONCE
Refills: 0 | Status: COMPLETED | OUTPATIENT
Start: 2017-10-13 | End: 2017-10-13

## 2017-10-13 RX ADMIN — ALBUTEROL 2 PUFF(S): 90 AEROSOL, METERED ORAL at 22:13

## 2017-10-13 RX ADMIN — HEPARIN SODIUM 5000 UNIT(S): 5000 INJECTION INTRAVENOUS; SUBCUTANEOUS at 18:01

## 2017-10-13 RX ADMIN — TIOTROPIUM BROMIDE 1 CAPSULE(S): 18 CAPSULE ORAL; RESPIRATORY (INHALATION) at 13:37

## 2017-10-13 RX ADMIN — Medication 1: at 18:23

## 2017-10-13 RX ADMIN — Medication 325 MILLIGRAM(S): at 18:24

## 2017-10-13 RX ADMIN — Medication 1 GRAM(S): at 13:32

## 2017-10-13 RX ADMIN — Medication 100 MILLIGRAM(S): at 05:48

## 2017-10-13 RX ADMIN — POLYETHYLENE GLYCOL 3350 17 GRAM(S): 17 POWDER, FOR SOLUTION ORAL at 13:33

## 2017-10-13 RX ADMIN — ZINC SULFATE TAB 220 MG (50 MG ZINC EQUIVALENT) 220 MILLIGRAM(S): 220 (50 ZN) TAB at 13:34

## 2017-10-13 RX ADMIN — ALBUTEROL 2 PUFF(S): 90 AEROSOL, METERED ORAL at 17:59

## 2017-10-13 RX ADMIN — HEPARIN SODIUM 5000 UNIT(S): 5000 INJECTION INTRAVENOUS; SUBCUTANEOUS at 05:48

## 2017-10-13 RX ADMIN — Medication 1 TABLET(S): at 13:32

## 2017-10-13 RX ADMIN — Medication 1 GRAM(S): at 05:48

## 2017-10-13 RX ADMIN — Medication 500 MILLIGRAM(S): at 13:33

## 2017-10-13 RX ADMIN — Medication 81 MILLIGRAM(S): at 13:39

## 2017-10-13 RX ADMIN — WARFARIN SODIUM 5 MILLIGRAM(S): 2.5 TABLET ORAL at 22:14

## 2017-10-13 RX ADMIN — ERTAPENEM SODIUM 120 MILLIGRAM(S): 1 INJECTION, POWDER, LYOPHILIZED, FOR SOLUTION INTRAMUSCULAR; INTRAVENOUS at 13:53

## 2017-10-13 RX ADMIN — Medication 100 MILLIGRAM(S): at 18:24

## 2017-10-13 RX ADMIN — ALBUTEROL 2 PUFF(S): 90 AEROSOL, METERED ORAL at 08:49

## 2017-10-13 RX ADMIN — Medication 1 MILLIGRAM(S): at 13:32

## 2017-10-13 RX ADMIN — Medication 1 GRAM(S): at 18:24

## 2017-10-13 RX ADMIN — TAMSULOSIN HYDROCHLORIDE 0.4 MILLIGRAM(S): 0.4 CAPSULE ORAL at 22:14

## 2017-10-13 RX ADMIN — PANTOPRAZOLE SODIUM 40 MILLIGRAM(S): 20 TABLET, DELAYED RELEASE ORAL at 08:41

## 2017-10-13 NOTE — PROGRESS NOTE ADULT - ASSESSMENT
81 y/o M from [facility] w/ PMHx of Rheumatic heart disease, NPH  (unsteady gait used to ambulate with walker now bedbound for a couple of months) & Afib (on Coumadin) was sent from [facility] for low Hb.     Patient is poor historian and source of information is wife. Wife stated that patient is having cough with yellowish sputum for last 4 weeks with occasional temp spikes.    In the ED, patient is AxO x1, unable to interact. T 102.8, Hr 113, /69. S/p Vancomycni/Cefepime and Zithromax.  CXR showed interstitial prominence and opacity at the left lung base.     Patient was admitted to the medical floor with Sepsis 2/2 PNA vs. UTI

## 2017-10-13 NOTE — PROGRESS NOTE ADULT - SUBJECTIVE AND OBJECTIVE BOX
81 y/o M from [facility] w/ PMHx of Rheumatic heart disease, NPH , Afib (on Coumadin) admitted for septic with Pneumonia, UTI and unstageble and stage 3 sacral ulcer s/p debridement 10/9 is being treated for ESBL Proteus bacteremia and UTI on ertapenem. Patient is doing very well and was seen alert and oriented. Denies any pain, fever, cough, dyspnea, nausea, vomiting or abdominal pain.     MEDICATIONS  (STANDING):  acetaminophen  IVPB. 1000 milliGRAM(s) IV Intermittent once  ALBUTerol    90 MICROgram(s) HFA Inhaler 2 Puff(s) Inhalation every 6 hours  ascorbic acid 500 milliGRAM(s) Oral daily  aspirin enteric coated 81 milliGRAM(s) Oral daily  dextrose 5%. 1000 milliLiter(s) (50 mL/Hr) IV Continuous <Continuous>  dextrose 50% Injectable 12.5 Gram(s) IV Push once  docusate sodium 100 milliGRAM(s) Oral two times a day  ertapenem  IVPB      ertapenem  IVPB 1000 milliGRAM(s) IV Intermittent every 24 hours  ferrous    sulfate 325 milliGRAM(s) Oral two times a day with meals  folic acid 1 milliGRAM(s) Oral daily  heparin  Injectable 5000 Unit(s) SubCutaneous every 12 hours  influenza   Vaccine 0.5 milliLiter(s) IntraMuscular once  insulin lispro (HumaLOG) corrective regimen sliding scale   SubCutaneous three times a day before meals  multivitamin 1 Tablet(s) Oral daily  pantoprazole    Tablet 40 milliGRAM(s) Oral before breakfast  polyethylene glycol 3350 17 Gram(s) Oral daily  sucralfate suspension 1 Gram(s) Oral every 6 hours  tamsulosin 0.4 milliGRAM(s) Oral at bedtime  tiotropium 18 MICROgram(s) Capsule 1 Capsule(s) Inhalation daily  warfarin 5 milliGRAM(s) Oral once  zinc sulfate 220 milliGRAM(s) Oral daily    MEDICATIONS  (PRN):  dextrose Gel 1 Dose(s) Oral once PRN Blood Glucose LESS THAN 70 milliGRAM(s)/deciLiter  glucagon  Injectable 1 milliGRAM(s) IntraMuscular once PRN Glucose <70 milliGRAM(s)/deciLiter                          8.4    10.6  )-----------( 295      ( 13 Oct 2017 07:29 )             28.5   10-13    145  |  111<H>  |  26<H>  Vital Signs Last 24 Hrs  T(C): 36.8 (13 Oct 2017 05:51), Max: 37.2 (12 Oct 2017 20:31)  T(F): 98.2 (13 Oct 2017 05:51), Max: 98.9 (12 Oct 2017 20:31)  HR: 104 (13 Oct 2017 05:51) (78 - 104)  BP: 107/66 (13 Oct 2017 05:51) (99/59 - 115/70)  BP(mean): --  RR: 16 (13 Oct 2017 05:51) (16 - 18)  SpO2: 97% (13 Oct 2017 05:51) (97% - 99%)----------------------------<  148<H>  4.7   |  25  |  0.96    Ca    9.0      13 Oct 2017 07:29          Culture - Blood (10.05.17 @ 23:07)    Gram Stain:   Growth in anaerobic bottle: Gram Positive Cocci in Pairs and Chains    Specimen Source: .Blood Blood    Culture Results:   Growth in anaerobic bottle: Gram Positive Cocci in Pairs and Chains    SpO2: 100% (09 Oct 2017 09:36) (98% - 100%)      REVIEW OF SYSTEMS:   CONSTITUTIONAL: No fever, weight loss, or fatigue  EYES: No eye pain,  discharge  ENMT:  No difficulty hearing,  No sinus or throat pain  RESPIRATORY: No cough,  No shortness of breath  CARDIOVASCULAR: No chest pain, palpitations,   GASTROINTESTINAL: No abdominal or epigastric pain. No nausea, vomiting, ; No diarrhea or constipation.   GENITOURINARY: No urinary complaints  NEUROLOGICAL: No headaches,   SKIN: sacral ulcer    PHYSICAL EXAM:    GENERAL: patient comfortable in bed, NAD, well-groomed, well-developed  HEAD:  Atraumatic, Normocephalic  EYES: PERRL, conjunctiva and sclera clear  ENMT:  dry mucous membranes  NECK: Supple, No JVD, Normal thyroid  NERVOUS SYSTEM:  Alert and awake. Follows simple commands   CHEST/LUNG: Clear to percussion bilaterally; No rales, rhonchi, wheezing, or rubs  HEART: Regular rate and rhythm; No murmurs, rubs, or gallops  ABDOMEN: Soft, Nontender, Nondistended; Bowel sounds present.   EXTREMITIES:  2+ Peripheral Pulses, No clubbing, cyanosis, or edema  SKIN: sacral ulcer, healthy granulation tissue present; foul smelling drainage.  b/l feet DTIs      LABS:                        7.4    8.9   )-----------( 311      ( 10 Oct 2017 07:47 )             23.6   10-10    144  |  112<H>  |  22<H>  ----------------------------<  140<H>  4.3   |  23  |  0.86    Ca    8.5      10 Oct 2017 07:47  Phos  3.0     10-09  Mg     2.1     10-09                   Culture - Urine (10.05.17 @ 23:11)    -  Amikacin: S <=8    -  Ampicillin: R >16    -  Ampicillin/Sulbactam: R <=4/2    -  Aztreonam: R <=4    -  Cefazolin: R >16    -  Cefepime: R >16    -  Cefoxitin: S <=4    -  Ceftazidime: R <=1    -  Ceftriaxone: R >32    -  Ciprofloxacin: R >2    -  Ertapenem: S <=0.5    -  Gentamicin: R >8    -  Levofloxacin: R >4    -  Meropenem: S <=1    -  Nitrofurantoin: R >64    -  Piperacillin/Tazobactam: R <=8    -  Tobramycin: R >8    -  Trimethoprim/Sulfamethoxazole: R >2/38    Specimen Source: .Urine Clean Catch (Midstream)    Culture Results:   50,000 - 99,000 CFU/mL Proteus mirabilis ESBL    Organism Identification: Proteus mirabilis ESBL    Organism: Proteus mirabilis ESBL    Method Type: SHARONA    Culture - Blood (10.05.17 @ 23:07)    Gram Stain:   Growth in anaerobic bottle: Gram Positive Cocci in Pairs and Chains    Specimen Source: .Blood Blood    Culture Results:   Growth in anaerobic bottle: Streptococcus anginosus  See previous culture [id number]

## 2017-10-13 NOTE — PROGRESS NOTE ADULT - ASSESSMENT
1. ESBL Proteus bacteremia 2/2 to UTI - continue ertapenem 1g q 24 hours,   - repeat Bcx negative  - PICC to be done today   (bacteremia with strep anginosis also in 4 of 4 bottles from sacral decub' wound culture growing ESBL proteus sensitive to ertapenem)   2. ESBL Proteus UTI - continue ertapenem 1g q 24 hours  3. Infected sacral decubitus- s/p surgery 10/9 1. ESBL Proteus bacteremia 2/2 to UTI - continue ertapenem 1g q 24 hours,   - repeat Bcx negative  - PICC to be done today   (bacteremia with strep anginosis also in 4 of 4 bottles from sacral decub' wound culture growing ESBL proteus sensitive to ertapenem)   2. ESBL Proteus UTI - continue ertapenem 1g q 24 hours  3. Infected sacral decubitus- s/p surgery 10/9  4. if nursing home not taking pt with ertapenem then switch to primaxin 500mgs iv q6hrs till 10/25/17

## 2017-10-13 NOTE — PROGRESS NOTE ADULT - SUBJECTIVE AND OBJECTIVE BOX
NP Note discussed with  Primary Attending    Patient is a 82y old  Male who presents with a chief complaint of low hb level in NH (05 Oct 2017 19:36)      INTERVAL HPI/OVERNIGHT EVENTS: no new complaints    MEDICATIONS  (STANDING):  acetaminophen  IVPB. 1000 milliGRAM(s) IV Intermittent once  ALBUTerol    90 MICROgram(s) HFA Inhaler 2 Puff(s) Inhalation every 6 hours  ascorbic acid 500 milliGRAM(s) Oral daily  aspirin enteric coated 81 milliGRAM(s) Oral daily  dextrose 5%. 1000 milliLiter(s) (50 mL/Hr) IV Continuous <Continuous>  dextrose 50% Injectable 12.5 Gram(s) IV Push once  docusate sodium 100 milliGRAM(s) Oral two times a day  ertapenem  IVPB      ertapenem  IVPB 1000 milliGRAM(s) IV Intermittent every 24 hours  ferrous    sulfate 325 milliGRAM(s) Oral two times a day with meals  folic acid 1 milliGRAM(s) Oral daily  heparin  Injectable 5000 Unit(s) SubCutaneous every 12 hours  influenza   Vaccine 0.5 milliLiter(s) IntraMuscular once  insulin lispro (HumaLOG) corrective regimen sliding scale   SubCutaneous three times a day before meals  multivitamin 1 Tablet(s) Oral daily  pantoprazole    Tablet 40 milliGRAM(s) Oral before breakfast  polyethylene glycol 3350 17 Gram(s) Oral daily  sucralfate suspension 1 Gram(s) Oral every 6 hours  tamsulosin 0.4 milliGRAM(s) Oral at bedtime  tiotropium 18 MICROgram(s) Capsule 1 Capsule(s) Inhalation daily  warfarin 5 milliGRAM(s) Oral once  zinc sulfate 220 milliGRAM(s) Oral daily    MEDICATIONS  (PRN):  dextrose Gel 1 Dose(s) Oral once PRN Blood Glucose LESS THAN 70 milliGRAM(s)/deciLiter  glucagon  Injectable 1 milliGRAM(s) IntraMuscular once PRN Glucose <70 milliGRAM(s)/deciLiter      __________________________________________________  REVIEW OF SYSTEMS:    CONSTITUTIONAL: No fever,   EYES: no acute visual disturbances  NECK: No pain or stiffness  RESPIRATORY: No cough; No shortness of breath  CARDIOVASCULAR: No chest pain, no palpitations  GASTROINTESTINAL: No pain. No nausea or vomiting; No diarrhea   NEUROLOGICAL: No headache or numbness, no tremors  MUSCULOSKELETAL: No joint pain, no muscle pain  GENITOURINARY: no dysuria, no frequency, no hesitancy  PSYCHIATRY: no depression , no anxiety  ALL OTHER  ROS negative        Vital Signs Last 24 Hrs  T(C): 36.4 (13 Oct 2017 12:40), Max: 37.2 (12 Oct 2017 20:31)  T(F): 97.5 (13 Oct 2017 12:40), Max: 98.9 (12 Oct 2017 20:31)  HR: 99 (13 Oct 2017 12:40) (78 - 104)  BP: 120/90 (13 Oct 2017 12:40) (107/66 - 120/90)  BP(mean): --  RR: 17 (13 Oct 2017 12:40) (16 - 17)  SpO2: 99% (13 Oct 2017 12:40) (97% - 99%)    ________________________________________________  PHYSICAL EXAM:  GENERAL: NAD  HEENT: Normocephalic;  conjunctivae and sclerae clear; moist mucous membranes;   NECK : supple  CHEST/LUNG: Clear to auscultation bilaterally with good air entry   HEART: S1 S2  regular; no murmurs, gallops or rubs  ABDOMEN: Soft, Nontender, Nondistended; Bowel sounds present.    EXTREMITIES: no cyanosis; no edema; no calf tenderness  SKIN: warm and dry; no rash.  Sacral dressing changed  NERVOUS SYSTEM:  Awake and alert; Oriented  to place, person and time ; no new deficits    _________________________________________________  LABS:                        8.4    10.6  )-----------( 295      ( 13 Oct 2017 07:29 )             28.5     10-13    145  |  111<H>  |  26<H>  ----------------------------<  148<H>  4.7   |  25  |  0.96    Ca    9.0      13 Oct 2017 07:29      PT/INR - ( 13 Oct 2017 07:29 )   PT: 16.5 sec;   INR: 1.50 ratio             CAPILLARY BLOOD GLUCOSE      POCT Blood Glucose.: 169 mg/dL (13 Oct 2017 07:27)  POCT Blood Glucose.: 153 mg/dL (12 Oct 2017 21:45)  POCT Blood Glucose.: 128 mg/dL (12 Oct 2017 16:14)        RADIOLOGY & ADDITIONAL TESTS:    Imaging Personally Reviewed:  YES    Consultant(s) Notes Reviewed:   YES    Care Discussed with Consultants :     Plan of care was discussed with patient and /or primary care giver; all questions and concerns were addressed and care was aligned with patient's wishes.

## 2017-10-13 NOTE — PROGRESS NOTE ADULT - PROBLEM SELECTOR PLAN 1
ESBL ESBL Proteus bacteremia 2/2 to UTI - continued ertapenem 1g q 24 hours, (bacteremia with strep anginosis also in 4 of 4 bottles from sacral decub' wound culture growing ESBL proteus sensitive to ertapenem)  - repeat Bcx negative  - PICC placed in IR  - 12 more days of Ertapenem IV (two weeks from past wednesday). ESBL ESBL Proteus bacteremia 2/2 to UTI - continued ertapenem 1g q 24 hours, (bacteremia with strep anginosis also in 4 of 4 bottles from sacral decub' wound culture growing ESBL proteus sensitive to ertapenem)  - repeat Bcx negative  - PICC placed in IR  - Dr. Lala, ID, stated pt can be discharge on Monday with Primaxin 500 mg., IV, q6H X 10 days.

## 2017-10-14 DIAGNOSIS — R78.81 BACTEREMIA: ICD-10-CM

## 2017-10-14 DIAGNOSIS — J96.90 RESPIRATORY FAILURE, UNSPECIFIED, UNSPECIFIED WHETHER WITH HYPOXIA OR HYPERCAPNIA: ICD-10-CM

## 2017-10-14 DIAGNOSIS — Z29.9 ENCOUNTER FOR PROPHYLACTIC MEASURES, UNSPECIFIED: ICD-10-CM

## 2017-10-14 DIAGNOSIS — D64.9 ANEMIA, UNSPECIFIED: ICD-10-CM

## 2017-10-14 LAB
ALBUMIN SERPL ELPH-MCNC: 2.2 G/DL — LOW (ref 3.5–5)
ALP SERPL-CCNC: 167 U/L — HIGH (ref 40–120)
ALT FLD-CCNC: 34 U/L DA — SIGNIFICANT CHANGE UP (ref 10–60)
ANION GAP SERPL CALC-SCNC: 10 MMOL/L — SIGNIFICANT CHANGE UP (ref 5–17)
APTT BLD: 35 SEC — SIGNIFICANT CHANGE UP (ref 27.5–37.4)
AST SERPL-CCNC: 47 U/L — HIGH (ref 10–40)
BASE EXCESS BLDA CALC-SCNC: -1.7 MMOL/L — SIGNIFICANT CHANGE UP (ref -2–2)
BASE EXCESS BLDA CALC-SCNC: -2.2 MMOL/L — LOW (ref -2–2)
BASOPHILS # BLD AUTO: 0.1 K/UL — SIGNIFICANT CHANGE UP (ref 0–0.2)
BASOPHILS # BLD AUTO: 0.2 K/UL — SIGNIFICANT CHANGE UP (ref 0–0.2)
BASOPHILS NFR BLD AUTO: 0.9 % — SIGNIFICANT CHANGE UP (ref 0–2)
BASOPHILS NFR BLD AUTO: 1.3 % — SIGNIFICANT CHANGE UP (ref 0–2)
BILIRUB SERPL-MCNC: 0.5 MG/DL — SIGNIFICANT CHANGE UP (ref 0.2–1.2)
BLOOD GAS COMMENTS ARTERIAL: SIGNIFICANT CHANGE UP
BUN SERPL-MCNC: 37 MG/DL — HIGH (ref 7–18)
CALCIUM SERPL-MCNC: 8.7 MG/DL — SIGNIFICANT CHANGE UP (ref 8.4–10.5)
CHLORIDE SERPL-SCNC: 110 MMOL/L — HIGH (ref 96–108)
CK MB BLD-MCNC: 5 % — HIGH (ref 0–3.5)
CK MB CFR SERPL CALC: 1.2 NG/ML — SIGNIFICANT CHANGE UP (ref 0–3.6)
CK SERPL-CCNC: 24 U/L — LOW (ref 35–232)
CO2 SERPL-SCNC: 24 MMOL/L — SIGNIFICANT CHANGE UP (ref 22–31)
CREAT SERPL-MCNC: 1.4 MG/DL — HIGH (ref 0.5–1.3)
EOSINOPHIL # BLD AUTO: 0 K/UL — SIGNIFICANT CHANGE UP (ref 0–0.5)
EOSINOPHIL # BLD AUTO: 0 K/UL — SIGNIFICANT CHANGE UP (ref 0–0.5)
EOSINOPHIL NFR BLD AUTO: 0.2 % — SIGNIFICANT CHANGE UP (ref 0–6)
EOSINOPHIL NFR BLD AUTO: 0.4 % — SIGNIFICANT CHANGE UP (ref 0–6)
GLUCOSE BLDC GLUCOMTR-MCNC: 168 MG/DL — HIGH (ref 70–99)
GLUCOSE BLDC GLUCOMTR-MCNC: 169 MG/DL — HIGH (ref 70–99)
GLUCOSE BLDC GLUCOMTR-MCNC: 179 MG/DL — HIGH (ref 70–99)
GLUCOSE SERPL-MCNC: 174 MG/DL — HIGH (ref 70–99)
HCO3 BLDA-SCNC: 20 MMOL/L — LOW (ref 23–27)
HCO3 BLDA-SCNC: 20 MMOL/L — LOW (ref 23–27)
HCT VFR BLD CALC: 29.3 % — LOW (ref 39–50)
HCT VFR BLD CALC: 32.1 % — LOW (ref 39–50)
HGB BLD-MCNC: 9 G/DL — LOW (ref 13–17)
HGB BLD-MCNC: 9.4 G/DL — LOW (ref 13–17)
HOROWITZ INDEX BLDA+IHG-RTO: 100 — SIGNIFICANT CHANGE UP
HOROWITZ INDEX BLDA+IHG-RTO: 24 — SIGNIFICANT CHANGE UP
INR BLD: 1.87 RATIO — HIGH (ref 0.88–1.16)
INR BLD: 2.25 RATIO — HIGH (ref 0.88–1.16)
LACTATE SERPL-SCNC: 2.3 MMOL/L — HIGH (ref 0.7–2)
LYMPHOCYTES # BLD AUTO: 1.9 K/UL — SIGNIFICANT CHANGE UP (ref 1–3.3)
LYMPHOCYTES # BLD AUTO: 12.4 % — LOW (ref 13–44)
LYMPHOCYTES # BLD AUTO: 17.5 % — SIGNIFICANT CHANGE UP (ref 13–44)
LYMPHOCYTES # BLD AUTO: 2.3 K/UL — SIGNIFICANT CHANGE UP (ref 1–3.3)
MAGNESIUM SERPL-MCNC: 2.5 MG/DL — SIGNIFICANT CHANGE UP (ref 1.6–2.6)
MCHC RBC-ENTMCNC: 24.4 PG — LOW (ref 27–34)
MCHC RBC-ENTMCNC: 25.3 PG — LOW (ref 27–34)
MCHC RBC-ENTMCNC: 29.3 GM/DL — LOW (ref 32–36)
MCHC RBC-ENTMCNC: 30.8 GM/DL — LOW (ref 32–36)
MCV RBC AUTO: 82.2 FL — SIGNIFICANT CHANGE UP (ref 80–100)
MCV RBC AUTO: 83.4 FL — SIGNIFICANT CHANGE UP (ref 80–100)
MONOCYTES # BLD AUTO: 0.8 K/UL — SIGNIFICANT CHANGE UP (ref 0–0.9)
MONOCYTES # BLD AUTO: 1 K/UL — HIGH (ref 0–0.9)
MONOCYTES NFR BLD AUTO: 6 % — SIGNIFICANT CHANGE UP (ref 2–14)
MONOCYTES NFR BLD AUTO: 6.9 % — SIGNIFICANT CHANGE UP (ref 2–14)
NEUTROPHILS # BLD AUTO: 12 K/UL — HIGH (ref 1.8–7.4)
NEUTROPHILS # BLD AUTO: 9.8 K/UL — HIGH (ref 1.8–7.4)
NEUTROPHILS NFR BLD AUTO: 75.1 % — SIGNIFICANT CHANGE UP (ref 43–77)
NEUTROPHILS NFR BLD AUTO: 79.2 % — HIGH (ref 43–77)
PCO2 BLDA: 25 MMHG — LOW (ref 32–46)
PCO2 BLDA: 28 MMHG — LOW (ref 32–46)
PH BLDA: 7.48 — HIGH (ref 7.35–7.45)
PH BLDA: 7.52 — HIGH (ref 7.35–7.45)
PHOSPHATE SERPL-MCNC: 4.4 MG/DL — SIGNIFICANT CHANGE UP (ref 2.5–4.5)
PLATELET # BLD AUTO: 280 K/UL — SIGNIFICANT CHANGE UP (ref 150–400)
PLATELET # BLD AUTO: 322 K/UL — SIGNIFICANT CHANGE UP (ref 150–400)
PO2 BLDA: 102 MMHG — SIGNIFICANT CHANGE UP (ref 74–108)
PO2 BLDA: 490 MMHG — HIGH (ref 74–108)
POTASSIUM SERPL-MCNC: 5.1 MMOL/L — SIGNIFICANT CHANGE UP (ref 3.5–5.3)
POTASSIUM SERPL-SCNC: 5.1 MMOL/L — SIGNIFICANT CHANGE UP (ref 3.5–5.3)
PROT SERPL-MCNC: 6.5 G/DL — SIGNIFICANT CHANGE UP (ref 6–8.3)
PROTHROM AB SERPL-ACNC: 20.7 SEC — HIGH (ref 9.8–12.7)
PROTHROM AB SERPL-ACNC: 24.9 SEC — HIGH (ref 9.8–12.7)
RBC # BLD: 3.56 M/UL — LOW (ref 4.2–5.8)
RBC # BLD: 3.85 M/UL — LOW (ref 4.2–5.8)
RBC # FLD: 22.1 % — HIGH (ref 10.3–14.5)
RBC # FLD: 22.8 % — HIGH (ref 10.3–14.5)
SAO2 % BLDA: 98 % — HIGH (ref 92–96)
SAO2 % BLDA: SIGNIFICANT CHANGE UP % (ref 92–96)
SODIUM SERPL-SCNC: 144 MMOL/L — SIGNIFICANT CHANGE UP (ref 135–145)
TROPONIN I SERPL-MCNC: 0.08 NG/ML — HIGH (ref 0–0.04)
TSH SERPL-MCNC: 0.7 UU/ML — SIGNIFICANT CHANGE UP (ref 0.34–4.82)
WBC # BLD: 13 K/UL — HIGH (ref 3.8–10.5)
WBC # BLD: 15.2 K/UL — HIGH (ref 3.8–10.5)
WBC # FLD AUTO: 13 K/UL — HIGH (ref 3.8–10.5)
WBC # FLD AUTO: 15.2 K/UL — HIGH (ref 3.8–10.5)

## 2017-10-14 PROCEDURE — 71010: CPT | Mod: 26

## 2017-10-14 PROCEDURE — 99291 CRITICAL CARE FIRST HOUR: CPT

## 2017-10-14 RX ORDER — WARFARIN SODIUM 2.5 MG/1
5 TABLET ORAL ONCE
Refills: 0 | Status: COMPLETED | OUTPATIENT
Start: 2017-10-14 | End: 2017-10-14

## 2017-10-14 RX ORDER — IPRATROPIUM BROMIDE 0.2 MG/ML
1 SOLUTION, NON-ORAL INHALATION EVERY 6 HOURS
Refills: 0 | Status: DISCONTINUED | OUTPATIENT
Start: 2017-10-14 | End: 2017-10-16

## 2017-10-14 RX ORDER — FUROSEMIDE 40 MG
40 TABLET ORAL ONCE
Refills: 0 | Status: COMPLETED | OUTPATIENT
Start: 2017-10-14 | End: 2017-10-14

## 2017-10-14 RX ORDER — FENTANYL CITRATE 50 UG/ML
5 INJECTION INTRAVENOUS
Qty: 2500 | Refills: 0 | Status: DISCONTINUED | OUTPATIENT
Start: 2017-10-14 | End: 2017-10-15

## 2017-10-14 RX ORDER — PANTOPRAZOLE SODIUM 20 MG/1
40 TABLET, DELAYED RELEASE ORAL DAILY
Refills: 0 | Status: DISCONTINUED | OUTPATIENT
Start: 2017-10-14 | End: 2017-10-19

## 2017-10-14 RX ORDER — ALBUTEROL 90 UG/1
2 AEROSOL, METERED ORAL EVERY 6 HOURS
Refills: 0 | Status: DISCONTINUED | OUTPATIENT
Start: 2017-10-14 | End: 2017-10-16

## 2017-10-14 RX ORDER — METOPROLOL TARTRATE 50 MG
5 TABLET ORAL EVERY 6 HOURS
Refills: 0 | Status: DISCONTINUED | OUTPATIENT
Start: 2017-10-14 | End: 2017-10-15

## 2017-10-14 RX ORDER — WARFARIN SODIUM 2.5 MG/1
6 TABLET ORAL ONCE
Refills: 0 | Status: DISCONTINUED | OUTPATIENT
Start: 2017-10-14 | End: 2017-10-14

## 2017-10-14 RX ORDER — SODIUM CHLORIDE 9 MG/ML
1000 INJECTION INTRAMUSCULAR; INTRAVENOUS; SUBCUTANEOUS
Refills: 0 | Status: DISCONTINUED | OUTPATIENT
Start: 2017-10-14 | End: 2017-10-15

## 2017-10-14 RX ORDER — METOPROLOL TARTRATE 50 MG
5 TABLET ORAL ONCE
Refills: 0 | Status: COMPLETED | OUTPATIENT
Start: 2017-10-14 | End: 2017-10-14

## 2017-10-14 RX ADMIN — Medication 1: at 17:50

## 2017-10-14 RX ADMIN — Medication 1 GRAM(S): at 06:55

## 2017-10-14 RX ADMIN — Medication 1 GRAM(S): at 00:23

## 2017-10-14 RX ADMIN — Medication 325 MILLIGRAM(S): at 17:49

## 2017-10-14 RX ADMIN — Medication 40 MILLIGRAM(S): at 19:58

## 2017-10-14 RX ADMIN — WARFARIN SODIUM 5 MILLIGRAM(S): 2.5 TABLET ORAL at 23:40

## 2017-10-14 RX ADMIN — ZINC SULFATE TAB 220 MG (50 MG ZINC EQUIVALENT) 220 MILLIGRAM(S): 220 (50 ZN) TAB at 12:34

## 2017-10-14 RX ADMIN — ALBUTEROL 2 PUFF(S): 90 AEROSOL, METERED ORAL at 17:49

## 2017-10-14 RX ADMIN — Medication 5 MILLIGRAM(S): at 23:39

## 2017-10-14 RX ADMIN — Medication 1 TABLET(S): at 12:34

## 2017-10-14 RX ADMIN — ERTAPENEM SODIUM 120 MILLIGRAM(S): 1 INJECTION, POWDER, LYOPHILIZED, FOR SOLUTION INTRAMUSCULAR; INTRAVENOUS at 12:31

## 2017-10-14 RX ADMIN — PANTOPRAZOLE SODIUM 40 MILLIGRAM(S): 20 TABLET, DELAYED RELEASE ORAL at 06:55

## 2017-10-14 RX ADMIN — Medication 1 GRAM(S): at 12:35

## 2017-10-14 RX ADMIN — ALBUTEROL 2 PUFF(S): 90 AEROSOL, METERED ORAL at 03:23

## 2017-10-14 RX ADMIN — FENTANYL CITRATE 40.8 MICROGRAM(S)/KG/HR: 50 INJECTION INTRAVENOUS at 22:02

## 2017-10-14 RX ADMIN — TIOTROPIUM BROMIDE 1 CAPSULE(S): 18 CAPSULE ORAL; RESPIRATORY (INHALATION) at 12:34

## 2017-10-14 RX ADMIN — Medication 500 MILLIGRAM(S): at 12:34

## 2017-10-14 RX ADMIN — Medication 325 MILLIGRAM(S): at 08:25

## 2017-10-14 RX ADMIN — PANTOPRAZOLE SODIUM 40 MILLIGRAM(S): 20 TABLET, DELAYED RELEASE ORAL at 23:39

## 2017-10-14 RX ADMIN — Medication 81 MILLIGRAM(S): at 12:35

## 2017-10-14 RX ADMIN — Medication 1 GRAM(S): at 17:51

## 2017-10-14 RX ADMIN — TAMSULOSIN HYDROCHLORIDE 0.4 MILLIGRAM(S): 0.4 CAPSULE ORAL at 23:39

## 2017-10-14 RX ADMIN — Medication 1 MILLIGRAM(S): at 12:34

## 2017-10-14 RX ADMIN — HEPARIN SODIUM 5000 UNIT(S): 5000 INJECTION INTRAVENOUS; SUBCUTANEOUS at 08:24

## 2017-10-14 RX ADMIN — Medication 100 MILLIGRAM(S): at 08:24

## 2017-10-14 RX ADMIN — Medication 1: at 08:26

## 2017-10-14 RX ADMIN — Medication 5 MILLIGRAM(S): at 20:30

## 2017-10-14 RX ADMIN — Medication 1: at 12:32

## 2017-10-14 RX ADMIN — Medication 100 MILLIGRAM(S): at 17:51

## 2017-10-14 RX ADMIN — ALBUTEROL 2 PUFF(S): 90 AEROSOL, METERED ORAL at 08:25

## 2017-10-14 RX ADMIN — Medication 1 GRAM(S): at 23:45

## 2017-10-14 RX ADMIN — POLYETHYLENE GLYCOL 3350 17 GRAM(S): 17 POWDER, FOR SOLUTION ORAL at 12:40

## 2017-10-14 NOTE — CONSULT NOTE ADULT - ASSESSMENT
y/o M from [facility] w/ PMHx of Rheumatic heart disease, NPH  (unsteady gait used to ambulate with walker now bedbound for a couple of months) & Afib (on Coumadin), CHF being treated on this hospitalization for ESBL bacteremia , now transfered to ICU for respiratory failure and altered mental status .

## 2017-10-14 NOTE — PROGRESS NOTE ADULT - SUBJECTIVE AND OBJECTIVE BOX
Anesthesia called for emergency intubation.  On arrival, pt unresponsive, , and bag mask ventilation with 100% FiO2 being performed.  DL x _1_ with MAC 3 blade, grade _2__ view, __7.5_ cuffed ETT passed without trauma, + ETCO2 via EasyCap, + BBS, taped at _23___ cm, teeth intact, no complications.  Care resumed by ICU team.

## 2017-10-14 NOTE — CONSULT NOTE ADULT - SUBJECTIVE AND OBJECTIVE BOX
Patient is a 82y old  Male who presents with a chief complaint of low hb level in NH (05 Oct 2017 19:36)      Initial HPI on admission:  HPI:  81 [facility] NPH ( unsteady gait used to ambulate with walker now bedbound for a couple of months) & Afib (on Coumadin) was sent from [facility] for low Hb.   Patient is poor historian and source of information is wife at bedside. Wife states that patient is having cough with yellowish sputum from last 4 weeks with occasional temp spikes. Denies h/o blood in stool, blood in urine, sputum or dark color stool.     Patient is being treated for pneumonia in NH.   SH: Non smoker, non alcoholic, denies illicit drug use. Lives at NH, mostly bed bound.   Fh: Not significant as per wife  Goals of care: Patient is full code. Wife Daly Tracy is HCP who can be reached at . [phone]  Ed Course: Patient is AxO x1, unable to interact. T 102.8, Hr 113, /69. S/p Vancomycni/Cefepime and Zithromax (05 Oct 2017 19:36)      BRIEF HOSPITAL COURSE: 81 y/o M from [facility] w/ PMx of Rheumatic heart disease, NPH  (unsteady gait used to ambulate with walker now bedbound for a couple of months) & Afib (on Coumadin) was sent from [facility] for low Hb on 10/5/17 , was admitted for sepsis secondary to pneumonia and infected sacral DU  . and started on iv antibiotics , patient was found to have ESBL pseudomonas bacteremia (bacteremia with strep anginosis also in 4 of 4 bottles from sacral decub' wound culture growing ESBL proteus sensitive to ertapenem) patient had sacral wound debridement done by surgery and wound cx grew ESBL proteus . positive blood cultures on 10/9/17 repeat blood cultures were negative .PICC line was placed by IR on 10/13/17 , patient was had hemoglobin of 7.0 on admission , was worked up for anemia ,Negative FOBT , EGD was done which showed evidence of erosive duodenitis , patient was cleared by GI to resume coumadin , and started on Carafate and PPI .got a total of 1 unit PRBC on admission . off note patient did not receive any lasix on this admission because of sepsis .  Echo in may 2017: EF = 55 to 60%. Grade II diastolic dysfunction.. patient had PICC line placed and was for discharge on Monday to rehab to receive a total of 10 more days of antibiotics .    RRT called on 10/14/17 for altered mental status increased drowsiness from baseline , tachycardia and diaphoresis    VS :t 98.5 , hr 62 FLUCTUATING UP TO 130s , RR 25 , PULSE OX  PERCENT ON 2 LITER N/C, bsl of 179    patient on exam , is accusable only to sternal rub appears to be moaning , and in moderate respiratory distress , STAT labs and ABG were obtained , ABG showed :pH: 7.52  /  pCO2: 25    /  pO2: 102   / HCO3: 20    / Base Excess: -1.7  /  SaO2: 98 , initially put on BIPAP , patient was transferred to ICU , for impending respiratory failure and in ability to maintain his Airway , and was intubated by anesthesia after being brought to ICU .        PAST MEDICAL & SURGICAL HISTORY:  DM (diabetes mellitus)  Afib  CHF (congestive heart failure)  No significant past surgical history    Allergies    penicillin (Unknown)    Intolerances      FAMILY HISTORY:        Review of Systems:  drowsy diaphoretic       Medications:  acetaminophen  IVPB. 1000 milliGRAM(s) IV Intermittent once  ALBUTerol    90 MICROgram(s) HFA Inhaler 2 Puff(s) Inhalation every 6 hours  ascorbic acid 500 milliGRAM(s) Oral daily  aspirin enteric coated 81 milliGRAM(s) Oral daily  dextrose 5%. 1000 milliLiter(s) IV Continuous <Continuous>  dextrose 50% Injectable 12.5 Gram(s) IV Push once  dextrose Gel 1 Dose(s) Oral once PRN  docusate sodium 100 milliGRAM(s) Oral two times a day  ertapenem  IVPB      ertapenem  IVPB 1000 milliGRAM(s) IV Intermittent every 24 hours  fentaNYL   Infusion 5 MICROgram(s)/kG/Hr IV Continuous <Continuous>  ferrous    sulfate 325 milliGRAM(s) Oral two times a day with meals  folic acid 1 milliGRAM(s) Oral daily  furosemide   Injectable 40 milliGRAM(s) IV Push once  glucagon  Injectable 1 milliGRAM(s) IntraMuscular once PRN  influenza   Vaccine 0.5 milliLiter(s) IntraMuscular once  insulin lispro (HumaLOG) corrective regimen sliding scale   SubCutaneous three times a day before meals  ipratropium 17 MICROgram(s) HFA Inhaler 1 Puff(s) Inhalation every 6 hours  metoprolol Injectable 5 milliGRAM(s) IV Push once  multivitamin 1 Tablet(s) Oral daily  pantoprazole  Injectable 40 milliGRAM(s) IV Push daily  polyethylene glycol 3350 17 Gram(s) Oral daily  sucralfate suspension 1 Gram(s) Oral every 6 hours  tamsulosin 0.4 milliGRAM(s) Oral at bedtime  tiotropium 18 MICROgram(s) Capsule 1 Capsule(s) Inhalation daily  zinc sulfate 220 milliGRAM(s) Oral daily      vent settings  Mode: AC/ CMV (Assist Control/ Continuous Mandatory Ventilation)  RR (machine): 14  TV (machine): 450  FiO2: 100  PEEP: 5  ITime: 1  MAP: 12  PIP: 23      Vital Signs Last 24 Hrs  T(C): 36.8 (14 Oct 2017 13:40), Max: 37.3 (14 Oct 2017 05:39)  T(F): 98.2 (14 Oct 2017 13:40), Max: 99.1 (14 Oct 2017 05:39)  HR: 121 (14 Oct 2017 20:30) (85 - 141)  BP: 134/89 (14 Oct 2017 18:46) (100/80 - 145/102)  BP(mean): --  RR: 32 (14 Oct 2017 18:46) (16 - 32)  SpO2: 100% (14 Oct 2017 20:30) (97% - 100%)    ABG - ( 14 Oct 2017 19:41 )  pH: 7.52  /  pCO2: 25    /  pO2: 102   / HCO3: 20    / Base Excess: -1.7  /  SaO2: 98                        LABS:                        9.4    15.2  )-----------( 322      ( 14 Oct 2017 19:47 )             32.1     10-14    144  |  110<H>  |  37<H>  ----------------------------<  174<H>  5.1   |  24  |  1.40<H>    Ca    8.7      14 Oct 2017 19:47  Phos  4.4     10-14  Mg     2.5     10-14    TPro  6.5  /  Alb  2.2<L>  /  TBili  0.5  /  DBili  x   /  AST  47<H>  /  ALT  34  /  AlkPhos  167<H>  10-14      CARDIAC MARKERS ( 14 Oct 2017 19:47 )  0.077 ng/mL / x     / 24 U/L / x     / 1.2 ng/mL      CAPILLARY BLOOD GLUCOSE  179 (14 Oct 2017 18:46)      POCT Blood Glucose.: 179 mg/dL (14 Oct 2017 18:18)    PT/INR - ( 14 Oct 2017 19:47 )   PT: 24.9 sec;   INR: 2.25 ratio         PTT - ( 14 Oct 2017 19:47 )  PTT:35.0 sec    CULTURES:  Culture Results:   No growth to date. (10-11 @ 13:27)  Culture Results:   No growth to date. (10-11 @ 13:27)  Culture Results:   Rare Proteus mirabilis ESBL  Few Enterococcus faecalis (vancomycin resistant)  Few Bacteroides thetaiotaomicron (10-09 @ 13:21)        Physical Examination:    General: diaphoretic respiratory distress .      HEENT: Pupils equal, reactive to light.  Symmetric.    PULM: harsh breath sounds , no rales or ronchi heard , tacypneic    CVS: Regular rate and rhythm, no murmurs, rubs, or gallops    ABD: Soft, nondistended, nontender, normoactive bowel sounds, no masses    EXT: No edema, nontender    SKIN: clammy , pale , no rashes noted     NEURO: drowsy arousal minimally to sternal rub.        CRITICAL CARE TIME SPENT: 35 minutes Patient is a 82y old  Male who presents with a chief complaint of low hb level in NH (05 Oct 2017 19:36)      Initial HPI on admission:  HPI:  83 [facility] ( unsteady gait used to ambulate with walker now bedbound for a couple of months) & Afib (on Coumadin) was sent from [facility] for low Hb.   Patient is poor historian and source of information is wife at bedside. Wife states that patient is having cough with yellowish sputum from last 4 weeks with occasional temp spikes. Denies h/o blood in stool, blood in urine, sputum or dark color stool.     Patient is being treated for pneumonia in NH.   SH: Non smoker, non alcoholic, denies illicit drug use. Lives at NH, mostly bed bound.   Fh: Not significant as per wife  Goals of care: Patient is full code. Wife Daly Tracy is HCP who can be reached at . [phone]  Ed Course: Patient is AxO x1, unable to interact. T 102.8, Hr 113, /69. S/p Vancomycni/Cefepime and Zithromax (05 Oct 2017 19:36)      BRIEF HOSPITAL COURSE: 83 y/o M from [facility] w/ PMx of Rheumatic heart disease, NPH  (unsteady gait used to ambulate with walker now bedbound for a couple of months) & Afib (on Coumadin) was sent from [facility] for low Hb on 10/5/17 , was admitted for sepsis secondary to pneumonia and infected sacral DU  . and started on iv antibiotics , patient was found to have ESBL proteus bacteremia (bacteremia with strep anginosis also in 4 of 4 bottles from sacral decub' wound culture growing ESBL proteus sensitive to ertapenem) patient had sacral wound debridement done by surgery and wound cx grew ESBL proteus . positive blood cultures on 10/9/17 repeat blood cultures were negative .PICC line was placed by IR on 10/13/17 , patient was had hemoglobin of 7.0 on admission , was worked up for anemia ,Negative FOBT , EGD was done which showed evidence of erosive duodenitis , patient was cleared by GI to resume coumadin , and started on Carafate and PPI .got a total of 1 unit PRBC on admission . off note patient did not receive any lasix on this admission because of sepsis .  Echo in may 2017: EF = 55 to 60%. Grade II diastolic dysfunction.. patient had PICC line placed and was for discharge on Monday to rehab to receive a total of 10 more days of antibiotics .    RRT called on 10/14/17 for altered mental status increased drowsiness from baseline , tachycardia and diaphoresis    VS :t 98.5 , hr 62 FLUCTUATING UP TO 130s , RR 25 , PULSE OX  PERCENT ON 2 LITER N/C, bsl of 179    patient on exam , is accusable only to sternal rub appears to be moaning , and in moderate respiratory distress , STAT labs and ABG were obtained , ABG showed :pH: 7.52  /  pCO2: 25    /  pO2: 102   / HCO3: 20    / Base Excess: -1.7  /  SaO2: 98 , initially put on BIPAP , patient was transferred to ICU , for impending respiratory failure and in ability to maintain his Airway , and was intubated by anesthesia after being brought to ICU .        PAST MEDICAL & SURGICAL HISTORY:  DM (diabetes mellitus)  Afib  CHF (congestive heart failure)  No significant past surgical history    Allergies    penicillin (Unknown)    Intolerances      FAMILY HISTORY:        Review of Systems: N/A  drowsy diaphoretic ,       Medications:  acetaminophen  IVPB. 1000 milliGRAM(s) IV Intermittent once  ALBUTerol    90 MICROgram(s) HFA Inhaler 2 Puff(s) Inhalation every 6 hours  ascorbic acid 500 milliGRAM(s) Oral daily  aspirin enteric coated 81 milliGRAM(s) Oral daily  dextrose 5%. 1000 milliLiter(s) IV Continuous <Continuous>  dextrose 50% Injectable 12.5 Gram(s) IV Push once  dextrose Gel 1 Dose(s) Oral once PRN  docusate sodium 100 milliGRAM(s) Oral two times a day  ertapenem  IVPB      ertapenem  IVPB 1000 milliGRAM(s) IV Intermittent every 24 hours  fentaNYL   Infusion 5 MICROgram(s)/kG/Hr IV Continuous <Continuous>  ferrous    sulfate 325 milliGRAM(s) Oral two times a day with meals  folic acid 1 milliGRAM(s) Oral daily  furosemide   Injectable 40 milliGRAM(s) IV Push once  glucagon  Injectable 1 milliGRAM(s) IntraMuscular once PRN  influenza   Vaccine 0.5 milliLiter(s) IntraMuscular once  insulin lispro (HumaLOG) corrective regimen sliding scale   SubCutaneous three times a day before meals  ipratropium 17 MICROgram(s) HFA Inhaler 1 Puff(s) Inhalation every 6 hours  metoprolol Injectable 5 milliGRAM(s) IV Push once  multivitamin 1 Tablet(s) Oral daily  pantoprazole  Injectable 40 milliGRAM(s) IV Push daily  polyethylene glycol 3350 17 Gram(s) Oral daily  sucralfate suspension 1 Gram(s) Oral every 6 hours  tamsulosin 0.4 milliGRAM(s) Oral at bedtime  tiotropium 18 MICROgram(s) Capsule 1 Capsule(s) Inhalation daily  zinc sulfate 220 milliGRAM(s) Oral daily      vent settings  Mode: AC/ CMV (Assist Control/ Continuous Mandatory Ventilation)  RR (machine): 14  TV (machine): 450  FiO2: 100  PEEP: 5  ITime: 1  MAP: 12  PIP: 23      Vital Signs Last 24 Hrs  T(C): 36.8 (14 Oct 2017 13:40), Max: 37.3 (14 Oct 2017 05:39)  T(F): 98.2 (14 Oct 2017 13:40), Max: 99.1 (14 Oct 2017 05:39)  HR: 121 (14 Oct 2017 20:30) (85 - 141)  BP: 134/89 (14 Oct 2017 18:46) (100/80 - 145/102)  BP(mean): --  RR: 32 (14 Oct 2017 18:46) (16 - 32)  SpO2: 100% (14 Oct 2017 20:30) (97% - 100%)    ABG - ( 14 Oct 2017 19:41 )  pH: 7.52  /  pCO2: 25    /  pO2: 102   / HCO3: 20    / Base Excess: -1.7  /  SaO2: 98                        LABS:                        9.4    15.2  )-----------( 322      ( 14 Oct 2017 19:47 )             32.1     10-14    144  |  110<H>  |  37<H>  ----------------------------<  174<H>  5.1   |  24  |  1.40<H>    Ca    8.7      14 Oct 2017 19:47  Phos  4.4     10-14  Mg     2.5     10-14    TPro  6.5  /  Alb  2.2<L>  /  TBili  0.5  /  DBili  x   /  AST  47<H>  /  ALT  34  /  AlkPhos  167<H>  10-14      CARDIAC MARKERS ( 14 Oct 2017 19:47 )  0.077 ng/mL / x     / 24 U/L / x     / 1.2 ng/mL      CAPILLARY BLOOD GLUCOSE  179 (14 Oct 2017 18:46)      POCT Blood Glucose.: 179 mg/dL (14 Oct 2017 18:18)    PT/INR - ( 14 Oct 2017 19:47 )   PT: 24.9 sec;   INR: 2.25 ratio         PTT - ( 14 Oct 2017 19:47 )  PTT:35.0 sec    CULTURES:  Culture Results:   No growth to date. (10-11 @ 13:27)  Culture Results:   No growth to date. (10-11 @ 13:27)  Culture Results:   Rare Proteus mirabilis ESBL  Few Enterococcus faecalis (vancomycin resistant)  Few Bacteroides thetaiotaomicron (10-09 @ 13:21)        Physical Examination:    General: diaphoretic respiratory distress .      HEENT: Pupils equal, reactive to light.  Symmetric.    PULM: harsh breath sounds , no rales or ronchi heard , tacypneic    CVS: Regular rate and rhythm, no murmurs, rubs, or gallops    ABD: Soft, nondistended, nontender, normoactive bowel sounds, no masses    EXT: No edema, nontender    SKIN: clammy , pale , no rashes noted     NEURO: drowsy arousal minimally to sternal rub.        CRITICAL CARE TIME SPENT: 35 minutes

## 2017-10-14 NOTE — PROGRESS NOTE ADULT - PROBLEM SELECTOR PLAN 1
ESBL ESBL Proteus bacteremia 2/2 to UTI - continued ertapenem 1g q 24 hours, (bacteremia with strep anginosis also in 4 of 4 bottles from sacral decub' wound culture growing ESBL proteus sensitive to ertapenem)  - repeat Bcx negative  - PICC placed in IR  - Dr. Lala, ID, stated pt can be discharge on Monday with Primaxin 500 mg., IV, q6H X 10 days.

## 2017-10-14 NOTE — PROGRESS NOTE ADULT - ASSESSMENT
83 y/o M from [facility] w/ PMHx of Rheumatic heart disease, NPH  (unsteady gait used to ambulate with walker now bedbound for a couple of months) & Afib (on Coumadin) was sent [facility] for low Hb.     Patient is poor historian and source of information is wife. Wife stated that patient is having cough with yellowish sputum for last 4 weeks with occasional temp spikes.    In the ED, patient is AxO x1, unable to interact. T 102.8, Hr 113, /69. S/p Vancomycni/Cefepime and Zithromax.  CXR showed interstitial prominence and opacity at the left lung base.     Patient was admitted to the medical floor with Sepsis 2/2 PNA vs. UTI

## 2017-10-15 LAB
ALBUMIN SERPL ELPH-MCNC: 2 G/DL — LOW (ref 3.5–5)
ALP SERPL-CCNC: 139 U/L — HIGH (ref 40–120)
ALT FLD-CCNC: 63 U/L DA — HIGH (ref 10–60)
ANION GAP SERPL CALC-SCNC: 11 MMOL/L — SIGNIFICANT CHANGE UP (ref 5–17)
ANION GAP SERPL CALC-SCNC: 7 MMOL/L — SIGNIFICANT CHANGE UP (ref 5–17)
APTT BLD: 35.5 SEC — SIGNIFICANT CHANGE UP (ref 27.5–37.4)
AST SERPL-CCNC: 89 U/L — HIGH (ref 10–40)
BASE EXCESS BLDA CALC-SCNC: 0.6 MMOL/L — SIGNIFICANT CHANGE UP (ref -2–2)
BASOPHILS # BLD AUTO: 0.1 K/UL — SIGNIFICANT CHANGE UP (ref 0–0.2)
BASOPHILS NFR BLD AUTO: 1 % — SIGNIFICANT CHANGE UP (ref 0–2)
BILIRUB SERPL-MCNC: 0.5 MG/DL — SIGNIFICANT CHANGE UP (ref 0.2–1.2)
BLOOD GAS COMMENTS ARTERIAL: SIGNIFICANT CHANGE UP
BUN SERPL-MCNC: 36 MG/DL — HIGH (ref 7–18)
BUN SERPL-MCNC: 37 MG/DL — HIGH (ref 7–18)
CALCIUM SERPL-MCNC: 8.3 MG/DL — LOW (ref 8.4–10.5)
CALCIUM SERPL-MCNC: 8.4 MG/DL — SIGNIFICANT CHANGE UP (ref 8.4–10.5)
CHLORIDE SERPL-SCNC: 113 MMOL/L — HIGH (ref 96–108)
CHLORIDE SERPL-SCNC: 115 MMOL/L — HIGH (ref 96–108)
CK MB BLD-MCNC: <6.2 % — HIGH (ref 0–3.5)
CK MB CFR SERPL CALC: <1 NG/ML — SIGNIFICANT CHANGE UP (ref 0–3.6)
CK SERPL-CCNC: 16 U/L — LOW (ref 35–232)
CO2 SERPL-SCNC: 25 MMOL/L — SIGNIFICANT CHANGE UP (ref 22–31)
CO2 SERPL-SCNC: 28 MMOL/L — SIGNIFICANT CHANGE UP (ref 22–31)
CREAT SERPL-MCNC: 1.25 MG/DL — SIGNIFICANT CHANGE UP (ref 0.5–1.3)
CREAT SERPL-MCNC: 1.26 MG/DL — SIGNIFICANT CHANGE UP (ref 0.5–1.3)
EOSINOPHIL # BLD AUTO: 0 K/UL — SIGNIFICANT CHANGE UP (ref 0–0.5)
EOSINOPHIL NFR BLD AUTO: 0.3 % — SIGNIFICANT CHANGE UP (ref 0–6)
GLUCOSE BLDC GLUCOMTR-MCNC: 161 MG/DL — HIGH (ref 70–99)
GLUCOSE SERPL-MCNC: 135 MG/DL — HIGH (ref 70–99)
GLUCOSE SERPL-MCNC: 98 MG/DL — SIGNIFICANT CHANGE UP (ref 70–99)
HCO3 BLDA-SCNC: 24 MMOL/L — SIGNIFICANT CHANGE UP (ref 23–27)
HCT VFR BLD CALC: 29.2 % — LOW (ref 39–50)
HGB BLD-MCNC: 8.5 G/DL — LOW (ref 13–17)
HOROWITZ INDEX BLDA+IHG-RTO: 40 — SIGNIFICANT CHANGE UP
INR BLD: 2.28 RATIO — HIGH (ref 0.88–1.16)
LYMPHOCYTES # BLD AUTO: 17.7 % — SIGNIFICANT CHANGE UP (ref 13–44)
LYMPHOCYTES # BLD AUTO: 2.2 K/UL — SIGNIFICANT CHANGE UP (ref 1–3.3)
MAGNESIUM SERPL-MCNC: 2.3 MG/DL — SIGNIFICANT CHANGE UP (ref 1.6–2.6)
MCHC RBC-ENTMCNC: 24.2 PG — LOW (ref 27–34)
MCHC RBC-ENTMCNC: 29 GM/DL — LOW (ref 32–36)
MCV RBC AUTO: 83.7 FL — SIGNIFICANT CHANGE UP (ref 80–100)
MONOCYTES # BLD AUTO: 0.9 K/UL — SIGNIFICANT CHANGE UP (ref 0–0.9)
MONOCYTES NFR BLD AUTO: 6.9 % — SIGNIFICANT CHANGE UP (ref 2–14)
NEUTROPHILS # BLD AUTO: 9.4 K/UL — HIGH (ref 1.8–7.4)
NEUTROPHILS NFR BLD AUTO: 74.1 % — SIGNIFICANT CHANGE UP (ref 43–77)
PCO2 BLDA: 36 MMHG — SIGNIFICANT CHANGE UP (ref 32–46)
PH BLDA: 7.44 — SIGNIFICANT CHANGE UP (ref 7.35–7.45)
PHOSPHATE SERPL-MCNC: 4.3 MG/DL — SIGNIFICANT CHANGE UP (ref 2.5–4.5)
PLATELET # BLD AUTO: 235 K/UL — SIGNIFICANT CHANGE UP (ref 150–400)
PO2 BLDA: 168 MMHG — HIGH (ref 74–108)
POTASSIUM SERPL-MCNC: 4.1 MMOL/L — SIGNIFICANT CHANGE UP (ref 3.5–5.3)
POTASSIUM SERPL-MCNC: 4.1 MMOL/L — SIGNIFICANT CHANGE UP (ref 3.5–5.3)
POTASSIUM SERPL-SCNC: 4.1 MMOL/L — SIGNIFICANT CHANGE UP (ref 3.5–5.3)
POTASSIUM SERPL-SCNC: 4.1 MMOL/L — SIGNIFICANT CHANGE UP (ref 3.5–5.3)
PROT SERPL-MCNC: 5.8 G/DL — LOW (ref 6–8.3)
PROTHROM AB SERPL-ACNC: 25.3 SEC — HIGH (ref 9.8–12.7)
RBC # BLD: 3.49 M/UL — LOW (ref 4.2–5.8)
RBC # FLD: 23.4 % — HIGH (ref 10.3–14.5)
SAO2 % BLDA: SIGNIFICANT CHANGE UP % (ref 92–96)
SODIUM SERPL-SCNC: 149 MMOL/L — HIGH (ref 135–145)
SODIUM SERPL-SCNC: 150 MMOL/L — HIGH (ref 135–145)
TROPONIN I SERPL-MCNC: 0.08 NG/ML — HIGH (ref 0–0.04)
WBC # BLD: 12.7 K/UL — HIGH (ref 3.8–10.5)
WBC # FLD AUTO: 12.7 K/UL — HIGH (ref 3.8–10.5)

## 2017-10-15 PROCEDURE — 71010: CPT | Mod: 26

## 2017-10-15 PROCEDURE — 70450 CT HEAD/BRAIN W/O DYE: CPT | Mod: 26

## 2017-10-15 RX ORDER — DOCUSATE SODIUM 100 MG
100 CAPSULE ORAL
Refills: 0 | Status: DISCONTINUED | OUTPATIENT
Start: 2017-10-15 | End: 2017-10-15

## 2017-10-15 RX ORDER — SODIUM CHLORIDE 9 MG/ML
1000 INJECTION INTRAMUSCULAR; INTRAVENOUS; SUBCUTANEOUS ONCE
Refills: 0 | Status: COMPLETED | OUTPATIENT
Start: 2017-10-15 | End: 2017-10-15

## 2017-10-15 RX ORDER — ACETAMINOPHEN 500 MG
650 TABLET ORAL EVERY 6 HOURS
Refills: 0 | Status: DISCONTINUED | OUTPATIENT
Start: 2017-10-15 | End: 2017-10-26

## 2017-10-15 RX ORDER — FERROUS SULFATE 325(65) MG
300 TABLET ORAL DAILY
Refills: 0 | Status: DISCONTINUED | OUTPATIENT
Start: 2017-10-15 | End: 2017-10-26

## 2017-10-15 RX ORDER — METOPROLOL TARTRATE 50 MG
25 TABLET ORAL
Refills: 0 | Status: DISCONTINUED | OUTPATIENT
Start: 2017-10-15 | End: 2017-10-26

## 2017-10-15 RX ORDER — DEXMEDETOMIDINE HYDROCHLORIDE IN 0.9% SODIUM CHLORIDE 4 UG/ML
0.5 INJECTION INTRAVENOUS
Qty: 200 | Refills: 0 | Status: DISCONTINUED | OUTPATIENT
Start: 2017-10-15 | End: 2017-10-16

## 2017-10-15 RX ORDER — INSULIN LISPRO 100/ML
VIAL (ML) SUBCUTANEOUS EVERY 6 HOURS
Refills: 0 | Status: DISCONTINUED | OUTPATIENT
Start: 2017-10-15 | End: 2017-10-17

## 2017-10-15 RX ORDER — SODIUM CHLORIDE 9 MG/ML
1000 INJECTION, SOLUTION INTRAVENOUS
Refills: 0 | Status: DISCONTINUED | OUTPATIENT
Start: 2017-10-15 | End: 2017-10-15

## 2017-10-15 RX ORDER — WARFARIN SODIUM 2.5 MG/1
5 TABLET ORAL ONCE
Refills: 0 | Status: COMPLETED | OUTPATIENT
Start: 2017-10-15 | End: 2017-10-15

## 2017-10-15 RX ADMIN — Medication 100 MILLIGRAM(S): at 06:45

## 2017-10-15 RX ADMIN — ALBUTEROL 2 PUFF(S): 90 AEROSOL, METERED ORAL at 09:52

## 2017-10-15 RX ADMIN — ALBUTEROL 2 PUFF(S): 90 AEROSOL, METERED ORAL at 15:40

## 2017-10-15 RX ADMIN — SODIUM CHLORIDE 75 MILLILITER(S): 9 INJECTION INTRAMUSCULAR; INTRAVENOUS; SUBCUTANEOUS at 12:01

## 2017-10-15 RX ADMIN — Medication 1: at 07:11

## 2017-10-15 RX ADMIN — PANTOPRAZOLE SODIUM 40 MILLIGRAM(S): 20 TABLET, DELAYED RELEASE ORAL at 12:01

## 2017-10-15 RX ADMIN — Medication 1 MILLIGRAM(S): at 11:46

## 2017-10-15 RX ADMIN — ERTAPENEM SODIUM 120 MILLIGRAM(S): 1 INJECTION, POWDER, LYOPHILIZED, FOR SOLUTION INTRAMUSCULAR; INTRAVENOUS at 11:43

## 2017-10-15 RX ADMIN — DEXMEDETOMIDINE HYDROCHLORIDE IN 0.9% SODIUM CHLORIDE 11.25 MICROGRAM(S)/KG/HR: 4 INJECTION INTRAVENOUS at 16:21

## 2017-10-15 RX ADMIN — SODIUM CHLORIDE 1000 MILLILITER(S): 9 INJECTION INTRAMUSCULAR; INTRAVENOUS; SUBCUTANEOUS at 23:29

## 2017-10-15 RX ADMIN — Medication 1 GRAM(S): at 17:24

## 2017-10-15 RX ADMIN — Medication 5 MILLIGRAM(S): at 06:40

## 2017-10-15 RX ADMIN — Medication 1 PUFF(S): at 09:52

## 2017-10-15 RX ADMIN — Medication 500 MILLIGRAM(S): at 11:47

## 2017-10-15 RX ADMIN — Medication 1 GRAM(S): at 11:47

## 2017-10-15 RX ADMIN — SODIUM CHLORIDE 1000 MILLILITER(S): 9 INJECTION INTRAMUSCULAR; INTRAVENOUS; SUBCUTANEOUS at 22:04

## 2017-10-15 RX ADMIN — Medication 1 GRAM(S): at 06:40

## 2017-10-15 RX ADMIN — Medication 1 GRAM(S): at 23:29

## 2017-10-15 RX ADMIN — Medication 1 PUFF(S): at 15:40

## 2017-10-15 RX ADMIN — DEXMEDETOMIDINE HYDROCHLORIDE IN 0.9% SODIUM CHLORIDE 11.25 MICROGRAM(S)/KG/HR: 4 INJECTION INTRAVENOUS at 13:50

## 2017-10-15 RX ADMIN — TAMSULOSIN HYDROCHLORIDE 0.4 MILLIGRAM(S): 0.4 CAPSULE ORAL at 21:18

## 2017-10-15 RX ADMIN — Medication 81 MILLIGRAM(S): at 11:47

## 2017-10-15 RX ADMIN — Medication 1 PUFF(S): at 20:35

## 2017-10-15 RX ADMIN — POLYETHYLENE GLYCOL 3350 17 GRAM(S): 17 POWDER, FOR SOLUTION ORAL at 11:49

## 2017-10-15 RX ADMIN — WARFARIN SODIUM 5 MILLIGRAM(S): 2.5 TABLET ORAL at 21:18

## 2017-10-15 RX ADMIN — Medication 300 MILLIGRAM(S): at 11:46

## 2017-10-15 RX ADMIN — ZINC SULFATE TAB 220 MG (50 MG ZINC EQUIVALENT) 220 MILLIGRAM(S): 220 (50 ZN) TAB at 11:47

## 2017-10-15 RX ADMIN — ALBUTEROL 2 PUFF(S): 90 AEROSOL, METERED ORAL at 20:35

## 2017-10-15 NOTE — PROGRESS NOTE ADULT - SUBJECTIVE AND OBJECTIVE BOX
INTERVAL HPI/OVERNIGHT EVENTS: trop1 = 0.077, continue to trend    PRESSORS: [ ] YES [x ] NO  WHICH:    ANTIBIOTICS: ertapenem                  DATE STARTED: 10/9    Antimicrobial:  ertapenem  IVPB      ertapenem  IVPB 1000 milliGRAM(s) IV Intermittent every 24 hours    Cardiovascular:  metoprolol Injectable 5 milliGRAM(s) IV Push every 6 hours  tamsulosin 0.4 milliGRAM(s) Oral at bedtime    Pulmonary:  ALBUTerol    90 MICROgram(s) HFA Inhaler 2 Puff(s) Inhalation every 6 hours  ipratropium 17 MICROgram(s) HFA Inhaler 1 Puff(s) Inhalation every 6 hours  tiotropium 18 MICROgram(s) Capsule 1 Capsule(s) Inhalation daily    Hematalogic:  aspirin enteric coated 81 milliGRAM(s) Oral daily    Other:  acetaminophen  IVPB. 1000 milliGRAM(s) IV Intermittent once  ascorbic acid 500 milliGRAM(s) Oral daily  dextrose 5%. 1000 milliLiter(s) IV Continuous <Continuous>  dextrose 50% Injectable 12.5 Gram(s) IV Push once  dextrose Gel 1 Dose(s) Oral once PRN  docusate sodium 100 milliGRAM(s) Oral two times a day  fentaNYL   Infusion 5 MICROgram(s)/kG/Hr IV Continuous <Continuous>  ferrous    sulfate 325 milliGRAM(s) Oral two times a day with meals  folic acid 1 milliGRAM(s) Oral daily  glucagon  Injectable 1 milliGRAM(s) IntraMuscular once PRN  influenza   Vaccine 0.5 milliLiter(s) IntraMuscular once  insulin lispro (HumaLOG) corrective regimen sliding scale   SubCutaneous three times a day before meals  multivitamin 1 Tablet(s) Oral daily  pantoprazole  Injectable 40 milliGRAM(s) IV Push daily  polyethylene glycol 3350 17 Gram(s) Oral daily  sodium chloride 0.9%. 1000 milliLiter(s) IV Continuous <Continuous>  sucralfate suspension 1 Gram(s) Oral every 6 hours  zinc sulfate 220 milliGRAM(s) Oral daily    acetaminophen  IVPB. 1000 milliGRAM(s) IV Intermittent once  ALBUTerol    90 MICROgram(s) HFA Inhaler 2 Puff(s) Inhalation every 6 hours  ascorbic acid 500 milliGRAM(s) Oral daily  aspirin enteric coated 81 milliGRAM(s) Oral daily  dextrose 5%. 1000 milliLiter(s) IV Continuous <Continuous>  dextrose 50% Injectable 12.5 Gram(s) IV Push once  dextrose Gel 1 Dose(s) Oral once PRN  docusate sodium 100 milliGRAM(s) Oral two times a day  ertapenem  IVPB      ertapenem  IVPB 1000 milliGRAM(s) IV Intermittent every 24 hours  fentaNYL   Infusion 5 MICROgram(s)/kG/Hr IV Continuous <Continuous>  ferrous    sulfate 325 milliGRAM(s) Oral two times a day with meals  folic acid 1 milliGRAM(s) Oral daily  glucagon  Injectable 1 milliGRAM(s) IntraMuscular once PRN  influenza   Vaccine 0.5 milliLiter(s) IntraMuscular once  insulin lispro (HumaLOG) corrective regimen sliding scale   SubCutaneous three times a day before meals  ipratropium 17 MICROgram(s) HFA Inhaler 1 Puff(s) Inhalation every 6 hours  metoprolol Injectable 5 milliGRAM(s) IV Push every 6 hours  multivitamin 1 Tablet(s) Oral daily  pantoprazole  Injectable 40 milliGRAM(s) IV Push daily  polyethylene glycol 3350 17 Gram(s) Oral daily  sodium chloride 0.9%. 1000 milliLiter(s) IV Continuous <Continuous>  sucralfate suspension 1 Gram(s) Oral every 6 hours  tamsulosin 0.4 milliGRAM(s) Oral at bedtime  tiotropium 18 MICROgram(s) Capsule 1 Capsule(s) Inhalation daily  zinc sulfate 220 milliGRAM(s) Oral daily    Drug Dosing Weight  Height (cm): 177.8 (05 Oct 2017 16:02)  Weight (kg): 81.6 (05 Oct 2017 16:02)  BMI (kg/m2): 25.8 (05 Oct 2017 16:02)  BSA (m2): 2 (05 Oct 2017 16:02)    CENTRAL LINE: [ ] YES [x ] NO  LOCATION:   DATE INSERTED:  REMOVE: [ ] YES [ ] NO  EXPLAIN:    R PICC line 10/14    CABRERA: [x ] YES [ ] NO    DATE INSERTED: 10/14  REMOVE:  [ ] YES [ ] NO  EXPLAIN:    A-LINE:  [ ] YES [x ] NO  LOCATION:   DATE INSERTED:  REMOVE:  [ ] YES [ ] NO  EXPLAIN:    PMH -reviewed admission note, no change since admission    ICU Vital Signs Last 24 Hrs  T(C): 37.4 (15 Oct 2017 00:43), Max: 38.2 (14 Oct 2017 20:11)  T(F): 99.3 (15 Oct 2017 00:43), Max: 100.7 (14 Oct 2017 20:11)  HR: 78 (15 Oct 2017 01:00) (78 - 141)  BP: 99/58 (15 Oct 2017 01:00) (91/75 - 145/102)  BP(mean): 69 (15 Oct 2017 01:00) (69 - 98)  ABP: --  ABP(mean): --  RR: 15 (15 Oct 2017 01:00) (13 - 32)  SpO2: 100% (15 Oct 2017 01:00) (97% - 100%)    ABG - ( 14 Oct 2017 20:52 )  pH: 7.48  /  pCO2: 28    /  pO2: 490   / HCO3: 20    / Base Excess: -2.2  /  SaO2: NR        Mode: AC/ CMV (Assist Control/ Continuous Mandatory Ventilation)  RR (machine): 14  TV (machine): 450  FiO2: 40  PEEP: 5  ITime: 1  MAP: 11  PIP: 26      PHYSICAL EXAM:    GENERAL:   HEAD: atraumatic, normocephalic   EYES: PERRLA, white sclera.   ENMT: nasal mucosa-moist, Oral cavity- no exudate  NECK: supple, JVD/ no JVD  SKIN: warm, dry   CHEST/LUNG:  No Chest deformity , no chest tenderness. bilateral breath sounds,no  adventitious sounds  HEART: irregular rate and rhythm, no m/r/g   ABDOMEN: soft, nontender, nondistended; bowel sounds.  : cabrera catheter.  EXTREMITIES: no peripheral edema, no cyanosis, no clubbing.  NEURO: AA0X0, sedated , no focal neuro deficits    LABS:  CBC Full  -  ( 14 Oct 2017 19:47 )  WBC Count : 15.2 K/uL  Hemoglobin : 9.4 g/dL  Hematocrit : 32.1 %  Platelet Count - Automated : 322 K/uL  Mean Cell Volume : 83.4 fl  Mean Cell Hemoglobin : 24.4 pg  Mean Cell Hemoglobin Concentration : 29.3 gm/dL  Auto Neutrophil # : 12.0 K/uL  Auto Lymphocyte # : 1.9 K/uL  Auto Monocyte # : 1.0 K/uL  Auto Eosinophil # : 0.0 K/uL  Auto Basophil # : 0.2 K/uL  Auto Neutrophil % : 79.2 %  Auto Lymphocyte % : 12.4 %  Auto Monocyte % : 6.9 %  Auto Eosinophil % : 0.2 %  Auto Basophil % : 1.3 %    10-14    144  |  110<H>  |  37<H>  ----------------------------<  174<H>  5.1   |  24  |  1.40<H>    Ca    8.7      14 Oct 2017 19:47  Phos  4.4     10-14  Mg     2.5     10-14    TPro  6.5  /  Alb  2.2<L>  /  TBili  0.5  /  DBili  x   /  AST  47<H>  /  ALT  34  /  AlkPhos  167<H>  10-14    PT/INR - ( 14 Oct 2017 19:47 )   PT: 24.9 sec;   INR: 2.25 ratio      PTT - ( 14 Oct 2017 19:47 )  PTT:35.0 sec    RADIOLOGY & ADDITIONAL STUDIES REVIEWED:      CXR:    [ ]GOALS OF CARE DISCUSSION WITH PATIENT/FAMILY/PROXY: full code    CRITICAL CARE TIME SPENT: 35 minutes INTERVAL HPI/OVERNIGHT EVENTS: trop1 = 0.077, continue to trend    PRESSORS: [ ] YES [x ] NO  WHICH:    ANTIBIOTICS: ertapenem                  DATE STARTED: 10/9    Antimicrobial:  ertapenem  IVPB      ertapenem  IVPB 1000 milliGRAM(s) IV Intermittent every 24 hours    Cardiovascular:  metoprolol Injectable 5 milliGRAM(s) IV Push every 6 hours  tamsulosin 0.4 milliGRAM(s) Oral at bedtime    Pulmonary:  ALBUTerol    90 MICROgram(s) HFA Inhaler 2 Puff(s) Inhalation every 6 hours  ipratropium 17 MICROgram(s) HFA Inhaler 1 Puff(s) Inhalation every 6 hours  tiotropium 18 MICROgram(s) Capsule 1 Capsule(s) Inhalation daily    Hematalogic:  aspirin enteric coated 81 milliGRAM(s) Oral daily    Other:  acetaminophen  IVPB. 1000 milliGRAM(s) IV Intermittent once  ascorbic acid 500 milliGRAM(s) Oral daily  dextrose 5%. 1000 milliLiter(s) IV Continuous <Continuous>  dextrose 50% Injectable 12.5 Gram(s) IV Push once  dextrose Gel 1 Dose(s) Oral once PRN  docusate sodium 100 milliGRAM(s) Oral two times a day  fentaNYL   Infusion 5 MICROgram(s)/kG/Hr IV Continuous <Continuous>  ferrous    sulfate 325 milliGRAM(s) Oral two times a day with meals  folic acid 1 milliGRAM(s) Oral daily  glucagon  Injectable 1 milliGRAM(s) IntraMuscular once PRN  influenza   Vaccine 0.5 milliLiter(s) IntraMuscular once  insulin lispro (HumaLOG) corrective regimen sliding scale   SubCutaneous three times a day before meals  multivitamin 1 Tablet(s) Oral daily  pantoprazole  Injectable 40 milliGRAM(s) IV Push daily  polyethylene glycol 3350 17 Gram(s) Oral daily  sodium chloride 0.9%. 1000 milliLiter(s) IV Continuous <Continuous>  sucralfate suspension 1 Gram(s) Oral every 6 hours  zinc sulfate 220 milliGRAM(s) Oral daily    acetaminophen  IVPB. 1000 milliGRAM(s) IV Intermittent once  ALBUTerol    90 MICROgram(s) HFA Inhaler 2 Puff(s) Inhalation every 6 hours  ascorbic acid 500 milliGRAM(s) Oral daily  aspirin enteric coated 81 milliGRAM(s) Oral daily  dextrose 5%. 1000 milliLiter(s) IV Continuous <Continuous>  dextrose 50% Injectable 12.5 Gram(s) IV Push once  dextrose Gel 1 Dose(s) Oral once PRN  docusate sodium 100 milliGRAM(s) Oral two times a day  ertapenem  IVPB      ertapenem  IVPB 1000 milliGRAM(s) IV Intermittent every 24 hours  fentaNYL   Infusion 5 MICROgram(s)/kG/Hr IV Continuous <Continuous>  ferrous    sulfate 325 milliGRAM(s) Oral two times a day with meals  folic acid 1 milliGRAM(s) Oral daily  glucagon  Injectable 1 milliGRAM(s) IntraMuscular once PRN  influenza   Vaccine 0.5 milliLiter(s) IntraMuscular once  insulin lispro (HumaLOG) corrective regimen sliding scale   SubCutaneous three times a day before meals  ipratropium 17 MICROgram(s) HFA Inhaler 1 Puff(s) Inhalation every 6 hours  metoprolol Injectable 5 milliGRAM(s) IV Push every 6 hours  multivitamin 1 Tablet(s) Oral daily  pantoprazole  Injectable 40 milliGRAM(s) IV Push daily  polyethylene glycol 3350 17 Gram(s) Oral daily  sodium chloride 0.9%. 1000 milliLiter(s) IV Continuous <Continuous>  sucralfate suspension 1 Gram(s) Oral every 6 hours  tamsulosin 0.4 milliGRAM(s) Oral at bedtime  tiotropium 18 MICROgram(s) Capsule 1 Capsule(s) Inhalation daily  zinc sulfate 220 milliGRAM(s) Oral daily    Drug Dosing Weight  Height (cm): 177.8 (05 Oct 2017 16:02)  Weight (kg): 81.6 (05 Oct 2017 16:02)  BMI (kg/m2): 25.8 (05 Oct 2017 16:02)  BSA (m2): 2 (05 Oct 2017 16:02)    CENTRAL LINE: [ ] YES [x ] NO  LOCATION:   DATE INSERTED:  REMOVE: [ ] YES [ ] NO  EXPLAIN:    R PICC line 10/14    CABRERA: [x ] YES [ ] NO    DATE INSERTED: 10/14  REMOVE:  [ ] YES [ ] NO  EXPLAIN:    A-LINE:  [ ] YES [x ] NO  LOCATION:   DATE INSERTED:  REMOVE:  [ ] YES [ ] NO  EXPLAIN:    PMH -reviewed admission note, no change since admission    ICU Vital Signs Last 24 Hrs  T(C): 37.4 (15 Oct 2017 00:43), Max: 38.2 (14 Oct 2017 20:11)  T(F): 99.3 (15 Oct 2017 00:43), Max: 100.7 (14 Oct 2017 20:11)  HR: 78 (15 Oct 2017 01:00) (78 - 141)  BP: 99/58 (15 Oct 2017 01:00) (91/75 - 145/102)  BP(mean): 69 (15 Oct 2017 01:00) (69 - 98)  ABP: --  ABP(mean): --  RR: 15 (15 Oct 2017 01:00) (13 - 32)  SpO2: 100% (15 Oct 2017 01:00) (97% - 100%)    ABG - ( 14 Oct 2017 20:52 )  pH: 7.48  /  pCO2: 28    /  pO2: 490   / HCO3: 20    / Base Excess: -2.2  /  SaO2: NR        Mode: AC/ CMV (Assist Control/ Continuous Mandatory Ventilation)  RR (machine): 14  TV (machine): 450  FiO2: 40  PEEP: 5  ITime: 1  MAP: 11  PIP: 26      PHYSICAL EXAM:    GENERAL:   HEAD: atraumatic, normocephalic   EYES: PERRLA, white sclera.   ENMT: nasal mucosa-moist, Oral cavity- no exudate  NECK: supple  SKIN: warm, dry   CHEST/LUNG:  No Chest deformity , no chest tenderness. bilateral breath sounds,no  adventitious sounds  HEART: irregular rate and rhythm, no m/r/g   ABDOMEN: soft, nontender, nondistended; bowel sounds.  : cabrera catheter.  EXTREMITIES: no peripheral edema, no cyanosis, no clubbing.  NEURO: AA0X0, sedated     LABS:  CBC Full  -  ( 14 Oct 2017 19:47 )  WBC Count : 15.2 K/uL  Hemoglobin : 9.4 g/dL  Hematocrit : 32.1 %  Platelet Count - Automated : 322 K/uL  Mean Cell Volume : 83.4 fl  Mean Cell Hemoglobin : 24.4 pg  Mean Cell Hemoglobin Concentration : 29.3 gm/dL  Auto Neutrophil # : 12.0 K/uL  Auto Lymphocyte # : 1.9 K/uL  Auto Monocyte # : 1.0 K/uL  Auto Eosinophil # : 0.0 K/uL  Auto Basophil # : 0.2 K/uL  Auto Neutrophil % : 79.2 %  Auto Lymphocyte % : 12.4 %  Auto Monocyte % : 6.9 %  Auto Eosinophil % : 0.2 %  Auto Basophil % : 1.3 %    10-14    144  |  110<H>  |  37<H>  ----------------------------<  174<H>  5.1   |  24  |  1.40<H>    Ca    8.7      14 Oct 2017 19:47  Phos  4.4     10-14  Mg     2.5     10-14    TPro  6.5  /  Alb  2.2<L>  /  TBili  0.5  /  DBili  x   /  AST  47<H>  /  ALT  34  /  AlkPhos  167<H>  10-14    PT/INR - ( 14 Oct 2017 19:47 )   PT: 24.9 sec;   INR: 2.25 ratio      PTT - ( 14 Oct 2017 19:47 )  PTT:35.0 sec    RADIOLOGY & ADDITIONAL STUDIES REVIEWED:      CXR:    [ ]GOALS OF CARE DISCUSSION WITH PATIENT/FAMILY/PROXY: full code    CRITICAL CARE TIME SPENT: 35 minutes

## 2017-10-15 NOTE — PROGRESS NOTE ADULT - ASSESSMENT
y/o M from [facility] w/ PMHx of Rheumatic heart disease, NPH  (unsteady gait used to ambulate with walker now bedbound for a couple of months) & Afib (on Coumadin), CHF being treated on this hospitalization for ESBL bacteremia , now transfered to ICU for respiratory failure and altered mental status .     Problem/Recommendation - 1:  Problem: Respiratory failure. Recommendation: increasingly drowsy with tacypnea and tachycardia  unable to protect airway  ABG :pH: 7.52  /  pCO2: 25    /  pO2: 102   / HCO3: 20    / Base Excess: -1.7  /  SaO2: 98  Intubated for airway protection and respiratory distress  c/w mechanical ventilation and iv sedation  cxr is less congested from prior CXR  labs pertinent for worsening MATHEUS  and leucocytosis  c/w iv abx  cE also trending up  trend CE f/u repeat EKG and ECHO  when more Stable obtain CT scan to rule out CVA( patient has underlying history of Afib).     Problem/Recommendation - 2:  ·  Problem: Bacteremia.  Recommendation: ESBL bacteremia  ESBL in urine blood and sacral DU debridement tissue  on Etrapenem Day no 5   repeat blood CX negative  patient has a PICC line   c/w invanz.      Problem/Recommendation - 3:  ·  Problem: Afib.  Recommendation: lopressor IV for rate control  c/w coumadin  f/u INR.      Problem/Recommendation - 4:  ·  Problem: CHF (congestive heart failure).  Recommendation: lasix was on hold because of sepsis  s/p 1 dose of lasix because of suspicion of pulmonary edema  c/w asa lopressor .  cardiology evaluation.      Problem/Recommendation - 5:  ·  Problem: Anemia.  Recommendation: hemoglobin 9.4  got 1 unit PRBC this admission  presented with hemoglobin of 7.0  c/w carafate protonix iv  monitor h/h.      Problem/Recommendation - 6:  Problem: DM (diabetes mellitus). Recommendation: c/w insulin sliding scale  monitor Accu check.     Problem/Recommendation - 7:  Problem: Need for prophylactic measure. Recommendation: on coumadin and protonix.

## 2017-10-15 NOTE — PROGRESS NOTE ADULT - ASSESSMENT
83 y/o M from [facility] w/ PMHx of Rheumatic heart disease, NPH  (unsteady gait used to ambulate with walker now bedbound for a couple of months) & Afib (on Coumadin) was sent from [facility] for low Hb.     Patient is poor historian and source of information is wife. Wife stated that patient is having cough with yellowish sputum for last 4 weeks with occasional temp spikes.    In the ED, patient is AxO x1, unable to interact. T 102.8, Hr 113, /69. S/p Vancomycni/Cefepime and Zithromax.  CXR showed interstitial prominence and opacity at the left lung base.     Patient was admitted to the medical floor with Sepsis 2/2 PNA vs. UTI      Unresponsiveness on 10/14/17

## 2017-10-15 NOTE — PROGRESS NOTE ADULT - SUBJECTIVE AND OBJECTIVE BOX
Med attending follow up:    Patient is a 82y old  Male who presents with a chief complaint of low hb level in NH (05 Oct 2017 19:36)    An episode of unresponsiveness yesterday  INTERVAL HPI/OVERNIGHT EVENTS: Interim events noted, intubated, sedated in ICU.    MEDICATIONS  (STANDING):  acetaminophen  IVPB. 1000 milliGRAM(s) IV Intermittent once  ALBUTerol    90 MICROgram(s) HFA Inhaler 2 Puff(s) Inhalation every 6 hours  ascorbic acid 500 milliGRAM(s) Oral daily  aspirin enteric coated 81 milliGRAM(s) Oral daily  dextrose 5%. 1000 milliLiter(s) (50 mL/Hr) IV Continuous <Continuous>  dextrose 50% Injectable 12.5 Gram(s) IV Push once  docusate sodium 100 milliGRAM(s) Oral two times a day  ertapenem  IVPB      ertapenem  IVPB 1000 milliGRAM(s) IV Intermittent every 24 hours  ferrous    sulfate 325 milliGRAM(s) Oral two times a day with meals  folic acid 1 milliGRAM(s) Oral daily  heparin  Injectable 5000 Unit(s) SubCutaneous every 12 hours  influenza   Vaccine 0.5 milliLiter(s) IntraMuscular once  insulin lispro (HumaLOG) corrective regimen sliding scale   SubCutaneous three times a day before meals  multivitamin 1 Tablet(s) Oral daily  pantoprazole    Tablet 40 milliGRAM(s) Oral before breakfast  polyethylene glycol 3350 17 Gram(s) Oral daily  sucralfate suspension 1 Gram(s) Oral every 6 hours  tamsulosin 0.4 milliGRAM(s) Oral at bedtime  tiotropium 18 MICROgram(s) Capsule 1 Capsule(s) Inhalation daily  warfarin 5 milliGRAM(s) Oral once  zinc sulfate 220 milliGRAM(s) Oral daily    MEDICATIONS  (PRN):  dextrose Gel 1 Dose(s) Oral once PRN Blood Glucose LESS THAN 70 milliGRAM(s)/deciLiter  glucagon  Injectable 1 milliGRAM(s) IntraMuscular once PRN Glucose <70 milliGRAM(s)/deciLiter    ICU Vital Signs Last 24 Hrs  T(C): 37.1 (15 Oct 2017 11:00), Max: 38.2 (14 Oct 2017 20:11)  T(F): 98.7 (15 Oct 2017 11:00), Max: 100.7 (14 Oct 2017 20:11)  HR: 84 (15 Oct 2017 16:17) (71 - 141)  BP: 84/58 (15 Oct 2017 16:00) (84/58 - 145/102)  BP(mean): 65 (15 Oct 2017 16:00) (63 - 98)  ABP: --  ABP(mean): --  RR: 17 (15 Oct 2017 16:00) (10 - 32)  SpO2: 98% (15 Oct 2017 16:17) (98% - 100%)    __________________________________________________  REVIEW OF SYSTEMS:    CONSTITUTIONAL: No fever,   EYES: no acute visual disturbances  NECK: No pain or stiffness  RESPIRATORY: No cough; No shortness of breath  CARDIOVASCULAR: No chest pain, no palpitations  GASTROINTESTINAL: No pain. No nausea or vomiting; No diarrhea   NEUROLOGICAL: No headache or numbness, no tremors  MUSCULOSKELETAL: No joint pain, no muscle pain  GENITOURINARY: no dysuria, no frequency, no hesitancy  PSYCHIATRY: no depression , no anxiety  ALL OTHER  ROS negative    T(C): 37.3 (10-14-17 @ 05:39), Max: 37.3 (10-14-17 @ 05:39)  HR: 110 (10-14-17 @ 05:39) (99 - 110)  BP: 117/88 (10-14-17 @ 05:39) (117/88 - 127/93)  RR: 16 (10-14-17 @ 05:39) (16 - 17)  SpO2: 99% (10-14-17 @ 05:39) (99% - 100%)  Wt(kg): --    ________________________________________________  PHYSICAL EXAM:  GENERAL: NAD  HEENT: Normocephalic;  conjunctivae and sclerae clear; moist mucous membranes;   NECK : supple  CHEST/LUNG: Clear to auscultation bilaterally with good air entry   HEART: S1 S2  regular; no murmurs, gallops or rubs  ABDOMEN: Soft, Nontender, Nondistended; Bowel sounds present.    EXTREMITIES: no cyanosis; no edema; no calf tenderness  SKIN: warm and dry; no rash.  Sacral dressing changed  NERVOUS SYSTEM:  Awake and alert; Oriented  to place, person and time ; no new deficits    _________________________________________________  LABS:                        8.5    12.7  )-----------( 235      ( 15 Oct 2017 06:35 )             29.2   10-15    150<H>  |  115<H>  |  37<H>  ----------------------------<  98  4.1   |  28  |  1.25    Ca    8.3<L>      15 Oct 2017 10:23  Phos  4.3     10-15  Mg     2.3     10-15    TPro  5.8<L>  /  Alb  2.0<L>  /  TBili  0.5  /  DBili  x   /  AST  89<H>  /  ALT  63<H>  /  AlkPhos  139<H>  10-15            PT/INR - ( 13 Oct 2017 07:29 )   PT: 16.5 sec;   INR: 1.50 ratio             CAPILLARY BLOOD GLUCOSE      POCT Blood Glucose.: 169 mg/dL (13 Oct 2017 07:27)  POCT Blood Glucose.: 153 mg/dL (12 Oct 2017 21:45)  POCT Blood Glucose.: 128 mg/dL (12 Oct 2017 16:14)        RADIOLOGY & ADDITIONAL TESTS:    Imaging Personally Reviewed:  YES    Consultant(s) Notes Reviewed:   YES    Care Discussed with Consultants :     Plan of care was discussed with patient and /or primary care giver; all questions and concerns were addressed and care was aligned with patient's wishes.

## 2017-10-16 DIAGNOSIS — I50.23 ACUTE ON CHRONIC SYSTOLIC (CONGESTIVE) HEART FAILURE: ICD-10-CM

## 2017-10-16 DIAGNOSIS — R53.2 FUNCTIONAL QUADRIPLEGIA: ICD-10-CM

## 2017-10-16 DIAGNOSIS — R56.9 UNSPECIFIED CONVULSIONS: ICD-10-CM

## 2017-10-16 DIAGNOSIS — G93.41 METABOLIC ENCEPHALOPATHY: ICD-10-CM

## 2017-10-16 DIAGNOSIS — J96.21 ACUTE AND CHRONIC RESPIRATORY FAILURE WITH HYPOXIA: ICD-10-CM

## 2017-10-16 DIAGNOSIS — Z51.5 ENCOUNTER FOR PALLIATIVE CARE: ICD-10-CM

## 2017-10-16 DIAGNOSIS — F02.80 DEMENTIA IN OTHER DISEASES CLASSIFIED ELSEWHERE, UNSPECIFIED SEVERITY, WITHOUT BEHAVIORAL DISTURBANCE, PSYCHOTIC DISTURBANCE, MOOD DISTURBANCE, AND ANXIETY: ICD-10-CM

## 2017-10-16 LAB
ALBUMIN SERPL ELPH-MCNC: 1.8 G/DL — LOW (ref 3.5–5)
ALP SERPL-CCNC: 117 U/L — SIGNIFICANT CHANGE UP (ref 40–120)
ALT FLD-CCNC: 62 U/L DA — HIGH (ref 10–60)
ANION GAP SERPL CALC-SCNC: 7 MMOL/L — SIGNIFICANT CHANGE UP (ref 5–17)
ANION GAP SERPL CALC-SCNC: 7 MMOL/L — SIGNIFICANT CHANGE UP (ref 5–17)
AST SERPL-CCNC: 44 U/L — HIGH (ref 10–40)
BASE EXCESS BLDA CALC-SCNC: -1.1 MMOL/L — SIGNIFICANT CHANGE UP (ref -2–2)
BASOPHILS # BLD AUTO: 0.1 K/UL — SIGNIFICANT CHANGE UP (ref 0–0.2)
BASOPHILS NFR BLD AUTO: 0.9 % — SIGNIFICANT CHANGE UP (ref 0–2)
BILIRUB SERPL-MCNC: 0.4 MG/DL — SIGNIFICANT CHANGE UP (ref 0.2–1.2)
BLOOD GAS COMMENTS ARTERIAL: SIGNIFICANT CHANGE UP
BUN SERPL-MCNC: 35 MG/DL — HIGH (ref 7–18)
BUN SERPL-MCNC: 37 MG/DL — HIGH (ref 7–18)
CALCIUM SERPL-MCNC: 7.6 MG/DL — LOW (ref 8.4–10.5)
CALCIUM SERPL-MCNC: 7.7 MG/DL — LOW (ref 8.4–10.5)
CHLORIDE SERPL-SCNC: 113 MMOL/L — HIGH (ref 96–108)
CHLORIDE SERPL-SCNC: 115 MMOL/L — HIGH (ref 96–108)
CO2 SERPL-SCNC: 25 MMOL/L — SIGNIFICANT CHANGE UP (ref 22–31)
CO2 SERPL-SCNC: 26 MMOL/L — SIGNIFICANT CHANGE UP (ref 22–31)
CREAT SERPL-MCNC: 1.06 MG/DL — SIGNIFICANT CHANGE UP (ref 0.5–1.3)
CREAT SERPL-MCNC: 1.11 MG/DL — SIGNIFICANT CHANGE UP (ref 0.5–1.3)
CULTURE RESULTS: SIGNIFICANT CHANGE UP
CULTURE RESULTS: SIGNIFICANT CHANGE UP
EOSINOPHIL # BLD AUTO: 0.1 K/UL — SIGNIFICANT CHANGE UP (ref 0–0.5)
EOSINOPHIL NFR BLD AUTO: 0.8 % — SIGNIFICANT CHANGE UP (ref 0–6)
GLUCOSE SERPL-MCNC: 119 MG/DL — HIGH (ref 70–99)
GLUCOSE SERPL-MCNC: 131 MG/DL — HIGH (ref 70–99)
HCO3 BLDA-SCNC: 22 MMOL/L — LOW (ref 23–27)
HCT VFR BLD CALC: 26.3 % — LOW (ref 39–50)
HGB BLD-MCNC: 8 G/DL — LOW (ref 13–17)
HOROWITZ INDEX BLDA+IHG-RTO: 30 — SIGNIFICANT CHANGE UP
INR BLD: 3.36 RATIO — HIGH (ref 0.88–1.16)
LYMPHOCYTES # BLD AUTO: 1.6 K/UL — SIGNIFICANT CHANGE UP (ref 1–3.3)
LYMPHOCYTES # BLD AUTO: 17.1 % — SIGNIFICANT CHANGE UP (ref 13–44)
MAGNESIUM SERPL-MCNC: 2.2 MG/DL — SIGNIFICANT CHANGE UP (ref 1.6–2.6)
MCHC RBC-ENTMCNC: 25.6 PG — LOW (ref 27–34)
MCHC RBC-ENTMCNC: 30.3 GM/DL — LOW (ref 32–36)
MCV RBC AUTO: 84.5 FL — SIGNIFICANT CHANGE UP (ref 80–100)
MONOCYTES # BLD AUTO: 0.5 K/UL — SIGNIFICANT CHANGE UP (ref 0–0.9)
MONOCYTES NFR BLD AUTO: 5.7 % — SIGNIFICANT CHANGE UP (ref 2–14)
NEUTROPHILS # BLD AUTO: 7.3 K/UL — SIGNIFICANT CHANGE UP (ref 1.8–7.4)
NEUTROPHILS NFR BLD AUTO: 75.5 % — SIGNIFICANT CHANGE UP (ref 43–77)
PCO2 BLDA: 24 MMHG — LOW (ref 32–46)
PH BLDA: 7.44 — SIGNIFICANT CHANGE UP (ref 7.35–7.45)
PHOSPHATE SERPL-MCNC: 3.4 MG/DL — SIGNIFICANT CHANGE UP (ref 2.5–4.5)
PLATELET # BLD AUTO: 178 K/UL — SIGNIFICANT CHANGE UP (ref 150–400)
PO2 BLDA: 98 MMHG — SIGNIFICANT CHANGE UP (ref 74–108)
POTASSIUM SERPL-MCNC: 3.4 MMOL/L — LOW (ref 3.5–5.3)
POTASSIUM SERPL-MCNC: 3.6 MMOL/L — SIGNIFICANT CHANGE UP (ref 3.5–5.3)
POTASSIUM SERPL-SCNC: 3.4 MMOL/L — LOW (ref 3.5–5.3)
POTASSIUM SERPL-SCNC: 3.6 MMOL/L — SIGNIFICANT CHANGE UP (ref 3.5–5.3)
PROT SERPL-MCNC: 5.2 G/DL — LOW (ref 6–8.3)
PROTHROM AB SERPL-ACNC: 37.5 SEC — HIGH (ref 9.8–12.7)
RBC # BLD: 3.11 M/UL — LOW (ref 4.2–5.8)
RBC # FLD: 24.1 % — HIGH (ref 10.3–14.5)
SAO2 % BLDA: 97 % — HIGH (ref 92–96)
SODIUM SERPL-SCNC: 146 MMOL/L — HIGH (ref 135–145)
SODIUM SERPL-SCNC: 147 MMOL/L — HIGH (ref 135–145)
SPECIMEN SOURCE: SIGNIFICANT CHANGE UP
SPECIMEN SOURCE: SIGNIFICANT CHANGE UP
WBC # BLD: 9.6 K/UL — SIGNIFICANT CHANGE UP (ref 3.8–10.5)
WBC # FLD AUTO: 9.6 K/UL — SIGNIFICANT CHANGE UP (ref 3.8–10.5)

## 2017-10-16 PROCEDURE — 99223 1ST HOSP IP/OBS HIGH 75: CPT

## 2017-10-16 PROCEDURE — 99497 ADVNCD CARE PLAN 30 MIN: CPT | Mod: 25

## 2017-10-16 PROCEDURE — 71010: CPT | Mod: 26,77

## 2017-10-16 PROCEDURE — 71010: CPT | Mod: 26

## 2017-10-16 PROCEDURE — 95819 EEG AWAKE AND ASLEEP: CPT | Mod: 26

## 2017-10-16 RX ORDER — SODIUM CHLORIDE 9 MG/ML
500 INJECTION INTRAMUSCULAR; INTRAVENOUS; SUBCUTANEOUS ONCE
Refills: 0 | Status: COMPLETED | OUTPATIENT
Start: 2017-10-16 | End: 2017-10-16

## 2017-10-16 RX ORDER — IPRATROPIUM/ALBUTEROL SULFATE 18-103MCG
3 AEROSOL WITH ADAPTER (GRAM) INHALATION EVERY 6 HOURS
Refills: 0 | Status: DISCONTINUED | OUTPATIENT
Start: 2017-10-16 | End: 2017-10-25

## 2017-10-16 RX ORDER — FUROSEMIDE 40 MG
20 TABLET ORAL ONCE
Refills: 0 | Status: COMPLETED | OUTPATIENT
Start: 2017-10-16 | End: 2017-10-16

## 2017-10-16 RX ORDER — POTASSIUM CHLORIDE 20 MEQ
40 PACKET (EA) ORAL EVERY 4 HOURS
Refills: 0 | Status: DISCONTINUED | OUTPATIENT
Start: 2017-10-16 | End: 2017-10-16

## 2017-10-16 RX ORDER — LISINOPRIL 2.5 MG/1
5 TABLET ORAL DAILY
Refills: 0 | Status: DISCONTINUED | OUTPATIENT
Start: 2017-10-16 | End: 2017-10-26

## 2017-10-16 RX ORDER — MEROPENEM 1 G/30ML
500 INJECTION INTRAVENOUS EVERY 8 HOURS
Refills: 0 | Status: DISCONTINUED | OUTPATIENT
Start: 2017-10-16 | End: 2017-10-26

## 2017-10-16 RX ORDER — POTASSIUM CHLORIDE 20 MEQ
10 PACKET (EA) ORAL ONCE
Refills: 0 | Status: COMPLETED | OUTPATIENT
Start: 2017-10-16 | End: 2017-10-16

## 2017-10-16 RX ORDER — SODIUM CHLORIDE 9 MG/ML
1000 INJECTION INTRAMUSCULAR; INTRAVENOUS; SUBCUTANEOUS ONCE
Refills: 0 | Status: COMPLETED | OUTPATIENT
Start: 2017-10-16 | End: 2017-10-16

## 2017-10-16 RX ADMIN — ALBUTEROL 2 PUFF(S): 90 AEROSOL, METERED ORAL at 02:10

## 2017-10-16 RX ADMIN — Medication 500 MILLIGRAM(S): at 11:50

## 2017-10-16 RX ADMIN — ERTAPENEM SODIUM 120 MILLIGRAM(S): 1 INJECTION, POWDER, LYOPHILIZED, FOR SOLUTION INTRAMUSCULAR; INTRAVENOUS at 11:28

## 2017-10-16 RX ADMIN — MEROPENEM 200 MILLIGRAM(S): 1 INJECTION INTRAVENOUS at 21:11

## 2017-10-16 RX ADMIN — Medication 1 GRAM(S): at 11:51

## 2017-10-16 RX ADMIN — ZINC SULFATE TAB 220 MG (50 MG ZINC EQUIVALENT) 220 MILLIGRAM(S): 220 (50 ZN) TAB at 11:51

## 2017-10-16 RX ADMIN — SODIUM CHLORIDE 500 MILLILITER(S): 9 INJECTION INTRAMUSCULAR; INTRAVENOUS; SUBCUTANEOUS at 05:15

## 2017-10-16 RX ADMIN — Medication 300 MILLIGRAM(S): at 11:51

## 2017-10-16 RX ADMIN — Medication 3 MILLILITER(S): at 20:44

## 2017-10-16 RX ADMIN — Medication 1 GRAM(S): at 05:15

## 2017-10-16 RX ADMIN — Medication 1 PUFF(S): at 02:10

## 2017-10-16 RX ADMIN — Medication 20 MILLIGRAM(S): at 14:06

## 2017-10-16 RX ADMIN — PANTOPRAZOLE SODIUM 40 MILLIGRAM(S): 20 TABLET, DELAYED RELEASE ORAL at 11:50

## 2017-10-16 RX ADMIN — Medication 1 PUFF(S): at 08:41

## 2017-10-16 RX ADMIN — Medication 20 MILLIGRAM(S): at 11:00

## 2017-10-16 RX ADMIN — POLYETHYLENE GLYCOL 3350 17 GRAM(S): 17 POWDER, FOR SOLUTION ORAL at 11:49

## 2017-10-16 RX ADMIN — ALBUTEROL 2 PUFF(S): 90 AEROSOL, METERED ORAL at 08:41

## 2017-10-16 RX ADMIN — Medication 3 MILLILITER(S): at 14:43

## 2017-10-16 RX ADMIN — Medication 100 MILLIEQUIVALENT(S): at 17:15

## 2017-10-16 RX ADMIN — SODIUM CHLORIDE 1000 MILLILITER(S): 9 INJECTION INTRAMUSCULAR; INTRAVENOUS; SUBCUTANEOUS at 02:15

## 2017-10-16 RX ADMIN — DEXMEDETOMIDINE HYDROCHLORIDE IN 0.9% SODIUM CHLORIDE 11.25 MICROGRAM(S)/KG/HR: 4 INJECTION INTRAVENOUS at 02:15

## 2017-10-16 RX ADMIN — Medication 1 MILLIGRAM(S): at 11:51

## 2017-10-16 RX ADMIN — Medication 1: at 05:56

## 2017-10-16 RX ADMIN — Medication 81 MILLIGRAM(S): at 11:51

## 2017-10-16 NOTE — PROGRESS NOTE ADULT - ASSESSMENT
infected sacral decubitus - s/p debridement  bacteremia - cleared with abxs  fever  plan - dc ertapenem 1 gm iv q24hrs   start meropenem 500mgs iv q8hrs which will cover VRE in sacral ulcer also  time spent - 30 mins

## 2017-10-16 NOTE — PROGRESS NOTE ADULT - SUBJECTIVE AND OBJECTIVE BOX
INTERVAL HPI/OVERNIGHT EVENTS: ***    PRESSORS: [ ] YES [x] NO  WHICH:    ANTIBIOTICS:                  DATE STARTED:  ANTIBIOTICS:                  DATE STARTED:  ANTIBIOTICS:                  DATE STARTED:    Antimicrobial:  ertapenem  IVPB      ertapenem  IVPB 1000 milliGRAM(s) IV Intermittent every 24 hours    Cardiovascular:  metoprolol 25 milliGRAM(s) Oral two times a day  tamsulosin 0.4 milliGRAM(s) Oral at bedtime    Pulmonary:  ALBUTerol    90 MICROgram(s) HFA Inhaler 2 Puff(s) Inhalation every 6 hours  ipratropium 17 MICROgram(s) HFA Inhaler 1 Puff(s) Inhalation every 6 hours    Hematalogic:  aspirin enteric coated 81 milliGRAM(s) Oral daily    Other:  acetaminophen    Suspension 650 milliGRAM(s) Oral every 6 hours PRN  ascorbic acid 500 milliGRAM(s) Oral daily  dextrose 5%. 1000 milliLiter(s) IV Continuous <Continuous>  dextrose 50% Injectable 12.5 Gram(s) IV Push once  dextrose Gel 1 Dose(s) Oral once PRN  ferrous    sulfate Liquid 300 milliGRAM(s) Oral daily  folic acid 1 milliGRAM(s) Oral daily  glucagon  Injectable 1 milliGRAM(s) IntraMuscular once PRN  influenza   Vaccine 0.5 milliLiter(s) IntraMuscular once  insulin lispro (HumaLOG) corrective regimen sliding scale   SubCutaneous every 6 hours  pantoprazole  Injectable 40 milliGRAM(s) IV Push daily  polyethylene glycol 3350 17 Gram(s) Oral daily  sucralfate suspension 1 Gram(s) Oral every 6 hours  zinc sulfate 220 milliGRAM(s) Oral daily    acetaminophen    Suspension 650 milliGRAM(s) Oral every 6 hours PRN  ALBUTerol    90 MICROgram(s) HFA Inhaler 2 Puff(s) Inhalation every 6 hours  ascorbic acid 500 milliGRAM(s) Oral daily  aspirin enteric coated 81 milliGRAM(s) Oral daily  dextrose 5%. 1000 milliLiter(s) IV Continuous <Continuous>  dextrose 50% Injectable 12.5 Gram(s) IV Push once  dextrose Gel 1 Dose(s) Oral once PRN  ertapenem  IVPB      ertapenem  IVPB 1000 milliGRAM(s) IV Intermittent every 24 hours  ferrous    sulfate Liquid 300 milliGRAM(s) Oral daily  folic acid 1 milliGRAM(s) Oral daily  glucagon  Injectable 1 milliGRAM(s) IntraMuscular once PRN  influenza   Vaccine 0.5 milliLiter(s) IntraMuscular once  insulin lispro (HumaLOG) corrective regimen sliding scale   SubCutaneous every 6 hours  ipratropium 17 MICROgram(s) HFA Inhaler 1 Puff(s) Inhalation every 6 hours  metoprolol 25 milliGRAM(s) Oral two times a day  pantoprazole  Injectable 40 milliGRAM(s) IV Push daily  polyethylene glycol 3350 17 Gram(s) Oral daily  sucralfate suspension 1 Gram(s) Oral every 6 hours  tamsulosin 0.4 milliGRAM(s) Oral at bedtime  zinc sulfate 220 milliGRAM(s) Oral daily    Drug Dosing Weight  Height (cm): 177.8 (05 Oct 2017 16:02)  Weight (kg): 90 (15 Oct 2017 07:30)  BMI (kg/m2): 28.5 (15 Oct 2017 07:30)  BSA (m2): 2.08 (15 Oct 2017 07:30)    CENTRAL LINE: [ ] YES [x ] NO  LOCATION:   DATE INSERTED:  REMOVE: [ ] YES [ ] NO  EXPLAIN:    TURNER: [x ] YES [ ] NO    DATE INSERTED:  REMOVE:  [ ] YES [ ] NO  EXPLAIN:    A-LINE:  [ ] YES [x ] NO  LOCATION:   DATE INSERTED:  REMOVE:  [ ] YES [ ] NO  EXPLAIN:    PMH -reviewed admission note, no change since admission  Heart faliure: acute [ ] chronic [ ] acute or chronic [ ] diastolic [ ] systolic [ ] combied systolic and diastolic[ ]  MATHEUS: ATN[ ] renal medullary necrosis [ ] CKD I [ ]CKDII [ ]CKD III [ ]CKD IV [ ]CKD V [ ]Other pathological lesions [ ]  Abdominal Nutrition Status: malnutrition [ ] cachexia [ ] morbid obesity/BMI=40 [ ] Supplement ordered [___________]     ICU Vital Signs Last 24 Hrs  T(C): 36.8 (16 Oct 2017 04:00), Max: 38.2 (15 Oct 2017 19:26)  T(F): 98.2 (16 Oct 2017 04:00), Max: 100.8 (15 Oct 2017 19:26)  HR: 98 (16 Oct 2017 08:42) (60 - 125)  BP: 96/63 (16 Oct 2017 08:00) (84/58 - 115/78)  BP(mean): 70 (16 Oct 2017 08:00) (51 - 87)  ABP: --  ABP(mean): --  RR: 17 (16 Oct 2017 08:00) (10 - 29)  SpO2: 100% (16 Oct 2017 08:42) (98% - 100%)      ABG - ( 16 Oct 2017 05:06 )  pH: 7.44  /  pCO2: 24    /  pO2: 98    / HCO3: 22    / Base Excess: -1.1  /  SaO2: 97                    10-15 @ 07:01  -  10-16 @ 07:00  --------------------------------------------------------  IN: 5440.6 mL / OUT: 820 mL / NET: 4620.6 mL        Mode: CPAP with PS  FiO2: 30  PEEP: 5  PS: 5  ITime: 1  MAP: 7  PIP: 11      PHYSICAL EXAM:    GENERAL: [x ]NAD, [x ]well-groomed, [ ]well-developed  HEAD:  [x ]Atraumatic, [x ]Normocephalic  EYES: [x ]EOMI, [x ]PERRLA, [ ]conjunctiva and sclera clear  ENMT: [x ]No tonsillar erythema, exudates, or enlargement; [ ]Moist mucous membranes, [ ]Good dentition, [ ]No lesions  NECK: [x ]Supple, normal appearance, [ x]No JVD; [ x]Normal thyroid; [ ]Trachea midline  NERVOUS SYSTEM:  [ x]sedated , [ ]Good concentration; [ ]Motor Strength 5/5 B/L upper and lower extremities; [ ]DTRs 2+ intact and symmetric  CHEST/LUNG: [x ]No chest deformity; [ x]Normal percussion bilaterally; [ ]No rales, rhonchi, wheezing   HEART: [x ]Regular rate and rhythm; [ ]No murmurs, rubs, or gallops  ABDOMEN: [x ]Soft, Nontender, Nondistended; [ ]Bowel sounds present  EXTREMITIES:  [x ]2+ Peripheral Pulses, [ ]No clubbing, cyanosis, or edema  LYMPH: [ x]No lymphadenopathy noted  SKIN: [x ]No rashes or lesions; [x ]Good capillary refill      LABS:  CBC Full  -  ( 16 Oct 2017 06:23 )  WBC Count : 9.6 K/uL  Hemoglobin : 8.0 g/dL  Hematocrit : 26.3 %  Platelet Count - Automated : 178 K/uL  Mean Cell Volume : 84.5 fl  Mean Cell Hemoglobin : 25.6 pg  Mean Cell Hemoglobin Concentration : 30.3 gm/dL  Auto Neutrophil # : 7.3 K/uL  Auto Lymphocyte # : 1.6 K/uL  Auto Monocyte # : 0.5 K/uL  Auto Eosinophil # : 0.1 K/uL  Auto Basophil # : 0.1 K/uL  Auto Neutrophil % : 75.5 %  Auto Lymphocyte % : 17.1 %  Auto Monocyte % : 5.7 %  Auto Eosinophil % : 0.8 %  Auto Basophil % : 0.9 %    10-16    147<H>  |  115<H>  |  37<H>  ----------------------------<  131<H>  3.6   |  25  |  1.06    Ca    7.6<L>      16 Oct 2017 06:23  Phos  3.4     10-16  Mg     2.2     10-16    TPro  5.2<L>  /  Alb  1.8<L>  /  TBili  0.4  /  DBili  x   /  AST  44<H>  /  ALT  62<H>  /  AlkPhos  117  10-16    PT/INR - ( 16 Oct 2017 06:23 )   PT: 37.5 sec;   INR: 3.36 ratio         PTT - ( 15 Oct 2017 10:23 )  PTT:35.5 sec    Culture Results:   No growth to date. (10-14 @ 22:45)  Culture Results:   No growth to date. (10-14 @ 22:45)      RADIOLOGY & ADDITIONAL STUDIES REVIEWED:  ***    [ ]GOALS OF CARE DISCUSSION WITH PATIENT/FAMILY/PROXY:    CRITICAL CARE TIME SPENT: 35 minutes INTERVAL HPI/OVERNIGHT EVENTS: was given multiple fluid boluses overnight     PRESSORS: [ ] YES [x] NO  WHICH:    ANTIBIOTICS:                  DATE STARTED:  ANTIBIOTICS:                  DATE STARTED:  ANTIBIOTICS:                  DATE STARTED:    Antimicrobial:  ertapenem  IVPB      ertapenem  IVPB 1000 milliGRAM(s) IV Intermittent every 24 hours    Cardiovascular:  metoprolol 25 milliGRAM(s) Oral two times a day  tamsulosin 0.4 milliGRAM(s) Oral at bedtime    Pulmonary:  ALBUTerol    90 MICROgram(s) HFA Inhaler 2 Puff(s) Inhalation every 6 hours  ipratropium 17 MICROgram(s) HFA Inhaler 1 Puff(s) Inhalation every 6 hours    Hematalogic:  aspirin enteric coated 81 milliGRAM(s) Oral daily    Other:  acetaminophen    Suspension 650 milliGRAM(s) Oral every 6 hours PRN  ascorbic acid 500 milliGRAM(s) Oral daily  dextrose 5%. 1000 milliLiter(s) IV Continuous <Continuous>  dextrose 50% Injectable 12.5 Gram(s) IV Push once  dextrose Gel 1 Dose(s) Oral once PRN  ferrous    sulfate Liquid 300 milliGRAM(s) Oral daily  folic acid 1 milliGRAM(s) Oral daily  glucagon  Injectable 1 milliGRAM(s) IntraMuscular once PRN  influenza   Vaccine 0.5 milliLiter(s) IntraMuscular once  insulin lispro (HumaLOG) corrective regimen sliding scale   SubCutaneous every 6 hours  pantoprazole  Injectable 40 milliGRAM(s) IV Push daily  polyethylene glycol 3350 17 Gram(s) Oral daily  sucralfate suspension 1 Gram(s) Oral every 6 hours  zinc sulfate 220 milliGRAM(s) Oral daily    acetaminophen    Suspension 650 milliGRAM(s) Oral every 6 hours PRN  ALBUTerol    90 MICROgram(s) HFA Inhaler 2 Puff(s) Inhalation every 6 hours  ascorbic acid 500 milliGRAM(s) Oral daily  aspirin enteric coated 81 milliGRAM(s) Oral daily  dextrose 5%. 1000 milliLiter(s) IV Continuous <Continuous>  dextrose 50% Injectable 12.5 Gram(s) IV Push once  dextrose Gel 1 Dose(s) Oral once PRN  ertapenem  IVPB      ertapenem  IVPB 1000 milliGRAM(s) IV Intermittent every 24 hours  ferrous    sulfate Liquid 300 milliGRAM(s) Oral daily  folic acid 1 milliGRAM(s) Oral daily  glucagon  Injectable 1 milliGRAM(s) IntraMuscular once PRN  influenza   Vaccine 0.5 milliLiter(s) IntraMuscular once  insulin lispro (HumaLOG) corrective regimen sliding scale   SubCutaneous every 6 hours  ipratropium 17 MICROgram(s) HFA Inhaler 1 Puff(s) Inhalation every 6 hours  metoprolol 25 milliGRAM(s) Oral two times a day  pantoprazole  Injectable 40 milliGRAM(s) IV Push daily  polyethylene glycol 3350 17 Gram(s) Oral daily  sucralfate suspension 1 Gram(s) Oral every 6 hours  tamsulosin 0.4 milliGRAM(s) Oral at bedtime  zinc sulfate 220 milliGRAM(s) Oral daily    Drug Dosing Weight  Height (cm): 177.8 (05 Oct 2017 16:02)  Weight (kg): 90 (15 Oct 2017 07:30)  BMI (kg/m2): 28.5 (15 Oct 2017 07:30)  BSA (m2): 2.08 (15 Oct 2017 07:30)    CENTRAL LINE: [ ] YES [x ] NO  LOCATION:   DATE INSERTED:  REMOVE: [ ] YES [ ] NO  EXPLAIN:    TURNER: [x ] YES [ ] NO    DATE INSERTED:  REMOVE:  [ ] YES [ ] NO  EXPLAIN:    A-LINE:  [ ] YES [x ] NO  LOCATION:   DATE INSERTED:  REMOVE:  [ ] YES [ ] NO  EXPLAIN:    PMH -reviewed admission note, no change since admission  Heart faliure: acute [ ] chronic [ ] acute or chronic [ ] diastolic [ ] systolic [ ] combied systolic and diastolic[ ]  MATHEUS: ATN[ ] renal medullary necrosis [ ] CKD I [ ]CKDII [ ]CKD III [ ]CKD IV [ ]CKD V [ ]Other pathological lesions [ ]  Abdominal Nutrition Status: malnutrition [ ] cachexia [ ] morbid obesity/BMI=40 [ ] Supplement ordered [___________]     ICU Vital Signs Last 24 Hrs  T(C): 36.8 (16 Oct 2017 04:00), Max: 38.2 (15 Oct 2017 19:26)  T(F): 98.2 (16 Oct 2017 04:00), Max: 100.8 (15 Oct 2017 19:26)  HR: 98 (16 Oct 2017 08:42) (60 - 125)  BP: 96/63 (16 Oct 2017 08:00) (84/58 - 115/78)  BP(mean): 70 (16 Oct 2017 08:00) (51 - 87)  ABP: --  ABP(mean): --  RR: 17 (16 Oct 2017 08:00) (10 - 29)  SpO2: 100% (16 Oct 2017 08:42) (98% - 100%)      ABG - ( 16 Oct 2017 05:06 )  pH: 7.44  /  pCO2: 24    /  pO2: 98    / HCO3: 22    / Base Excess: -1.1  /  SaO2: 97                    10-15 @ 07:01  -  10-16 @ 07:00  --------------------------------------------------------  IN: 5440.6 mL / OUT: 820 mL / NET: 4620.6 mL        Mode: CPAP with PS  FiO2: 30  PEEP: 5  PS: 5  ITime: 1  MAP: 7  PIP: 11      PHYSICAL EXAM:    GENERAL: [x ]NAD, [x ]well-groomed, [ ]well-developed  HEAD:  [x ]Atraumatic, [x ]Normocephalic  EYES: [x ]EOMI, [x ]PERRLA, [ ]conjunctiva and sclera clear  ENMT: [x ]No tonsillar erythema, exudates, or enlargement; [ ]Moist mucous membranes, [ ]Good dentition, [ ]No lesions  NECK: [x ]Supple, normal appearance, [ x]No JVD; [ x]Normal thyroid; [ ]Trachea midline  NERVOUS SYSTEM:  [ x]sedated , [ ]Good concentration; [ ]Motor Strength 5/5 B/L upper and lower extremities; [ ]DTRs 2+ intact and symmetric  CHEST/LUNG: [x ]No chest deformity; [ x]Normal percussion bilaterally; [ ]No rales, rhonchi, wheezing   HEART: [x ]Regular rate and rhythm; [ ]No murmurs, rubs, or gallops  ABDOMEN: [x ]Soft, Nontender, Nondistended; [ ]Bowel sounds present  EXTREMITIES:  [x ]2+ Peripheral Pulses, [ ]No clubbing, cyanosis, or edema  LYMPH: [ x]No lymphadenopathy noted  SKIN: [x ]No rashes or lesions; [x ]Good capillary refill      LABS:  CBC Full  -  ( 16 Oct 2017 06:23 )  WBC Count : 9.6 K/uL  Hemoglobin : 8.0 g/dL  Hematocrit : 26.3 %  Platelet Count - Automated : 178 K/uL  Mean Cell Volume : 84.5 fl  Mean Cell Hemoglobin : 25.6 pg  Mean Cell Hemoglobin Concentration : 30.3 gm/dL  Auto Neutrophil # : 7.3 K/uL  Auto Lymphocyte # : 1.6 K/uL  Auto Monocyte # : 0.5 K/uL  Auto Eosinophil # : 0.1 K/uL  Auto Basophil # : 0.1 K/uL  Auto Neutrophil % : 75.5 %  Auto Lymphocyte % : 17.1 %  Auto Monocyte % : 5.7 %  Auto Eosinophil % : 0.8 %  Auto Basophil % : 0.9 %    10-16    147<H>  |  115<H>  |  37<H>  ----------------------------<  131<H>  3.6   |  25  |  1.06    Ca    7.6<L>      16 Oct 2017 06:23  Phos  3.4     10-16  Mg     2.2     10-16    TPro  5.2<L>  /  Alb  1.8<L>  /  TBili  0.4  /  DBili  x   /  AST  44<H>  /  ALT  62<H>  /  AlkPhos  117  10-16    PT/INR - ( 16 Oct 2017 06:23 )   PT: 37.5 sec;   INR: 3.36 ratio         PTT - ( 15 Oct 2017 10:23 )  PTT:35.5 sec    Culture Results:   No growth to date. (10-14 @ 22:45)  Culture Results:   No growth to date. (10-14 @ 22:45)      RADIOLOGY & ADDITIONAL STUDIES REVIEWED:  cxr - increasing bilat interstitial opacities     [x]GOALS OF CARE DISCUSSION WITH PATIENT/FAMILY/PROXY: discussed with wife at bedside    CRITICAL CARE TIME SPENT: 35 minutes

## 2017-10-16 NOTE — EEG REPORT - NS EEG TEXT BOX
10/16/2017    Hx: Concern for Seizure      Study Interpretation:    FINDINGS:  The background was continuous, spontaneously variable and reactive.  The fastest prevalent posterior dominant rhythm during poorly sustained wakefulness was 6-7 Hz activity, with an amplitude to 40 uV, that attenuated to eye opening.  The background mainly consisted of diffuse irregular theta and polymorphic delta activity with intermixed faster frequencies.    Sleep Background:  Drowsiness was characterized by fragmentation, attenuation, and slowing of the background activity.    Segments of stage II sleep were not recorded.    Other Paroxysmal Non-Epileptiform Findings:    None.    Epileptiform Activity:   No epileptiform discharges were present.    Events:  No clinical events were recorded.  No seizures were recorded.    Activation Procedures:   -Hyperventilation was not performed.    -Photic stimulation was not performed.    Artifacts:  Intermittent myogenic and movement artifacts were noted.    Compressed Spectral Array Digital Analysis    FINDINGS:  Compressed Spectral Array (CSA) data was reviewed separately and correlated with the electroencephalographic findings detailed above.  CSA showed a variable spectral pattern.  Areas of increased power in particular were reviewed in detail, and compared with the raw EEG data.  Areas of abrupt increases in spectral power were reviewed to exclude seizures, and were determined to be artifactual in nature.    The relative ratio of the power of delta range frequencies and faster frequencies remained stable over the course of the study.  There was no definitive increase in the relative power in the delta frequency spectrum apparent in the left hemisphere versus the right hemisphere.      Compressed Spectral Array (Digital Analysis) Summary/ Impression:  No persistent hemispheric asymmetry.  Intermittent areas of increased power reviewed, without definite epileptiform activity associated on CSA.      EEG Classification:  Abnormal study  - moderate diffuse slowing    Impression:  Findings indicate non-specific moderate diffuse or multifocal cerebral dysfunction. There were no epileptiform abnormalities recorded

## 2017-10-16 NOTE — PROGRESS NOTE ADULT - SUBJECTIVE AND OBJECTIVE BOX
Med attending follow up:    s/p extubation, on BIPAP, NG tube  Patient is a 82y old  Male who presents with a chief complaint of low hb level in NH (05 Oct 2017 19:36  INTERVAL HPI/OVERNIGHT EVENTS: Interim events extubation    MEDICATIONS  (STANDING):  acetaminophen  IVPB. 1000 milliGRAM(s) IV Intermittent once  ALBUTerol    90 MICROgram(s) HFA Inhaler 2 Puff(s) Inhalation every 6 hours  ascorbic acid 500 milliGRAM(s) Oral daily  aspirin enteric coated 81 milliGRAM(s) Oral daily  dextrose 5%. 1000 milliLiter(s) (50 mL/Hr) IV Continuous <Continuous>  dextrose 50% Injectable 12.5 Gram(s) IV Push once  docusate sodium 100 milliGRAM(s) Oral two times a day  ertapenem  IVPB      ertapenem  IVPB 1000 milliGRAM(s) IV Intermittent every 24 hours  ferrous    sulfate 325 milliGRAM(s) Oral two times a day with meals  folic acid 1 milliGRAM(s) Oral daily  heparin  Injectable 5000 Unit(s) SubCutaneous every 12 hours  influenza   Vaccine 0.5 milliLiter(s) IntraMuscular once  insulin lispro (HumaLOG) corrective regimen sliding scale   SubCutaneous three times a day before meals  multivitamin 1 Tablet(s) Oral daily  pantoprazole    Tablet 40 milliGRAM(s) Oral before breakfast  polyethylene glycol 3350 17 Gram(s) Oral daily  sucralfate suspension 1 Gram(s) Oral every 6 hours  tamsulosin 0.4 milliGRAM(s) Oral at bedtime  tiotropium 18 MICROgram(s) Capsule 1 Capsule(s) Inhalation daily  warfarin 5 milliGRAM(s) Oral once  zinc sulfate 220 milliGRAM(s) Oral daily    MEDICATIONS  (PRN):  dextrose Gel 1 Dose(s) Oral once PRN Blood Glucose LESS THAN 70 milliGRAM(s)/deciLiter  glucagon  Injectable 1 milliGRAM(s) IntraMuscular once PRN Glucose <70 milliGRAM(s)/deciLiter    ICU Vital Signs Last 24 Hrs  T(C): 36.6 (16 Oct 2017 15:49), Max: 38.2 (15 Oct 2017 19:26)  T(F): 97.9 (16 Oct 2017 15:49), Max: 100.8 (15 Oct 2017 19:26)  HR: 109 (16 Oct 2017 18:06) (60 - 129)  BP: 115/72 (16 Oct 2017 17:00) (85/50 - 140/87)  BP(mean): 83 (16 Oct 2017 17:00) (51 - 102)  ABP: --  ABP(mean): --  RR: 20 (16 Oct 2017 17:00) (15 - 36)  SpO2: 99% (16 Oct 2017 18:06) (99% - 100%)      __________________________________________________  REVIEW OF SYSTEMS:    CONSTITUTIONAL: No fever,   EYES: no acute visual disturbances  NECK: No pain or stiffness  RESPIRATORY: No cough; No shortness of breath  CARDIOVASCULAR: No chest pain, no palpitations  GASTROINTESTINAL: No pain. No nausea or vomiting; No diarrhea   NEUROLOGICAL: No headache or numbness, no tremors  MUSCULOSKELETAL: No joint pain, no muscle pain  GENITOURINARY: no dysuria, no frequency, no hesitancy  PSYCHIATRY: no depression , no anxiety  ALL OTHER  ROS negative    T(C): 37.3 (10-14-17 @ 05:39), Max: 37.3 (10-14-17 @ 05:39)  HR: 110 (10-14-17 @ 05:39) (99 - 110)  BP: 117/88 (10-14-17 @ 05:39) (117/88 - 127/93)  RR: 16 (10-14-17 @ 05:39) (16 - 17)  SpO2: 99% (10-14-17 @ 05:39) (99% - 100%)  Wt(kg): --    ________________________________________________  PHYSICAL EXAM:  GENERAL: NAD  HEENT: Normocephalic;  conjunctivae and sclerae clear; moist mucous membranes;   NECK : supple  CHEST/LUNG: Clear to auscultation bilaterally with good air entry   HEART: S1 S2  regular; no murmurs, gallops or rubs  ABDOMEN: Soft, Nontender, Nondistended; Bowel sounds present.    EXTREMITIES: no cyanosis; no edema; no calf tenderness  SKIN: warm and dry; no rash.  Sacral dressing changed  NERVOUS SYSTEM:  Awake and alert; Oriented  to place, person and time ; no new deficits    _________________________________________________  LABS:                                       8.0    9.6   )-----------( 178      ( 16 Oct 2017 06:23 )             26.3   10-16    146<H>  |  113<H>  |  35<H>  ----------------------------<  119<H>  3.4<L>   |  26  |  1.11    Ca    7.7<L>      16 Oct 2017 15:42  Phos  3.4     10-16  Mg     2.2     10-16    TPro  5.2<L>  /  Alb  1.8<L>  /  TBili  0.4  /  DBili  x   /  AST  44<H>  /  ALT  62<H>  /  AlkPhos  117  10-16              PT/INR - ( 13 Oct 2017 07:29 )   PT: 16.5 sec;   INR: 1.50 ratio             CAPILLARY BLOOD GLUCOSE      POCT Blood Glucose.: 169 mg/dL (13 Oct 2017 07:27)  POCT Blood Glucose.: 153 mg/dL (12 Oct 2017 21:45)  POCT Blood Glucose.: 128 mg/dL (12 Oct 2017 16:14)        RADIOLOGY & ADDITIONAL TESTS:    Imaging Personally Reviewed:  YES    Consultant(s) Notes Reviewed:   YES    Care Discussed with Consultants :     Plan of care was discussed with patient and /or primary care giver; all questions and concerns were addressed and care was aligned with patient's wishes.

## 2017-10-16 NOTE — CONSULT NOTE ADULT - PROBLEM SELECTOR PROBLEM 1
Encephalopathy, metabolic
Respiratory failure
Wound
Acute on chronic respiratory failure with hypoxia and hypercapnia
UTI (urinary tract infection)

## 2017-10-16 NOTE — CONSULT NOTE ADULT - PROBLEM SELECTOR RECOMMENDATION 2
ESBL bacteremia  ESBL in urine blood and sacral DU debridement tissue  on Etrapenem Day no 5   repeat blood CX negative  patient has a PICC line   c/w invanz
On Anti-biotics
appears that pt's CHF significantly worsened likely due to deconditioned state and progressive mitral stenosis (s/p MVR) EF is now barely 10% - too weak to tolerate any interventions, delicate balance of fluid resuscitation, now in overload and ICU is diuresing pt.

## 2017-10-16 NOTE — CONSULT NOTE ADULT - PROBLEM SELECTOR RECOMMENDATION 5
hemoglobin 9.4  got 1 unit PRBC this admission  presented with hemoglobin of 7.0  c/w carafate protonix iv  monitor h/h
spoke at length about pt's condition and likely trajectory and poor prognosis with wife Daly and daughter. Both agree that pt would never want heroic measures when prognosis is this poor. AGreed that DNR/DNi is best for pt. Wife does not want pt to "suffer" and "just remain peaceful". They agree that in the next 1-2 days if no improvement then inpt hospice could be the next step. support given.

## 2017-10-16 NOTE — CONSULT NOTE ADULT - SUBJECTIVE AND OBJECTIVE BOX
CHIEF COMPLAINT: none    HPI: 83 yo M with rheumatic mitral stenosis, A fib on warfarin, bedbound from nursing home, who initially went to the hospital due to low H/H. Patient developed respiratory failure in setting of pneumonia requiring intubation and ICU transfer. An echocardiogram performed today showe Ef 5-10%, compared to 55% seen on echo in 05/2017.     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 1000 milliLiter(s) (50 mL/Hr) IV Continuous <Continuous>  dextrose 50% Injectable 12.5 Gram(s) IV Push once  ferrous    sulfate Liquid 300 milliGRAM(s) Oral daily  folic acid 1 milliGRAM(s) Oral daily  influenza   Vaccine 0.5 milliLiter(s) IntraMuscular once  insulin lispro (HumaLOG) corrective regimen sliding scale   SubCutaneous every 6 hours  meropenem IVPB 500 milliGRAM(s) IV Intermittent every 8 hours  metoprolol 25 milliGRAM(s) Oral two times a day  pantoprazole  Injectable 40 milliGRAM(s) IV Push daily  polyethylene glycol 3350 17 Gram(s) Oral daily  sucralfate suspension 1 Gram(s) Oral every 6 hours  tamsulosin 0.4 milliGRAM(s) Oral at bedtime  zinc sulfate 220 milliGRAM(s) Oral daily    MEDICATIONS  (PRN):  acetaminophen    Suspension 650 milliGRAM(s) Oral every 6 hours PRN For Temp greater than 38 C (100.4 F)  dextrose Gel 1 Dose(s) Oral once PRN Blood Glucose LESS THAN 70 milliGRAM(s)/deciLiter  glucagon  Injectable 1 milliGRAM(s) IntraMuscular once PRN Glucose <70 milliGRAM(s)/deciLiter      FAMILY HISTORY:    No family history of premature coronary artery disease or sudden cardiac death    SOCIAL HISTORY:  Smoking-  Alcohol-  Ilicit Drug use-    REVIEW OF SYSTEMS:  Constitutional: [ ] fever, [ ]weight loss,  [ ]fatigue  Eyes: [ ] visual changes  Respiratory: [ ]shortness of breath;  [ ] cough, [ ]wheezing, [ ]chills, [ ]hemoptysis  Cardiovascular: [ ] chest pain, [ ]palpitations, [ ]dizziness,  [ ]leg swelling  Gastrointestinal: [ ] abdominal pain, [ ]nausea, [ ]vomiting,  [ ]diarrhea   Genitourinary: [ ] dysuria, [ ] hematuria  Neurologic: [ ] headaches [ ] tremors  [ ] weakness [ ] lightheadedness  Skin: [ ] itching, [ ]burning, [ ] rashes  Endocrine: [ ] heat or cold intolerance  Musculoskeletal: [ ] joint pain or swelling; [ ] muscle, back, or extremity pain  Psychiatric: [ ] depression, [ ]anxiety, [ ]mood swings, or [ ]difficulty sleeping  Hematologic: [ ] easy bruising, [ ] bleeding gums       [ x] All others negative	  [ ] Unable to obtain    Vital Signs Last 24 Hrs  T(C): 37.3 (16 Oct 2017 14:26), Max: 38.2 (15 Oct 2017 19:26)  T(F): 99.1 (16 Oct 2017 14:26), Max: 100.8 (15 Oct 2017 19:26)  HR: 107 (16 Oct 2017 15:00) (60 - 129)  BP: 114/93 (16 Oct 2017 15:00) (84/58 - 140/87)  BP(mean): 98 (16 Oct 2017 15:00) (51 - 102)  RR: 19 (16 Oct 2017 15:00) (15 - 36)  SpO2: 100% (16 Oct 2017 15:00) (98% - 100%)  I&O's Summary    15 Oct 2017 07:01  -  16 Oct 2017 07:00  --------------------------------------------------------  IN: 5440.6 mL / OUT: 820 mL / NET: 4620.6 mL    16 Oct 2017 07:01  -  16 Oct 2017 15:12  --------------------------------------------------------  IN: 250 mL / OUT: 940 mL / NET: -690 mL        PHYSICAL EXAM:  General: No acute distress  HEENT: EOMI, PERRL  Neck: Supple, No JVD  Lungs: Clear to percussion bilaterally; No rales or wheezing  Heart: Regular rate and rhythm; No murmurs, rubs, or gallops  Abdomen: Nontender, bowel sounds present  Extremities: No clubbing, cyanosis, or edema  Nervous system:  Alert & Oriented X3, no focal deficits  Psychiatric: Normal affect  Skin: No rashes or lesions      LABS:  10-16    147<H>  |  115<H>  |  37<H>  ----------------------------<  131<H>  3.6   |  25  |  1.06    Ca    7.6<L>      16 Oct 2017 06:23  Phos  3.4     10-16  Mg     2.2     10-16    TPro  5.2<L>  /  Alb  1.8<L>  /  TBili  0.4  /  DBili  x   /  AST  44<H>  /  ALT  62<H>  /  AlkPhos  117  10-16    Creatinine Trend: 1.06<--, 1.25<--, 1.26<--, 1.40<--, 0.96<--, 0.88<--                        8.0    9.6   )-----------( 178      ( 16 Oct 2017 06:23 )             26.3     PT/INR - ( 16 Oct 2017 06:23 )   PT: 37.5 sec;   INR: 3.36 ratio         PTT - ( 15 Oct 2017 10:23 )  PTT:35.5 sec    Lipid Panel:   Cardiac Enzymes: CARDIAC MARKERS ( 15 Oct 2017 06:35 )  0.078 ng/mL / x     / 16 U/L / x     / <1.0 ng/mL  CARDIAC MARKERS ( 14 Oct 2017 19:47 )  0.077 ng/mL / x     / 24 U/L / x     / 1.2 ng/mL        10-06 [id number] 6.3      RADIOLOGY: < from: CT Head No Cont (10.15.17 @ 15:24) >  Atrophy commensurate with patient's age. Moderate white matter small   vessel ischemic changes. Otherwise, unremarkable noncontrast CT scan of   the brain. Follow up studies as clinically indicated.    < end of copied text >  < from: Xray Chest 1 View AP- PORTABLE-Urgent (10.16.17 @ 14:04) >  Stable NG tube terminates in stomach. Stable right PICC line.    Increasing mild right pleural effusion with adjacent airspace   opacification.    Possible left basilar pulmonary infiltrate and/or pleural effusion.    No evidence for pneumothorax.    Stable cardiac silhouette.    Stable median sternotomy wires and heart valve replacement.    < end of copied text >      ECG [my interpretation]:    TELEMETRY:    ECHO: < from: Transthoracic Echocardiogram (10.16.17 @ 06:51) >  CONCLUSIONS:  1. An annuloplasty ring seen in mitral position. Mild to  moderate mitral regurgitation. Mean transmitral valve  gradient equals 11 mm Hg, consistent with severe mitral  stenosis.  2. Normal trileaflet aortic valve. Mild aortic  insufficiency.  3. Aortic Root: 3.1 cm.  4. Severe left atrial enlargement.  5. Mild concentric left ventricular hypertrophy.  6. Dilated LV with severe global left ventricular  dysfunction.  7. Grade II diastolic dysfunction.  8. Normal right atrium.  9. Normal right ventricular size and function.  10. RA Pressure is 8 mm Hg.  11. RV systolic pressure is moderately increased at  49 mm  Hg.  12. There is moderate tricuspid regurgitation.  13. There is mild pulmonic regurgitation.  14. Normal pericardium with no pericardial effusion.  15. Bilateral pleural effusions.    < end of copied text > CHIEF COMPLAINT: none    HPI: 83 yo M with rheumatic mitral stenosis s/p MVR (~10 years ago), A fib on warfarin, bedbound from nursing home, who initially was brought to the hospital due to low H/H, and was found to have ESBL infection of sacral decubitus ulcer. Patient developed respiratory failure in setting of pneumonia requiring intubation and ICU transfer. He was given several liters of fluids in the setting of possible sepsis due to the PNA. An echocardiogram performed today showed EF 5-10%, compared to 55%  reported on echo in 05/2017.     Patient is A & O x0, not following commands, and unable to provide any additional information; the above was obtained from review of charts and discussion with ICU team.     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 1000 milliLiter(s) (50 mL/Hr) IV Continuous <Continuous>  dextrose 50% Injectable 12.5 Gram(s) IV Push once  ferrous    sulfate Liquid 300 milliGRAM(s) Oral daily  folic acid 1 milliGRAM(s) Oral daily  influenza   Vaccine 0.5 milliLiter(s) IntraMuscular once  insulin lispro (HumaLOG) corrective regimen sliding scale   SubCutaneous every 6 hours  meropenem IVPB 500 milliGRAM(s) IV Intermittent every 8 hours  metoprolol 25 milliGRAM(s) Oral two times a day  pantoprazole  Injectable 40 milliGRAM(s) IV Push daily  polyethylene glycol 3350 17 Gram(s) Oral daily  sucralfate suspension 1 Gram(s) Oral every 6 hours  tamsulosin 0.4 milliGRAM(s) Oral at bedtime  zinc sulfate 220 milliGRAM(s) Oral daily    MEDICATIONS  (PRN):  acetaminophen    Suspension 650 milliGRAM(s) Oral every 6 hours PRN For Temp greater than 38 C (100.4 F)  dextrose Gel 1 Dose(s) Oral once PRN Blood Glucose LESS THAN 70 milliGRAM(s)/deciLiter  glucagon  Injectable 1 milliGRAM(s) IntraMuscular once PRN Glucose <70 milliGRAM(s)/deciLiter      FAMILY HISTORY:    Unobtainable    SOCIAL HISTORY:  Smoking- Unobtainable  Alcohol- Unobtainable  Ilicit Drug use-Unobtainable    REVIEW OF SYSTEMS:  Constitutional: [ ] fever, [ ]weight loss,  [ ]fatigue  Eyes: [ ] visual changes  Respiratory: [ ]shortness of breath;  [ ] cough, [ ]wheezing, [ ]chills, [ ]hemoptysis  Cardiovascular: [ ] chest pain, [ ]palpitations, [ ]dizziness,  [ ]leg swelling  Gastrointestinal: [ ] abdominal pain, [ ]nausea, [ ]vomiting,  [ ]diarrhea   Genitourinary: [ ] dysuria, [ ] hematuria  Neurologic: [ ] headaches [ ] tremors  [ ] weakness [ ] lightheadedness  Skin: [ ] itching, [ ]burning, [ ] rashes  Endocrine: [ ] heat or cold intolerance  Musculoskeletal: [ ] joint pain or swelling; [ ] muscle, back, or extremity pain  Psychiatric: [ ] depression, [ ]anxiety, [ ]mood swings, or [ ]difficulty sleeping  Hematologic: [ ] easy bruising, [ ] bleeding gums       [  ] All others negative	  [x ] Unable to obtain    Vital Signs Last 24 Hrs  T(C): 37.3 (16 Oct 2017 14:26), Max: 38.2 (15 Oct 2017 19:26)  T(F): 99.1 (16 Oct 2017 14:26), Max: 100.8 (15 Oct 2017 19:26)  HR: 107 (16 Oct 2017 15:00) (60 - 129)  BP: 114/93 (16 Oct 2017 15:00) (84/58 - 140/87)  BP(mean): 98 (16 Oct 2017 15:00) (51 - 102)  RR: 19 (16 Oct 2017 15:00) (15 - 36)  SpO2: 100% (16 Oct 2017 15:00) (98% - 100%)  I&O's Summary    15 Oct 2017 07:01  -  16 Oct 2017 07:00  --------------------------------------------------------  IN: 5440.6 mL / OUT: 820 mL / NET: 4620.6 mL    16 Oct 2017 07:01  -  16 Oct 2017 15:12  --------------------------------------------------------  IN: 250 mL / OUT: 940 mL / NET: -690 mL        PHYSICAL EXAM:  General: Lying in bed with BiPAP, no acute distress  HEENT: EOMI, PERRL  Neck: Supple, No JVD  Lungs: Clear to percussion bilaterally; No rales or wheezing  Heart: Irregular rate and rhythm; No murmurs, rubs, or gallops  Abdomen: Nontender, bowel sounds present  Extremities: No clubbing, cyanosis, or edema  Nervous system:  Alert & Oriented X0, does not follow commands  Psychiatric: Stares straight ahead, does not follow commands  Skin: Sacral decubitus        LABS:  10-16    147<H>  |  115<H>  |  37<H>  ----------------------------<  131<H>  3.6   |  25  |  1.06    Ca    7.6<L>      16 Oct 2017 06:23  Phos  3.4     10-16  Mg     2.2     10-16    TPro  5.2<L>  /  Alb  1.8<L>  /  TBili  0.4  /  DBili  x   /  AST  44<H>  /  ALT  62<H>  /  AlkPhos  117  10-16    Creatinine Trend: 1.06<--, 1.25<--, 1.26<--, 1.40<--, 0.96<--, 0.88<--                        8.0    9.6   )-----------( 178      ( 16 Oct 2017 06:23 )             26.3     PT/INR - ( 16 Oct 2017 06:23 )   PT: 37.5 sec;   INR: 3.36 ratio         PTT - ( 15 Oct 2017 10:23 )  PTT:35.5 sec    Lipid Panel:   Cardiac Enzymes: CARDIAC MARKERS ( 15 Oct 2017 06:35 )  0.078 ng/mL / x     / 16 U/L / x     / <1.0 ng/mL  CARDIAC MARKERS ( 14 Oct 2017 19:47 )  0.077 ng/mL / x     / 24 U/L / x     / 1.2 ng/mL        10-06 [id number] 6.3      RADIOLOGY: < from: CT Head No Cont (10.15.17 @ 15:24) >  Atrophy commensurate with patient's age. Moderate white matter small   vessel ischemic changes. Otherwise, unremarkable noncontrast CT scan of   the brain. Follow up studies as clinically indicated.    < end of copied text >  < from: Xray Chest 1 View AP- PORTABLE-Urgent (10.16.17 @ 14:04) >  Stable NG tube terminates in stomach. Stable right PICC line.    Increasing mild right pleural effusion with adjacent airspace   opacification.    Possible left basilar pulmonary infiltrate and/or pleural effusion.    No evidence for pneumothorax.    Stable cardiac silhouette.    Stable median sternotomy wires and heart valve replacement.    < end of copied text >      ECG [my interpretation]: 10/16/17@ 13:35: Atrial fibrillation with left axis deviation and RBBB    TELEMETRY: occasional ectopy, likely A fib with aberrancy vs. true NSVT    ECHO: < from: Transthoracic Echocardiogram (10.16.17 @ 06:51) >  CONCLUSIONS:  1. An annuloplasty ring seen in mitral position. Mild to  moderate mitral regurgitation. Mean transmitral valve  gradient equals 11 mm Hg, consistent with severe mitral  stenosis.  2. Normal trileaflet aortic valve. Mild aortic  insufficiency.  3. Aortic Root: 3.1 cm.  4. Severe left atrial enlargement.  5. Mild concentric left ventricular hypertrophy.  6. Dilated LV with severe global left ventricular  dysfunction.  7. Grade II diastolic dysfunction.  8. Normal right atrium.  9. Normal right ventricular size and function.  10. RA Pressure is 8 mm Hg.  11. RV systolic pressure is moderately increased at  49 mm  Hg.  12. There is moderate tricuspid regurgitation.  13. There is mild pulmonic regurgitation.  14. Normal pericardium with no pericardial effusion.  15. Bilateral pleural effusions.    < end of copied text >

## 2017-10-16 NOTE — CONSULT NOTE ADULT - ASSESSMENT
81 yo M with noted PMH including A fib on warfarin p/w respiratory failure in setting of pneumonia, also significant decrease in EF noted on echo.     1. Cardiomyopathy: No evidence of significant pulmonary vascular congestion from CT 10/6/17, but yesterday's CXR shows increased pulmonary vascular congestion.  -Please check BNP levels  -Agree with diuresis with furosemide, goal will be more conservative diuresis (-1L /24 hours) in setting of underlying infection/sepsis  -Continue metoprolol, add lisinopril (5 mg daily) as hemodynamically tolerated. Can uptitrate over time.   -Patient will likely need ischemic evaluation for cardiomyopathy pending goals of care discussion with palliative care service.     2. A fib:  Rate control with metoprolol  -likely potentiated by underlying infection, should improve as the former gets treated  -warfarin when able to tolerate PO, no need to bridge with heparin gtt    3. CAD: Low level troponins may be type II MI (demand ischemia) in setting of underlying infection as well as A fib with RVR  -continue aspirin, metoprolol  -Lipid panel shows , HDL 50, LDL 55, trigs 111; otherwise acceptable 81 yo M with noted PMH including h/o MVR and A fib on warfarin p/w respiratory failure in setting of pneumonia, also significant decrease in EF noted on echo.     1. Cardiomyopathy: No evidence of significant pulmonary vascular congestion from CT 10/6/17, but yesterday's CXR shows increased pulmonary vascular congestion.  -Please check BNP levels  -Agree with diuresis with furosemide, goal will be more conservative diuresis (-1L /24 hours) in setting of underlying infection/sepsis  -Continue metoprolol, add lisinopril (5 mg daily) as hemodynamically tolerated. Can uptitrate over time.   -Patient will likely need ischemic evaluation for cardiomyopathy pending goals of care discussion with palliative care service.   -If possible, obtain records of patient's ischemic work-up done prior to his mitral valve surgery    2. A fib:  Rate controlled with metoprolol  -likely potentiated by underlying infection, should improve as the former gets treated  -warfarin when able to tolerate PO, no need to bridge with heparin gtt    3. CAD: Low level troponins may be type II MI (demand ischemia) in setting of underlying infection as well as A fib with RVR  -continue aspirin, metoprolol  -Lipid panel shows , HDL 50, LDL 55, trigs 111; otherwise acceptable

## 2017-10-16 NOTE — CONSULT NOTE ADULT - PROBLEM SELECTOR RECOMMENDATION 9
1)plan for OR debridement on 10/9/17  2) optimize patient for surgery  3) monitor INR  4) cont medical management  5) case and plan discussed with Dr. Ramsey and agrees
Pt will get an EEG today
increasingly drowsy with tacypnea and tachycardia  unable to protect airway  ABG :pH: 7.52  /  pCO2: 25    /  pO2: 102   / HCO3: 20    / Base Excess: -1.7  /  SaO2: 98  Intubated for airway protection and respiratory distress  c/w mechanical ventilation and iv sedation  cxr is less congested from prior CXR  labs pertinent for worsening MATHEUS  and leucocytosis  c/w iv abx  cE also trending up  trend CE f/u repeat EKG and ECHO  when more Stable obtain CT scan to rule out CVA( patient has underlying history of Afib)
1. continue abx  2. f/u urine cx  3. No acute urologic intervention at this time.  Treat UTI  4. can f/u as outpatient with Dr. Yates for cystoscopy  5. D/w Dr. Yates and agreed
s/p extubation and on bipap 24 hr - unable to wean yet, still very weak  not sure if pt can be successfully weaned off bipap, will c/w diuresing pt and icu level of care

## 2017-10-16 NOTE — CONSULT NOTE ADULT - PROBLEM SELECTOR RECOMMENDATION 4
lasix was on hold because of sepsis  s/p 1 dose of lasix because of suspicion of pulmonary edema  c/w asa lopressor .  cardiology evaluation
needs full ADL care, wound care

## 2017-10-16 NOTE — CONSULT NOTE ADULT - CONSULT REQUESTED DATE/TIME
06-Oct-2017 14:44
06-Oct-2017 15:27
11-Oct-2017 18:57
14-Oct-2017 20:33
16-Oct-2017 12:26
16-Oct-2017 15:10
16-Oct-2017 15:43
06-Oct-2017 11:30

## 2017-10-16 NOTE — PROGRESS NOTE ADULT - SUBJECTIVE AND OBJECTIVE BOX
82y Male who was transferred to the ICU as he became unresponsive and had to be intubated, he is extubated now but is struggling on a ventimask, he is awake and alert and occ nods to answer a question or so but not sure if he understands me. He is going to be placed on bipap . He had a temp to 100.8 yesterday , he is having a lot of pvc's. He was found to have an EF of 5% !!!    Meds:  ertapenem  IVPB      ertapenem  IVPB 1000 milliGRAM(s) IV Intermittent every 24 hours    Allergies    penicillin (Unknown)    Intolerances        VITALS:  Vital Signs Last 24 Hrs  T(C): 37.3 (16 Oct 2017 14:26), Max: 38.2 (15 Oct 2017 19:26)  T(F): 99.1 (16 Oct 2017 14:26), Max: 100.8 (15 Oct 2017 19:26)  HR: 129 (16 Oct 2017 14:26) (60 - 129)  BP: 133/90 (16 Oct 2017 14:26) (84/58 - 140/87)  BP(mean): 99 (16 Oct 2017 14:26) (51 - 102)  RR: 28 (16 Oct 2017 14:26) (15 - 36)  SpO2: 100% (16 Oct 2017 14:26) (98% - 100%)    LABS/DIAGNOSTIC TESTS:                          8.0    9.6   )-----------( 178      ( 16 Oct 2017 06:23 )             26.3         10-16    147<H>  |  115<H>  |  37<H>  ----------------------------<  131<H>  3.6   |  25  |  1.06    Ca    7.6<L>      16 Oct 2017 06:23  Phos  3.4     10-16  Mg     2.2     10-16    TPro  5.2<L>  /  Alb  1.8<L>  /  TBili  0.4  /  DBili  x   /  AST  44<H>  /  ALT  62<H>  /  AlkPhos  117  10-16      LIVER FUNCTIONS - ( 16 Oct 2017 06:23 )  Alb: 1.8 g/dL / Pro: 5.2 g/dL / ALK PHOS: 117 U/L / ALT: 62 U/L DA / AST: 44 U/L / GGT: x             CULTURES: .Blood Blood-Peripheral  10-14 @ 22:45   No growth to date.  --  --      .Blood Blood-Peripheral  10-11 @ 13:27   No growth at 5 days.  --  --      .Surgical Swab sacral decubitus  10-09 @ 13:21   Rare Proteus mirabilis ESBL  Few Enterococcus faecalis (vancomycin resistant)  Few Bacteroides thetaiotaomicron  --  Proteus mirabilis ESBL  Enterococcus faecalis (vancomycin resistant)      .Urine Clean Catch (Midstream)  10-05 @ 23:11   50,000 - 99,000 CFU/mL Proteus mirabilis ESBL  --  Proteus mirabilis ESBL      .Blood Blood  10-05 @ 23:07   Growth in aerobic bottle: Proteus mirabilis ESBL  Growth in anaerobic bottle: Streptococcus anginosus  ***Blood Panel PCR results on this specimen are available  approximately 3 hours after the Gram stain result.***  Gram stain, PCR, and/or cultureresults may not always  correspond due to difference in methodologies.  ************************************************************  This PCR assay was performed using [employer].  The following targets are tested for: Enterococcus,  vancomycin resistant enterococci, Listeria monocytogenes,  coagulase negative staphylococci, S. aureus,  methicillin resistant S. aureus, Streptococcus agalactiae  (Group B), S. pneumoniae, S. pyogenes (Group A),  Acinetobacter baumannii, Enterobacter cloacae, E. coli,  Klebsiella oxytoca, K. pneumoniae, Proteus sp.,  Serratia marcescens, Haemophilus influenzae,  Neisseria meningitidis, Pseudomonas aeruginosa, Candida  albicans, C. glabrata, C krusei, C parapsilosis,  C. tropicalis and the KPC resistance gene.  --  Proteus mirabilis ESBL  Blood Culture PCR  Blood Culture PCR  Streptococcus anginosus            RADIOLOGY:      ROS:  [x  ] UNABLE TO ELICIT

## 2017-10-16 NOTE — CONSULT NOTE ADULT - PROBLEM SELECTOR RECOMMENDATION 3
EEG today
lopressor IV for rate control  c/w coumadin  f/u INR
progressive decline in rehab, as per family pt never improved since May when pt first entered rehab. Pt cognitively and functionally worsened with increasing medical complications, pressure ulcers, and pt's overall spirits, Wife Daly is the HCP and oldest daughter is closely involved. FAST 7c at this time

## 2017-10-16 NOTE — CONSULT NOTE ADULT - ASSESSMENT
1) Severe metabolic/toxic encephalopathy due to pneumonia/sepsis  2) R/o Sub-clinical seizures  3) NPH- unknown duration

## 2017-10-16 NOTE — CONSULT NOTE ADULT - SUBJECTIVE AND OBJECTIVE BOX
History of Present Illness:    83 y/o M from [facility] w/ PMHx of Rheumatic heart disease, NPH ( unsteady gait used to ambulate with walker now bedbound for a couple of months) & Afib (on Coumadin) was sent from [facility] for low Hb.   Patient is poor historian and source of information is wife at bedside. Wife states that patient is having cough with yellowish sputum from last 4 weeks with occasional temp spikes. Denies h/o blood in stool, blood in urine, sputum or dark color stool.     Patient is being treated for pneumonia in NH.     In ICU: Since pt remains lethargic there is a concerned about having subclinical seizures hence this consultation is prompted.  History is obtained from his current chart.     His CT-scan of the brain shows severe atrophy with small vessels ischemic changes and dilatation of the ventricles. He was basically bedbound/wheelchair bound. He is unable to move his lower extremities. He does follows some simple commends. No shaking or jerking activities observed.      Allergies    penicillin (Unknown)        PAST MEDICAL & SURGICAL HISTORY:  DM (diabetes mellitus)  Afib  CHF (congestive heart failure)  NPH? duration  No significant past surgical history      Social History: [ ] Tobacco use, [ ] Alcohol use.        MEDICATIONS  (STANDING):  ALBUTerol/ipratropium for Nebulization 3 milliLiter(s) Nebulizer every 6 hours  ascorbic acid 500 milliGRAM(s) Oral daily  aspirin enteric coated 81 milliGRAM(s) Oral daily  dextrose 5%. 1000 milliLiter(s) (50 mL/Hr) IV Continuous <Continuous>  dextrose 50% Injectable 12.5 Gram(s) IV Push once  ertapenem  IVPB      ertapenem  IVPB 1000 milliGRAM(s) IV Intermittent every 24 hours  ferrous    sulfate Liquid 300 milliGRAM(s) Oral daily  folic acid 1 milliGRAM(s) Oral daily  influenza   Vaccine 0.5 milliLiter(s) IntraMuscular once  insulin lispro (HumaLOG) corrective regimen sliding scale   SubCutaneous every 6 hours  metoprolol 25 milliGRAM(s) Oral two times a day  pantoprazole  Injectable 40 milliGRAM(s) IV Push daily  polyethylene glycol 3350 17 Gram(s) Oral daily  sucralfate suspension 1 Gram(s) Oral every 6 hours  tamsulosin 0.4 milliGRAM(s) Oral at bedtime  zinc sulfate 220 milliGRAM(s) Oral daily        Family:  FAMILY HISTORY: unknown      Review of Systems:  General:  [ ] chills,[ ] fatigue,[x ] fevers  Skin: [ ] rash present  HEENT: [ ] head injury,[ ] blurred vision, [ ] double vision, [ ] eye pain, [ ] visual loss, [ ] hearing loss, [ ] deafness, [ ] ear pain, [ ] ringing in the ears, [ ] vertigo, [ ] sinus pain, [ ] voice changes  Neck: [ ] Neck stiffness  Respiratory: [x ] cough, [ x] difficulty breathing  Cardiovascular: [ ] calf cramps, [ ] chest pain, [ ] leg pain, [ ] swelling, [ ] rapid heart rate, [ ] shortness of breath  Gastrointestinal: [ ] abdominal pain, [ ] nausea, [ ] vomiting  Musculoskeletal: [ ] back pain, [ ] joint pain, [ ] joint stiffness, [ ] leg cramps, [ ] muscle atrophy, [ ] muscle cramps, [ ] muscle weakness, [ ] swelling of extremities  Neurological: [ ] Dizziness, [ ] decreased memory, [ ] fainting, [ ] focal neurological symptoms, [ ] headaches, [ ] incontinence of stool, [ ] incontinence of urine, [x ] loss of consciousness, [ ] numbness, [ ] seizures, [ ] spinning sensation, [ ] stroke, [ x] trouble walking, [x ] unsteadiness, [ ] visual changes,  [x ] weakness, [ ] tremors, [ ] rigidity, [ ] slowness  Psychiatric: [ ] depression, [ ] anxiety, [ ] hallucinations, [ ] inability to concentrate,[ ] mood changes, [ ] panic attacks  Hematology: [ ] blood clots, [ ] spontaneous bleeding        Vital Signs:    T(C): 37.2 (10-16-17 @ 11:00), Max: 38.2 (10-14-17 @ 20:11)  HR: 117 (10-16-17 @ 12:00) (60 - 141)  BP: 133/72 (10-16-17 @ 12:00) (84/58 - 145/102)  RR: 25 (10-16-17 @ 12:00) (10 - 32)  SpO2: 100% (10-16-17 @ 12:00) (97% - 100%)      Physical Exam:    General:  General Appearance - Well groomed, Not sickly   Build and nutrition - Well nourished and Well developed  Posture - Normal posture    Head and Neck:  Head - normocephalic, atraumatic with no lesions or palpable masses    Chest and Lung exam:  Quiet, even and easy respiratory effort with no use of accessory muscles and on ausculation, normal breath sounds, no adventitious sounds and normal vocal resonance    Cardiovascular:  Auscultation: Normal heart sounds  Murmurs & Other heart sounds: Auscultation of the heart reveals- no murmurs  Carotid arteris: No Carotid bruits    Abdomen:  Palpation/Percussion: Non Tender, No Rebound tenderness, No hepatosplenomegaly, and No palpable abdominal masses    Peripheral Vascular:  Lower extremity: Inspection - Bilateral - No varicose veins  Palpation: Tenderness - bilateral - Non tender  Dorsalis pedis pulse - bilateral - normal  Edema - bilateral - no edema    Neurologic:  Mental Status -Lethargic, opens eyes and follow some simple commends  Cranial Nerves:  Limited study  II Optic: Visual acuity – Bilateral - Normal ; Visual fields – normal ; Fundi – Bilateral – no optic atrophy or Papilledema  III Oculomotor – Normal bilaterally  IV Trochlear – Bilateral – Normal  V Trigeminal: Ophthalmic – Bilateral – Normal;  Maxillary – Bilateral- Normal; Mandibular – Bilateral - Normal  VI Abducens – Bilateral - Normal  VII Facial: Normal bilaterally  Eye Movements: moves eyes side by side  Motor:  Able to move both hand  but does not move legs    Plantar Reflexes(Babinski) (L4-S2) – Bilateral – mute  Sensory:

## 2017-10-16 NOTE — CONSULT NOTE ADULT - ATTENDING COMMENTS
Thank you for the courtesy of a consultation, please contact me for any additional questions
pt seen and examined with ID resident
89-year-old admitted with symptoms change in mental status. He found to have pneumonia/sepsis and infected decubiti. The ICU team is concerned bout possible seizures. Pt is scheduled to have EEG today
MICU ATTENDING  81 y/o with multiple med problem admitted with anemia and hospital acquired pneumonia, bacteremia. 10/14/2017 found poorly responsive in respiratorily distress, transferred to MICU fro further Mx, intubated in ICU.    A/P respiratory failure   Altered mental status   ARF   Bacteremia  A/FIB  DM    MICU  Cont MV, taper FIO2 as tolerated, serials ABG, CXR  Cont antibiotics, F/U C$S, ID F/U   U/A , urine lytes, monitor renal function, urinary output   Serial CE   Lopressor for A/fib control, Cont anticoagulation  DVT/GI prophylaxis  F/U FS, RISS

## 2017-10-16 NOTE — PROGRESS NOTE ADULT - ASSESSMENT
y/o M from [facility] w/ PMHx of Rheumatic heart disease, NPH  (unsteady gait used to ambulate with walker now bedbound for a couple of months) & Afib (on Coumadin), CHF being treated on this hospitalization for ESBL bacteremia , now transfered to ICU for respiratory failure and altered mental status .     Problem/Recommendation - 1:  Problem: Respiratory failure. Recommendation: increasingly drowsy with tacypnea and tachycardia  unable to protect airway  ABG :pH: 7.52  /  pCO2: 25    /  pO2: 102   / HCO3: 20    / Base Excess: -1.7  /  SaO2: 98  Intubated for airway protection and respiratory distress  c/w mechanical ventilation and iv sedation  cxr is less congested from prior CXR  c/w iv abx  MATHEUS resolved  when more Stable obtain CT scan to rule out CVA( patient has underlying history of Afib).     Problem/Recommendation - 2:  ·  Problem: Bacteremia.  Recommendation: ESBL bacteremia  ESBL in urine blood and sacral DU debridement tissue  on Etrapenem Day no 5   repeat blood CX negative  patient has a PICC line   c/w invanz.   WBC WNL     Problem/Recommendation - 3:  ·  Problem: Afib.  Recommendation: lopressor IV for rate control  c/w coumadin  f/u INR.      Problem/Recommendation - 4:  ·  Problem: CHF (congestive heart failure).  Recommendation: lasix was on hold because of sepsis  s/p 1 dose of lasix because of suspicion of pulmonary edema  c/w asa lopressor .  cardiology evaluation.      Problem/Recommendation - 5:  ·  Problem: Anemia.  Recommendation: hemoglobin 8.0  got 1 unit PRBC this admission  presented with hemoglobin of 7.0  c/w carafate protonix iv  monitor h/h.   -FOBT negative     Problem/Recommendation - 6:  Problem: DM (diabetes mellitus). Recommendation: c/w insulin sliding scale  monitor Accu check.     Problem/Recommendation - 7:  Problem: Need for prophylactic measure. Recommendation: on coumadin and protonix.

## 2017-10-16 NOTE — CONSULT NOTE ADULT - NSHPATTENDINGPLANDISCUSS_GEN_ALL_CORE
ICU team residents and attending Dr. Chauhan
Resident physician
ICU team
HS. Total Critical Care time spent 45 min

## 2017-10-16 NOTE — CONSULT NOTE ADULT - SUBJECTIVE AND OBJECTIVE BOX
HPI:  83 y/o M from [facility] w/ PMHx of Rheumatic heart disease, NPH ( unsteady gait used to ambulate with walker now bedbound for a couple of months) & Afib (on Coumadin) was sent from [facility] for low Hb.   Patient is poor historian and source of information is wife at bedside. Wife states that patient is having cough with yellowish sputum from last 4 weeks with occasional temp spikes. Denies h/o blood in stool, blood in urine, sputum or dark color stool.     Patient is being treated for pneumonia in NH.   SH: Non smoker, non alcoholic, denies illicit drug use. Lives at NH, mostly bed bound.   Fh: Not significant as per wife  Goals of care: Patient is full code. Wife Daly Tracy is HCP who can be reached at Ph. [phone]  Ed Course: Patient is AxO x1, unable to interact. T 102.8, Hr 113, /69. S/p Vancomycni/Cefepime and Zithromax (05 Oct 2017 19:36)      PAST MEDICAL & SURGICAL HISTORY:  DM (diabetes mellitus)  Afib  CHF (congestive heart failure)  No significant past surgical history      SOCIAL HISTORY:    Admitted from:  home assisted living [facility]   Substance abuse history:              Tobacco hx:                  Alcohol hx:              Home Opioid hx:  Jehovah's witness:                                    Preferred Language:    Surrogate/HCP/Guardian:            Phone#:    FAMILY HISTORY:    Baseline ADLs (prior to admission):    Allergies    penicillin (Unknown)    Intolerances      Present Symptoms:   Dyspnea:   Nausea/Vomiting:   Anxiety:  Depressed   Fatigue:  Loss of appetite:   Pain:                                location:          Review of Systems: [All others negative or Unable to obtain due to poor mentation]    MEDICATIONS  (STANDING):  ALBUTerol/ipratropium for Nebulization 3 milliLiter(s) Nebulizer every 6 hours  ascorbic acid 500 milliGRAM(s) Oral daily  aspirin enteric coated 81 milliGRAM(s) Oral daily  dextrose 5%. 1000 milliLiter(s) (50 mL/Hr) IV Continuous <Continuous>  dextrose 50% Injectable 12.5 Gram(s) IV Push once  ferrous    sulfate Liquid 300 milliGRAM(s) Oral daily  folic acid 1 milliGRAM(s) Oral daily  influenza   Vaccine 0.5 milliLiter(s) IntraMuscular once  insulin lispro (HumaLOG) corrective regimen sliding scale   SubCutaneous every 6 hours  meropenem IVPB 500 milliGRAM(s) IV Intermittent every 8 hours  metoprolol 25 milliGRAM(s) Oral two times a day  pantoprazole  Injectable 40 milliGRAM(s) IV Push daily  polyethylene glycol 3350 17 Gram(s) Oral daily  sucralfate suspension 1 Gram(s) Oral every 6 hours  tamsulosin 0.4 milliGRAM(s) Oral at bedtime  zinc sulfate 220 milliGRAM(s) Oral daily    MEDICATIONS  (PRN):  acetaminophen    Suspension 650 milliGRAM(s) Oral every 6 hours PRN For Temp greater than 38 C (100.4 F)  dextrose Gel 1 Dose(s) Oral once PRN Blood Glucose LESS THAN 70 milliGRAM(s)/deciLiter  glucagon  Injectable 1 milliGRAM(s) IntraMuscular once PRN Glucose <70 milliGRAM(s)/deciLiter      PHYSICAL EXAM:    Vital Signs Last 24 Hrs  T(C): 37.3 (16 Oct 2017 14:26), Max: 38.2 (15 Oct 2017 19:26)  T(F): 99.1 (16 Oct 2017 14:26), Max: 100.8 (15 Oct 2017 19:26)  HR: 107 (16 Oct 2017 15:00) (60 - 129)  BP: 114/93 (16 Oct 2017 15:00) (84/58 - 140/87)  BP(mean): 98 (16 Oct 2017 15:00) (51 - 102)  RR: 19 (16 Oct 2017 15:00) (15 - 36)  SpO2: 100% (16 Oct 2017 15:00) (98% - 100%)    General: alert  oriented x ____    [lethargic distressed cachexia  nonverbal  unarousable verbal]  Karnofsky Performance Score/Palliative Performance Status Version2:     %    HEENT: normal  dry mouth  ET tube/trach oral lesions:  Lungs: comfortable tachypnea/labored breathing  excessive secretions  CV: normal  tachycardia  GI: normal  distended  tender  incontinent               PEG/NG/OG tube  constipation  last BM:   : normal  incontinent  oliguria/anuria  cabrera  Musculoskeletal: normal  weakness  edema             ambulatory  bedbound/wheelchair bound  Skin: normal  pressure ulcers: stage: edema: other:  Neuro: no deficits cognitive impairment dsyphagia/dysarthria paresis: other:  Oral intake ability: unable/only mouth care [minimal moderate full capability]  Diet: [NPO]    LABS:                        8.0    9.6   )-----------( 178      ( 16 Oct 2017 06:23 )             26.3     10-16    147<H>  |  115<H>  |  37<H>  ----------------------------<  131<H>  3.6   |  25  |  1.06    Ca    7.6<L>      16 Oct 2017 06:23  Phos  3.4     10-16  Mg     2.2     10-16    TPro  5.2<L>  /  Alb  1.8<L>  /  TBili  0.4  /  DBili  x   /  AST  44<H>  /  ALT  62<H>  /  AlkPhos  117  10-16        RADIOLOGY & ADDITIONAL STUDIES:    ADVANCE DIRECTIVES:   Advanced Care Planning discussion total time spent:    Prolonged Service: Non-Face-to-Face: Time start (    )Time ended (   ) Total Time: (   ) HPI:  81 y/o M from [facility] w/ PMHx of Rheumatic heart disease, NPH ( unsteady gait used to ambulate with walker now bedbound for a couple of months) & Afib (on Coumadin) was sent from [facility] for low Hb.   Patient is poor historian and source of information is wife at bedside. Wife states that patient is having cough with yellowish sputum from last 4 weeks with occasional temp spikes. Denies h/o blood in stool, blood in urine, sputum or dark color stool.     Patient is being treated for pneumonia in NH.   SH: Non smoker, non alcoholic, denies illicit drug use. Lives at NH, mostly bed bound.   Fh: Not significant as per wife  Goals of care: Patient is full code. Wife Daly Tracy is HCP who can be reached at . [phone]  Ed Course: Patient is AxO x1, unable to interact. T 102.8, Hr 113, /69. S/p Vancomycni/Cefepime and Zithromax (05 Oct 2017 19:36)      PAST MEDICAL & SURGICAL HISTORY:  DM (diabetes mellitus)  Afib  CHF (congestive heart failure)  No significant past surgical history      SOCIAL HISTORY:  has three adult children with first wife  Admitted from:  [facility]   Congregation:      [employer]                              Preferred Language: English    HCP  Daly Tracy         Phone#: [phone]    FAMILY HISTORY:  not pertinent  Baseline ADLs (prior to admission): mostly bedbound    Allergies    penicillin (Unknown)    Intolerances      Present Symptoms:   Review of Systems:  Unable to obtain due to poor mentation]    MEDICATIONS  (STANDING):  ALBUTerol/ipratropium for Nebulization 3 milliLiter(s) Nebulizer every 6 hours  ascorbic acid 500 milliGRAM(s) Oral daily  aspirin enteric coated 81 milliGRAM(s) Oral daily  dextrose 5%. 1000 milliLiter(s) (50 mL/Hr) IV Continuous <Continuous>  dextrose 50% Injectable 12.5 Gram(s) IV Push once  ferrous    sulfate Liquid 300 milliGRAM(s) Oral daily  folic acid 1 milliGRAM(s) Oral daily  influenza   Vaccine 0.5 milliLiter(s) IntraMuscular once  insulin lispro (HumaLOG) corrective regimen sliding scale   SubCutaneous every 6 hours  meropenem IVPB 500 milliGRAM(s) IV Intermittent every 8 hours  metoprolol 25 milliGRAM(s) Oral two times a day  pantoprazole  Injectable 40 milliGRAM(s) IV Push daily  polyethylene glycol 3350 17 Gram(s) Oral daily  sucralfate suspension 1 Gram(s) Oral every 6 hours  tamsulosin 0.4 milliGRAM(s) Oral at bedtime  zinc sulfate 220 milliGRAM(s) Oral daily    MEDICATIONS  (PRN):  acetaminophen    Suspension 650 milliGRAM(s) Oral every 6 hours PRN For Temp greater than 38 C (100.4 F)  dextrose Gel 1 Dose(s) Oral once PRN Blood Glucose LESS THAN 70 milliGRAM(s)/deciLiter  glucagon  Injectable 1 milliGRAM(s) IntraMuscular once PRN Glucose <70 milliGRAM(s)/deciLiter      PHYSICAL EXAM:    Vital Signs Last 24 Hrs  T(C): 37.3 (16 Oct 2017 14:26), Max: 38.2 (15 Oct 2017 19:26)  T(F): 99.1 (16 Oct 2017 14:26), Max: 100.8 (15 Oct 2017 19:26)  HR: 107 (16 Oct 2017 15:00) (60 - 129)  BP: 114/93 (16 Oct 2017 15:00) (84/58 - 140/87)  BP(mean): 98 (16 Oct 2017 15:00) (51 - 102)  RR: 19 (16 Oct 2017 15:00) (15 - 36)  SpO2: 100% (16 Oct 2017 15:00) (98% - 100%)    General: alert  oriented x 1   [lethargic barely able to acknowledge other people]  Karnofsky Performance Score/Palliative Performance Status Version2:  30 %    HEENT: dry mouth  on bipap 24hr NGT  Lungs: tachypnea/labored breathing on bipap  CV: tachycardia  GI:  incontinent            : incontinent cabrera  Musculoskeletal: weakness x4 edema x4           bedbound  Skin: pressure ulcers: stage:2-3   Neuro: mod severe cognitive impairment dsyphagia  Oral intake ability: unable/only mouth care   Diet: [NPO]    LABS:                        8.0    9.6   )-----------( 178      ( 16 Oct 2017 06:23 )             26.3     10-16    147<H>  |  115<H>  |  37<H>  ----------------------------<  131<H>  3.6   |  25  |  1.06    Ca    7.6<L>      16 Oct 2017 06:23  Phos  3.4     10-16  Mg     2.2     10-16    TPro  5.2<L>  /  Alb  1.8<L>  /  TBili  0.4  /  DBili  x   /  AST  44<H>  /  ALT  62<H>  /  AlkPhos  117  10-16        RADIOLOGY & ADDITIONAL STUDIES: reviewed    ADVANCE DIRECTIVES: DNR/DNI  Advanced Care Planning discussion total time spent:  30 min

## 2017-10-16 NOTE — PROGRESS NOTE ADULT - ASSESSMENT
81 y/o M from [facility] w/ PMHx of Rheumatic heart disease, NPH  (unsteady gait used to ambulate with walker now bedbound for a couple of months) & Afib (on Coumadin) was sent from [facility] for low Hb.     Patient is poor historian and source of information is wife. Wife stated that patient is having cough with yellowish sputum for last 4 weeks with occasional temp spikes.    In the ED, patient is AxO x1, unable to interact. T 102.8, Hr 113, /69. S/p Vancomycni/Cefepime and Zithromax.  CXR showed interstitial prominence and opacity at the left lung base.     Patient was admitted to the medical floor with Sepsis 2/2 PNA vs. UTI      Unresponsiveness on 10/14/17 , s/p Extubation    ECho: EF 10%

## 2017-10-16 NOTE — AIRWAY REMOVAL NOTE  ADULT & PEDS - ARTIFICAL AIRWAY REMOVAL COMMENTS
Pt extubated to 50% aerosol mask as per MD Jan Chauhan, no distress noted at this time. Vital signs:  RR 24 % /93

## 2017-10-16 NOTE — CONSULT NOTE ADULT - PROVIDER SPECIALTY LIST ADULT
Cardiology
Critical Care
Gastroenterology
Neurology
Palliative Care
Surgery
Urology
Infectious Disease

## 2017-10-17 LAB
ANION GAP SERPL CALC-SCNC: 10 MMOL/L — SIGNIFICANT CHANGE UP (ref 5–17)
APTT BLD: 39 SEC — HIGH (ref 27.5–37.4)
BASOPHILS # BLD AUTO: 0.1 K/UL — SIGNIFICANT CHANGE UP (ref 0–0.2)
BASOPHILS NFR BLD AUTO: 0.8 % — SIGNIFICANT CHANGE UP (ref 0–2)
BUN SERPL-MCNC: 32 MG/DL — HIGH (ref 7–18)
CALCIUM SERPL-MCNC: 7.7 MG/DL — LOW (ref 8.4–10.5)
CHLORIDE SERPL-SCNC: 117 MMOL/L — HIGH (ref 96–108)
CO2 SERPL-SCNC: 24 MMOL/L — SIGNIFICANT CHANGE UP (ref 22–31)
CREAT SERPL-MCNC: 1 MG/DL — SIGNIFICANT CHANGE UP (ref 0.5–1.3)
EOSINOPHIL # BLD AUTO: 0.1 K/UL — SIGNIFICANT CHANGE UP (ref 0–0.5)
EOSINOPHIL NFR BLD AUTO: 1.2 % — SIGNIFICANT CHANGE UP (ref 0–6)
GLUCOSE SERPL-MCNC: 102 MG/DL — HIGH (ref 70–99)
HCT VFR BLD CALC: 28.4 % — LOW (ref 39–50)
HGB BLD-MCNC: 8.1 G/DL — LOW (ref 13–17)
INR BLD: 4.14 RATIO — HIGH (ref 0.88–1.16)
LYMPHOCYTES # BLD AUTO: 1.5 K/UL — SIGNIFICANT CHANGE UP (ref 1–3.3)
LYMPHOCYTES # BLD AUTO: 16.6 % — SIGNIFICANT CHANGE UP (ref 13–44)
MAGNESIUM SERPL-MCNC: 2 MG/DL — SIGNIFICANT CHANGE UP (ref 1.6–2.6)
MCHC RBC-ENTMCNC: 24.5 PG — LOW (ref 27–34)
MCHC RBC-ENTMCNC: 28.7 GM/DL — LOW (ref 32–36)
MCV RBC AUTO: 85.5 FL — SIGNIFICANT CHANGE UP (ref 80–100)
MONOCYTES # BLD AUTO: 0.6 K/UL — SIGNIFICANT CHANGE UP (ref 0–0.9)
MONOCYTES NFR BLD AUTO: 6.4 % — SIGNIFICANT CHANGE UP (ref 2–14)
NEUTROPHILS # BLD AUTO: 6.9 K/UL — SIGNIFICANT CHANGE UP (ref 1.8–7.4)
NEUTROPHILS NFR BLD AUTO: 74.9 % — SIGNIFICANT CHANGE UP (ref 43–77)
NT-PROBNP SERPL-SCNC: HIGH PG/ML (ref 0–450)
PHOSPHATE SERPL-MCNC: 2.6 MG/DL — SIGNIFICANT CHANGE UP (ref 2.5–4.5)
PLATELET # BLD AUTO: 179 K/UL — SIGNIFICANT CHANGE UP (ref 150–400)
POTASSIUM SERPL-MCNC: 3.1 MMOL/L — LOW (ref 3.5–5.3)
POTASSIUM SERPL-SCNC: 3.1 MMOL/L — LOW (ref 3.5–5.3)
PROTHROM AB SERPL-ACNC: 46.5 SEC — HIGH (ref 9.8–12.7)
RBC # BLD: 3.32 M/UL — LOW (ref 4.2–5.8)
RBC # FLD: 25.4 % — HIGH (ref 10.3–14.5)
SODIUM SERPL-SCNC: 151 MMOL/L — HIGH (ref 135–145)
WBC # BLD: 9.3 K/UL — SIGNIFICANT CHANGE UP (ref 3.8–10.5)
WBC # FLD AUTO: 9.3 K/UL — SIGNIFICANT CHANGE UP (ref 3.8–10.5)

## 2017-10-17 PROCEDURE — 99232 SBSQ HOSP IP/OBS MODERATE 35: CPT

## 2017-10-17 RX ORDER — POTASSIUM CHLORIDE 20 MEQ
10 PACKET (EA) ORAL
Refills: 0 | Status: COMPLETED | OUTPATIENT
Start: 2017-10-17 | End: 2017-10-17

## 2017-10-17 RX ORDER — POTASSIUM CHLORIDE 20 MEQ
40 PACKET (EA) ORAL ONCE
Refills: 0 | Status: DISCONTINUED | OUTPATIENT
Start: 2017-10-17 | End: 2017-10-17

## 2017-10-17 RX ORDER — INSULIN LISPRO 100/ML
VIAL (ML) SUBCUTANEOUS
Refills: 0 | Status: DISCONTINUED | OUTPATIENT
Start: 2017-10-17 | End: 2017-10-26

## 2017-10-17 RX ORDER — POTASSIUM CHLORIDE 20 MEQ
40 PACKET (EA) ORAL ONCE
Refills: 0 | Status: COMPLETED | OUTPATIENT
Start: 2017-10-17 | End: 2017-10-17

## 2017-10-17 RX ORDER — FUROSEMIDE 40 MG
40 TABLET ORAL DAILY
Refills: 0 | Status: DISCONTINUED | OUTPATIENT
Start: 2017-10-17 | End: 2017-10-19

## 2017-10-17 RX ADMIN — MEROPENEM 200 MILLIGRAM(S): 1 INJECTION INTRAVENOUS at 22:58

## 2017-10-17 RX ADMIN — LISINOPRIL 5 MILLIGRAM(S): 2.5 TABLET ORAL at 06:04

## 2017-10-17 RX ADMIN — Medication 2: at 17:32

## 2017-10-17 RX ADMIN — Medication 100 MILLIEQUIVALENT(S): at 08:21

## 2017-10-17 RX ADMIN — TAMSULOSIN HYDROCHLORIDE 0.4 MILLIGRAM(S): 0.4 CAPSULE ORAL at 22:58

## 2017-10-17 RX ADMIN — Medication 100 MILLIEQUIVALENT(S): at 06:37

## 2017-10-17 RX ADMIN — Medication 1 GRAM(S): at 12:08

## 2017-10-17 RX ADMIN — Medication 3 MILLILITER(S): at 14:19

## 2017-10-17 RX ADMIN — Medication 300 MILLIGRAM(S): at 12:04

## 2017-10-17 RX ADMIN — PANTOPRAZOLE SODIUM 40 MILLIGRAM(S): 20 TABLET, DELAYED RELEASE ORAL at 12:03

## 2017-10-17 RX ADMIN — Medication 40 MILLIEQUIVALENT(S): at 06:06

## 2017-10-17 RX ADMIN — Medication 3 MILLILITER(S): at 02:08

## 2017-10-17 RX ADMIN — Medication 25 MILLIGRAM(S): at 08:21

## 2017-10-17 RX ADMIN — Medication 100 MILLIEQUIVALENT(S): at 05:52

## 2017-10-17 RX ADMIN — Medication 81 MILLIGRAM(S): at 12:03

## 2017-10-17 RX ADMIN — Medication 1 GRAM(S): at 17:32

## 2017-10-17 RX ADMIN — Medication 1 GRAM(S): at 05:58

## 2017-10-17 RX ADMIN — Medication 1 GRAM(S): at 23:19

## 2017-10-17 RX ADMIN — Medication 3 MILLILITER(S): at 08:01

## 2017-10-17 RX ADMIN — Medication 1: at 23:18

## 2017-10-17 RX ADMIN — Medication 1 MILLIGRAM(S): at 12:05

## 2017-10-17 RX ADMIN — Medication 500 MILLIGRAM(S): at 12:04

## 2017-10-17 RX ADMIN — MEROPENEM 200 MILLIGRAM(S): 1 INJECTION INTRAVENOUS at 05:49

## 2017-10-17 RX ADMIN — MEROPENEM 200 MILLIGRAM(S): 1 INJECTION INTRAVENOUS at 14:17

## 2017-10-17 RX ADMIN — Medication 3 MILLILITER(S): at 20:59

## 2017-10-17 RX ADMIN — ZINC SULFATE TAB 220 MG (50 MG ZINC EQUIVALENT) 220 MILLIGRAM(S): 220 (50 ZN) TAB at 12:05

## 2017-10-17 NOTE — SWALLOW BEDSIDE ASSESSMENT ADULT - MODE OF PRESENTATION
x2/spoon/fed by clinician 2 fl oz; x3/cup/spoon/straw/fed by clinician w/ mod assistance; 2fl oz; x4; 2 fl oz/cup/spoon/straw/self fed w/ moderate assistance; x5/spoon/self fed w/ mod assistance; x5/spoon/self fed x1 attempt/spoon

## 2017-10-17 NOTE — PROGRESS NOTE ADULT - ASSESSMENT
infected sacral decubitus  bacteremia  leukocytosis   ?? pneumonia on cxr but not clinically  plan - cont meropenem 500mgs iv q 8hrs  time spent - 30 mins

## 2017-10-17 NOTE — PHYSICAL THERAPY INITIAL EVALUATION ADULT - DIAGNOSIS, PT EVAL
Impaired Mobility, Unstable Balance, Weakness
generalized weakness; difficulty performing mobility; inability to perform transfers or ambulation

## 2017-10-17 NOTE — PHYSICAL THERAPY INITIAL EVALUATION ADULT - PLANNED THERAPY INTERVENTIONS, PT EVAL
bed mobility training/gait training/strengthening/transfer training
balance training/bed mobility training/gait training/strengthening/transfer training

## 2017-10-17 NOTE — PHYSICAL THERAPY INITIAL EVALUATION ADULT - ADDITIONAL COMMENTS
was able to participate in transfers and FAP using RW as per NH transfer papers; able to ambulate independently ~6 months ago

## 2017-10-17 NOTE — SWALLOW BEDSIDE ASSESSMENT ADULT - PHARYNGEAL PHASE
Delayed pharyngeal swallow/Decreased laryngeal elevation Delayed pharyngeal swallow/Decreased laryngeal elevation/Cough post oral intake/Throat clear post oral intake Delayed pharyngeal swallow/Decreased laryngeal elevation/Multiple swallows

## 2017-10-17 NOTE — SWALLOW BEDSIDE ASSESSMENT ADULT - ORAL PREPARATORY PHASE
Anterior loss of bolus Bolus falls into right lateral sulci Anterior loss of bolus/Bolus falls into right lateral sulci Reduced oral grading/Anterior loss of bolus/Bolus falls into right lateral sulci Refuses to accept bolus into oral cavity

## 2017-10-17 NOTE — PROGRESS NOTE ADULT - GASTROINTESTINAL DETAILS
soft/nontender/bowel sounds normal/no guarding/no rigidity
soft/nontender/bowel sounds normal/no guarding/no rigidity/no organomegaly

## 2017-10-17 NOTE — SWALLOW BEDSIDE ASSESSMENT ADULT - COMMENTS
Pt HOB elevated to 45 degrees, AA+Ox2 (name, location), disoriented to time, mildly confused, cooperative, able to follow directives and verbally responded to queries; reduced vocal volume & delayed response time noted. Pt refused PO trial, requested applesauce & juliana crackers. Pt HOB elevated to 45 degrees, AA+Ox2 (name, location), disoriented to time, mildly confused, cooperative, able to follow directives and verbally responded to queries; reduced vocal volume & delayed response latency noted.

## 2017-10-17 NOTE — SWALLOW BEDSIDE ASSESSMENT ADULT - CONSISTENCIES ADMINISTERED
Ice Chip/thin liquid water/thin liquid apple juice/nectar thick applesauce/puree applesauce & juliana cracker/mech soft fish/mech soft

## 2017-10-17 NOTE — PROGRESS NOTE ADULT - SUBJECTIVE AND OBJECTIVE BOX
Med attending follow up:    s/p extubation, on BIPAP, NG tube  Patient is a 82y old  Male who presents with a chief complaint of low hb level in NH (05 Oct 2017 19:36  INTERVAL HPI/OVERNIGHT EVENTS: Interim events extubation-appears much better, eating.    MEDICATIONS  (STANDING):  acetaminophen  IVPB. 1000 milliGRAM(s) IV Intermittent once  ALBUTerol    90 MICROgram(s) HFA Inhaler 2 Puff(s) Inhalation every 6 hours  ascorbic acid 500 milliGRAM(s) Oral daily  aspirin enteric coated 81 milliGRAM(s) Oral daily  dextrose 5%. 1000 milliLiter(s) (50 mL/Hr) IV Continuous <Continuous>  dextrose 50% Injectable 12.5 Gram(s) IV Push once  docusate sodium 100 milliGRAM(s) Oral two times a day  ertapenem  IVPB      ertapenem  IVPB 1000 milliGRAM(s) IV Intermittent every 24 hours  ferrous    sulfate 325 milliGRAM(s) Oral two times a day with meals  folic acid 1 milliGRAM(s) Oral daily  heparin  Injectable 5000 Unit(s) SubCutaneous every 12 hours  influenza   Vaccine 0.5 milliLiter(s) IntraMuscular once  insulin lispro (HumaLOG) corrective regimen sliding scale   SubCutaneous three times a day before meals  multivitamin 1 Tablet(s) Oral daily  pantoprazole    Tablet 40 milliGRAM(s) Oral before breakfast  polyethylene glycol 3350 17 Gram(s) Oral daily  sucralfate suspension 1 Gram(s) Oral every 6 hours  tamsulosin 0.4 milliGRAM(s) Oral at bedtime  tiotropium 18 MICROgram(s) Capsule 1 Capsule(s) Inhalation daily  warfarin 5 milliGRAM(s) Oral once  zinc sulfate 220 milliGRAM(s) Oral daily    MEDICATIONS  (PRN):  dextrose Gel 1 Dose(s) Oral once PRN Blood Glucose LESS THAN 70 milliGRAM(s)/deciLiter  glucagon  Injectable 1 milliGRAM(s) IntraMuscular once PRN Glucose <70 milliGRAM(s)/deciLiter    ICU Vital Signs Last 24 Hrs  T(C): 36.2 (17 Oct 2017 13:00), Max: 37.3 (17 Oct 2017 00:00)  T(F): 97.2 (17 Oct 2017 13:00), Max: 99.1 (17 Oct 2017 00:00)  HR: 96 (17 Oct 2017 16:00) (79 - 120)  BP: 93/68 (17 Oct 2017 16:00) (91/65 - 122/76)  BP(mean): 74 (17 Oct 2017 16:00) (57 - 92)  ABP: --  ABP(mean): --  RR: 22 (17 Oct 2017 16:00) (14 - 28)  SpO2: 84% (17 Oct 2017 16:00) (84% - 100%)      __________________________________________________  REVIEW OF SYSTEMS:    debilitated,  details not available       ________________________________________________  PHYSICAL EXAM:  GENERAL: NAD  HEENT: Normocephalic;  conjunctivae and sclerae clear; moist mucous membranes;   NECK : supple  CHEST/LUNG: Clear to auscultation bilaterally with good air entry   HEART: S1 S2  regular; no murmurs, gallops or rubs  ABDOMEN: Soft, Nontender, Nondistended; Bowel sounds present.    EXTREMITIES: no cyanosis; no edema; no calf tenderness  SKIN: warm and dry; no rash.  Sacral dressing changed-stage 3  NERVOUS SYSTEM:  Awake and alert; Oriented  to place, person and time ; no new deficits    _________________________________________________  LABS:                                   8.1    9.3   )-----------( 179      ( 17 Oct 2017 03:50 )             28.4   10-17    151<H>  |  117<H>  |  32<H>  ----------------------------<  102<H>  3.1<L>   |  24  |  1.00    Ca    7.7<L>      17 Oct 2017 03:50  Phos  2.6     10-17  Mg     2.0     10-17    TPro  5.2<L>  /  Alb  1.8<L>  /  TBili  0.4  /  DBili  x   /  AST  44<H>  /  ALT  62<H>  /  AlkPhos  117  10-16                PT/INR - ( 13 Oct 2017 07:29 )   PT: 16.5 sec;   INR: 1.50 ratio             CAPILLARY BLOOD GLUCOSE      POCT Blood Glucose.: 169 mg/dL (13 Oct 2017 07:27)  POCT Blood Glucose.: 153 mg/dL (12 Oct 2017 21:45)  POCT Blood Glucose.: 128 mg/dL (12 Oct 2017 16:14)        RADIOLOGY & ADDITIONAL TESTS:    Imaging Personally Reviewed:  YES    Consultant(s) Notes Reviewed:   YES    Care Discussed with Consultants :     Plan of care was discussed with patient and /or primary care giver; all questions and concerns were addressed and care was aligned with patient's wishes.

## 2017-10-17 NOTE — SWALLOW BEDSIDE ASSESSMENT ADULT - ASR SWALLOW LINGUAL MOBILITY
impaired anterior elevation/impaired left lateral movement/impaired right lateral movement
Unable to assess due to poor participation

## 2017-10-17 NOTE — PROGRESS NOTE ADULT - RS GEN PE MLT RESP DETAILS PC
good air movement/no wheezes/rales/rhonchi
clear to auscultation bilaterally/no rales/no rhonchi/no wheezes

## 2017-10-17 NOTE — PHYSICAL THERAPY INITIAL EVALUATION ADULT - GENERAL OBSERVATIONS, REHAB EVAL
pt found in bed, sidelying position with heel pads, requires moderate assistance during bedmobility assessment
awake, alert, appears confused; + IV access on left arm; + indwelling urethral catheter;

## 2017-10-17 NOTE — SWALLOW BEDSIDE ASSESSMENT ADULT - SWALLOW EVAL: RECOMMENDED FEEDING/EATING TECHNIQUES
allow for swallow between intakes/alternate food with liquid/check mouth frequently for oral residue/pocketing/maintain upright posture during/after eating for 30 mins/oral hygiene/position upright (90 degrees)/small sips/bites
allow for swallow between intakes/alternate food with liquid/maintain upright posture during/after eating for 30 mins/oral hygiene/position upright (90 degrees)/small sips/bites

## 2017-10-17 NOTE — PHYSICAL THERAPY INITIAL EVALUATION ADULT - IMPAIRMENTS FOUND, PT EVAL
aerobic capacity/endurance/gait, locomotion, and balance/gross motor/muscle strength/ROM/tone
aerobic capacity/endurance/arousal, attention, and cognition/cognitive impairment/decreased midline orientation/gait, locomotion, and balance/integumentary integrity/muscle strength/poor safety awareness

## 2017-10-17 NOTE — SWALLOW BEDSIDE ASSESSMENT ADULT - SWALLOW EVAL: DIAGNOSIS
Pt p/w oropharyngeal dysphagia c/b weak labial seal, impaired bolus formation and mastication, slow A-P transport, oral stasis, delayed swallow reflex, poor laryngeal elevation; however no overt s&s of aspiration seen at this exam.
Pt p/w oral-pharyngeal dysphagia c/b impaired labial seal, prolonged bolus formation and mastication, slow A-P transport, oral stasis, premature spillage of bolus to the pharynx, delayed swallow reflex, reduced hyoid excursion; overt s&s of penetration/aspiration noted on thin liquids.

## 2017-10-17 NOTE — PROGRESS NOTE ADULT - SUBJECTIVE AND OBJECTIVE BOX
PRESENTING CC: none    SUBJ:     In summary, this is an 83 yo M with dementia, rheumatic mitral stenosis s/p MVR (~10 years ago), A fib on warfarin, bedbound from nursing home, who initially was brought to the hospital due to low H/H, and was found to have ESBL infection of sacral decubitus ulcer, as well as possible sepsis due to PNA. an echocardiogram performed 10/16/17 showed EF 5-10%, compared to 55%  reported on echo in 05/2017.     Patient is A & O x0, not following commands, and unable to provide any additional information; the above was obtained from review of charts and discussion with ICU team.     Overnight, there were no acute events. Patient was evaluated by palliative team, with possibility of going to hospice if no improvement over next few days per their discussion with family. He was diuresed net negative -2.1L    PMH: As above, also DM, NPH (no  shunt)    Allergies    penicillin (Unknown)    MEDICATIONS  (STANDING):  ALBUTerol/ipratropium for Nebulization 3 milliLiter(s) Nebulizer every 6 hours  ascorbic acid 500 milliGRAM(s) Oral daily  aspirin enteric coated 81 milliGRAM(s) Oral daily  dextrose 5%. 1000 milliLiter(s) (50 mL/Hr) IV Continuous <Continuous>  dextrose 50% Injectable 12.5 Gram(s) IV Push once  ferrous    sulfate Liquid 300 milliGRAM(s) Oral daily  folic acid 1 milliGRAM(s) Oral daily  furosemide   Injectable 40 milliGRAM(s) IV Push daily  influenza   Vaccine 0.5 milliLiter(s) IntraMuscular once  insulin lispro (HumaLOG) corrective regimen sliding scale   SubCutaneous every 6 hours  lisinopril 5 milliGRAM(s) Oral daily  meropenem IVPB 500 milliGRAM(s) IV Intermittent every 8 hours  metoprolol 25 milliGRAM(s) Oral two times a day  pantoprazole  Injectable 40 milliGRAM(s) IV Push daily  polyethylene glycol 3350 17 Gram(s) Oral daily  sucralfate suspension 1 Gram(s) Oral every 6 hours  tamsulosin 0.4 milliGRAM(s) Oral at bedtime  zinc sulfate 220 milliGRAM(s) Oral daily    MEDICATIONS  (PRN):  acetaminophen    Suspension 650 milliGRAM(s) Oral every 6 hours PRN For Temp greater than 38 C (100.4 F)  dextrose Gel 1 Dose(s) Oral once PRN Blood Glucose LESS THAN 70 milliGRAM(s)/deciLiter  glucagon  Injectable 1 milliGRAM(s) IntraMuscular once PRN Glucose <70 milliGRAM(s)/deciLiter      FAMILY HISTORY:    Unobtainable    SOCIAL HISTORY:  Smoking- Unobtainable  Alcohol- Unobtainable  Ilicit Drug use-Unobtainable      REVIEW OF SYSTEMS:  Constitutional: [ ] fever, [ ]weight loss,  [ ]fatigue  Eyes: [ ] visual changes  Respiratory: [ ]shortness of breath;  [ ] cough, [ ]wheezing, [ ]chills, [ ]hemoptysis  Cardiovascular: [ ] chest pain, [ ]palpitations, [ ]dizziness,  [ ]leg swelling  Gastrointestinal: [ ] abdominal pain, [ ]nausea, [ ]vomiting,  [ ]diarrhea   Genitourinary: [ ] dysuria, [ ] hematuria  Neurologic: [ ] headaches [ ] tremors [ ] weakness [ ] lightheadedness  Skin: [ ] itching, [ ]burning, [ ] rashes  Endocrine: [ ] heat or cold intolerance  Musculoskeletal: [ ] joint pain or swelling; [ ] muscle, back, or extremity pain  Psychiatric: [ ] depression, [ ]anxiety, [ ]mood swings, or [ ]difficulty sleeping  Hematologic: [ ] easy bruising, [ ] bleeding gums    [  ] All remaining systems negative except as per above.   [ x ] Unable to obtain    Vital Signs Last 24 Hrs  T(C): 35.9 (17 Oct 2017 07:22), Max: 37.3 (16 Oct 2017 14:26)  T(F): 96.7 (17 Oct 2017 07:22), Max: 99.1 (16 Oct 2017 14:26)  HR: 85 (17 Oct 2017 08:02) (85 - 129)  BP: 112/84 (17 Oct 2017 07:00) (98/58 - 140/87)  BP(mean): 92 (17 Oct 2017 07:00) (64 - 102)  RR: 28 (17 Oct 2017 07:00) (14 - 36)  SpO2: 100% (17 Oct 2017 08:02) (99% - 100%)  I&O's Summary    16 Oct 2017 07:01  -  17 Oct 2017 07:00  --------------------------------------------------------  IN: 1000 mL / OUT: 3130 mL / NET: -2130 mL      PHYSICAL EXAM:  General: Lying in bed with BiPAP, no acute distress  HEENT: EOMI, PERRL  Neck: Supple, No JVD  Lungs: Clear to percussion bilaterally; No rales or wheezing  Heart: Irregular rate and rhythm; No murmurs, rubs, or gallops  Abdomen: Nontender, bowel sounds present  Extremities: No clubbing, cyanosis, or edema  Nervous system:  Alert & Oriented X0, does not follow commands  Psychiatric: Stares straight ahead, does not follow commands  Skin: Sacral decubitus      LABS:  10-17    151<H>  |  117<H>  |  32<H>  ----------------------------<  102<H>  3.1<L>   |  24  |  1.00    Ca    7.7<L>      17 Oct 2017 03:50  Phos  2.6     10-17  Mg     2.0     10-17    TPro  5.2<L>  /  Alb  1.8<L>  /  TBili  0.4  /  DBili  x   /  AST  44<H>  /  ALT  62<H>  /  AlkPhos  117  10-16    Creatinine Trend: 1.00<--, 1.11<--, 1.06<--, 1.25<--, 1.26<--, 1.40<--                        8.1    9.3   )-----------( 179      ( 17 Oct 2017 03:50 )             28.4     PT/INR - ( 17 Oct 2017 03:50 )   PT: 46.5 sec;   INR: 4.14 ratio         PTT - ( 17 Oct 2017 03:50 )  PTT:39.0 sec  Lipid Panel: Serum Pro-Brain Natriuretic Peptide: 02251 pg/mL (10-17 @ 03:50)    Cardiac Enzymes:  0.077-->0.078    Serum Pro-Brain Natriuretic Peptide: 49295 pg/mL (10-17-17 @ 03:50)    TELEMETRY: PRESENTING CC: none    SUBJ:     In summary, this is an 83 yo M with dementia, 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 An echocardiogram performed 10/16/17 showed EF 5-10%, compared to 55%  reported on echo in 05/2017.     Patient is A & O x0, not following commands, and unable to provide any additional information; the above was obtained from review of charts and discussion with ICU team.     Overnight, there were no acute events. Patient was evaluated by palliative team, with possibility of going to hospice if no improvement over next few days per their discussion with family. He was diuresed net negative -2.1L    PMH: As above, also DM, NPH (no  shunt)    Allergies    penicillin (Unknown)    MEDICATIONS  (STANDING):  ALBUTerol/ipratropium for Nebulization 3 milliLiter(s) Nebulizer every 6 hours  ascorbic acid 500 milliGRAM(s) Oral daily  aspirin enteric coated 81 milliGRAM(s) Oral daily  dextrose 5%. 1000 milliLiter(s) (50 mL/Hr) IV Continuous <Continuous>  dextrose 50% Injectable 12.5 Gram(s) IV Push once  ferrous    sulfate Liquid 300 milliGRAM(s) Oral daily  folic acid 1 milliGRAM(s) Oral daily  furosemide   Injectable 40 milliGRAM(s) IV Push daily  influenza   Vaccine 0.5 milliLiter(s) IntraMuscular once  insulin lispro (HumaLOG) corrective regimen sliding scale   SubCutaneous every 6 hours  lisinopril 5 milliGRAM(s) Oral daily  meropenem IVPB 500 milliGRAM(s) IV Intermittent every 8 hours  metoprolol 25 milliGRAM(s) Oral two times a day  pantoprazole  Injectable 40 milliGRAM(s) IV Push daily  polyethylene glycol 3350 17 Gram(s) Oral daily  sucralfate suspension 1 Gram(s) Oral every 6 hours  tamsulosin 0.4 milliGRAM(s) Oral at bedtime  zinc sulfate 220 milliGRAM(s) Oral daily    MEDICATIONS  (PRN):  acetaminophen    Suspension 650 milliGRAM(s) Oral every 6 hours PRN For Temp greater than 38 C (100.4 F)  dextrose Gel 1 Dose(s) Oral once PRN Blood Glucose LESS THAN 70 milliGRAM(s)/deciLiter  glucagon  Injectable 1 milliGRAM(s) IntraMuscular once PRN Glucose <70 milliGRAM(s)/deciLiter      FAMILY HISTORY:    Unobtainable    SOCIAL HISTORY:  Smoking- Unobtainable  Alcohol- Unobtainable  Ilicit Drug use-Unobtainable      REVIEW OF SYSTEMS:  Constitutional: [ ] fever, [ ]weight loss,  [ ]fatigue  Eyes: [ ] visual changes  Respiratory: [ ]shortness of breath;  [ ] cough, [ ]wheezing, [ ]chills, [ ]hemoptysis  Cardiovascular: [ ] chest pain, [ ]palpitations, [ ]dizziness,  [ ]leg swelling  Gastrointestinal: [ ] abdominal pain, [ ]nausea, [ ]vomiting,  [ ]diarrhea   Genitourinary: [ ] dysuria, [ ] hematuria  Neurologic: [ ] headaches [ ] tremors [ ] weakness [ ] lightheadedness  Skin: [ ] itching, [ ]burning, [ ] rashes  Endocrine: [ ] heat or cold intolerance  Musculoskeletal: [ ] joint pain or swelling; [ ] muscle, back, or extremity pain  Psychiatric: [ ] depression, [ ]anxiety, [ ]mood swings, or [ ]difficulty sleeping  Hematologic: [ ] easy bruising, [ ] bleeding gums    [  ] All remaining systems negative except as per above.   [ x ] Unable to obtain    Vital Signs Last 24 Hrs  T(C): 35.9 (17 Oct 2017 07:22), Max: 37.3 (16 Oct 2017 14:26)  T(F): 96.7 (17 Oct 2017 07:22), Max: 99.1 (16 Oct 2017 14:26)  HR: 85 (17 Oct 2017 08:02) (85 - 129)  BP: 112/84 (17 Oct 2017 07:00) (98/58 - 140/87)  BP(mean): 92 (17 Oct 2017 07:00) (64 - 102)  RR: 28 (17 Oct 2017 07:00) (14 - 36)  SpO2: 100% (17 Oct 2017 08:02) (99% - 100%)  I&O's Summary    16 Oct 2017 07:01  -  17 Oct 2017 07:00  --------------------------------------------------------  IN: 1000 mL / OUT: 3130 mL / NET: -2130 mL      PHYSICAL EXAM:  General: Lying in bed with BiPAP, no acute distress  HEENT: EOMI, PERRL  Neck: Supple, No JVD  Lungs: Clear to percussion bilaterally; No rales or wheezing  Heart: Irregular rate and rhythm; No murmurs, rubs, or gallops  Abdomen: Nontender, bowel sounds present  Extremities: No clubbing, cyanosis, or edema  Nervous system:  Alert & Oriented X0, does not follow commands  Psychiatric: Stares straight ahead, does not follow commands  Skin: Sacral decubitus      LABS:  10-17    151<H>  |  117<H>  |  32<H>  ----------------------------<  102<H>  3.1<L>   |  24  |  1.00    Ca    7.7<L>      17 Oct 2017 03:50  Phos  2.6     10-17  Mg     2.0     10-17    TPro  5.2<L>  /  Alb  1.8<L>  /  TBili  0.4  /  DBili  x   /  AST  44<H>  /  ALT  62<H>  /  AlkPhos  117  10-16    Creatinine Trend: 1.00<--, 1.11<--, 1.06<--, 1.25<--, 1.26<--, 1.40<--                        8.1    9.3   )-----------( 179      ( 17 Oct 2017 03:50 )             28.4     PT/INR - ( 17 Oct 2017 03:50 )   PT: 46.5 sec;   INR: 4.14 ratio         PTT - ( 17 Oct 2017 03:50 )  PTT:39.0 sec  Lipid Panel: Serum Pro-Brain Natriuretic Peptide: 27435 pg/mL (10-17 @ 03:50)    Cardiac Enzymes:  0.077-->0.078    Serum Pro-Brain Natriuretic Peptide: 08171 pg/mL (10-17-17 @ 03:50)    TELEMETRY: A fib with PVCs

## 2017-10-17 NOTE — SWALLOW BEDSIDE ASSESSMENT ADULT - ORAL PHASE
Decreased anterior-posterior movement of the bolus/Delayed oral transit time Decreased anterior-posterior movement of the bolus/Delayed oral transit time/Palatal stasis/Lingual stasis

## 2017-10-17 NOTE — PROGRESS NOTE ADULT - SUBJECTIVE AND OBJECTIVE BOX
82y Male    Meds:  meropenem IVPB 500 milliGRAM(s) IV Intermittent every 8 hours    Allergies    penicillin (Unknown)    Intolerances        VITALS:  Vital Signs Last 24 Hrs  T(C): 36.2 (17 Oct 2017 13:00), Max: 37.3 (17 Oct 2017 00:00)  T(F): 97.2 (17 Oct 2017 13:00), Max: 99.1 (17 Oct 2017 00:00)  HR: 96 (17 Oct 2017 16:00) (79 - 120)  BP: 93/68 (17 Oct 2017 16:00) (91/65 - 122/76)  BP(mean): 74 (17 Oct 2017 16:00) (57 - 92)  RR: 22 (17 Oct 2017 16:00) (14 - 28)  SpO2: 84% (17 Oct 2017 16:00) (84% - 100%)    LABS/DIAGNOSTIC TESTS:                          8.1    9.3   )-----------( 179      ( 17 Oct 2017 03:50 )             28.4         10-17    151<H>  |  117<H>  |  32<H>  ----------------------------<  102<H>  3.1<L>   |  24  |  1.00    Ca    7.7<L>      17 Oct 2017 03:50  Phos  2.6     10-17  Mg     2.0     10-17    TPro  5.2<L>  /  Alb  1.8<L>  /  TBili  0.4  /  DBili  x   /  AST  44<H>  /  ALT  62<H>  /  AlkPhos  117  10-16      LIVER FUNCTIONS - ( 16 Oct 2017 06:23 )  Alb: 1.8 g/dL / Pro: 5.2 g/dL / ALK PHOS: 117 U/L / ALT: 62 U/L DA / AST: 44 U/L / GGT: x             CULTURES: .Blood Blood-Peripheral  10-14 @ 22:45   No growth to date.  --  --      .Blood Blood-Peripheral  10-11 @ 13:27   No growth at 5 days.  --  --      .Surgical Swab sacral decubitus  10-09 @ 13:21   Rare Proteus mirabilis ESBL  Few Enterococcus faecalis (vancomycin resistant)  Few Bacteroides thetaiotaomicron  --  Proteus mirabilis ESBL  Enterococcus faecalis (vancomycin resistant)      .Urine Clean Catch (Midstream)  10-05 @ 23:11   50,000 - 99,000 CFU/mL Proteus mirabilis ESBL  --  Proteus mirabilis ESBL      .Blood Blood  10-05 @ 23:07   Growth in aerobic bottle: Proteus mirabilis ESBL  Growth in anaerobic bottle: Streptococcus anginosus  ***Blood Panel PCR results on this specimen are available  approximately 3 hours after the Gram stain result.***  Gram stain, PCR, and/or cultureresults may not always  correspond due to difference in methodologies.  ************************************************************  This PCR assay was performed using [employer].  The following targets are tested for: Enterococcus,  vancomycin resistant enterococci, Listeria monocytogenes,  coagulase negative staphylococci, S. aureus,  methicillin resistant S. aureus, Streptococcus agalactiae  (Group B), S. pneumoniae, S. pyogenes (Group A),  Acinetobacter baumannii, Enterobacter cloacae, E. coli,  Klebsiella oxytoca, K. pneumoniae, Proteus sp.,  Serratia marcescens, Haemophilus influenzae,  Neisseria meningitidis, Pseudomonas aeruginosa, Candida  albicans, C. glabrata, C krusei, C parapsilosis,  C. tropicalis and the KPC resistance gene.  --  Proteus mirabilis ESBL  Blood Culture PCR  Blood Culture PCR  Streptococcus anginosus            RADIOLOGY:< from: Xray Chest 1 View AP- PORTABLE-Urgent (10.16.17 @ 14:04) >  EXAM:  CHEST-PORTABLE URGENT                            PROCEDURE DATE:  10/16/2017          INTERPRETATION:  Portable chest x-ray    Indication: Fluid overload. Pneumonia.    Portable chest x-ray is compared to a previous examination dated   10/16/2017 at 0621 hours.    Impression: Interval extubation.    Stable NG tube terminates in stomach. Stable right PICC line.    Increasing mild right pleural effusion with adjacent airspace   opacification.    Possible left basilar pulmonary infiltrate and/or pleural effusion.    No evidence for pneumothorax.    Stable cardiac silhouette.    Stable median sternotomy wires and heart valve replacement.    < end of copied text >        ROS:  [  ] UNABLE TO ELICIT 82y Male who is still in the ICU but is doing much better clinically, he is awake , alert and answering some questions with a nod though not verbalising , he has no fevers, he denies having a cough, he is eating and drinking with the speech pathologist and doing well. No fevers or diarrhea.    Meds:  meropenem IVPB 500 milliGRAM(s) IV Intermittent every 8 hours    Allergies    penicillin (Unknown)    Intolerances        VITALS:  Vital Signs Last 24 Hrs  T(C): 36.2 (17 Oct 2017 13:00), Max: 37.3 (17 Oct 2017 00:00)  T(F): 97.2 (17 Oct 2017 13:00), Max: 99.1 (17 Oct 2017 00:00)  HR: 96 (17 Oct 2017 16:00) (79 - 120)  BP: 93/68 (17 Oct 2017 16:00) (91/65 - 122/76)  BP(mean): 74 (17 Oct 2017 16:00) (57 - 92)  RR: 22 (17 Oct 2017 16:00) (14 - 28)  SpO2: 84% (17 Oct 2017 16:00) (84% - 100%)    LABS/DIAGNOSTIC TESTS:                          8.1    9.3   )-----------( 179      ( 17 Oct 2017 03:50 )             28.4         10-17    151<H>  |  117<H>  |  32<H>  ----------------------------<  102<H>  3.1<L>   |  24  |  1.00    Ca    7.7<L>      17 Oct 2017 03:50  Phos  2.6     10-17  Mg     2.0     10-17    TPro  5.2<L>  /  Alb  1.8<L>  /  TBili  0.4  /  DBili  x   /  AST  44<H>  /  ALT  62<H>  /  AlkPhos  117  10-16      LIVER FUNCTIONS - ( 16 Oct 2017 06:23 )  Alb: 1.8 g/dL / Pro: 5.2 g/dL / ALK PHOS: 117 U/L / ALT: 62 U/L DA / AST: 44 U/L / GGT: x             CULTURES: .Blood Blood-Peripheral  10-14 @ 22:45   No growth to date.  --  --      .Blood Blood-Peripheral  10-11 @ 13:27   No growth at 5 days.  --  --      .Surgical Swab sacral decubitus  10-09 @ 13:21   Rare Proteus mirabilis ESBL  Few Enterococcus faecalis (vancomycin resistant)  Few Bacteroides thetaiotaomicron  --  Proteus mirabilis ESBL  Enterococcus faecalis (vancomycin resistant)      .Urine Clean Catch (Midstream)  10-05 @ 23:11   50,000 - 99,000 CFU/mL Proteus mirabilis ESBL  --  Proteus mirabilis ESBL      .Blood Blood  10-05 @ 23:07   Growth in aerobic bottle: Proteus mirabilis ESBL  Growth in anaerobic bottle: Streptococcus anginosus  ***Blood Panel PCR results on this specimen are available  approximately 3 hours after the Gram stain result.***  Gram stain, PCR, and/or cultureresults may not always  correspond due to difference in methodologies.  ************************************************************  This PCR assay was performed using [employer].  The following targets are tested for: Enterococcus,  vancomycin resistant enterococci, Listeria monocytogenes,  coagulase negative staphylococci, S. aureus,  methicillin resistant S. aureus, Streptococcus agalactiae  (Group B), S. pneumoniae, S. pyogenes (Group A),  Acinetobacter baumannii, Enterobacter cloacae, E. coli,  Klebsiella oxytoca, K. pneumoniae, Proteus sp.,  Serratia marcescens, Haemophilus influenzae,  Neisseria meningitidis, Pseudomonas aeruginosa, Candida  albicans, C. glabrata, C krusei, C parapsilosis,  C. tropicalis and the KPC resistance gene.  --  Proteus mirabilis ESBL  Blood Culture PCR  Blood Culture PCR  Streptococcus anginosus            RADIOLOGY:< from: Xray Chest 1 View AP- PORTABLE-Urgent (10.16.17 @ 14:04) >  EXAM:  CHEST-PORTABLE URGENT                            PROCEDURE DATE:  10/16/2017          INTERPRETATION:  Portable chest x-ray    Indication: Fluid overload. Pneumonia.    Portable chest x-ray is compared to a previous examination dated   10/16/2017 at 0621 hours.    Impression: Interval extubation.    Stable NG tube terminates in stomach. Stable right PICC line.    Increasing mild right pleural effusion with adjacent airspace   opacification.    Possible left basilar pulmonary infiltrate and/or pleural effusion.    No evidence for pneumothorax.    Stable cardiac silhouette.    Stable median sternotomy wires and heart valve replacement.    < end of copied text >        ROS:  [ x ] UNABLE TO ELICIT, very limited as pt not verbalising answers

## 2017-10-17 NOTE — SWALLOW BEDSIDE ASSESSMENT ADULT - ASR SWALLOW ASPIRATION MONITOR
change of breathing pattern/oral hygiene/position upright (90Y)/cough/gurgly voice/fever/pneumonia/throat clearing/upper respiratory infection
change of breathing pattern/oral hygiene/position upright (90Y)/cough/gurgly voice/pneumonia/throat clearing/upper respiratory infection

## 2017-10-17 NOTE — PHYSICAL THERAPY INITIAL EVALUATION ADULT - IMPAIRMENTS CONTRIBUTING IMPAIRED BED MOBILITY, REHAB EVAL
impaired balance/decreased flexibility/impaired postural control/decreased strength
post op/impaired balance/decreased flexibility/impaired motor control/pain/decreased strength

## 2017-10-17 NOTE — PROGRESS NOTE ADULT - SUBJECTIVE AND OBJECTIVE BOX
INTERVAL HPI/OVERNIGHT EVENTS:     PRESSORS: [ ] YES [x ] NO  WHICH:      Antimicrobial:  meropenem IVPB 500 milliGRAM(s) IV Intermittent every 8 hours    Cardiovascular:  lisinopril 5 milliGRAM(s) Oral daily  metoprolol 25 milliGRAM(s) Oral two times a day  tamsulosin 0.4 milliGRAM(s) Oral at bedtime    Pulmonary:  ALBUTerol/ipratropium for Nebulization 3 milliLiter(s) Nebulizer every 6 hours    Hematalogic:  aspirin enteric coated 81 milliGRAM(s) Oral daily    Other:  acetaminophen    Suspension 650 milliGRAM(s) Oral every 6 hours PRN  ascorbic acid 500 milliGRAM(s) Oral daily  dextrose 5%. 1000 milliLiter(s) IV Continuous <Continuous>  dextrose 50% Injectable 12.5 Gram(s) IV Push once  dextrose Gel 1 Dose(s) Oral once PRN  ferrous    sulfate Liquid 300 milliGRAM(s) Oral daily  folic acid 1 milliGRAM(s) Oral daily  glucagon  Injectable 1 milliGRAM(s) IntraMuscular once PRN  influenza   Vaccine 0.5 milliLiter(s) IntraMuscular once  insulin lispro (HumaLOG) corrective regimen sliding scale   SubCutaneous every 6 hours  pantoprazole  Injectable 40 milliGRAM(s) IV Push daily  polyethylene glycol 3350 17 Gram(s) Oral daily  potassium chloride  10 mEq/100 mL IVPB 10 milliEquivalent(s) IV Intermittent every 1 hour  sucralfate suspension 1 Gram(s) Oral every 6 hours  zinc sulfate 220 milliGRAM(s) Oral daily    acetaminophen    Suspension 650 milliGRAM(s) Oral every 6 hours PRN  ALBUTerol/ipratropium for Nebulization 3 milliLiter(s) Nebulizer every 6 hours  ascorbic acid 500 milliGRAM(s) Oral daily  aspirin enteric coated 81 milliGRAM(s) Oral daily  dextrose 5%. 1000 milliLiter(s) IV Continuous <Continuous>  dextrose 50% Injectable 12.5 Gram(s) IV Push once  dextrose Gel 1 Dose(s) Oral once PRN  ferrous    sulfate Liquid 300 milliGRAM(s) Oral daily  folic acid 1 milliGRAM(s) Oral daily  glucagon  Injectable 1 milliGRAM(s) IntraMuscular once PRN  influenza   Vaccine 0.5 milliLiter(s) IntraMuscular once  insulin lispro (HumaLOG) corrective regimen sliding scale   SubCutaneous every 6 hours  lisinopril 5 milliGRAM(s) Oral daily  meropenem IVPB 500 milliGRAM(s) IV Intermittent every 8 hours  metoprolol 25 milliGRAM(s) Oral two times a day  pantoprazole  Injectable 40 milliGRAM(s) IV Push daily  polyethylene glycol 3350 17 Gram(s) Oral daily  potassium chloride  10 mEq/100 mL IVPB 10 milliEquivalent(s) IV Intermittent every 1 hour  sucralfate suspension 1 Gram(s) Oral every 6 hours  tamsulosin 0.4 milliGRAM(s) Oral at bedtime  zinc sulfate 220 milliGRAM(s) Oral daily    Drug Dosing Weight  Height (cm): 177.8 (05 Oct 2017 16:02)  Weight (kg): 90 (15 Oct 2017 07:30)  BMI (kg/m2): 28.5 (15 Oct 2017 07:30)  BSA (m2): 2.08 (15 Oct 2017 07:30)    CENTRAL LINE: [ ] YES [x ] NO  LOCATION:   DATE INSERTED:  REMOVE: [ ] YES [ ] NO  EXPLAIN:    TURNER: [x ] YES [ ] NO    DATE INSERTED: 10/14  REMOVE:  [ ] YES [ ] NO  EXPLAIN:    A-LINE:  [ ] YES [x ] NO  LOCATION:   DATE INSERTED:  REMOVE:  [ ] YES [ ] NO  EXPLAIN:    PMH -reviewed admission note, no change since admission  Heart faliure: acute [ ] chronic [ ] acute or chronic [ ] diastolic [ ] systolic [ ] combied systolic and diastolic[ ]  MATHEUS: ATN[ ] renal medullary necrosis [ ] CKD I [ ]CKDII [ ]CKD III [ ]CKD IV [ ]CKD V [ ]Other pathological lesions [ ]  Abdominal Nutrition Status: malnutrition [ ] cachexia [ ] morbid obesity/BMI=40 [ ] Supplement ordered [___________]     ICU Vital Signs Last 24 Hrs  T(C): 35.9 (17 Oct 2017 07:22), Max: 37.3 (16 Oct 2017 14:26)  T(F): 96.7 (17 Oct 2017 07:22), Max: 99.1 (16 Oct 2017 14:26)  HR: 85 (17 Oct 2017 08:02) (85 - 129)  BP: 112/84 (17 Oct 2017 07:00) (98/58 - 140/87)  BP(mean): 92 (17 Oct 2017 07:00) (64 - 102)  ABP: --  ABP(mean): --  RR: 28 (17 Oct 2017 07:00) (14 - 36)  SpO2: 100% (17 Oct 2017 08:02) (99% - 100%)      ABG - ( 16 Oct 2017 05:06 )  pH: 7.44  /  pCO2: 24    /  pO2: 98    / HCO3: 22    / Base Excess: -1.1  /  SaO2: 97                    10-16 @ 07:01  -  10-17 @ 07:00  --------------------------------------------------------  IN: 1000 mL / OUT: 3130 mL / NET: -2130 mL        Mode: CPAP with PS  FiO2: 30  PEEP: 5  PS: 5  ITime: 1  MAP: 7  PIP: 11      PHYSICAL EXAM:    GENERAL: [x ]NAD, [x ]well-groomed, [x ]well-developed  HEAD:  [x ]Atraumatic, [x ]Normocephalic  EYES: [x ]EOMI, [x ]PERRLA, [x ]conjunctiva and sclera clear  ENMT: [x ]No tonsillar erythema, exudates, or enlargement; [ ]Moist mucous membranes, [ ]Good dentition, [ ]No lesions  NECK: [x ]Supple, normal appearance, [ x]No JVD; [ ]Normal thyroid; [ ]Trachea midline  NERVOUS SYSTEM:  [x ]Alert & Oriented X3, [ ]Good concentration; [ ]Motor Strength 5/5 B/L upper and lower extremities; [ ]DTRs 2+ intact and symmetric  CHEST/LUNG: [x ]No chest deformity; [x ]Normal percussion bilaterally; [ ]No rales, rhonchi, wheezing   HEART: [x ]Regular rate and rhythm; [ ]No murmurs, rubs, or gallops  ABDOMEN: [ x]Soft, Nontender, Nondistended; [ ]Bowel sounds present  EXTREMITIES:  [x ]2+ Peripheral Pulses,[x ]No clubbing, cyanosis, [x] 2+ edema  LYMPH: [x ]No lymphadenopathy noted  SKIN: [ ]No rashes or lesions; [x ]Good capillary refill      LABS:  CBC Full  -  ( 17 Oct 2017 03:50 )  WBC Count : 9.3 K/uL  Hemoglobin : 8.1 g/dL  Hematocrit : 28.4 %  Platelet Count - Automated : 179 K/uL  Mean Cell Volume : 85.5 fl  Mean Cell Hemoglobin : 24.5 pg  Mean Cell Hemoglobin Concentration : 28.7 gm/dL  Auto Neutrophil # : 6.9 K/uL  Auto Lymphocyte # : 1.5 K/uL  Auto Monocyte # : 0.6 K/uL  Auto Eosinophil # : 0.1 K/uL  Auto Basophil # : 0.1 K/uL  Auto Neutrophil % : 74.9 %  Auto Lymphocyte % : 16.6 %  Auto Monocyte % : 6.4 %  Auto Eosinophil % : 1.2 %  Auto Basophil % : 0.8 %    10-17    151<H>  |  117<H>  |  32<H>  ----------------------------<  102<H>  3.1<L>   |  24  |  1.00    Ca    7.7<L>      17 Oct 2017 03:50  Phos  2.6     10-17  Mg     2.0     10-17    TPro  5.2<L>  /  Alb  1.8<L>  /  TBili  0.4  /  DBili  x   /  AST  44<H>  /  ALT  62<H>  /  AlkPhos  117  10-16    PT/INR - ( 17 Oct 2017 03:50 )   PT: 46.5 sec;   INR: 4.14 ratio         PTT - ( 17 Oct 2017 03:50 )  PTT:39.0 sec    Culture Results:   No growth to date. (10-14 @ 22:45)  Culture Results:   No growth to date. (10-14 @ 22:45)      RADIOLOGY & ADDITIONAL STUDIES REVIEWED:  ***    [ ]GOALS OF CARE DISCUSSION WITH PATIENT/FAMILY/PROXY:    CRITICAL CARE TIME SPENT: 35 minutes INTERVAL HPI/OVERNIGHT EVENTS:     PRESSORS: [ ] YES [x ] NO  WHICH:      Antimicrobial:  meropenem IVPB 500 milliGRAM(s) IV Intermittent every 8 hours    Cardiovascular:  lisinopril 5 milliGRAM(s) Oral daily  metoprolol 25 milliGRAM(s) Oral two times a day  tamsulosin 0.4 milliGRAM(s) Oral at bedtime    Pulmonary:  ALBUTerol/ipratropium for Nebulization 3 milliLiter(s) Nebulizer every 6 hours    Hematalogic:  aspirin enteric coated 81 milliGRAM(s) Oral daily    Other:  acetaminophen    Suspension 650 milliGRAM(s) Oral every 6 hours PRN  ascorbic acid 500 milliGRAM(s) Oral daily  dextrose 5%. 1000 milliLiter(s) IV Continuous <Continuous>  dextrose 50% Injectable 12.5 Gram(s) IV Push once  dextrose Gel 1 Dose(s) Oral once PRN  ferrous    sulfate Liquid 300 milliGRAM(s) Oral daily  folic acid 1 milliGRAM(s) Oral daily  glucagon  Injectable 1 milliGRAM(s) IntraMuscular once PRN  influenza   Vaccine 0.5 milliLiter(s) IntraMuscular once  insulin lispro (HumaLOG) corrective regimen sliding scale   SubCutaneous every 6 hours  pantoprazole  Injectable 40 milliGRAM(s) IV Push daily  polyethylene glycol 3350 17 Gram(s) Oral daily  potassium chloride  10 mEq/100 mL IVPB 10 milliEquivalent(s) IV Intermittent every 1 hour  sucralfate suspension 1 Gram(s) Oral every 6 hours  zinc sulfate 220 milliGRAM(s) Oral daily    acetaminophen    Suspension 650 milliGRAM(s) Oral every 6 hours PRN  ALBUTerol/ipratropium for Nebulization 3 milliLiter(s) Nebulizer every 6 hours  ascorbic acid 500 milliGRAM(s) Oral daily  aspirin enteric coated 81 milliGRAM(s) Oral daily  dextrose 5%. 1000 milliLiter(s) IV Continuous <Continuous>  dextrose 50% Injectable 12.5 Gram(s) IV Push once  dextrose Gel 1 Dose(s) Oral once PRN  ferrous    sulfate Liquid 300 milliGRAM(s) Oral daily  folic acid 1 milliGRAM(s) Oral daily  glucagon  Injectable 1 milliGRAM(s) IntraMuscular once PRN  influenza   Vaccine 0.5 milliLiter(s) IntraMuscular once  insulin lispro (HumaLOG) corrective regimen sliding scale   SubCutaneous every 6 hours  lisinopril 5 milliGRAM(s) Oral daily  meropenem IVPB 500 milliGRAM(s) IV Intermittent every 8 hours  metoprolol 25 milliGRAM(s) Oral two times a day  pantoprazole  Injectable 40 milliGRAM(s) IV Push daily  polyethylene glycol 3350 17 Gram(s) Oral daily  potassium chloride  10 mEq/100 mL IVPB 10 milliEquivalent(s) IV Intermittent every 1 hour  sucralfate suspension 1 Gram(s) Oral every 6 hours  tamsulosin 0.4 milliGRAM(s) Oral at bedtime  zinc sulfate 220 milliGRAM(s) Oral daily    Drug Dosing Weight  Height (cm): 177.8 (05 Oct 2017 16:02)  Weight (kg): 90 (15 Oct 2017 07:30)  BMI (kg/m2): 28.5 (15 Oct 2017 07:30)  BSA (m2): 2.08 (15 Oct 2017 07:30)    CENTRAL LINE: [ ] YES [x ] NO  LOCATION:   DATE INSERTED:  REMOVE: [ ] YES [ ] NO  EXPLAIN:    TURNER: [x ] YES [ ] NO    DATE INSERTED: 10/14  REMOVE:  [ ] YES [ ] NO  EXPLAIN:    A-LINE:  [ ] YES [x ] NO  LOCATION:   DATE INSERTED:  REMOVE:  [ ] YES [ ] NO  EXPLAIN:    PMH -reviewed admission note, no change since admission  Heart faliure: acute [ ] chronic [ ] acute or chronic [ ] diastolic [ ] systolic [ ] combied systolic and diastolic[ ]  MATHEUS: ATN[ ] renal medullary necrosis [ ] CKD I [ ]CKDII [ ]CKD III [ ]CKD IV [ ]CKD V [ ]Other pathological lesions [ ]  Abdominal Nutrition Status: malnutrition [ ] cachexia [ ] morbid obesity/BMI=40 [ ] Supplement ordered [___________]     ICU Vital Signs Last 24 Hrs  T(C): 35.9 (17 Oct 2017 07:22), Max: 37.3 (16 Oct 2017 14:26)  T(F): 96.7 (17 Oct 2017 07:22), Max: 99.1 (16 Oct 2017 14:26)  HR: 85 (17 Oct 2017 08:02) (85 - 129)  BP: 112/84 (17 Oct 2017 07:00) (98/58 - 140/87)  BP(mean): 92 (17 Oct 2017 07:00) (64 - 102)  ABP: --  ABP(mean): --  RR: 28 (17 Oct 2017 07:00) (14 - 36)  SpO2: 100% (17 Oct 2017 08:02) (99% - 100%)      ABG - ( 16 Oct 2017 05:06 )  pH: 7.44  /  pCO2: 24    /  pO2: 98    / HCO3: 22    / Base Excess: -1.1  /  SaO2: 97                    10-16 @ 07:01  -  10-17 @ 07:00  --------------------------------------------------------  IN: 1000 mL / OUT: 3130 mL / NET: -2130 mL        Mode: CPAP with PS  FiO2: 30  PEEP: 5  PS: 5  ITime: 1  MAP: 7  PIP: 11      PHYSICAL EXAM:    GENERAL: [x ]NAD, [x ]well-groomed, [x ]well-developed  HEAD:  [x ]Atraumatic, [x ]Normocephalic  EYES: [x ]EOMI, [x ]PERRLA, [x ]conjunctiva and sclera clear  ENMT: [x ]No tonsillar erythema, exudates, or enlargement; [ ]Moist mucous membranes, [ ]Good dentition, [ ]No lesions  NECK: [x ]Supple, normal appearance, [ x]No JVD; [ ]Normal thyroid; [ ]Trachea midline  NERVOUS SYSTEM:  [x ]Alert & Oriented X3, [ ]Good concentration; [ ]Motor Strength 5/5 B/L upper and lower extremities; [ ]DTRs 2+ intact and symmetric  CHEST/LUNG: [x ]No chest deformity; [x ]Normal percussion bilaterally; [ ]No rales, rhonchi, wheezing   HEART: [x ]Regular rate and rhythm; [ ]No murmurs, rubs, or gallops  ABDOMEN: [ x]Soft, Nontender, Nondistended; [ ]Bowel sounds present  EXTREMITIES:  [x ]2+ Peripheral Pulses,[x ]No clubbing, cyanosis, [x] 2+ edema  LYMPH: [x ]No lymphadenopathy noted  SKIN: [ ]No rashes or lesions; [x ]Good capillary refill      LABS:  CBC Full  -  ( 17 Oct 2017 03:50 )  WBC Count : 9.3 K/uL  Hemoglobin : 8.1 g/dL  Hematocrit : 28.4 %  Platelet Count - Automated : 179 K/uL  Mean Cell Volume : 85.5 fl  Mean Cell Hemoglobin : 24.5 pg  Mean Cell Hemoglobin Concentration : 28.7 gm/dL  Auto Neutrophil # : 6.9 K/uL  Auto Lymphocyte # : 1.5 K/uL  Auto Monocyte # : 0.6 K/uL  Auto Eosinophil # : 0.1 K/uL  Auto Basophil # : 0.1 K/uL  Auto Neutrophil % : 74.9 %  Auto Lymphocyte % : 16.6 %  Auto Monocyte % : 6.4 %  Auto Eosinophil % : 1.2 %  Auto Basophil % : 0.8 %    10-17    151<H>  |  117<H>  |  32<H>  ----------------------------<  102<H>  3.1<L>   |  24  |  1.00    Ca    7.7<L>      17 Oct 2017 03:50  Phos  2.6     10-17  Mg     2.0     10-17    TPro  5.2<L>  /  Alb  1.8<L>  /  TBili  0.4  /  DBili  x   /  AST  44<H>  /  ALT  62<H>  /  AlkPhos  117  10-16    PT/INR - ( 17 Oct 2017 03:50 )   PT: 46.5 sec;   INR: 4.14 ratio         PTT - ( 17 Oct 2017 03:50 )  PTT:39.0 sec    Culture Results:   No growth to date. (10-14 @ 22:45)  Culture Results:   No growth to date. (10-14 @ 22:45)      RADIOLOGY & ADDITIONAL STUDIES REVIEWED:  cxr - pulmonary edema, cardiomegaly, trace effusions     [ x]GOALS OF CARE DISCUSSION WITH PATIENT/FAMILY/PROXY: discussed with wife at bedside     CRITICAL CARE TIME SPENT: 35 minutes

## 2017-10-17 NOTE — PHYSICAL THERAPY INITIAL EVALUATION ADULT - PASSIVE RANGE OF MOTION EXAMINATION, REHAB EVAL
bilateral lower extremity Passive ROM was WFL (within functional limits)
no Passive ROM deficits were identified

## 2017-10-17 NOTE — PHYSICAL THERAPY INITIAL EVALUATION ADULT - LEVEL OF INDEPENDENCE: STAIR NEGOTIATION, REHAB EVAL
Unable to assess pt on the stairs at this time, pt does not exhibit appropriate pre requisite skills to safely negotiate unlevel surfaces due to poor endurance, decrease musculature, unsteady gait, post debridement and safety awareness which placed pt significant risks for falls and injury
unable to perform

## 2017-10-17 NOTE — SWALLOW BEDSIDE ASSESSMENT ADULT - SLP PERTINENT HISTORY OF CURRENT PROBLEM
83 y/o Male from [facility] w/ PMHx of Rheumatic heart disease, NPH ( unsteady gait used to ambulate with walker now bedbound for a couple of months); admitted for sepsis secondary to pnemonia and infected sacral DU; started on iv antibiotics , patient was found to have ESBL pseudomonas bacteremia. PICC line was placed by IR on 10/13/17. CT Head (+) Atrophy commensurate with patient's age. Moderate white matter small vessel ischemic changes. Chest XR (+) Pulmonary vascular congestion, mild diffuse interstitial prominence, 83 y/o Male from [facility] w/ PMHx of Rheumatic heart disease, NPH ( unsteady gait used to ambulate with walker now bedbound for a couple of months); admitted for sepsis secondary to pnemonia and infected sacral DU; started on iv antibiotics , patient was found to have ESBL pseudomonas bacteremia. PICC line was placed by IR on 10/13/17. CT Head (+) Atrophy commensurate with patient's age. Moderate white matter small vessel ischemic changes. Chest XR (+) Pulmonary vascular congestion, mild diffuse interstitial prominence; Pt on BiPAP FiO2:40

## 2017-10-17 NOTE — PHYSICAL THERAPY INITIAL EVALUATION ADULT - MANUAL MUSCLE TESTING RESULTS, REHAB EVAL
UE 3-/5; trunk 2+/5 and LE 2+/5/grossly assessed due to
MMT on both UE are grossly graded 3-/5; both LE are grossly graded 2/5

## 2017-10-17 NOTE — PROGRESS NOTE ADULT - ASSESSMENT
83 y/o M from [facility] w/ PMHx of Rheumatic heart disease, NPH  (unsteady gait used to ambulate with walker now bedbound for a couple of months) & Afib (on Coumadin) was sent from [facility] for low Hb.     Patient is poor historian and source of information is wife. Wife stated that patient is having cough with yellowish sputum for last 4 weeks with occasional temp spikes.    In the ED, patient is AxO x1, unable to interact. T 102.8, Hr 113, /69. S/p Vancomycni/Cefepime and Zithromax.  CXR showed interstitial prominence and opacity at the left lung base.     Patient was admitted to the medical floor with Sepsis 2/2 PNA vs. UTI      Unresponsiveness on 10/14/17 , s/p Extubation    ECho: EF 10%

## 2017-10-17 NOTE — PROGRESS NOTE ADULT - ASSESSMENT
83 yo M with noted PMH including h/o MVR and A fib on warfarin p/w respiratory failure in setting of pneumonia, also significant decrease in EF noted on echo.     1. Cardiomyopathy: No evidence of significant pulmonary vascular congestion from CT 10/6/17, but yesterday's CXR shows increased pulmonary vascular congestion.  -BNP elevated at 33,000  -Good urine output, overnight was net negative -2.1L; continue furosemide  -Continue metoprolol, and lisinopril, can uptitrate over time.   -Consider ischemic evaluation for cardiomyopathy pending goals of care discussion with palliative care service.   -If possible, obtain records of patient's ischemic work-up done prior to his mitral valve surgery    2. A fib:  Rate controlled with metoprolol  -likely potentiated by underlying infection, should improve as the former gets treated  -resume warfarin when able to tolerate PO, no need to bridge with heparin gtt    3. CAD: Low level troponins may be type II MI (demand ischemia) in setting of underlying infection as well as A fib with RVR  -continue aspirin, metoprolol  -Lipid panel shows , HDL 50, LDL 55, trigs 111; otherwise acceptable 83 yo M with noted PMH including CAD s/p CABG and MVR and A fib on warfarin p/w respiratory failure in setting of pneumonia, also significant decrease in EF noted on echo.     1. Cardiomyopathy: No evidence of significant pulmonary vascular congestion from CT 10/6/17, but yesterday's CXR shows increased pulmonary vascular congestion.  -BNP elevated at 33,000  -Good urine output, overnight was net negative -2.1L; continue furosemide  -Continue metoprolol, and lisinopril, can uptitrate over time.   -Consider ischemic evaluation for cardiomyopathy pending family's goals of care; if agreeable this would occur just before discharge when patient is otherwise medically optimized.   -If possible, obtain records of patient's ischemic work-up done prior to his mitral valve surgery    2. A fib:  Rate controlled with metoprolol  -likely potentiated by underlying infection, should improve as the former gets treated  -resume warfarin when able to tolerate PO, no need to bridge with heparin gtt    3. CAD: Low level troponins may be type II MI (demand ischemia) in setting of underlying infection as well as A fib with RVR  -continue aspirin, metoprolol  -Lipid panel shows , HDL 50, LDL 55, trigs 111; otherwise acceptable

## 2017-10-17 NOTE — PROGRESS NOTE ADULT - ASSESSMENT
y/o M from [facility] w/ PMHx of Rheumatic heart disease, NPH  (unsteady gait used to ambulate with walker now bedbound for a couple of months) & Afib (on Coumadin), CHF being treated on this hospitalization for ESBL bacteremia , now transfered to ICU for respiratory failure and altered mental status .     Problem/Recommendation - 1:  Problem: Respiratory failure. Recommendation: increasingly drowsy with tacypnea and tachycardia  unable to protect airway  patient was intubated for respiratory failure and protection of airway - extubated yesterday  - was on BIPAP last night and on 2 L nasal canula this morning   c/w iv abx meropenum   MATHEUS resolved  when more Stable obtain CT scan to rule out CVA( patient has underlying history of Afib).  - EEG no epileptiform activity  - neuro eval     Problem/Recommendation - 2:  ·  Problem: Bacteremia.  Recommendation: ESBL bacteremia  ESBL in urine blood and sacral DU debridement tissue  on Etrapenem Day no 5   repeat blood CX negative  patient has a PICC line   c/w invanz.   WBC WNL     Problem/Recommendation - 3:  ·  Problem: Afib.  Recommendation: lopressor IV for rate control  high INR holding coumadin      Problem/Recommendation - 4:  ·  Problem: CHF (congestive heart failure).  Recommendation: lasix was on hold because of sepsis  s/p 2 dose of lasix because of suspicion of pulmonary edema yesterday  c/w asa lopressor .  - EF of 10% on Echo   - lisinopril added as per cardio   - further cardio workup once patient is stable  cardiology evaluation.      Problem/Recommendation - 5:  ·  Problem: Anemia.  Recommendation: hemoglobin 8.0  got 1 unit PRBC this admission  presented with hemoglobin of 7.0  c/w carafate protonix iv  monitor h/h.   -FOBT negative     Problem/Recommendation - 6:  Problem: DM (diabetes mellitus). Recommendation: c/w insulin sliding scale  monitor Accu check.     Problem/Recommendation - 7:  Problem: Need for prophylactic measure. Recommendation: on coumadin and protonix.  WBC WNL

## 2017-10-17 NOTE — PHYSICAL THERAPY INITIAL EVALUATION ADULT - BED MOBILITY LIMITATIONS, REHAB EVAL
decreased ability to use arms for pushing/pulling/decreased ability to use legs for bridging/pushing/impaired ability to control trunk for mobility
impaired ability to control trunk for mobility

## 2017-10-17 NOTE — SWALLOW BEDSIDE ASSESSMENT ADULT - SPECIFY REASON(S)
Subjective assessment of Pt's current swallow function 2/2 s/p extubation (10/16). Subjective re-assessment of Pt's current swallow function 2/2 s/p extubation (10/16).

## 2017-10-18 LAB
ANION GAP SERPL CALC-SCNC: 5 MMOL/L — SIGNIFICANT CHANGE UP (ref 5–17)
BASOPHILS # BLD AUTO: 0.1 K/UL — SIGNIFICANT CHANGE UP (ref 0–0.2)
BASOPHILS NFR BLD AUTO: 0.8 % — SIGNIFICANT CHANGE UP (ref 0–2)
BUN SERPL-MCNC: 26 MG/DL — HIGH (ref 7–18)
CALCIUM SERPL-MCNC: 7.9 MG/DL — LOW (ref 8.4–10.5)
CHLORIDE SERPL-SCNC: 115 MMOL/L — HIGH (ref 96–108)
CO2 SERPL-SCNC: 28 MMOL/L — SIGNIFICANT CHANGE UP (ref 22–31)
CREAT SERPL-MCNC: 0.82 MG/DL — SIGNIFICANT CHANGE UP (ref 0.5–1.3)
EOSINOPHIL # BLD AUTO: 0.2 K/UL — SIGNIFICANT CHANGE UP (ref 0–0.5)
EOSINOPHIL NFR BLD AUTO: 1.9 % — SIGNIFICANT CHANGE UP (ref 0–6)
GLUCOSE BLDC GLUCOMTR-MCNC: 115 MG/DL — HIGH (ref 70–99)
GLUCOSE BLDC GLUCOMTR-MCNC: 140 MG/DL — HIGH (ref 70–99)
GLUCOSE BLDC GLUCOMTR-MCNC: 171 MG/DL — HIGH (ref 70–99)
GLUCOSE SERPL-MCNC: 116 MG/DL — HIGH (ref 70–99)
HCT VFR BLD CALC: 27.2 % — LOW (ref 39–50)
HGB BLD-MCNC: 8.2 G/DL — LOW (ref 13–17)
LYMPHOCYTES # BLD AUTO: 1.4 K/UL — SIGNIFICANT CHANGE UP (ref 1–3.3)
LYMPHOCYTES # BLD AUTO: 15.2 % — SIGNIFICANT CHANGE UP (ref 13–44)
MAGNESIUM SERPL-MCNC: 2.2 MG/DL — SIGNIFICANT CHANGE UP (ref 1.6–2.6)
MCHC RBC-ENTMCNC: 26 PG — LOW (ref 27–34)
MCHC RBC-ENTMCNC: 30.3 GM/DL — LOW (ref 32–36)
MCV RBC AUTO: 85.9 FL — SIGNIFICANT CHANGE UP (ref 80–100)
MONOCYTES # BLD AUTO: 0.6 K/UL — SIGNIFICANT CHANGE UP (ref 0–0.9)
MONOCYTES NFR BLD AUTO: 6.7 % — SIGNIFICANT CHANGE UP (ref 2–14)
NEUTROPHILS # BLD AUTO: 7.1 K/UL — SIGNIFICANT CHANGE UP (ref 1.8–7.4)
NEUTROPHILS NFR BLD AUTO: 75.4 % — SIGNIFICANT CHANGE UP (ref 43–77)
PHOSPHATE SERPL-MCNC: 2.2 MG/DL — LOW (ref 2.5–4.5)
PLATELET # BLD AUTO: 188 K/UL — SIGNIFICANT CHANGE UP (ref 150–400)
POTASSIUM SERPL-MCNC: 3.5 MMOL/L — SIGNIFICANT CHANGE UP (ref 3.5–5.3)
POTASSIUM SERPL-SCNC: 3.5 MMOL/L — SIGNIFICANT CHANGE UP (ref 3.5–5.3)
RBC # BLD: 3.16 M/UL — LOW (ref 4.2–5.8)
RBC # FLD: 24.8 % — HIGH (ref 10.3–14.5)
SODIUM SERPL-SCNC: 148 MMOL/L — HIGH (ref 135–145)
WBC # BLD: 9.5 K/UL — SIGNIFICANT CHANGE UP (ref 3.8–10.5)
WBC # FLD AUTO: 9.5 K/UL — SIGNIFICANT CHANGE UP (ref 3.8–10.5)

## 2017-10-18 PROCEDURE — 99232 SBSQ HOSP IP/OBS MODERATE 35: CPT

## 2017-10-18 RX ORDER — POTASSIUM PHOSPHATE, MONOBASIC POTASSIUM PHOSPHATE, DIBASIC 236; 224 MG/ML; MG/ML
15 INJECTION, SOLUTION INTRAVENOUS ONCE
Refills: 0 | Status: COMPLETED | OUTPATIENT
Start: 2017-10-18 | End: 2017-10-18

## 2017-10-18 RX ADMIN — Medication 3 MILLILITER(S): at 21:07

## 2017-10-18 RX ADMIN — Medication 300 MILLIGRAM(S): at 11:39

## 2017-10-18 RX ADMIN — Medication 1 GRAM(S): at 05:28

## 2017-10-18 RX ADMIN — MEROPENEM 200 MILLIGRAM(S): 1 INJECTION INTRAVENOUS at 21:40

## 2017-10-18 RX ADMIN — MEROPENEM 200 MILLIGRAM(S): 1 INJECTION INTRAVENOUS at 13:35

## 2017-10-18 RX ADMIN — Medication 1 GRAM(S): at 17:01

## 2017-10-18 RX ADMIN — MEROPENEM 200 MILLIGRAM(S): 1 INJECTION INTRAVENOUS at 05:27

## 2017-10-18 RX ADMIN — Medication 1: at 11:42

## 2017-10-18 RX ADMIN — Medication 1: at 16:29

## 2017-10-18 RX ADMIN — Medication 81 MILLIGRAM(S): at 11:39

## 2017-10-18 RX ADMIN — POTASSIUM PHOSPHATE, MONOBASIC POTASSIUM PHOSPHATE, DIBASIC 62.5 MILLIMOLE(S): 236; 224 INJECTION, SOLUTION INTRAVENOUS at 09:11

## 2017-10-18 RX ADMIN — TAMSULOSIN HYDROCHLORIDE 0.4 MILLIGRAM(S): 0.4 CAPSULE ORAL at 21:40

## 2017-10-18 RX ADMIN — POLYETHYLENE GLYCOL 3350 17 GRAM(S): 17 POWDER, FOR SOLUTION ORAL at 11:39

## 2017-10-18 RX ADMIN — Medication 3 MILLILITER(S): at 08:13

## 2017-10-18 RX ADMIN — Medication 1 GRAM(S): at 11:39

## 2017-10-18 RX ADMIN — ZINC SULFATE TAB 220 MG (50 MG ZINC EQUIVALENT) 220 MILLIGRAM(S): 220 (50 ZN) TAB at 11:39

## 2017-10-18 RX ADMIN — PANTOPRAZOLE SODIUM 40 MILLIGRAM(S): 20 TABLET, DELAYED RELEASE ORAL at 11:38

## 2017-10-18 RX ADMIN — Medication 3 MILLILITER(S): at 14:26

## 2017-10-18 RX ADMIN — Medication 1 GRAM(S): at 23:57

## 2017-10-18 RX ADMIN — Medication 1 MILLIGRAM(S): at 11:39

## 2017-10-18 RX ADMIN — Medication 40 MILLIGRAM(S): at 05:28

## 2017-10-18 RX ADMIN — Medication 500 MILLIGRAM(S): at 11:39

## 2017-10-18 RX ADMIN — Medication 3 MILLILITER(S): at 02:45

## 2017-10-18 NOTE — PROGRESS NOTE ADULT - ASSESSMENT
y/o M from [facility] w/ PMHx of Rheumatic heart disease, NPH  (unsteady gait used to ambulate with walker now bedbound for a couple of months) & Afib (on Coumadin), CHF being treated on this hospitalization for ESBL bacteremia , now transfered to ICU for respiratory failure and altered mental status .     Problem/Recommendation - 1:  Problem: Respiratory failure. Recommendation: increasingly drowsy with tacypnea and tachycardia  unable to protect airway  patient was intubated for respiratory failure and protection of airway - extubated on 10/15  - patient was comfortable over night on 2L nasal canula   c/w iv abx meropenum   MATHEUS resolved  when more Stable obtain CT scan to rule out CVA( patient has underlying history of Afib).  - EEG no epileptiform activity  - neuro eval     Problem/Recommendation - 2:  ·  Problem: Bacteremia.  Recommendation: ESBL bacteremia  ESBL in urine blood and sacral DU debridement tissue  ertapenum switched to meropenum for possible pseudomonas coverage   meropenum day 3  repeat blood CX negative  patient has a PICC line   c/w invanz.   WBC WNL     Problem/Recommendation - 3:  ·  Problem: Afib.  Recommendation: lopressor oral for rate control  high INR holding coumadin      Problem/Recommendation - 4:  ·  Problem: CHF (congestive heart failure).  Recommendation: lasix was on hold because of sepsis  s/p 2 dose of lasix because of suspicion of pulmonary edema yesterday  c/w asa lopressor .  - EF of 10% on Echo   - lisinopril added as per cardio   - further cardio workup once patient is stable  records obtained from primary cardiologist - records in charts   cardiology evaluation.      Problem/Recommendation - 5:  ·  Problem: Anemia.  Recommendation: hemoglobin 8.0  got 1 unit PRBC this admission  presented with hemoglobin of 7.0  c/w carafate protonix iv  monitor h/h.   -FOBT negative     Problem/Recommendation - 6:  Problem: DM (diabetes mellitus). Recommendation: c/w insulin sliding scale  monitor Accu check.     Problem/Recommendation - 7:  Problem: Need for prophylactic measure. Recommendation: on coumadin and protonix.  WBC WNL

## 2017-10-18 NOTE — PROGRESS NOTE ADULT - ASSESSMENT
1.	Infected sacral decubitus  2.	Bacteremia  3.	?Pneumonia   ·	cont meropenem 500mg IV q8h D3  ·	awaiting downgrade to floor  Time spent - 35 mins

## 2017-10-18 NOTE — PROGRESS NOTE ADULT - ASSESSMENT
81 y/o M from [facility] w/ PMHx of Rheumatic heart disease, NPH  (unsteady gait used to ambulate with walker now bedbound for a couple of months) & Afib (on Coumadin) was sent from [facility] for low Hb.     Patient is poor historian and source of information is wife. Wife stated that patient is having cough with yellowish sputum for last 4 weeks with occasional temp spikes.    In the ED, patient is AxO x1, unable to interact. T 102.8, Hr 113, /69. S/p Vancomycni/Cefepime and Zithromax.  CXR showed interstitial prominence and opacity at the left lung base.     Patient was admitted to the medical floor with Sepsis 2/2 PNA vs. UTI      Unresponsiveness on 10/14/17 , s/p Extubation    ECho: EF 10%

## 2017-10-18 NOTE — PROGRESS NOTE ADULT - ASSESSMENT
81 yo M with noted PMH including CAD s/p CABG and MVR and A fib on warfarin p/w respiratory failure in setting of pneumonia, also significant decrease in EF noted on echo.     1. Cardiomyopathy:   -BNP elevated at 33,000  -Good urine output, overnight was net negative -1.3L; continue furosemide  -Continue metoprolol, and lisinopril, can uptitrate over time.   -Consider ischemic evaluation for cardiomyopathy pending family's goals of care; if agreeable this would occur just before discharge when patient is otherwise medically optimized.   -Review of patient records shows    2. A fib:  Rate controlled with metoprolol  -likely potentiated by underlying infection, should improve as the former gets treated  -supertherapeutic INR with warfarin, holding tonight's dose.    3. CAD: Low level troponins may be type II MI (demand ischemia) in setting of underlying infection as well as A fib with RVR  -continue aspirin, metoprolol  -Lipid panel shows , HDL 50, LDL 55, trigs 111; otherwise acceptable 83 yo M with noted PMH including CAD s/p CABG and MVR and A fib on warfarin p/w respiratory failure in setting of pneumonia, also significant decrease in EF noted on echo.     1. Acute decompensated systolic HF:   -BNP elevated at 33,000, still volume overloaded on exam  -Good urine output, overnight was net negative -1.3L; continue furosemide  -Continue metoprolol, and lisinopril, can uptitrate over time.   -Would obtain partial echo next week to see if EF improved after the acute illness.  -If still depressed, consider ischemic evaluation for cardiomyopathy pending family's goals of care ( I spoke with patient's daughter today, she will discuss with patient's wife). If agreeable this would occur just before discharge when patient is otherwise medically optimized.       2. A fib:  Rate controlled with metoprolol  -likely potentiated by underlying infection, should improve as the former gets treated  -supertherapeutic INR with warfarin, holding tonight's dose.    3. CAD: Low level troponins may be type II MI (demand ischemia) in setting of underlying infection as well as A fib with RVR  -continue aspirin, metoprolol  -Lipid panel shows , HDL 50, LDL 55, trigs 111; otherwise acceptable

## 2017-10-18 NOTE — PROGRESS NOTE ADULT - PROBLEM SELECTOR PLAN 10
Coumadin continued
Coumadin held
Coumadin
Coumadin continued
Coumadin held
Coumadin continued

## 2017-10-18 NOTE — PROGRESS NOTE ADULT - SUBJECTIVE AND OBJECTIVE BOX
INTERVAL HPI/OVERNIGHT EVENTS: ***    PRESSORS: [ ] YES [x ] NO  WHICH:      Antimicrobial:  meropenem IVPB 500 milliGRAM(s) IV Intermittent every 8 hours    Cardiovascular:  furosemide   Injectable 40 milliGRAM(s) IV Push daily  lisinopril 5 milliGRAM(s) Oral daily  metoprolol 25 milliGRAM(s) Oral two times a day  tamsulosin 0.4 milliGRAM(s) Oral at bedtime    Pulmonary:  ALBUTerol/ipratropium for Nebulization 3 milliLiter(s) Nebulizer every 6 hours    Hematalogic:  aspirin enteric coated 81 milliGRAM(s) Oral daily    Other:  acetaminophen    Suspension 650 milliGRAM(s) Oral every 6 hours PRN  ascorbic acid 500 milliGRAM(s) Oral daily  dextrose 5%. 1000 milliLiter(s) IV Continuous <Continuous>  dextrose 50% Injectable 12.5 Gram(s) IV Push once  dextrose Gel 1 Dose(s) Oral once PRN  ferrous    sulfate Liquid 300 milliGRAM(s) Oral daily  folic acid 1 milliGRAM(s) Oral daily  glucagon  Injectable 1 milliGRAM(s) IntraMuscular once PRN  influenza   Vaccine 0.5 milliLiter(s) IntraMuscular once  insulin lispro (HumaLOG) corrective regimen sliding scale   SubCutaneous Before meals and at bedtime  pantoprazole  Injectable 40 milliGRAM(s) IV Push daily  polyethylene glycol 3350 17 Gram(s) Oral daily  sucralfate suspension 1 Gram(s) Oral every 6 hours  zinc sulfate 220 milliGRAM(s) Oral daily    acetaminophen    Suspension 650 milliGRAM(s) Oral every 6 hours PRN  ALBUTerol/ipratropium for Nebulization 3 milliLiter(s) Nebulizer every 6 hours  ascorbic acid 500 milliGRAM(s) Oral daily  aspirin enteric coated 81 milliGRAM(s) Oral daily  dextrose 5%. 1000 milliLiter(s) IV Continuous <Continuous>  dextrose 50% Injectable 12.5 Gram(s) IV Push once  dextrose Gel 1 Dose(s) Oral once PRN  ferrous    sulfate Liquid 300 milliGRAM(s) Oral daily  folic acid 1 milliGRAM(s) Oral daily  furosemide   Injectable 40 milliGRAM(s) IV Push daily  glucagon  Injectable 1 milliGRAM(s) IntraMuscular once PRN  influenza   Vaccine 0.5 milliLiter(s) IntraMuscular once  insulin lispro (HumaLOG) corrective regimen sliding scale   SubCutaneous Before meals and at bedtime  lisinopril 5 milliGRAM(s) Oral daily  meropenem IVPB 500 milliGRAM(s) IV Intermittent every 8 hours  metoprolol 25 milliGRAM(s) Oral two times a day  pantoprazole  Injectable 40 milliGRAM(s) IV Push daily  polyethylene glycol 3350 17 Gram(s) Oral daily  sucralfate suspension 1 Gram(s) Oral every 6 hours  tamsulosin 0.4 milliGRAM(s) Oral at bedtime  zinc sulfate 220 milliGRAM(s) Oral daily    Drug Dosing Weight  Height (cm): 177.8 (05 Oct 2017 16:02)  Weight (kg): 90 (15 Oct 2017 07:30)  BMI (kg/m2): 28.5 (15 Oct 2017 07:30)  BSA (m2): 2.08 (15 Oct 2017 07:30)    CENTRAL LINE: [ ] YES [x ] NO  LOCATION:   DATE INSERTED:  REMOVE: [ ] YES [ ] NO  EXPLAIN:    TURNER: [x ] YES [ ] NO    DATE INSERTED:10/14  REMOVE:  [ ] YES [ ] NO  EXPLAIN:    A-LINE:  [ ] YES [x ] NO  LOCATION:   DATE INSERTED:  REMOVE:  [ ] YES [ ] NO  EXPLAIN:    PMH -reviewed admission note, no change since admission  Heart faliure: acute [ ] chronic [ ] acute or chronic [ ] diastolic [ ] systolic [ ] combied systolic and diastolic[ ]  MATHEUS: ATN[ ] renal medullary necrosis [ ] CKD I [ ]CKDII [ ]CKD III [ ]CKD IV [ ]CKD V [ ]Other pathological lesions [ ]  Abdominal Nutrition Status: malnutrition [ ] cachexia [ ] morbid obesity/BMI=40 [ ] Supplement ordered [___________]     ICU Vital Signs Last 24 Hrs  T(C): 36.1 (18 Oct 2017 05:21), Max: 36.6 (17 Oct 2017 17:00)  T(F): 97 (18 Oct 2017 05:21), Max: 97.8 (17 Oct 2017 17:00)  HR: 99 (18 Oct 2017 07:00) (79 - 120)  BP: 119/87 (18 Oct 2017 07:00) (68/33 - 119/87)  BP(mean): 91 (18 Oct 2017 07:00) (42 - 91)  ABP: --  ABP(mean): --  RR: 18 (18 Oct 2017 07:00) (16 - 28)  SpO2: 100% (18 Oct 2017 07:00) (84% - 100%)            10-17 @ 07:01  -  10-18 @ 07:00  --------------------------------------------------------  IN: 200 mL / OUT: 1295 mL / NET: -1095 mL            PHYSICAL EXAM:    GENERAL: [x ]NAD, [x ]well-groomed, [ ]well-developed  HEAD:  [x ]Atraumatic, [x ]Normocephalic  EYES: [x ]EOMI, [x ]PERRLA, [x ]conjunctiva and sclera clear  ENMT: [x ]No tonsillar erythema, exudates, or enlargement; [x ]Moist mucous membranes, [ ]Good dentition, [ ]No lesions  NECK: [ x]Supple, normal appearance, [x ]No JVD; [ x]Normal thyroid; [ ]Trachea midline  NERVOUS SYSTEM:  [ x]Alert & Oriented X3, [x ]Good concentration; [x ]Motor Strength 2/5 B/L lower extremities; [ ]DTRs 2+ intact and symmetric  CHEST/LUNG: [x ]No chest deformity; [x ]Normal percussion bilaterally; [ ]No rales, rhonchi, wheezing   HEART: [x ]Regular rate and rhythm; [ ]No murmurs, rubs, or gallops  ABDOMEN: [x ]Soft, Nontender, Nondistended; [ ]Bowel sounds present  EXTREMITIES:  [x]2+ Peripheral Pulses, [ ]No clubbing, cyanosis, [x]2+ edema  LYMPH: [x]No lymphadenopathy noted  SKIN: [x ]No rashes or lesions; [x ]Good capillary refill      LABS:  CBC Full  -  ( 18 Oct 2017 06:16 )  WBC Count : 9.5 K/uL  Hemoglobin : 8.2 g/dL  Hematocrit : 27.2 %  Platelet Count - Automated : 188 K/uL  Mean Cell Volume : 85.9 fl  Mean Cell Hemoglobin : 26.0 pg  Mean Cell Hemoglobin Concentration : 30.3 gm/dL  Auto Neutrophil # : 7.1 K/uL  Auto Lymphocyte # : 1.4 K/uL  Auto Monocyte # : 0.6 K/uL  Auto Eosinophil # : 0.2 K/uL  Auto Basophil # : 0.1 K/uL  Auto Neutrophil % : 75.4 %  Auto Lymphocyte % : 15.2 %  Auto Monocyte % : 6.7 %  Auto Eosinophil % : 1.9 %  Auto Basophil % : 0.8 %    10-18    148<H>  |  115<H>  |  26<H>  ----------------------------<  116<H>  3.5   |  28  |  0.82    Ca    7.9<L>      18 Oct 2017 06:16  Phos  2.2     10-18  Mg     2.2     10-18      PT/INR - ( 17 Oct 2017 03:50 )   PT: 46.5 sec;   INR: 4.14 ratio         PTT - ( 17 Oct 2017 03:50 )  PTT:39.0 sec        RADIOLOGY & ADDITIONAL STUDIES REVIEWED:  ***    [ ]GOALS OF CARE DISCUSSION WITH PATIENT/FAMILY/PROXY:    CRITICAL CARE TIME SPENT: 35 minutes INTERVAL HPI/OVERNIGHT EVENTS: no major events overnight    PRESSORS: [ ] YES [x ] NO  WHICH:      Antimicrobial:  meropenem IVPB 500 milliGRAM(s) IV Intermittent every 8 hours    Cardiovascular:  furosemide   Injectable 40 milliGRAM(s) IV Push daily  lisinopril 5 milliGRAM(s) Oral daily  metoprolol 25 milliGRAM(s) Oral two times a day  tamsulosin 0.4 milliGRAM(s) Oral at bedtime    Pulmonary:  ALBUTerol/ipratropium for Nebulization 3 milliLiter(s) Nebulizer every 6 hours    Hematalogic:  aspirin enteric coated 81 milliGRAM(s) Oral daily    Other:  acetaminophen    Suspension 650 milliGRAM(s) Oral every 6 hours PRN  ascorbic acid 500 milliGRAM(s) Oral daily  dextrose 5%. 1000 milliLiter(s) IV Continuous <Continuous>  dextrose 50% Injectable 12.5 Gram(s) IV Push once  dextrose Gel 1 Dose(s) Oral once PRN  ferrous    sulfate Liquid 300 milliGRAM(s) Oral daily  folic acid 1 milliGRAM(s) Oral daily  glucagon  Injectable 1 milliGRAM(s) IntraMuscular once PRN  influenza   Vaccine 0.5 milliLiter(s) IntraMuscular once  insulin lispro (HumaLOG) corrective regimen sliding scale   SubCutaneous Before meals and at bedtime  pantoprazole  Injectable 40 milliGRAM(s) IV Push daily  polyethylene glycol 3350 17 Gram(s) Oral daily  sucralfate suspension 1 Gram(s) Oral every 6 hours  zinc sulfate 220 milliGRAM(s) Oral daily    acetaminophen    Suspension 650 milliGRAM(s) Oral every 6 hours PRN  ALBUTerol/ipratropium for Nebulization 3 milliLiter(s) Nebulizer every 6 hours  ascorbic acid 500 milliGRAM(s) Oral daily  aspirin enteric coated 81 milliGRAM(s) Oral daily  dextrose 5%. 1000 milliLiter(s) IV Continuous <Continuous>  dextrose 50% Injectable 12.5 Gram(s) IV Push once  dextrose Gel 1 Dose(s) Oral once PRN  ferrous    sulfate Liquid 300 milliGRAM(s) Oral daily  folic acid 1 milliGRAM(s) Oral daily  furosemide   Injectable 40 milliGRAM(s) IV Push daily  glucagon  Injectable 1 milliGRAM(s) IntraMuscular once PRN  influenza   Vaccine 0.5 milliLiter(s) IntraMuscular once  insulin lispro (HumaLOG) corrective regimen sliding scale   SubCutaneous Before meals and at bedtime  lisinopril 5 milliGRAM(s) Oral daily  meropenem IVPB 500 milliGRAM(s) IV Intermittent every 8 hours  metoprolol 25 milliGRAM(s) Oral two times a day  pantoprazole  Injectable 40 milliGRAM(s) IV Push daily  polyethylene glycol 3350 17 Gram(s) Oral daily  sucralfate suspension 1 Gram(s) Oral every 6 hours  tamsulosin 0.4 milliGRAM(s) Oral at bedtime  zinc sulfate 220 milliGRAM(s) Oral daily    Drug Dosing Weight  Height (cm): 177.8 (05 Oct 2017 16:02)  Weight (kg): 90 (15 Oct 2017 07:30)  BMI (kg/m2): 28.5 (15 Oct 2017 07:30)  BSA (m2): 2.08 (15 Oct 2017 07:30)    CENTRAL LINE: [ ] YES [x ] NO  LOCATION:   DATE INSERTED:  REMOVE: [ ] YES [ ] NO  EXPLAIN:    TURNER: [x ] YES [ ] NO    DATE INSERTED:10/14  REMOVE:  [ ] YES [ ] NO  EXPLAIN:    A-LINE:  [ ] YES [x ] NO  LOCATION:   DATE INSERTED:  REMOVE:  [ ] YES [ ] NO  EXPLAIN:    PMH -reviewed admission note, no change since admission  Heart faliure: acute [ ] chronic [ ] acute or chronic [ ] diastolic [ ] systolic [ ] combied systolic and diastolic[ ]  MATHEUS: ATN[ ] renal medullary necrosis [ ] CKD I [ ]CKDII [ ]CKD III [ ]CKD IV [ ]CKD V [ ]Other pathological lesions [ ]  Abdominal Nutrition Status: malnutrition [ ] cachexia [ ] morbid obesity/BMI=40 [ ] Supplement ordered [___________]     ICU Vital Signs Last 24 Hrs  T(C): 36.1 (18 Oct 2017 05:21), Max: 36.6 (17 Oct 2017 17:00)  T(F): 97 (18 Oct 2017 05:21), Max: 97.8 (17 Oct 2017 17:00)  HR: 99 (18 Oct 2017 07:00) (79 - 120)  BP: 119/87 (18 Oct 2017 07:00) (68/33 - 119/87)  BP(mean): 91 (18 Oct 2017 07:00) (42 - 91)  ABP: --  ABP(mean): --  RR: 18 (18 Oct 2017 07:00) (16 - 28)  SpO2: 100% (18 Oct 2017 07:00) (84% - 100%)            10-17 @ 07:01  -  10-18 @ 07:00  --------------------------------------------------------  IN: 200 mL / OUT: 1295 mL / NET: -1095 mL            PHYSICAL EXAM:    GENERAL: [x ]NAD, [x ]well-groomed, [ ]well-developed  HEAD:  [x ]Atraumatic, [x ]Normocephalic  EYES: [x ]EOMI, [x ]PERRLA, [x ]conjunctiva and sclera clear  ENMT: [x ]No tonsillar erythema, exudates, or enlargement; [x ]Moist mucous membranes, [ ]Good dentition, [ ]No lesions  NECK: [ x]Supple, normal appearance, [x ]No JVD; [ x]Normal thyroid; [ x]Trachea midline  NERVOUS SYSTEM:  [ x]Alert & Oriented X2, [x ]Good concentration; [x ]Motor Strength 2/5 B/L lower extremities; [ ]DTRs 2+ intact and symmetric  CHEST/LUNG: [x ]No chest deformity; [x ]Normal percussion bilaterally; [ ]No rales, rhonchi, wheezing   HEART: [x ]Regular rate and rhythm; [ ]No murmurs, rubs, or gallops  ABDOMEN: [x ]Soft, Nontender, Nondistended; [ ]Bowel sounds present  EXTREMITIES:  [x]2+ Peripheral Pulses, [ ]No clubbing, cyanosis, [x]2+ edema  LYMPH: [x]No lymphadenopathy noted  SKIN: [x ]No rashes or lesions; [x ]Good capillary refill      LABS:  CBC Full  -  ( 18 Oct 2017 06:16 )  WBC Count : 9.5 K/uL  Hemoglobin : 8.2 g/dL  Hematocrit : 27.2 %  Platelet Count - Automated : 188 K/uL  Mean Cell Volume : 85.9 fl  Mean Cell Hemoglobin : 26.0 pg  Mean Cell Hemoglobin Concentration : 30.3 gm/dL  Auto Neutrophil # : 7.1 K/uL  Auto Lymphocyte # : 1.4 K/uL  Auto Monocyte # : 0.6 K/uL  Auto Eosinophil # : 0.2 K/uL  Auto Basophil # : 0.1 K/uL  Auto Neutrophil % : 75.4 %  Auto Lymphocyte % : 15.2 %  Auto Monocyte % : 6.7 %  Auto Eosinophil % : 1.9 %  Auto Basophil % : 0.8 %    10-18    148<H>  |  115<H>  |  26<H>  ----------------------------<  116<H>  3.5   |  28  |  0.82    Ca    7.9<L>      18 Oct 2017 06:16  Phos  2.2     10-18  Mg     2.2     10-18      PT/INR - ( 17 Oct 2017 03:50 )   PT: 46.5 sec;   INR: 4.14 ratio         PTT - ( 17 Oct 2017 03:50 )  PTT:39.0 sec        RADIOLOGY & ADDITIONAL STUDIES REVIEWED:  ***    [ ]GOALS OF CARE DISCUSSION WITH PATIENT/FAMILY/PROXY: daily discussions with family regarding goals of care.    CRITICAL CARE TIME SPENT: 35 minutes

## 2017-10-18 NOTE — PROGRESS NOTE ADULT - PROBLEM SELECTOR PROBLEM 10
Prophylactic measure

## 2017-10-18 NOTE — CHART NOTE - NSCHARTNOTEFT_GEN_A_CORE
83 y/o M from [facility] w/ PMHx of Rheumatic heart disease, NPH  (unsteady gait used to ambulate with walker now bedbound for a couple of months) & Afib (on Coumadin), CHF being treated on this hospitalization for ESBL bacteremia , now transfered to ICU for respiratory failure and altered mental status .     Problem/Recommendation - 1:  Problem: Respiratory failure. Recommendation: increasingly drowsy with tacypnea and tachycardia  unable to protect airway  patient was intubated for respiratory failure and protection of airway - extubated on 10/15  - patient was comfortable over night on 2L nasal canula   c/w iv abx meropenum   MATHEUS resolved  when more Stable obtain CT scan to rule out CVA( patient has underlying history of Afib).  - EEG no epileptiform activity  - neuro eval     Problem/Recommendation - 2:  ·  Problem: Bacteremia.  Recommendation: ESBL bacteremia  ESBL in urine blood and sacral DU debridement tissue  ertapenum switched to meropenum for possible pseudomonas coverage   meropenum day 3  repeat blood CX negative  patient has a PICC line   c/w invanz.   WBC WNL     Problem/Recommendation - 3:  ·  Problem: Afib.  Recommendation: lopressor oral for rate control  high INR holding coumadin      Problem/Recommendation - 4:  ·  Problem: CHF (congestive heart failure).  Recommendation: lasix was on hold because of sepsis  s/p 2 dose of lasix because of suspicion of pulmonary edema yesterday  c/w asa lopressor .  - EF of 10% on Echo   - lisinopril added as per cardio   - further cardio workup once patient is stable  records obtained from primary cardiologist - records in charts   cardiology evaluation.      Problem/Recommendation - 5:  ·  Problem: Anemia.  Recommendation: hemoglobin 8.0  got 1 unit PRBC this admission  presented with hemoglobin of 7.0  c/w carafate protonix iv  monitor h/h.   -FOBT negative     Problem/Recommendation - 6:  Problem: DM (diabetes mellitus). Recommendation: c/w insulin sliding scale  monitor Accu check.     Problem/Recommendation - 7:  Problem: Need for prophylactic measure. Recommendation: on coumadin and protonix.    Patient is stable and ready to transfer to medical floor. Plan discussed with Patient, family, ICU and floor attending Dr. Chauhan and floor team.   - when stable can get partial ECHO to check improvement in cardiac function.  - Patient's EF on latest ECHO is 10%. So, be cautious with fluids.   - Patients Baseline BP is 90s so don't give fluid if BP ranges in that range. 81 y/o M from [facility] w/ PMHx of Rheumatic heart disease, NPH  (unsteady gait used to ambulate with walker now bedbound for a couple of months) & Afib (on Coumadin), CHF being treated on this hospitalization for ESBL bacteremia , now transferred to ICU for respiratory failure and altered mental status .     Problem/Recommendation - 1:  Problem: Respiratory failure. Recommendation: increasingly drowsy with tacypnea and tachycardia  unable to protect airway  patient was intubated for respiratory failure and protection of airway - extubated on 10/15  - patient was comfortable over night on 2L nasal canula   c/w iv abx meropenum   MATHEUS resolved  when more Stable obtain CT scan to rule out CVA( patient has underlying history of Afib).  - EEG no epileptiform activity  - neuro eval     Problem/Recommendation - 2:  ·  Problem: Bacteremia.  Recommendation: ESBL bacteremia  ESBL in urine blood and sacral DU debridement tissue  ertapenum switched to meropenum for possible pseudomonas coverage   meropenum day 3  repeat blood CX negative  patient has a PICC line   c/w invanz.   WBC WNL     Problem/Recommendation - 3:  ·  Problem: Afib.  Recommendation: lopressor oral for rate control  high INR holding coumadin      Problem/Recommendation - 4:  ·  Problem: CHF (congestive heart failure).  Recommendation: lasix was on hold because of sepsis  s/p 2 dose of lasix because of suspicion of pulmonary edema yesterday  c/w asa lopressor .  - EF of 10% on Echo   - lisinopril added as per cardio   - further cardio workup once patient is stable  records obtained from primary cardiologist - records in charts   cardiology evaluation.      Problem/Recommendation - 5:  ·  Problem: Anemia.  Recommendation: hemoglobin 8.0  got 1 unit PRBC this admission  presented with hemoglobin of 7.0  c/w carafate protonix iv  monitor h/h.   -FOBT negative     Problem/Recommendation - 6:  Problem: DM (diabetes mellitus). Recommendation: c/w insulin sliding scale  monitor Accu check.     Problem/Recommendation - 7:  Problem: Need for prophylactic measure. Recommendation: on coumadin and protonix.    Patient is stable and ready to transfer to medical floor. Plan discussed with Patient, family, ICU and floor attending Dr. Chauhan and floor team.   - when stable can get partial ECHO to check improvement in cardiac function.  - Patient's EF on latest ECHO is 10%. So, be cautious with fluids.   - Patients Baseline BP is 90s so don't give fluid if BP ranges in that range.  - coumadin was held as INR was 4.14. follow INR tomorrow mornig and restart if INR is therapeutic.

## 2017-10-18 NOTE — PROGRESS NOTE ADULT - SUBJECTIVE AND OBJECTIVE BOX
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 An echocardiogram performed 10/16/17 showed EF 5-10%, compared to 55%  reported on echo in 05/2017.     Patient is A & O x0, not following commands, and unable to provide any additional information; the above was obtained from review of charts and discussion with ICU team.     Overnight, there were no acute events. Patient diuresed net negative -1.3L. He    PMH: As above, also DM, NPH (no  shunt)    Allergies    penicillin (Unknown)        MEDICATIONS  (STANDING):  ALBUTerol/ipratropium for Nebulization 3 milliLiter(s) Nebulizer every 6 hours  ascorbic acid 500 milliGRAM(s) Oral daily  aspirin enteric coated 81 milliGRAM(s) Oral daily  dextrose 5%. 1000 milliLiter(s) (50 mL/Hr) IV Continuous <Continuous>  dextrose 50% Injectable 12.5 Gram(s) IV Push once  ferrous    sulfate Liquid 300 milliGRAM(s) Oral daily  folic acid 1 milliGRAM(s) Oral daily  furosemide   Injectable 40 milliGRAM(s) IV Push daily  influenza   Vaccine 0.5 milliLiter(s) IntraMuscular once  insulin lispro (HumaLOG) corrective regimen sliding scale   SubCutaneous Before meals and at bedtime  lisinopril 5 milliGRAM(s) Oral daily  meropenem IVPB 500 milliGRAM(s) IV Intermittent every 8 hours  metoprolol 25 milliGRAM(s) Oral two times a day  pantoprazole  Injectable 40 milliGRAM(s) IV Push daily  polyethylene glycol 3350 17 Gram(s) Oral daily  sucralfate suspension 1 Gram(s) Oral every 6 hours  tamsulosin 0.4 milliGRAM(s) Oral at bedtime  zinc sulfate 220 milliGRAM(s) Oral daily    MEDICATIONS  (PRN):  acetaminophen    Suspension 650 milliGRAM(s) Oral every 6 hours PRN For Temp greater than 38 C (100.4 F)  dextrose Gel 1 Dose(s) Oral once PRN Blood Glucose LESS THAN 70 milliGRAM(s)/deciLiter  glucagon  Injectable 1 milliGRAM(s) IntraMuscular once PRN Glucose <70 milliGRAM(s)/deciLiter      FAMILY HISTORY:    Unobtainable    SOCIAL HISTORY:  Smoking- Unobtainable  Alcohol- Unobtainable  Ilicit Drug use-Unobtainable    REVIEW OF SYSTEMS:  Constitutional: [ ] fever, [ ]weight loss,  [ ]fatigue  Eyes: [ ] visual changes  Respiratory: [ ]shortness of breath;  [ ] cough, [ ]wheezing, [ ]chills, [ ]hemoptysis  Cardiovascular: [ ] chest pain, [ ]palpitations, [ ]dizziness,  [ ]leg swelling  Gastrointestinal: [ ] abdominal pain, [ ]nausea, [ ]vomiting,  [ ]diarrhea   Genitourinary: [ ] dysuria, [ ] hematuria  Neurologic: [ ] headaches [ ] tremors [ ] weakness [ ] lightheadedness  Skin: [ ] itching, [ ]burning, [ ] rashes  Endocrine: [ ] heat or cold intolerance  Musculoskeletal: [ ] joint pain or swelling; [ ] muscle, back, or extremity pain  Psychiatric: [ ] depression, [ ]anxiety, [ ]mood swings, or [ ]difficulty sleeping  Hematologic: [ ] easy bruising, [ ] bleeding gums    [ ] All remaining systems negative except as per above.   [ x ] Unable to obtain    Vital Signs Last 24 Hrs  T(C): 36.1 (18 Oct 2017 05:21), Max: 36.6 (17 Oct 2017 17:00)  T(F): 97 (18 Oct 2017 05:21), Max: 97.8 (17 Oct 2017 17:00)  HR: 99 (18 Oct 2017 09:00) (79 - 103)  BP: 92/51 (18 Oct 2017 09:00) (68/33 - 119/87)  BP(mean): 59 (18 Oct 2017 09:00) (42 - 91)  RR: 20 (18 Oct 2017 09:00) (16 - 28)  SpO2: 100% (18 Oct 2017 09:00) (84% - 100%)  I&O's Summary    17 Oct 2017 07:01  -  18 Oct 2017 07:00  --------------------------------------------------------  IN: 200 mL / OUT: 1545 mL / NET: -1345 mL    18 Oct 2017 07:01  -  18 Oct 2017 09:16  --------------------------------------------------------  IN: 0 mL / OUT: 100 mL / NET: -100 mL        PHYSICAL EXAM:  General: Lying in bed with BiPAP, no acute distress  HEENT: EOMI, PERRL  Neck: Supple, No JVD  Lungs: Clear to percussion bilaterally; No rales or wheezing  Heart: Irregular rate and rhythm; No murmurs, rubs, or gallops  Abdomen: Nontender, bowel sounds present  Extremities: No clubbing, cyanosis, or edema  Nervous system:  Alert & Oriented X0, does not follow commands  Psychiatric: Stares straight ahead, does not follow commands  Skin: Sacral decubitus    LABS:  10-18    148<H>  |  115<H>  |  26<H>  ----------------------------<  116<H>  3.5   |  28  |  0.82    Ca    7.9<L>      18 Oct 2017 06:16  Phos  2.2     10-18  Mg     2.2     10-18      Creatinine Trend: 0.82<--, 1.00<--, 1.11<--, 1.06<--, 1.25<--, 1.26<--                        8.2    9.5   )-----------( 188      ( 18 Oct 2017 06:16 )             27.2     PT/INR - ( 17 Oct 2017 03:50 )   PT: 46.5 sec;   INR: 4.14 ratio         PTT - ( 17 Oct 2017 03:50 )  PTT:39.0 sec    Serum Pro-Brain Natriuretic Peptide: 89032 pg/mL (10-17-17 @ 03:50)      TELEMETRY: PRESENTING CC: none    SUBJ:     In summary, this is an 83 yo M with dementia, 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 An echocardiogram performed 10/16/17 showed EF 5-10%, compared to 55%  reported on echo in 05/2017.     Overnight, there were no acute events. Patient diuresed net negative -1.3L. He appears more alert, able to follow simple commands such as squeezing hands, able to say own name.     PMH: As above, also DM, NPH (no  shunt)    Allergies    penicillin (Unknown)        MEDICATIONS  (STANDING):  ALBUTerol/ipratropium for Nebulization 3 milliLiter(s) Nebulizer every 6 hours  ascorbic acid 500 milliGRAM(s) Oral daily  aspirin enteric coated 81 milliGRAM(s) Oral daily  dextrose 5%. 1000 milliLiter(s) (50 mL/Hr) IV Continuous <Continuous>  dextrose 50% Injectable 12.5 Gram(s) IV Push once  ferrous    sulfate Liquid 300 milliGRAM(s) Oral daily  folic acid 1 milliGRAM(s) Oral daily  furosemide   Injectable 40 milliGRAM(s) IV Push daily  influenza   Vaccine 0.5 milliLiter(s) IntraMuscular once  insulin lispro (HumaLOG) corrective regimen sliding scale   SubCutaneous Before meals and at bedtime  lisinopril 5 milliGRAM(s) Oral daily  meropenem IVPB 500 milliGRAM(s) IV Intermittent every 8 hours  metoprolol 25 milliGRAM(s) Oral two times a day  pantoprazole  Injectable 40 milliGRAM(s) IV Push daily  polyethylene glycol 3350 17 Gram(s) Oral daily  sucralfate suspension 1 Gram(s) Oral every 6 hours  tamsulosin 0.4 milliGRAM(s) Oral at bedtime  zinc sulfate 220 milliGRAM(s) Oral daily    MEDICATIONS  (PRN):  acetaminophen    Suspension 650 milliGRAM(s) Oral every 6 hours PRN For Temp greater than 38 C (100.4 F)  dextrose Gel 1 Dose(s) Oral once PRN Blood Glucose LESS THAN 70 milliGRAM(s)/deciLiter  glucagon  Injectable 1 milliGRAM(s) IntraMuscular once PRN Glucose <70 milliGRAM(s)/deciLiter      FAMILY HISTORY:    Unobtainable    SOCIAL HISTORY:  Smoking- Unobtainable  Alcohol- Unobtainable  Ilicit Drug use-Unobtainable    REVIEW OF SYSTEMS:  Constitutional: [ ] fever, [ ]weight loss,  [ ]fatigue  Eyes: [ ] visual changes  Respiratory: [ ]shortness of breath;  [ ] cough, [ ]wheezing, [ ]chills, [ ]hemoptysis  Cardiovascular: [ ] chest pain, [ ]palpitations, [ ]dizziness,  [ ]leg swelling  Gastrointestinal: [ ] abdominal pain, [ ]nausea, [ ]vomiting,  [ ]diarrhea   Genitourinary: [ ] dysuria, [ ] hematuria  Neurologic: [ ] headaches [ ] tremors [ ] weakness [ ] lightheadedness  Skin: [ ] itching, [ ]burning, [ ] rashes  Endocrine: [ ] heat or cold intolerance  Musculoskeletal: [ ] joint pain or swelling; [ ] muscle, back, or extremity pain  Psychiatric: [ ] depression, [ ]anxiety, [ ]mood swings, or [ ]difficulty sleeping  Hematologic: [ ] easy bruising, [ ] bleeding gums    [ ] All remaining systems negative except as per above.   [ x ] Unable to obtain    Vital Signs Last 24 Hrs  T(C): 36.1 (18 Oct 2017 05:21), Max: 36.6 (17 Oct 2017 17:00)  T(F): 97 (18 Oct 2017 05:21), Max: 97.8 (17 Oct 2017 17:00)  HR: 99 (18 Oct 2017 09:00) (79 - 103)  BP: 92/51 (18 Oct 2017 09:00) (68/33 - 119/87)  BP(mean): 59 (18 Oct 2017 09:00) (42 - 91)  RR: 20 (18 Oct 2017 09:00) (16 - 28)  SpO2: 100% (18 Oct 2017 09:00) (84% - 100%)  I&O's Summary    17 Oct 2017 07:01  -  18 Oct 2017 07:00  --------------------------------------------------------  IN: 200 mL / OUT: 1545 mL / NET: -1345 mL    18 Oct 2017 07:01  -  18 Oct 2017 09:16  --------------------------------------------------------  IN: 0 mL / OUT: 100 mL / NET: -100 mL        PHYSICAL EXAM:  General: Sitting up in bed in no distress  HEENT: EOMI, PERRL  Neck: Supple, No JVD  Lungs: Slight bibasilar crackles  Heart: Irregular rate and rhythm; No murmurs, rubs, or gallops  Abdomen: Nontender, bowel sounds present  Extremities: 1+ pitting B/ LE edema  Nervous system:  Alert & Oriented X1, follows simple commands  Psychiatric: Stares straight ahead, mostly flat affect  Skin: Sacral decubitus    LABS:  10-18    148<H>  |  115<H>  |  26<H>  ----------------------------<  116<H>  3.5   |  28  |  0.82    Ca    7.9<L>      18 Oct 2017 06:16  Phos  2.2     10-18  Mg     2.2     10-18      Creatinine Trend: 0.82<--, 1.00<--, 1.11<--, 1.06<--, 1.25<--, 1.26<--                        8.2    9.5   )-----------( 188      ( 18 Oct 2017 06:16 )             27.2     PT/INR - ( 17 Oct 2017 03:50 )   PT: 46.5 sec;   INR: 4.14 ratio         PTT - ( 17 Oct 2017 03:50 )  PTT:39.0 sec    Serum Pro-Brain Natriuretic Peptide: 06275 pg/mL (10-17-17 @ 03:50)      TELEMETRY: A fib with aberrant conduction, PVCs

## 2017-10-18 NOTE — PROGRESS NOTE ADULT - SUBJECTIVE AND OBJECTIVE BOX
Med attending follow up:    s/p extubation, on BIPAP, NG tube  Patient is a 82y old  Male who presents with a chief complaint of low hb level in NH (05 Oct 2017 19:36  INTERVAL HPI/OVERNIGHT EVENTS: Interim events extubation-appears much better, eating, DU seen while dressing    MEDICATIONS  (STANDING):  acetaminophen  IVPB. 1000 milliGRAM(s) IV Intermittent once  ALBUTerol    90 MICROgram(s) HFA Inhaler 2 Puff(s) Inhalation every 6 hours  ascorbic acid 500 milliGRAM(s) Oral daily  aspirin enteric coated 81 milliGRAM(s) Oral daily  dextrose 5%. 1000 milliLiter(s) (50 mL/Hr) IV Continuous <Continuous>  dextrose 50% Injectable 12.5 Gram(s) IV Push once  docusate sodium 100 milliGRAM(s) Oral two times a day  ertapenem  IVPB      ertapenem  IVPB 1000 milliGRAM(s) IV Intermittent every 24 hours  ferrous    sulfate 325 milliGRAM(s) Oral two times a day with meals  folic acid 1 milliGRAM(s) Oral daily  heparin  Injectable 5000 Unit(s) SubCutaneous every 12 hours  influenza   Vaccine 0.5 milliLiter(s) IntraMuscular once  insulin lispro (HumaLOG) corrective regimen sliding scale   SubCutaneous three times a day before meals  multivitamin 1 Tablet(s) Oral daily  pantoprazole    Tablet 40 milliGRAM(s) Oral before breakfast  polyethylene glycol 3350 17 Gram(s) Oral daily  sucralfate suspension 1 Gram(s) Oral every 6 hours  tamsulosin 0.4 milliGRAM(s) Oral at bedtime  tiotropium 18 MICROgram(s) Capsule 1 Capsule(s) Inhalation daily  warfarin 5 milliGRAM(s) Oral once  zinc sulfate 220 milliGRAM(s) Oral daily    MEDICATIONS  (PRN):  dextrose Gel 1 Dose(s) Oral once PRN Blood Glucose LESS THAN 70 milliGRAM(s)/deciLiter  glucagon  Injectable 1 milliGRAM(s) IntraMuscular once PRN Glucose <70 milliGRAM(s)/deciLiter    ICU Vital Signs Last 24 Hrs  T(C): 36.2 (17 Oct 2017 13:00), Max: 37.3 (17 Oct 2017 00:00)  T(F): 97.2 (17 Oct 2017 13:00), Max: 99.1 (17 Oct 2017 00:00)  HR: 96 (17 Oct 2017 16:00) (79 - 120)  BP: 93/68 (17 Oct 2017 16:00) (91/65 - 122/76)  BP(mean): 74 (17 Oct 2017 16:00) (57 - 92)  ABP: --  ABP(mean): --  RR: 22 (17 Oct 2017 16:00) (14 - 28)  SpO2: 84% (17 Oct 2017 16:00) (84% - 100%)      __________________________________________________  REVIEW OF SYSTEMS:    debilitated,  details not available       ________________________________________________  PHYSICAL EXAM:  GENERAL: NAD  HEENT: Normocephalic;  conjunctivae and sclerae clear; moist mucous membranes;   NECK : supple  CHEST/LUNG: Clear to auscultation bilaterally with good air entry   HEART: S1 S2  regular; no murmurs, gallops or rubs  ABDOMEN: Soft, Nontender, Nondistended; Bowel sounds present.    EXTREMITIES: no cyanosis; no edema; no calf tenderness  SKIN: warm and dry; no rash.  Sacral dressing changed-stage 3  NERVOUS SYSTEM:  Awake and alert; Oriented  to place, person and time ; no new deficits    _________________________________________________  LABS:                                              8.2    9.5   )-----------( 188      ( 18 Oct 2017 06:16 )             27.2   10-18    148<H>  |  115<H>  |  26<H>  ----------------------------<  116<H>  3.5   |  28  |  0.82    Ca    7.9<L>      18 Oct 2017 06:16  Phos  2.2     10-18  Mg     2.2     10-18                    PT/INR - ( 13 Oct 2017 07:29 )   PT: 16.5 sec;   INR: 1.50 ratio             CAPILLARY BLOOD GLUCOSE      POCT Blood Glucose.: 169 mg/dL (13 Oct 2017 07:27)  POCT Blood Glucose.: 153 mg/dL (12 Oct 2017 21:45)  POCT Blood Glucose.: 128 mg/dL (12 Oct 2017 16:14)        RADIOLOGY & ADDITIONAL TESTS:    Imaging Personally Reviewed:  YES    Consultant(s) Notes Reviewed:   YES    Care Discussed with Consultants :     Plan of care was discussed with patient and /or primary care giver; all questions and concerns were addressed and care was aligned with patient's wishes.

## 2017-10-18 NOTE — CHART NOTE - NSCHARTNOTEFT_GEN_A_CORE
Surgery consulted for re-evaluation of sacral decubitus.  Patient seen at bedside and sacral decubitus evaluated. Pt is s/p I&D 2 weeks ago. Wound is clean, no odor, no discharge, some slough present.  Recommend continue daily dressing change and add santyl to dressing change daily. Frequent turning to promote healing.  Case and plan discussed with Dr. Ramsey and agrees

## 2017-10-18 NOTE — PROGRESS NOTE ADULT - SUBJECTIVE AND OBJECTIVE BOX
ICU visit:  82y Male is under our care for pneumonia, bacteremia and infected sacral ulcer.  Patient is doing better and is awaiting downgrade to floor.  When patient was asked questions he would nod his head at times, but other times be unresponsive.  Has not had any fevers and wbc count is normal.  Repeat BC have been neagtive.    REVIEW OF SYSTEMS:  [ X ] Not able to illicit    MEDS:  meropenem IVPB 500 milliGRAM(s) IV Intermittent every 8 hours    ALLERGIES: penicillin (Unknown)    VITALS:  ICU Vital Signs Last 24 Hrs  T(C): 36.4 (18 Oct 2017 17:53), Max: 36.6 (17 Oct 2017 20:39)  T(F): 97.6 (18 Oct 2017 17:53), Max: 97.8 (17 Oct 2017 20:39)  HR: 97 (18 Oct 2017 17:53) (86 - 103)  BP: 111/71 (18 Oct 2017 17:53) (77/61 - 119/87)  BP(mean): 63 (18 Oct 2017 17:00) (57 - 91)  ABP: --  ABP(mean): --  RR: 16 (18 Oct 2017 17:53) (16 - 28)  SpO2: 100% (18 Oct 2017 17:53) (86% - 100%)      PHYSICAL EXAM:  HEENT: +NC @ 2L/ min  Neck: supple no LN's  Respiratory: R>L base rales no wheezing  Cardiovascular: S1 S2 reg no murmurs  Gastrointestinal: +BS with soft, nondistended and nontender abdomen  +cabrera  Extremities: mild bilateral ankle edema  Skin: no rashes  Ortho: n/a  Neuro: Awake and alert, not able to access orientation      LABS/DIAGNOSTIC TESTS:                        8.2    9.5   )-----------( 188      ( 18 Oct 2017 06:16 )             27.2     10-18    148<H>  |  115<H>  |  26<H>  ----------------------------<  116<H>  3.5   |  28  |  0.82    Ca    7.9<L>      18 Oct 2017 06:16  Phos  2.2     10-18  Mg     2.2     10-18      CULTURES:   .Blood Blood-Peripheral  10-14 @ 22:45   No growth to date.  --  --      .Blood Blood-Peripheral  10-11 @ 13:27   No growth at 5 days.  --  --      .Surgical Swab sacral decubitus  10-09 @ 13:21   Rare Proteus mirabilis ESBL  Few Enterococcus faecalis (vancomycin resistant)  Few Bacteroides thetaiotaomicron  --  Proteus mirabilis ESBL  Enterococcus faecalis (vancomycin resistant)      .Urine Clean Catch (Midstream)  10-05 @ 23:11   50,000 - 99,000 CFU/mL Proteus mirabilis ESBL  --  Proteus mirabilis ESBL      .Blood Blood  10-05 @ 23:07   Growth in aerobic bottle: Proteus mirabilis ESBL  Growth in anaerobic bottle: Streptococcus anginosus      RADIOLOGY:  No new studies

## 2017-10-19 DIAGNOSIS — E87.0 HYPEROSMOLALITY AND HYPERNATREMIA: ICD-10-CM

## 2017-10-19 LAB
ANION GAP SERPL CALC-SCNC: 7 MMOL/L — SIGNIFICANT CHANGE UP (ref 5–17)
APTT BLD: 43.9 SEC — HIGH (ref 27.5–37.4)
BASOPHILS # BLD AUTO: 0.1 K/UL — SIGNIFICANT CHANGE UP (ref 0–0.2)
BASOPHILS NFR BLD AUTO: 0.6 % — SIGNIFICANT CHANGE UP (ref 0–2)
BUN SERPL-MCNC: 20 MG/DL — HIGH (ref 7–18)
CALCIUM SERPL-MCNC: 7.9 MG/DL — LOW (ref 8.4–10.5)
CHLORIDE SERPL-SCNC: 111 MMOL/L — HIGH (ref 96–108)
CO2 SERPL-SCNC: 28 MMOL/L — SIGNIFICANT CHANGE UP (ref 22–31)
CREAT SERPL-MCNC: 0.83 MG/DL — SIGNIFICANT CHANGE UP (ref 0.5–1.3)
CULTURE RESULTS: SIGNIFICANT CHANGE UP
CULTURE RESULTS: SIGNIFICANT CHANGE UP
EOSINOPHIL # BLD AUTO: 0.4 K/UL — SIGNIFICANT CHANGE UP (ref 0–0.5)
EOSINOPHIL NFR BLD AUTO: 4 % — SIGNIFICANT CHANGE UP (ref 0–6)
GLUCOSE BLDC GLUCOMTR-MCNC: 160 MG/DL — HIGH (ref 70–99)
GLUCOSE BLDC GLUCOMTR-MCNC: 183 MG/DL — HIGH (ref 70–99)
GLUCOSE BLDC GLUCOMTR-MCNC: 203 MG/DL — HIGH (ref 70–99)
GLUCOSE SERPL-MCNC: 109 MG/DL — HIGH (ref 70–99)
HCT VFR BLD CALC: 28.1 % — LOW (ref 39–50)
HGB BLD-MCNC: 8.5 G/DL — LOW (ref 13–17)
INR BLD: 4.22 RATIO — HIGH (ref 0.88–1.16)
LYMPHOCYTES # BLD AUTO: 1.7 K/UL — SIGNIFICANT CHANGE UP (ref 1–3.3)
LYMPHOCYTES # BLD AUTO: 18.6 % — SIGNIFICANT CHANGE UP (ref 13–44)
MAGNESIUM SERPL-MCNC: 2 MG/DL — SIGNIFICANT CHANGE UP (ref 1.6–2.6)
MCHC RBC-ENTMCNC: 26.3 PG — LOW (ref 27–34)
MCHC RBC-ENTMCNC: 30.4 GM/DL — LOW (ref 32–36)
MCV RBC AUTO: 86.5 FL — SIGNIFICANT CHANGE UP (ref 80–100)
MONOCYTES # BLD AUTO: 0.6 K/UL — SIGNIFICANT CHANGE UP (ref 0–0.9)
MONOCYTES NFR BLD AUTO: 6.7 % — SIGNIFICANT CHANGE UP (ref 2–14)
NEUTROPHILS # BLD AUTO: 6.5 K/UL — SIGNIFICANT CHANGE UP (ref 1.8–7.4)
NEUTROPHILS NFR BLD AUTO: 70.1 % — SIGNIFICANT CHANGE UP (ref 43–77)
PHOSPHATE SERPL-MCNC: 2.5 MG/DL — SIGNIFICANT CHANGE UP (ref 2.5–4.5)
PLATELET # BLD AUTO: 206 K/UL — SIGNIFICANT CHANGE UP (ref 150–400)
POTASSIUM SERPL-MCNC: 3.6 MMOL/L — SIGNIFICANT CHANGE UP (ref 3.5–5.3)
POTASSIUM SERPL-SCNC: 3.6 MMOL/L — SIGNIFICANT CHANGE UP (ref 3.5–5.3)
PROTHROM AB SERPL-ACNC: 47.4 SEC — HIGH (ref 9.8–12.7)
RBC # BLD: 3.25 M/UL — LOW (ref 4.2–5.8)
RBC # FLD: 25.7 % — HIGH (ref 10.3–14.5)
SODIUM SERPL-SCNC: 146 MMOL/L — HIGH (ref 135–145)
SPECIMEN SOURCE: SIGNIFICANT CHANGE UP
SPECIMEN SOURCE: SIGNIFICANT CHANGE UP
WBC # BLD: 9.3 K/UL — SIGNIFICANT CHANGE UP (ref 3.8–10.5)
WBC # FLD AUTO: 9.3 K/UL — SIGNIFICANT CHANGE UP (ref 3.8–10.5)

## 2017-10-19 PROCEDURE — 99497 ADVNCD CARE PLAN 30 MIN: CPT

## 2017-10-19 PROCEDURE — 99232 SBSQ HOSP IP/OBS MODERATE 35: CPT

## 2017-10-19 RX ORDER — FUROSEMIDE 40 MG
40 TABLET ORAL DAILY
Refills: 0 | Status: DISCONTINUED | OUTPATIENT
Start: 2017-10-20 | End: 2017-10-26

## 2017-10-19 RX ORDER — PANTOPRAZOLE SODIUM 20 MG/1
40 TABLET, DELAYED RELEASE ORAL
Refills: 0 | Status: DISCONTINUED | OUTPATIENT
Start: 2017-10-20 | End: 2017-10-26

## 2017-10-19 RX ADMIN — Medication 1 GRAM(S): at 05:17

## 2017-10-19 RX ADMIN — ZINC SULFATE TAB 220 MG (50 MG ZINC EQUIVALENT) 220 MILLIGRAM(S): 220 (50 ZN) TAB at 11:15

## 2017-10-19 RX ADMIN — Medication 25 MILLIGRAM(S): at 05:17

## 2017-10-19 RX ADMIN — Medication 1: at 17:45

## 2017-10-19 RX ADMIN — Medication 1 GRAM(S): at 17:46

## 2017-10-19 RX ADMIN — Medication 2: at 21:24

## 2017-10-19 RX ADMIN — Medication 3 MILLILITER(S): at 02:23

## 2017-10-19 RX ADMIN — LISINOPRIL 5 MILLIGRAM(S): 2.5 TABLET ORAL at 05:17

## 2017-10-19 RX ADMIN — MEROPENEM 200 MILLIGRAM(S): 1 INJECTION INTRAVENOUS at 21:23

## 2017-10-19 RX ADMIN — Medication 1 GRAM(S): at 23:45

## 2017-10-19 RX ADMIN — Medication 81 MILLIGRAM(S): at 12:39

## 2017-10-19 RX ADMIN — Medication 300 MILLIGRAM(S): at 12:39

## 2017-10-19 RX ADMIN — Medication 1 MILLIGRAM(S): at 12:39

## 2017-10-19 RX ADMIN — Medication 1: at 11:19

## 2017-10-19 RX ADMIN — POLYETHYLENE GLYCOL 3350 17 GRAM(S): 17 POWDER, FOR SOLUTION ORAL at 11:13

## 2017-10-19 RX ADMIN — TAMSULOSIN HYDROCHLORIDE 0.4 MILLIGRAM(S): 0.4 CAPSULE ORAL at 21:24

## 2017-10-19 RX ADMIN — Medication 3 MILLILITER(S): at 08:25

## 2017-10-19 RX ADMIN — Medication 1 GRAM(S): at 11:13

## 2017-10-19 RX ADMIN — Medication 40 MILLIGRAM(S): at 05:17

## 2017-10-19 RX ADMIN — Medication 500 MILLIGRAM(S): at 11:16

## 2017-10-19 RX ADMIN — MEROPENEM 200 MILLIGRAM(S): 1 INJECTION INTRAVENOUS at 14:55

## 2017-10-19 RX ADMIN — Medication 25 MILLIGRAM(S): at 17:46

## 2017-10-19 RX ADMIN — Medication 3 MILLILITER(S): at 20:10

## 2017-10-19 RX ADMIN — Medication 3 MILLILITER(S): at 14:40

## 2017-10-19 RX ADMIN — MEROPENEM 200 MILLIGRAM(S): 1 INJECTION INTRAVENOUS at 05:17

## 2017-10-19 NOTE — PROGRESS NOTE ADULT - SUBJECTIVE AND OBJECTIVE BOX
82y Male is under our care for pneumonia, bacteremia and infected sacral ulcer.  Patient was downgraded to floor last night.  Patient is not verbal much, but when asked if he has any sxs he shook his head NO. Remains afebrile with normal wbc count. Possible dc planning tomorrow on IV antibiotics.    REVIEW OF SYSTEMS:  [ X ] Not able to illicit    MEDS:  meropenem IVPB 500 milliGRAM(s) IV Intermittent every 8 hours    ALLERGIES: penicillin (Unknown)    VITALS:  Vital Signs Last 24 Hrs  T(C): 36.6 (19 Oct 2017 14:13), Max: 37.3 (18 Oct 2017 20:34)  T(F): 97.8 (19 Oct 2017 14:13), Max: 99.1 (18 Oct 2017 20:34)  HR: 110 (19 Oct 2017 14:13) (57 - 110)  BP: 105/56 (19 Oct 2017 14:13) (90/57 - 131/70)  BP(mean): 77 (19 Oct 2017 09:58) (57 - 77)  RR: 18 (19 Oct 2017 14:13) (16 - 23)  SpO2: 98% (19 Oct 2017 14:13) (96% - 100%)      PHYSICAL EXAM:  HEENT: +NC @ 2L/ min  Neck: supple no LN's  Respiratory: R>L base rales no wheezing  Cardiovascular: S1 S2 reg no murmurs  Gastrointestinal: +BS with soft, nondistended and nontender abdomen  +cabrera  Extremities: mild bilateral ankle edema; +right arm PICC  Skin: no rashes  Ortho: n/a  Neuro: Awake and alert      LABS/DIAGNOSTIC TESTS:                        8.5    9.3   )-----------( 206      ( 19 Oct 2017 07:30 )             28.1   10-19    146<H>  |  111<H>  |  20<H>  ----------------------------<  109<H>  3.6   |  28  |  0.83    Ca    7.9<L>      19 Oct 2017 07:30  Phos  2.5     10-19  Mg     2.0     10-19      CULTURES:   .Blood Blood-Peripheral  10-14 @ 22:45   No growth to date.  --  --      .Blood Blood-Peripheral  10-11 @ 13:27   No growth at 5 days.  --  --      .Surgical Swab sacral decubitus  10-09 @ 13:21   Rare Proteus mirabilis ESBL  Few Enterococcus faecalis (vancomycin resistant)  Few Bacteroides thetaiotaomicron  --  Proteus mirabilis ESBL  Enterococcus faecalis (vancomycin resistant)      .Urine Clean Catch (Midstream)  10-05 @ 23:11   50,000 - 99,000 CFU/mL Proteus mirabilis ESBL  --  Proteus mirabilis ESBL      .Blood Blood  10-05 @ 23:07   Growth in aerobic bottle: Proteus mirabilis ESBL  Growth in anaerobic bottle: Streptococcus anginosus      RADIOLOGY:  No new studies

## 2017-10-19 NOTE — GOALS OF CARE CONVERSATION - PERSONAL ADVANCE DIRECTIVE - CONVERSATION DETAILS
NP discussed with wife regarding goals of care as well as above, and she again requested that her  is a full code.  NP then discussed MOLST form with wife.  Wife was agreeable to MOLST.
SHUBHAM form discussed with spouse KEENAN, who states she wants hospital treatment options to extend to the community.

## 2017-10-19 NOTE — PROGRESS NOTE ADULT - ASSESSMENT
1.	Infected sacral decubitus  2.	Bacteremia - ESBL  3.	?Pneumonia   ·	cont meropenem 500mg IV q8h   ·	if patient goes tomorrow can be dc'ed on primaxin 500mg IV q6h x 7 days

## 2017-10-19 NOTE — PROGRESS NOTE ADULT - PROBLEM SELECTOR PLAN 2
ESBL bacteremia  ESBL in urine blood and sacral DU debridement tissue  cont meropenem 500mg IV q8h D4  Normal WBC, afebrile  repeat blood cultures negative to date

## 2017-10-19 NOTE — PROGRESS NOTE ADULT - ASSESSMENT
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 He developed encephalopathy and acute resp failure requiring intubation 10/14, extubated 10/16. DNR/DNI. Downgraded to SCU

## 2017-10-19 NOTE — PROGRESS NOTE ADULT - PROBLEM SELECTOR PLAN 5
Dilated LV with severe global left ventricular dysfunction. EF 10%  BNP elevated at 33,000  continues to have good urine output,  c/w Lasix, BB, ACEI Dilated LV with severe global left ventricular dysfunction. EF 10% - Cards to repeat echo (limited)  BNP elevated at 33,000  continues to have good urine output,  c/w Lasix, BB, ACEI

## 2017-10-19 NOTE — PROGRESS NOTE ADULT - SUBJECTIVE AND OBJECTIVE BOX
Med attending follow up:    s/p extubation, on BIPAP, NG tube, now on 402 bed  Patient is a 82y old  Male who presents with a chief complaint of low hb level in NH (05 Oct 2017 19:36  INTERVAL HPI/OVERNIGHT EVENTS: Interim events extubation-appears much better, eating, DU seen while dressing    MEDICATIONS  (STANDING):  acetaminophen  IVPB. 1000 milliGRAM(s) IV Intermittent once  ALBUTerol    90 MICROgram(s) HFA Inhaler 2 Puff(s) Inhalation every 6 hours  ascorbic acid 500 milliGRAM(s) Oral daily  aspirin enteric coated 81 milliGRAM(s) Oral daily  dextrose 5%. 1000 milliLiter(s) (50 mL/Hr) IV Continuous <Continuous>  dextrose 50% Injectable 12.5 Gram(s) IV Push once  docusate sodium 100 milliGRAM(s) Oral two times a day  ertapenem  IVPB      ertapenem  IVPB 1000 milliGRAM(s) IV Intermittent every 24 hours  ferrous    sulfate 325 milliGRAM(s) Oral two times a day with meals  folic acid 1 milliGRAM(s) Oral daily  heparin  Injectable 5000 Unit(s) SubCutaneous every 12 hours  influenza   Vaccine 0.5 milliLiter(s) IntraMuscular once  insulin lispro (HumaLOG) corrective regimen sliding scale   SubCutaneous three times a day before meals  multivitamin 1 Tablet(s) Oral daily  pantoprazole    Tablet 40 milliGRAM(s) Oral before breakfast  polyethylene glycol 3350 17 Gram(s) Oral daily  sucralfate suspension 1 Gram(s) Oral every 6 hours  tamsulosin 0.4 milliGRAM(s) Oral at bedtime  tiotropium 18 MICROgram(s) Capsule 1 Capsule(s) Inhalation daily  warfarin 5 milliGRAM(s) Oral once  zinc sulfate 220 milliGRAM(s) Oral daily    MEDICATIONS  (PRN):  dextrose Gel 1 Dose(s) Oral once PRN Blood Glucose LESS THAN 70 milliGRAM(s)/deciLiter  glucagon  Injectable 1 milliGRAM(s) IntraMuscular once PRN Glucose <70 milliGRAM(s)/deciLiter    Vital Signs Last 24 Hrs  T(C): 36.6 (19 Oct 2017 14:13), Max: 37.3 (18 Oct 2017 20:34)  T(F): 97.8 (19 Oct 2017 14:13), Max: 99.1 (18 Oct 2017 20:34)  HR: 110 (19 Oct 2017 14:13) (57 - 110)  BP: 105/56 (19 Oct 2017 14:13) (91/66 - 131/70)  BP(mean): 77 (19 Oct 2017 09:58) (77 - 77)  RR: 18 (19 Oct 2017 14:13) (16 - 18)  SpO2: 98% (19 Oct 2017 14:13) (96% - 100%)      __________________________________________________  REVIEW OF SYSTEMS:    debilitated,  details not available       ________________________________________________  PHYSICAL EXAM:  GENERAL: NAD  HEENT: Normocephalic;  conjunctivae and sclerae clear; moist mucous membranes;   NECK : supple  CHEST/LUNG: Clear to auscultation bilaterally with good air entry   HEART: S1 S2  regular; no murmurs, gallops or rubs  ABDOMEN: Soft, Nontender, Nondistended; Bowel sounds present.    EXTREMITIES: no cyanosis; no edema; no calf tenderness  SKIN: warm and dry; no rash.  Sacral dressing changed-stage 3  NERVOUS SYSTEM:  Awake and alert; Oriented  to place, person and time ; no new deficits    _________________________________________________  LABS:                                                       8.5    9.3   )-----------( 206      ( 19 Oct 2017 07:30 )             28.1   10-19    146<H>  |  111<H>  |  20<H>  ----------------------------<  109<H>  3.6   |  28  |  0.83    Ca    7.9<L>      19 Oct 2017 07:30  Phos  2.5     10-19  Mg     2.0     10-19                        PT/INR - ( 13 Oct 2017 07:29 )   PT: 16.5 sec;   INR: 1.50 ratio             CAPILLARY BLOOD GLUCOSE      POCT Blood Glucose.: 169 mg/dL (13 Oct 2017 07:27)  POCT Blood Glucose.: 153 mg/dL (12 Oct 2017 21:45)  POCT Blood Glucose.: 128 mg/dL (12 Oct 2017 16:14)        RADIOLOGY & ADDITIONAL TESTS:    Imaging Personally Reviewed:  YES    Consultant(s) Notes Reviewed:   YES    Care Discussed with Consultants :     Plan of care was discussed with patient and /or primary care giver; all questions and concerns were addressed and care was aligned with patient's wishes.

## 2017-10-19 NOTE — PROGRESS NOTE ADULT - SUBJECTIVE AND OBJECTIVE BOX
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 An echocardiogram performed 10/16/17 showed EF 5-10%, compared to 55%  reported on echo in 05/2017.     Overnight, there were no acute events. Patient diuresed net negative -0.9L. He appears     PMH: As above, also DM, NPH (no  shunt)    Allergies    penicillin (Unknown)      MEDICATIONS  (STANDING):  ALBUTerol/ipratropium for Nebulization 3 milliLiter(s) Nebulizer every 6 hours  ascorbic acid 500 milliGRAM(s) Oral daily  aspirin enteric coated 81 milliGRAM(s) Oral daily  dextrose 5%. 1000 milliLiter(s) (50 mL/Hr) IV Continuous <Continuous>  dextrose 50% Injectable 12.5 Gram(s) IV Push once  ferrous    sulfate Liquid 300 milliGRAM(s) Oral daily  folic acid 1 milliGRAM(s) Oral daily  furosemide   Injectable 40 milliGRAM(s) IV Push daily  influenza   Vaccine 0.5 milliLiter(s) IntraMuscular once  insulin lispro (HumaLOG) corrective regimen sliding scale   SubCutaneous Before meals and at bedtime  lisinopril 5 milliGRAM(s) Oral daily  meropenem IVPB 500 milliGRAM(s) IV Intermittent every 8 hours  metoprolol 25 milliGRAM(s) Oral two times a day  pantoprazole  Injectable 40 milliGRAM(s) IV Push daily  polyethylene glycol 3350 17 Gram(s) Oral daily  sucralfate suspension 1 Gram(s) Oral every 6 hours  tamsulosin 0.4 milliGRAM(s) Oral at bedtime  zinc sulfate 220 milliGRAM(s) Oral daily    MEDICATIONS  (PRN):  acetaminophen    Suspension 650 milliGRAM(s) Oral every 6 hours PRN For Temp greater than 38 C (100.4 F)  dextrose Gel 1 Dose(s) Oral once PRN Blood Glucose LESS THAN 70 milliGRAM(s)/deciLiter  glucagon  Injectable 1 milliGRAM(s) IntraMuscular once PRN Glucose <70 milliGRAM(s)/deciLiter      FAMILY HISTORY:    Unobtainable    SOCIAL HISTORY:  Smoking- Unobtainable  Alcohol- Unobtainable  Ilicit Drug use-Unobtainable    REVIEW OF SYSTEMS:  Constitutional: [ ] fever, [ ]weight loss,  [ ]fatigue  Eyes: [ ] visual changes  Respiratory: [ ]shortness of breath;  [ ] cough, [ ]wheezing, [ ]chills, [ ]hemoptysis  Cardiovascular: [ ] chest pain, [ ]palpitations, [ ]dizziness,  [ ]leg swelling  Gastrointestinal: [ ] abdominal pain, [ ]nausea, [ ]vomiting,  [ ]diarrhea   Genitourinary: [ ] dysuria, [ ] hematuria  Neurologic: [ ] headaches [ ] tremors [ ] weakness [ ] lightheadedness  Skin: [ ] itching, [ ]burning, [ ] rashes  Endocrine: [ ] heat or cold intolerance  Musculoskeletal: [ ] joint pain or swelling; [ ] muscle, back, or extremity pain  Psychiatric: [ ] depression, [ ]anxiety, [ ]mood swings, or [ ]difficulty sleeping  Hematologic: [ ] easy bruising, [ ] bleeding gums    [  ] All remaining systems negative except as per above.   [ x ] Unable to obtain    Vital Signs Last 24 Hrs  T(C): 36.9 (19 Oct 2017 06:00), Max: 37.3 (18 Oct 2017 20:34)  T(F): 98.4 (19 Oct 2017 06:00), Max: 99.1 (18 Oct 2017 20:34)  HR: 95 (19 Oct 2017 06:00) (66 - 103)  BP: 131/70 (19 Oct 2017 06:00) (90/56 - 131/70)  BP(mean): 63 (18 Oct 2017 17:00) (57 - 74)  RR: 16 (19 Oct 2017 06:00) (16 - 26)  SpO2: 96% (19 Oct 2017 06:00) (96% - 100%)  I&O's Summary    18 Oct 2017 07:01  -  19 Oct 2017 07:00  --------------------------------------------------------  IN: 1510 mL / OUT: 2400 mL / NET: -890 mL        PHYSICAL EXAM:  General: Sitting up in bed in no distress  HEENT: EOMI, PERRL  Neck: Supple, No JVD  Lungs: Slight bibasilar crackles  Heart: Irregular rate and rhythm; No murmurs, rubs, or gallops  Abdomen: Nontender, bowel sounds present  Extremities: 1+ pitting B/ LE edema  Nervous system:  Alert & Oriented X1, follows simple commands  Psychiatric: Stares straight ahead, mostly flat affect  Skin: Sacral decubitus    LABS:  10-19    146<H>  |  111<H>  |  20<H>  ----------------------------<  109<H>  3.6   |  28  |  0.83    Ca    7.9<L>      19 Oct 2017 07:30  Phos  2.5     10-19  Mg     2.0     10-19      Creatinine Trend: 0.83<--, 0.82<--, 1.00<--, 1.11<--, 1.06<--, 1.25<--                        8.5    9.3   )-----------( 206      ( 19 Oct 2017 07:30 )             28.1     PT/INR - ( 19 Oct 2017 07:30 )   PT: 47.4 sec;   INR: 4.22 ratio         PTT - ( 19 Oct 2017 07:30 )  PTT:43.9 sec    TELEMETRY: PRESENTING CC: none    SUBJ:     In summary, this is an 81 yo M with dementia, 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 An echocardiogram performed 10/16/17 showed EF 5-10%, compared to 55%  reported on echo in 05/2017.     Overnight, there were no acute events. Patient diuresed net negative -0.9L. He appears     PMH: As above, also DM, NPH (no  shunt)    Allergies    penicillin (Unknown)      MEDICATIONS  (STANDING):  ALBUTerol/ipratropium for Nebulization 3 milliLiter(s) Nebulizer every 6 hours  ascorbic acid 500 milliGRAM(s) Oral daily  aspirin enteric coated 81 milliGRAM(s) Oral daily  dextrose 5%. 1000 milliLiter(s) (50 mL/Hr) IV Continuous <Continuous>  dextrose 50% Injectable 12.5 Gram(s) IV Push once  ferrous    sulfate Liquid 300 milliGRAM(s) Oral daily  folic acid 1 milliGRAM(s) Oral daily  furosemide   Injectable 40 milliGRAM(s) IV Push daily  influenza   Vaccine 0.5 milliLiter(s) IntraMuscular once  insulin lispro (HumaLOG) corrective regimen sliding scale   SubCutaneous Before meals and at bedtime  lisinopril 5 milliGRAM(s) Oral daily  meropenem IVPB 500 milliGRAM(s) IV Intermittent every 8 hours  metoprolol 25 milliGRAM(s) Oral two times a day  pantoprazole  Injectable 40 milliGRAM(s) IV Push daily  polyethylene glycol 3350 17 Gram(s) Oral daily  sucralfate suspension 1 Gram(s) Oral every 6 hours  tamsulosin 0.4 milliGRAM(s) Oral at bedtime  zinc sulfate 220 milliGRAM(s) Oral daily    MEDICATIONS  (PRN):  acetaminophen    Suspension 650 milliGRAM(s) Oral every 6 hours PRN For Temp greater than 38 C (100.4 F)  dextrose Gel 1 Dose(s) Oral once PRN Blood Glucose LESS THAN 70 milliGRAM(s)/deciLiter  glucagon  Injectable 1 milliGRAM(s) IntraMuscular once PRN Glucose <70 milliGRAM(s)/deciLiter      FAMILY HISTORY:    Unobtainable    SOCIAL HISTORY:  Smoking- Unobtainable  Alcohol- Unobtainable  Ilicit Drug use-Unobtainable    REVIEW OF SYSTEMS:  Constitutional: [ ] fever, [ ]weight loss,  [ ]fatigue  Eyes: [ ] visual changes  Respiratory: [ ]shortness of breath;  [ ] cough, [ ]wheezing, [ ]chills, [ ]hemoptysis  Cardiovascular: [ ] chest pain, [ ]palpitations, [ ]dizziness,  [ ]leg swelling  Gastrointestinal: [ ] abdominal pain, [ ]nausea, [ ]vomiting,  [ ]diarrhea   Genitourinary: [ ] dysuria, [ ] hematuria  Neurologic: [ ] headaches [ ] tremors [ ] weakness [ ] lightheadedness  Skin: [ ] itching, [ ]burning, [ ] rashes  Endocrine: [ ] heat or cold intolerance  Musculoskeletal: [ ] joint pain or swelling; [ ] muscle, back, or extremity pain  Psychiatric: [ ] depression, [ ]anxiety, [ ]mood swings, or [ ]difficulty sleeping  Hematologic: [ ] easy bruising, [ ] bleeding gums    [  ] All remaining systems negative except as per above.   [ x ] Unable to obtain    Vital Signs Last 24 Hrs  T(C): 36.9 (19 Oct 2017 06:00), Max: 37.3 (18 Oct 2017 20:34)  T(F): 98.4 (19 Oct 2017 06:00), Max: 99.1 (18 Oct 2017 20:34)  HR: 95 (19 Oct 2017 06:00) (66 - 103)  BP: 131/70 (19 Oct 2017 06:00) (90/56 - 131/70)  BP(mean): 63 (18 Oct 2017 17:00) (57 - 74)  RR: 16 (19 Oct 2017 06:00) (16 - 26)  SpO2: 96% (19 Oct 2017 06:00) (96% - 100%)  I&O's Summary    18 Oct 2017 07:01  -  19 Oct 2017 07:00  --------------------------------------------------------  IN: 1510 mL / OUT: 2400 mL / NET: -890 mL        PHYSICAL EXAM:  General: Sitting up in bed in no distress  HEENT: EOMI, PERRL  Neck: Supple, No JVD  Lungs: Slight bibasilar crackles  Heart: Irregular rate and rhythm; No murmurs, rubs, or gallops  Abdomen: Nontender, bowel sounds present  Extremities: 1+ pitting B/ LE edema  Nervous system:  Alert & Oriented X1, follows simple commands  Psychiatric: Stares straight ahead, mostly flat affect  Skin: Sacral decubitus    LABS:  10-19    146<H>  |  111<H>  |  20<H>  ----------------------------<  109<H>  3.6   |  28  |  0.83    Ca    7.9<L>      19 Oct 2017 07:30  Phos  2.5     10-19  Mg     2.0     10-19      Creatinine Trend: 0.83<--, 0.82<--, 1.00<--, 1.11<--, 1.06<--, 1.25<--                        8.5    9.3   )-----------( 206      ( 19 Oct 2017 07:30 )             28.1     PT/INR - ( 19 Oct 2017 07:30 )   PT: 47.4 sec;   INR: 4.22 ratio         PTT - ( 19 Oct 2017 07:30 )  PTT:43.9 sec    TELEMETRY: PRESENTING CC: none    SUBJ:     In summary, this is an 81 yo M with dementia, 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 An echocardiogram performed 10/16/17 showed EF 5-10%, compared to 55%  reported on echo in 05/2017.     Overnight, there were no acute events. Patient diuresed net negative -0.9L. He appears comfortable and more communicative, denies any chest pain or dsypnea.     PMH: As above, also DM, NPH (no  shunt)    Allergies    penicillin (Unknown)      MEDICATIONS  (STANDING):  ALBUTerol/ipratropium for Nebulization 3 milliLiter(s) Nebulizer every 6 hours  ascorbic acid 500 milliGRAM(s) Oral daily  aspirin enteric coated 81 milliGRAM(s) Oral daily  dextrose 5%. 1000 milliLiter(s) (50 mL/Hr) IV Continuous <Continuous>  dextrose 50% Injectable 12.5 Gram(s) IV Push once  ferrous    sulfate Liquid 300 milliGRAM(s) Oral daily  folic acid 1 milliGRAM(s) Oral daily  furosemide   Injectable 40 milliGRAM(s) IV Push daily  influenza   Vaccine 0.5 milliLiter(s) IntraMuscular once  insulin lispro (HumaLOG) corrective regimen sliding scale   SubCutaneous Before meals and at bedtime  lisinopril 5 milliGRAM(s) Oral daily  meropenem IVPB 500 milliGRAM(s) IV Intermittent every 8 hours  metoprolol 25 milliGRAM(s) Oral two times a day  pantoprazole  Injectable 40 milliGRAM(s) IV Push daily  polyethylene glycol 3350 17 Gram(s) Oral daily  sucralfate suspension 1 Gram(s) Oral every 6 hours  tamsulosin 0.4 milliGRAM(s) Oral at bedtime  zinc sulfate 220 milliGRAM(s) Oral daily    MEDICATIONS  (PRN):  acetaminophen    Suspension 650 milliGRAM(s) Oral every 6 hours PRN For Temp greater than 38 C (100.4 F)  dextrose Gel 1 Dose(s) Oral once PRN Blood Glucose LESS THAN 70 milliGRAM(s)/deciLiter  glucagon  Injectable 1 milliGRAM(s) IntraMuscular once PRN Glucose <70 milliGRAM(s)/deciLiter      FAMILY HISTORY:    Unobtainable    SOCIAL HISTORY:  Smoking- Unobtainable  Alcohol- Unobtainable  Ilicit Drug use-Unobtainable    REVIEW OF SYSTEMS:  Constitutional: [ ] fever, [ ]weight loss,  [ ]fatigue  Eyes: [ ] visual changes  Respiratory: [ ]shortness of breath;  [ ] cough, [ ]wheezing, [ ]chills, [ ]hemoptysis  Cardiovascular: [ ] chest pain, [ ]palpitations, [ ]dizziness,  [ ]leg swelling  Gastrointestinal: [ ] abdominal pain, [ ]nausea, [ ]vomiting,  [ ]diarrhea   Genitourinary: [ ] dysuria, [ ] hematuria  Neurologic: [ ] headaches [ ] tremors [ ] weakness [ ] lightheadedness  Skin: [ ] itching, [ ]burning, [ ] rashes  Endocrine: [ ] heat or cold intolerance  Musculoskeletal: [ ] joint pain or swelling; [ ] muscle, back, or extremity pain  Psychiatric: [ ] depression, [ ]anxiety, [ ]mood swings, or [ ]difficulty sleeping  Hematologic: [ ] easy bruising, [ ] bleeding gums    [  ] All remaining systems negative except as per above.   [ x ] Unable to obtain    Vital Signs Last 24 Hrs  T(C): 36.9 (19 Oct 2017 06:00), Max: 37.3 (18 Oct 2017 20:34)  T(F): 98.4 (19 Oct 2017 06:00), Max: 99.1 (18 Oct 2017 20:34)  HR: 95 (19 Oct 2017 06:00) (66 - 103)  BP: 131/70 (19 Oct 2017 06:00) (90/56 - 131/70)  BP(mean): 63 (18 Oct 2017 17:00) (57 - 74)  RR: 16 (19 Oct 2017 06:00) (16 - 26)  SpO2: 96% (19 Oct 2017 06:00) (96% - 100%)  I&O's Summary    18 Oct 2017 07:01  -  19 Oct 2017 07:00  --------------------------------------------------------  IN: 1510 mL / OUT: 2400 mL / NET: -890 mL        PHYSICAL EXAM:  General: Sitting up in bed in no distress  HEENT: EOMI, PERRL  Neck: Supple, No JVD  Lungs: Slight bibasilar crackles  Heart: Irregular rate and rhythm; No murmurs, rubs, or gallops  Abdomen: Nontender, bowel sounds present  Extremities: 1+ pitting B/ LE edema  Nervous system:  Alert & Oriented X2, follows commands  Psychiatric: Stares straight ahead, mostly flat affect  Skin: Sacral decubitus    LABS:  10-19    146<H>  |  111<H>  |  20<H>  ----------------------------<  109<H>  3.6   |  28  |  0.83    Ca    7.9<L>      19 Oct 2017 07:30  Phos  2.5     10-19  Mg     2.0     10-19      Creatinine Trend: 0.83<--, 0.82<--, 1.00<--, 1.11<--, 1.06<--, 1.25<--                        8.5    9.3   )-----------( 206      ( 19 Oct 2017 07:30 )             28.1     PT/INR - ( 19 Oct 2017 07:30 )   PT: 47.4 sec;   INR: 4.22 ratio         PTT - ( 19 Oct 2017 07:30 )  PTT:43.9 sec

## 2017-10-19 NOTE — PROGRESS NOTE ADULT - SUBJECTIVE AND OBJECTIVE BOX
DNR [ x]   DNI  [ x ]    INTERVAL HPI/OVERNIGHT EVENTS: downgraded from ICU overnight; no acute events    PRESSORS: [ ] YES [x ] NO  WHICH:    ANTIBIOTICS:                  DATE STARTED:  ANTIBIOTICS:                  DATE STARTED:  ANTIBIOTICS:                  DATE STARTED:    meropenem IVPB 500 milliGRAM(s) IV Intermittent every 8 hours  Day 4    Cardiovascular: Dilated LV with severe global left ventricular dysfunction. Mild concentric left ventricular  hypertrophy. Grade II diastolic dysfunction. EF 10%        furosemide   Injectable 40 milliGRAM(s) IV Push daily  lisinopril 5 milliGRAM(s) Oral daily  metoprolol 25 milliGRAM(s) Oral two times a day  tamsulosin 0.4 milliGRAM(s) Oral at bedtime    Pulmonary:  ALBUTerol/ipratropium for Nebulization 3 milliLiter(s) Nebulizer every 6 hours    Hematalogic:  aspirin enteric coated 81 milliGRAM(s) Oral daily    Other:  acetaminophen    Suspension 650 milliGRAM(s) Oral every 6 hours PRN  ascorbic acid 500 milliGRAM(s) Oral daily  dextrose 5%. 1000 milliLiter(s) IV Continuous <Continuous>  dextrose 50% Injectable 12.5 Gram(s) IV Push once  dextrose Gel 1 Dose(s) Oral once PRN  ferrous    sulfate Liquid 300 milliGRAM(s) Oral daily  folic acid 1 milliGRAM(s) Oral daily  glucagon  Injectable 1 milliGRAM(s) IntraMuscular once PRN  influenza   Vaccine 0.5 milliLiter(s) IntraMuscular once  insulin lispro (HumaLOG) corrective regimen sliding scale   SubCutaneous Before meals and at bedtime  pantoprazole  Injectable 40 milliGRAM(s) IV Push daily  polyethylene glycol 3350 17 Gram(s) Oral daily  sucralfate suspension 1 Gram(s) Oral every 6 hours  zinc sulfate 220 milliGRAM(s) Oral daily    acetaminophen    Suspension 650 milliGRAM(s) Oral every 6 hours PRN  ALBUTerol/ipratropium for Nebulization 3 milliLiter(s) Nebulizer every 6 hours  ascorbic acid 500 milliGRAM(s) Oral daily  aspirin enteric coated 81 milliGRAM(s) Oral daily  dextrose 5%. 1000 milliLiter(s) IV Continuous <Continuous>  dextrose 50% Injectable 12.5 Gram(s) IV Push once  dextrose Gel 1 Dose(s) Oral once PRN  ferrous    sulfate Liquid 300 milliGRAM(s) Oral daily  folic acid 1 milliGRAM(s) Oral daily  furosemide   Injectable 40 milliGRAM(s) IV Push daily  glucagon  Injectable 1 milliGRAM(s) IntraMuscular once PRN  influenza   Vaccine 0.5 milliLiter(s) IntraMuscular once  insulin lispro (HumaLOG) corrective regimen sliding scale   SubCutaneous Before meals and at bedtime  lisinopril 5 milliGRAM(s) Oral daily  meropenem IVPB 500 milliGRAM(s) IV Intermittent every 8 hours  metoprolol 25 milliGRAM(s) Oral two times a day  pantoprazole  Injectable 40 milliGRAM(s) IV Push daily  polyethylene glycol 3350 17 Gram(s) Oral daily  sucralfate suspension 1 Gram(s) Oral every 6 hours  tamsulosin 0.4 milliGRAM(s) Oral at bedtime  zinc sulfate 220 milliGRAM(s) Oral daily    Drug Dosing Weight  Height (cm): 177.8 (05 Oct 2017 16:02)  Weight (kg): 90 (15 Oct 2017 07:30)  BMI (kg/m2): 28.5 (15 Oct 2017 07:30)  BSA (m2): 2.08 (15 Oct 2017 07:30)    CENTRAL LINE: [ ] YES [x ] NO  LOCATION:   DATE INSERTED:  REMOVE: [ ] YES [ ] NO  EXPLAIN:    TURNER: [x ] YES [ ] NO    DATE INSERTED:  REMOVE:  [ ] YES [ ] NO  EXPLAIN:    A-LINE:  [ ] YES [x ] NO  LOCATION:   DATE INSERTED:  REMOVE:  [ ] YES [ ] NO  EXPLAIN:    PAST MEDICAL & SURGICAL HISTORY:  DM (diabetes mellitus)  Afib  CHF (congestive heart failure)  No significant past surgical history              10-18 @ 07:01  -  10-19 @ 07:00  --------------------------------------------------------  IN: 1510 mL / OUT: 2400 mL / NET: -890 mL            PHYSICAL EXAM:    GENERAL: Resting in bed in NAD,   HEAD:  Atraumatic, Normocephalic  EYES: EOMI, PERRLA, conjunctiva and sclera clear  ENMT: No tonsillar erythema, exudates, or enlargement;   NECK: Supple, No JVD, Normal thyroid  NERVOUS SYSTEM:  Alert, awake, responds appropriately to questions; follows simple commands   CHEST/LUNG: Clear to percussion bilaterally; No rales, rhonchi, wheezing, or rubs  HEART: Regular rate and rhythm; No murmurs, rubs, or gallops  ABDOMEN: Soft, Nontender, Nondistended; Bowel sounds present  EXTREMITIES:  2+ Peripheral Pulses, No clubbing, cyanosis, or edema  LYMPH: No lymphadenopathy noted  SKIN: sacral decub       LABS:  CBC Full  -  ( 19 Oct 2017 07:30 )  WBC Count : 9.3 K/uL  Hemoglobin : 8.5 g/dL  Hematocrit : 28.1 %  Platelet Count - Automated : 206 K/uL  Mean Cell Volume : 86.5 fl  Mean Cell Hemoglobin : 26.3 pg  Mean Cell Hemoglobin Concentration : 30.4 gm/dL  Auto Neutrophil # : 6.5 K/uL  Auto Lymphocyte # : 1.7 K/uL  Auto Monocyte # : 0.6 K/uL  Auto Eosinophil # : 0.4 K/uL  Auto Basophil # : 0.1 K/uL  Auto Neutrophil % : 70.1 %  Auto Lymphocyte % : 18.6 %  Auto Monocyte % : 6.7 %  Auto Eosinophil % : 4.0 %  Auto Basophil % : 0.6 %    10-19    146<H>  |  111<H>  |  20<H>  ----------------------------<  109<H>  3.6   |  28  |  0.83    Ca    7.9<L>      19 Oct 2017 07:30  Phos  2.5     10-19  Mg     2.0     10-19      PT/INR - ( 19 Oct 2017 07:30 )   PT: 47.4 sec;   INR: 4.22 ratio         PTT - ( 19 Oct 2017 07:30 )  PTT:43.9 sec          [ x ]  DVT Prophylaxis - on coumadin with supratherpeutic INR  [ x ]  Nutrition, Brand, Rate - mechanical soft - [place]         Goal Rate         Abdominal Nutritional Status -  Malnutrition   Cachexia      Morbid Obesity BMI >/=40    RADIOLOGY & ADDITIONAL STUDIES:  ***    [  ] Goals of Care Discussion with Family/Proxy/Other           Elements of Conversation Discussed: Patient/Family understanding of current illness   Advanced Directives                                                                       Prognosis  Treatment Options  Care Aligned with patient's wishes                                             TIME SPENT: 35 minutes DNR [ x]   DNI  [ x ]    INTERVAL HPI/OVERNIGHT EVENTS: downgraded from ICU overnight; no acute events    PRESSORS: [ ] YES [x ] NO  WHICH:    ANTIBIOTICS:                  DATE STARTED:  ANTIBIOTICS:                  DATE STARTED:  ANTIBIOTICS:                  DATE STARTED:    meropenem IVPB 500 milliGRAM(s) IV Intermittent every 8 hours  Day 4    Cardiovascular: Dilated LV with severe global left ventricular dysfunction. Mild concentric left ventricular  hypertrophy. Grade II diastolic dysfunction. EF 10%        furosemide   Injectable 40 milliGRAM(s) IV Push daily  lisinopril 5 milliGRAM(s) Oral daily  metoprolol 25 milliGRAM(s) Oral two times a day  tamsulosin 0.4 milliGRAM(s) Oral at bedtime    Pulmonary:  ALBUTerol/ipratropium for Nebulization 3 milliLiter(s) Nebulizer every 6 hours    Hematalogic:  aspirin enteric coated 81 milliGRAM(s) Oral daily    Other:  acetaminophen    Suspension 650 milliGRAM(s) Oral every 6 hours PRN  ascorbic acid 500 milliGRAM(s) Oral daily  dextrose 5%. 1000 milliLiter(s) IV Continuous <Continuous>  dextrose 50% Injectable 12.5 Gram(s) IV Push once  dextrose Gel 1 Dose(s) Oral once PRN  ferrous    sulfate Liquid 300 milliGRAM(s) Oral daily  folic acid 1 milliGRAM(s) Oral daily  glucagon  Injectable 1 milliGRAM(s) IntraMuscular once PRN  influenza   Vaccine 0.5 milliLiter(s) IntraMuscular once  insulin lispro (HumaLOG) corrective regimen sliding scale   SubCutaneous Before meals and at bedtime  pantoprazole  Injectable 40 milliGRAM(s) IV Push daily  polyethylene glycol 3350 17 Gram(s) Oral daily  sucralfate suspension 1 Gram(s) Oral every 6 hours  zinc sulfate 220 milliGRAM(s) Oral daily    acetaminophen    Suspension 650 milliGRAM(s) Oral every 6 hours PRN  ALBUTerol/ipratropium for Nebulization 3 milliLiter(s) Nebulizer every 6 hours  ascorbic acid 500 milliGRAM(s) Oral daily  aspirin enteric coated 81 milliGRAM(s) Oral daily  dextrose 5%. 1000 milliLiter(s) IV Continuous <Continuous>  dextrose 50% Injectable 12.5 Gram(s) IV Push once  dextrose Gel 1 Dose(s) Oral once PRN  ferrous    sulfate Liquid 300 milliGRAM(s) Oral daily  folic acid 1 milliGRAM(s) Oral daily  furosemide   Injectable 40 milliGRAM(s) IV Push daily  glucagon  Injectable 1 milliGRAM(s) IntraMuscular once PRN  influenza   Vaccine 0.5 milliLiter(s) IntraMuscular once  insulin lispro (HumaLOG) corrective regimen sliding scale   SubCutaneous Before meals and at bedtime  lisinopril 5 milliGRAM(s) Oral daily  meropenem IVPB 500 milliGRAM(s) IV Intermittent every 8 hours  metoprolol 25 milliGRAM(s) Oral two times a day  pantoprazole  Injectable 40 milliGRAM(s) IV Push daily  polyethylene glycol 3350 17 Gram(s) Oral daily  sucralfate suspension 1 Gram(s) Oral every 6 hours  tamsulosin 0.4 milliGRAM(s) Oral at bedtime  zinc sulfate 220 milliGRAM(s) Oral daily    Drug Dosing Weight  Height (cm): 177.8 (05 Oct 2017 16:02)  Weight (kg): 90 (15 Oct 2017 07:30)  BMI (kg/m2): 28.5 (15 Oct 2017 07:30)  BSA (m2): 2.08 (15 Oct 2017 07:30)    CENTRAL LINE: [ ] YES [x ] NO  LOCATION:   DATE INSERTED:  REMOVE: [ ] YES [ ] NO  EXPLAIN:    TURNER: [x ] YES [ ] NO    DATE INSERTED:  REMOVE:  [ ] YES [ ] NO  EXPLAIN:    A-LINE:  [ ] YES [x ] NO  LOCATION:   DATE INSERTED:  REMOVE:  [ ] YES [ ] NO  EXPLAIN:    PAST MEDICAL & SURGICAL HISTORY:  DM (diabetes mellitus)  Afib  CHF (congestive heart failure)  No significant past surgical history              10-18 @ 07:01  -  10-19 @ 07:00  --------------------------------------------------------  IN: 1510 mL / OUT: 2400 mL / NET: -890 mL            PHYSICAL EXAM:    GENERAL: Resting in bed in NAD,   HEAD:  Atraumatic, Normocephalic  EYES: EOMI, PERRLA, conjunctiva and sclera clear  ENMT: No tonsillar erythema, exudates, or enlargement;   NECK: Supple, No JVD, Normal thyroid  NERVOUS SYSTEM:  Alert, awake, responds appropriately to questions; follows simple commands   CHEST/LUNG: Clear to percussion bilaterally; No rales, rhonchi, wheezing, or rubs  HEART: Regular rate and rhythm; No murmurs, rubs, or gallops  ABDOMEN: Soft, Nontender, Nondistended; Bowel sounds present  EXTREMITIES:  2+ Peripheral Pulses, No clubbing, cyanosis, or edema  LYMPH: No lymphadenopathy noted  SKIN: sacral decub       LABS:  CBC Full  -  ( 19 Oct 2017 07:30 )  WBC Count : 9.3 K/uL  Hemoglobin : 8.5 g/dL  Hematocrit : 28.1 %  Platelet Count - Automated : 206 K/uL  Mean Cell Volume : 86.5 fl  Mean Cell Hemoglobin : 26.3 pg  Mean Cell Hemoglobin Concentration : 30.4 gm/dL  Auto Neutrophil # : 6.5 K/uL  Auto Lymphocyte # : 1.7 K/uL  Auto Monocyte # : 0.6 K/uL  Auto Eosinophil # : 0.4 K/uL  Auto Basophil # : 0.1 K/uL  Auto Neutrophil % : 70.1 %  Auto Lymphocyte % : 18.6 %  Auto Monocyte % : 6.7 %  Auto Eosinophil % : 4.0 %  Auto Basophil % : 0.6 %    10-19    146<H>  |  111<H>  |  20<H>  ----------------------------<  109<H>  3.6   |  28  |  0.83    Ca    7.9<L>      19 Oct 2017 07:30  Phos  2.5     10-19  Mg     2.0     10-19      PT/INR - ( 19 Oct 2017 07:30 )   PT: 47.4 sec;   INR: 4.22 ratio         PTT - ( 19 Oct 2017 07:30 )  PTT:43.9 sec          [ x ]  DVT Prophylaxis - on coumadin with supratherpeutic INR  [ x ]  Nutrition, Brand, Rate - mechanical soft - [place]         Goal Rate         Abdominal Nutritional Status -  Malnutrition   Cachexia      Morbid Obesity BMI >/=40    RADIOLOGY & ADDITIONAL STUDIES:  ***    [x  ] Goals of Care Discussion with Family/Proxy/Other           Elements of Conversation Discussed: Patient/Family understanding of current illness   Advanced Directives                                                                       Prognosis  Treatment Options  Care Aligned with patient's wishes                                             TIME SPENT: 35 minutes DNR [ x]   DNI  [ x ]    INTERVAL HPI/OVERNIGHT EVENTS: downgraded from ICU overnight; no acute events    PRESSORS: [ ] YES [x ] NO  WHICH:    meropenem IVPB 500 milliGRAM(s) IV Intermittent every 8 hours  Day 4    Cardiovascular: Dilated LV with severe global left ventricular dysfunction. Mild concentric left ventricular  hypertrophy. Grade II diastolic dysfunction. EF 10%        furosemide   Injectable 40 milliGRAM(s) IV Push daily  lisinopril 5 milliGRAM(s) Oral daily  metoprolol 25 milliGRAM(s) Oral two times a day  tamsulosin 0.4 milliGRAM(s) Oral at bedtime    Pulmonary:  ALBUTerol/ipratropium for Nebulization 3 milliLiter(s) Nebulizer every 6 hours    Hematalogic:  aspirin enteric coated 81 milliGRAM(s) Oral daily    Other:  acetaminophen    Suspension 650 milliGRAM(s) Oral every 6 hours PRN  ascorbic acid 500 milliGRAM(s) Oral daily  dextrose 5%. 1000 milliLiter(s) IV Continuous <Continuous>  dextrose 50% Injectable 12.5 Gram(s) IV Push once  dextrose Gel 1 Dose(s) Oral once PRN  ferrous    sulfate Liquid 300 milliGRAM(s) Oral daily  folic acid 1 milliGRAM(s) Oral daily  glucagon  Injectable 1 milliGRAM(s) IntraMuscular once PRN  influenza   Vaccine 0.5 milliLiter(s) IntraMuscular once  insulin lispro (HumaLOG) corrective regimen sliding scale   SubCutaneous Before meals and at bedtime  pantoprazole  Injectable 40 milliGRAM(s) IV Push daily  polyethylene glycol 3350 17 Gram(s) Oral daily  sucralfate suspension 1 Gram(s) Oral every 6 hours  zinc sulfate 220 milliGRAM(s) Oral daily    acetaminophen    Suspension 650 milliGRAM(s) Oral every 6 hours PRN  ALBUTerol/ipratropium for Nebulization 3 milliLiter(s) Nebulizer every 6 hours  ascorbic acid 500 milliGRAM(s) Oral daily  aspirin enteric coated 81 milliGRAM(s) Oral daily  dextrose 5%. 1000 milliLiter(s) IV Continuous <Continuous>  dextrose 50% Injectable 12.5 Gram(s) IV Push once  dextrose Gel 1 Dose(s) Oral once PRN  ferrous    sulfate Liquid 300 milliGRAM(s) Oral daily  folic acid 1 milliGRAM(s) Oral daily  furosemide   Injectable 40 milliGRAM(s) IV Push daily  glucagon  Injectable 1 milliGRAM(s) IntraMuscular once PRN  influenza   Vaccine 0.5 milliLiter(s) IntraMuscular once  insulin lispro (HumaLOG) corrective regimen sliding scale   SubCutaneous Before meals and at bedtime  lisinopril 5 milliGRAM(s) Oral daily  meropenem IVPB 500 milliGRAM(s) IV Intermittent every 8 hours  metoprolol 25 milliGRAM(s) Oral two times a day  pantoprazole  Injectable 40 milliGRAM(s) IV Push daily  polyethylene glycol 3350 17 Gram(s) Oral daily  sucralfate suspension 1 Gram(s) Oral every 6 hours  tamsulosin 0.4 milliGRAM(s) Oral at bedtime  zinc sulfate 220 milliGRAM(s) Oral daily    Drug Dosing Weight  Height (cm): 177.8 (05 Oct 2017 16:02)  Weight (kg): 90 (15 Oct 2017 07:30)  BMI (kg/m2): 28.5 (15 Oct 2017 07:30)  BSA (m2): 2.08 (15 Oct 2017 07:30)    CENTRAL LINE: [ ] YES [x ] NO  LOCATION:   DATE INSERTED:  REMOVE: [ ] YES [ ] NO  EXPLAIN:    TURNER: [x ] YES [ ] NO    DATE INSERTED:  REMOVE:  [ ] YES [ ] NO  EXPLAIN:    A-LINE:  [ ] YES [x ] NO  LOCATION:   DATE INSERTED:  REMOVE:  [ ] YES [ ] NO  EXPLAIN:    PAST MEDICAL & SURGICAL HISTORY:  DM (diabetes mellitus)  Afib  CHF (congestive heart failure)  No significant past surgical history              10-18 @ 07:01  -  10-19 @ 07:00  --------------------------------------------------------  IN: 1510 mL / OUT: 2400 mL / NET: -890 mL            PHYSICAL EXAM:    GENERAL: Resting in bed in NAD,   HEAD:  Atraumatic, Normocephalic  EYES: EOMI, PERRLA, conjunctiva and sclera clear  ENMT: No tonsillar erythema, exudates, or enlargement;   NECK: Supple, No JVD, Normal thyroid  NERVOUS SYSTEM:  Alert, awake, responds appropriately to questions; follows simple commands   CHEST/LUNG: Clear to percussion bilaterally; No rales, rhonchi, wheezing, or rubs  HEART: Regular rate and rhythm; No murmurs, rubs, or gallops  ABDOMEN: Soft, Nontender, Nondistended; Bowel sounds present  EXTREMITIES:  2+ Peripheral Pulses, No clubbing, cyanosis, or edema  LYMPH: No lymphadenopathy noted  SKIN: sacral decub       LABS:  CBC Full  -  ( 19 Oct 2017 07:30 )  WBC Count : 9.3 K/uL  Hemoglobin : 8.5 g/dL  Hematocrit : 28.1 %  Platelet Count - Automated : 206 K/uL  Mean Cell Volume : 86.5 fl  Mean Cell Hemoglobin : 26.3 pg  Mean Cell Hemoglobin Concentration : 30.4 gm/dL  Auto Neutrophil # : 6.5 K/uL  Auto Lymphocyte # : 1.7 K/uL  Auto Monocyte # : 0.6 K/uL  Auto Eosinophil # : 0.4 K/uL  Auto Basophil # : 0.1 K/uL  Auto Neutrophil % : 70.1 %  Auto Lymphocyte % : 18.6 %  Auto Monocyte % : 6.7 %  Auto Eosinophil % : 4.0 %  Auto Basophil % : 0.6 %    10-19    146<H>  |  111<H>  |  20<H>  ----------------------------<  109<H>  3.6   |  28  |  0.83    Ca    7.9<L>      19 Oct 2017 07:30  Phos  2.5     10-19  Mg     2.0     10-19      PT/INR - ( 19 Oct 2017 07:30 )   PT: 47.4 sec;   INR: 4.22 ratio         PTT - ( 19 Oct 2017 07:30 )  PTT:43.9 sec          [ x ]  DVT Prophylaxis - on coumadin with supratherpeutic INR  [ x ]  Nutrition, Brand, Rate - mechanical soft - [place]         Goal Rate         Abdominal Nutritional Status -  Malnutrition   Cachexia      Morbid Obesity BMI >/=40    RADIOLOGY & ADDITIONAL STUDIES:  ***    [x  ] Goals of Care Discussion with Family/Proxy/Other           Elements of Conversation Discussed: Patient/Family understanding of current illness   Advanced Directives                                                                       Prognosis  Treatment Options  Care Aligned with patient's wishes                                             TIME SPENT: 35 minutes DNR [ x]   DNI  [ x ]    INTERVAL HPI/OVERNIGHT EVENTS: downgraded from ICU overnight; no acute events    PRESSORS: [ ] YES [x ] NO  WHICH:    meropenem IVPB 500 milliGRAM(s) IV Intermittent every 8 hours  Day 4    Cardiovascular: Dilated LV with severe global left ventricular dysfunction. Mild concentric left ventricular  hypertrophy. Grade II diastolic dysfunction. EF 10%        furosemide   Injectable 40 milliGRAM(s) IV Push daily  lisinopril 5 milliGRAM(s) Oral daily  metoprolol 25 milliGRAM(s) Oral two times a day  tamsulosin 0.4 milliGRAM(s) Oral at bedtime    Pulmonary:  ALBUTerol/ipratropium for Nebulization 3 milliLiter(s) Nebulizer every 6 hours    Hematalogic:  aspirin enteric coated 81 milliGRAM(s) Oral daily    Other:  acetaminophen    Suspension 650 milliGRAM(s) Oral every 6 hours PRN  ascorbic acid 500 milliGRAM(s) Oral daily  dextrose 5%. 1000 milliLiter(s) IV Continuous <Continuous>  dextrose 50% Injectable 12.5 Gram(s) IV Push once  dextrose Gel 1 Dose(s) Oral once PRN  ferrous    sulfate Liquid 300 milliGRAM(s) Oral daily  folic acid 1 milliGRAM(s) Oral daily  glucagon  Injectable 1 milliGRAM(s) IntraMuscular once PRN  influenza   Vaccine 0.5 milliLiter(s) IntraMuscular once  insulin lispro (HumaLOG) corrective regimen sliding scale   SubCutaneous Before meals and at bedtime  pantoprazole  Injectable 40 milliGRAM(s) IV Push daily  polyethylene glycol 3350 17 Gram(s) Oral daily  sucralfate suspension 1 Gram(s) Oral every 6 hours  zinc sulfate 220 milliGRAM(s) Oral daily    acetaminophen    Suspension 650 milliGRAM(s) Oral every 6 hours PRN  ALBUTerol/ipratropium for Nebulization 3 milliLiter(s) Nebulizer every 6 hours  ascorbic acid 500 milliGRAM(s) Oral daily  aspirin enteric coated 81 milliGRAM(s) Oral daily  dextrose 5%. 1000 milliLiter(s) IV Continuous <Continuous>  dextrose 50% Injectable 12.5 Gram(s) IV Push once  dextrose Gel 1 Dose(s) Oral once PRN  ferrous    sulfate Liquid 300 milliGRAM(s) Oral daily  folic acid 1 milliGRAM(s) Oral daily  furosemide   Injectable 40 milliGRAM(s) IV Push daily  glucagon  Injectable 1 milliGRAM(s) IntraMuscular once PRN  influenza   Vaccine 0.5 milliLiter(s) IntraMuscular once  insulin lispro (HumaLOG) corrective regimen sliding scale   SubCutaneous Before meals and at bedtime  lisinopril 5 milliGRAM(s) Oral daily  meropenem IVPB 500 milliGRAM(s) IV Intermittent every 8 hours  metoprolol 25 milliGRAM(s) Oral two times a day  pantoprazole  Injectable 40 milliGRAM(s) IV Push daily  polyethylene glycol 3350 17 Gram(s) Oral daily  sucralfate suspension 1 Gram(s) Oral every 6 hours  tamsulosin 0.4 milliGRAM(s) Oral at bedtime  zinc sulfate 220 milliGRAM(s) Oral daily    Drug Dosing Weight  Height (cm): 177.8 (05 Oct 2017 16:02)  Weight (kg): 90 (15 Oct 2017 07:30)  BMI (kg/m2): 28.5 (15 Oct 2017 07:30)  BSA (m2): 2.08 (15 Oct 2017 07:30)    CENTRAL LINE: [ ] YES [x ] NO  LOCATION:   DATE INSERTED:  REMOVE: [ ] YES [ ] NO  EXPLAIN:    TURNER: [x ] YES [ ] NO    DATE INSERTED:  REMOVE:  [ ] YES [ ] NO  EXPLAIN:    A-LINE:  [ ] YES [x ] NO  LOCATION:   DATE INSERTED:  REMOVE:  [ ] YES [ ] NO  EXPLAIN:    PAST MEDICAL & SURGICAL HISTORY:  DM (diabetes mellitus)  Afib  CHF (congestive heart failure)  No significant past surgical history              10-18 @ 07:01  -  10-19 @ 07:00  --------------------------------------------------------  IN: 1510 mL / OUT: 2400 mL / NET: -890 mL            PHYSICAL EXAM:    GENERAL: Resting in bed in NAD,   HEAD:  Atraumatic, Normocephalic  EYES: EOMI, PERRLA, conjunctiva and sclera clear  ENMT: No tonsillar erythema, exudates, or enlargement;   NECK: Supple, No JVD, Normal thyroid  NERVOUS SYSTEM:  Alert, awake, responds appropriately to questions; follows simple commands   CHEST/LUNG: Clear to percussion bilaterally; No rales, rhonchi, wheezing, or rubs  HEART: Regular rate and rhythm; No murmurs, rubs, or gallops  ABDOMEN: Soft, Nontender, Nondistended; Bowel sounds present  EXTREMITIES:  2+ Peripheral Pulses, No clubbing, cyanosis, or edema  LYMPH: No lymphadenopathy noted  SKIN: sacral decub       LABS:  CBC Full  -  ( 19 Oct 2017 07:30 )  WBC Count : 9.3 K/uL  Hemoglobin : 8.5 g/dL  Hematocrit : 28.1 %  Platelet Count - Automated : 206 K/uL  Mean Cell Volume : 86.5 fl  Mean Cell Hemoglobin : 26.3 pg  Mean Cell Hemoglobin Concentration : 30.4 gm/dL  Auto Neutrophil # : 6.5 K/uL  Auto Lymphocyte # : 1.7 K/uL  Auto Monocyte # : 0.6 K/uL  Auto Eosinophil # : 0.4 K/uL  Auto Basophil # : 0.1 K/uL  Auto Neutrophil % : 70.1 %  Auto Lymphocyte % : 18.6 %  Auto Monocyte % : 6.7 %  Auto Eosinophil % : 4.0 %  Auto Basophil % : 0.6 %    10-19    146<H>  |  111<H>  |  20<H>  ----------------------------<  109<H>  3.6   |  28  |  0.83    Ca    7.9<L>      19 Oct 2017 07:30  Phos  2.5     10-19  Mg     2.0     10-19      PT/INR - ( 19 Oct 2017 07:30 )   PT: 47.4 sec;   INR: 4.22 ratio         PTT - ( 19 Oct 2017 07:30 )  PTT:43.9 sec          [ x ]  DVT Prophylaxis - on coumadin with supratherpeutic INR  [ x ]  Nutrition, Brand, Rate - mechanical soft - [place]         Goal Rate         Abdominal Nutritional Status -  Malnutrition   Cachexia      Morbid Obesity BMI >/=40    RADIOLOGY & ADDITIONAL STUDIES:  cxr -cardiomegaly and bilat effusions    [x  ] Goals of Care Discussion with Family/Proxy/Other           Elements of Conversation Discussed: Patient/Family understanding of current illness   Advanced Directives                                                                       Prognosis  Treatment Options  Care Aligned with patient's wishes                                             TIME SPENT: 35 minutes

## 2017-10-19 NOTE — PROGRESS NOTE ADULT - ASSESSMENT
81 yo M with noted PMH including CAD s/p CABG and MVR and A fib on warfarin p/w respiratory failure in setting of pneumonia, also significant decrease in EF noted on echo.     1. Acute decompensated systolic HF:   -BNP elevated at 33,000, still volume overloaded on exam  -Good urine output, overnight was net negative -0.9L; continue furosemide  -Continue metoprolol, and lisinopril, can uptitrate over time.   -Would obtain partial echo next week to see if EF improved after the acute illness.  -If still depressed, consider ischemic evaluation for cardiomyopathy pending family's goals of care ( I spoke with patient's daughter yesterday, she will discuss with patient's wife). If agreeable this would occur just before discharge when patient is otherwise medically optimized.       2. A fib:  Rate controlled with metoprolol  -likely potentiated by underlying infection, should improve as the former gets treated  - still supertherapeutic INR with warfarin, hold tonight's dose.    3. CAD: Low level troponins may be type II MI (demand ischemia) in setting of underlying infection as well as A fib with RVR  -continue aspirin, metoprolol  -Lipid panel shows , HDL 50, LDL 55, trigs 111; otherwise acceptable 81 yo M with noted PMH including CAD s/p CABG and MVR and A fib on warfarin p/w respiratory failure in setting of pneumonia, also significant decrease in EF noted on echo.     1. Acute decompensated systolic HF:   -BNP elevated at 33,000, still volume overloaded on exam  -Good urine output, overnight was net negative -0.9L; continue furosemide  -Continue metoprolol, and lisinopril, can uptitrate over time.   -Would obtain partial echo tomorrow to see if EF improved after the acute illness.  -If still depressed, consider ischemic evaluation for cardiomyopathy pending family's goals of care ( I spoke with patient's daughter yesterday, she will discuss with patient's wife). If agreeable this would occur just before discharge when patient is otherwise medically optimized; can otherwise defer to outpatient basis.       2. A fib:  Rate controlled with metoprolol  -likely potentiated by underlying infection, should improve as the former gets treated  - still supertherapeutic INR with warfarin, hold tonight's dose.    3. CAD: Low level troponins may be type II MI (demand ischemia) in setting of underlying infection as well as A fib with RVR  -continue aspirin, metoprolol  -Lipid panel shows , HDL 50, LDL 55, trigs 111; otherwise acceptable

## 2017-10-19 NOTE — PROGRESS NOTE ADULT - ASSESSMENT
81 y/o M from [facility] w/ PMHx of Rheumatic heart disease, NPH  (unsteady gait used to ambulate with walker now bedbound for a couple of months) & Afib (on Coumadin) was sent from [facility] for low Hb.     Patient is poor historian and source of information is wife. Wife stated that patient is having cough with yellowish sputum for last 4 weeks with occasional temp spikes.    In the ED, patient is AxO x1, unable to interact. T 102.8, Hr 113, /69. S/p Vancomycni/Cefepime and Zithromax.  CXR showed interstitial prominence and opacity at the left lung base.     Patient was admitted to the medical floor with Sepsis 2/2 PNA vs. UTI      Unresponsiveness on 10/14/17 , s/p Extubation    ECho: EF 10%

## 2017-10-20 LAB
ANION GAP SERPL CALC-SCNC: 6 MMOL/L — SIGNIFICANT CHANGE UP (ref 5–17)
BUN SERPL-MCNC: 22 MG/DL — HIGH (ref 7–18)
CALCIUM SERPL-MCNC: 8.1 MG/DL — LOW (ref 8.4–10.5)
CHLORIDE SERPL-SCNC: 110 MMOL/L — HIGH (ref 96–108)
CO2 SERPL-SCNC: 29 MMOL/L — SIGNIFICANT CHANGE UP (ref 22–31)
CREAT SERPL-MCNC: 0.79 MG/DL — SIGNIFICANT CHANGE UP (ref 0.5–1.3)
GLUCOSE BLDC GLUCOMTR-MCNC: 132 MG/DL — HIGH (ref 70–99)
GLUCOSE BLDC GLUCOMTR-MCNC: 180 MG/DL — HIGH (ref 70–99)
GLUCOSE BLDC GLUCOMTR-MCNC: 196 MG/DL — HIGH (ref 70–99)
GLUCOSE BLDC GLUCOMTR-MCNC: 200 MG/DL — HIGH (ref 70–99)
GLUCOSE SERPL-MCNC: 113 MG/DL — HIGH (ref 70–99)
HCT VFR BLD CALC: 30 % — LOW (ref 39–50)
HGB BLD-MCNC: 8.6 G/DL — LOW (ref 13–17)
INR BLD: 2.86 RATIO — HIGH (ref 0.88–1.16)
MCHC RBC-ENTMCNC: 24.5 PG — LOW (ref 27–34)
MCHC RBC-ENTMCNC: 28.5 GM/DL — LOW (ref 32–36)
MCV RBC AUTO: 86 FL — SIGNIFICANT CHANGE UP (ref 80–100)
PLATELET # BLD AUTO: 224 K/UL — SIGNIFICANT CHANGE UP (ref 150–400)
POTASSIUM SERPL-MCNC: 3.9 MMOL/L — SIGNIFICANT CHANGE UP (ref 3.5–5.3)
POTASSIUM SERPL-SCNC: 3.9 MMOL/L — SIGNIFICANT CHANGE UP (ref 3.5–5.3)
PROTHROM AB SERPL-ACNC: 31.9 SEC — HIGH (ref 9.8–12.7)
RBC # BLD: 3.5 M/UL — LOW (ref 4.2–5.8)
RBC # FLD: 24.6 % — HIGH (ref 10.3–14.5)
SODIUM SERPL-SCNC: 145 MMOL/L — SIGNIFICANT CHANGE UP (ref 135–145)
WBC # BLD: 8.5 K/UL — SIGNIFICANT CHANGE UP (ref 3.8–10.5)
WBC # FLD AUTO: 8.5 K/UL — SIGNIFICANT CHANGE UP (ref 3.8–10.5)

## 2017-10-20 PROCEDURE — 99232 SBSQ HOSP IP/OBS MODERATE 35: CPT

## 2017-10-20 RX ORDER — WARFARIN SODIUM 2.5 MG/1
2 TABLET ORAL ONCE
Refills: 0 | Status: COMPLETED | OUTPATIENT
Start: 2017-10-20 | End: 2017-10-20

## 2017-10-20 RX ADMIN — Medication 1 GRAM(S): at 05:44

## 2017-10-20 RX ADMIN — MEROPENEM 200 MILLIGRAM(S): 1 INJECTION INTRAVENOUS at 05:44

## 2017-10-20 RX ADMIN — Medication 1: at 17:26

## 2017-10-20 RX ADMIN — POLYETHYLENE GLYCOL 3350 17 GRAM(S): 17 POWDER, FOR SOLUTION ORAL at 11:26

## 2017-10-20 RX ADMIN — ZINC SULFATE TAB 220 MG (50 MG ZINC EQUIVALENT) 220 MILLIGRAM(S): 220 (50 ZN) TAB at 11:27

## 2017-10-20 RX ADMIN — TAMSULOSIN HYDROCHLORIDE 0.4 MILLIGRAM(S): 0.4 CAPSULE ORAL at 21:57

## 2017-10-20 RX ADMIN — Medication 1 MILLIGRAM(S): at 11:26

## 2017-10-20 RX ADMIN — Medication 40 MILLIGRAM(S): at 05:45

## 2017-10-20 RX ADMIN — Medication 1: at 11:25

## 2017-10-20 RX ADMIN — MEROPENEM 200 MILLIGRAM(S): 1 INJECTION INTRAVENOUS at 21:57

## 2017-10-20 RX ADMIN — Medication 3 MILLILITER(S): at 15:06

## 2017-10-20 RX ADMIN — Medication 1 GRAM(S): at 23:10

## 2017-10-20 RX ADMIN — Medication 3 MILLILITER(S): at 20:41

## 2017-10-20 RX ADMIN — Medication 25 MILLIGRAM(S): at 17:27

## 2017-10-20 RX ADMIN — PANTOPRAZOLE SODIUM 40 MILLIGRAM(S): 20 TABLET, DELAYED RELEASE ORAL at 05:45

## 2017-10-20 RX ADMIN — Medication 1: at 22:00

## 2017-10-20 RX ADMIN — MEROPENEM 200 MILLIGRAM(S): 1 INJECTION INTRAVENOUS at 13:37

## 2017-10-20 RX ADMIN — Medication 1 GRAM(S): at 17:27

## 2017-10-20 RX ADMIN — Medication 1 GRAM(S): at 11:27

## 2017-10-20 RX ADMIN — WARFARIN SODIUM 2 MILLIGRAM(S): 2.5 TABLET ORAL at 21:57

## 2017-10-20 RX ADMIN — Medication 81 MILLIGRAM(S): at 11:26

## 2017-10-20 RX ADMIN — Medication 500 MILLIGRAM(S): at 11:26

## 2017-10-20 RX ADMIN — Medication 3 MILLILITER(S): at 02:22

## 2017-10-20 RX ADMIN — Medication 300 MILLIGRAM(S): at 11:25

## 2017-10-20 RX ADMIN — Medication 3 MILLILITER(S): at 09:31

## 2017-10-20 NOTE — PROGRESS NOTE ADULT - SUBJECTIVE AND OBJECTIVE BOX
Allergies    penicillin (Unknown)      MEDICATIONS  (STANDING):  ALBUTerol/ipratropium for Nebulization 3 milliLiter(s) Nebulizer every 6 hours  ascorbic acid 500 milliGRAM(s) Oral daily  aspirin enteric coated 81 milliGRAM(s) Oral daily  dextrose 5%. 1000 milliLiter(s) (50 mL/Hr) IV Continuous <Continuous>  dextrose 50% Injectable 12.5 Gram(s) IV Push once  ferrous    sulfate Liquid 300 milliGRAM(s) Oral daily  folic acid 1 milliGRAM(s) Oral daily  furosemide    Tablet 40 milliGRAM(s) Oral daily  influenza   Vaccine 0.5 milliLiter(s) IntraMuscular once  insulin lispro (HumaLOG) corrective regimen sliding scale   SubCutaneous Before meals and at bedtime  lisinopril 5 milliGRAM(s) Oral daily  meropenem IVPB 500 milliGRAM(s) IV Intermittent every 8 hours  metoprolol 25 milliGRAM(s) Oral two times a day  pantoprazole   Suspension 40 milliGRAM(s) Oral before breakfast  polyethylene glycol 3350 17 Gram(s) Oral daily  sucralfate suspension 1 Gram(s) Oral every 6 hours  tamsulosin 0.4 milliGRAM(s) Oral at bedtime  warfarin 2 milliGRAM(s) Oral once  zinc sulfate 220 milliGRAM(s) Oral daily    MEDICATIONS  (PRN):  acetaminophen    Suspension 650 milliGRAM(s) Oral every 6 hours PRN For Temp greater than 38 C (100.4 F)  dextrose Gel 1 Dose(s) Oral once PRN Blood Glucose LESS THAN 70 milliGRAM(s)/deciLiter  glucagon  Injectable 1 milliGRAM(s) IntraMuscular once PRN Glucose <70 milliGRAM(s)/deciLiter      FAMILY HISTORY:    Unobtainable    SOCIAL HISTORY:  Smoking- Unobtainable  Alcohol- Unobtainable  Ilicit Drug use-Unobtainable      REVIEW OF SYSTEMS:  Constitutional: [ ] fever, [ ]weight loss,  [ ]fatigue  Eyes: [ ] visual changes  Respiratory: [ ]shortness of breath;  [ ] cough, [ ]wheezing, [ ]chills, [ ]hemoptysis  Cardiovascular: [ ] chest pain, [ ]palpitations, [ ]dizziness,  [ ]leg swelling  Gastrointestinal: [ ] abdominal pain, [ ]nausea, [ ]vomiting,  [ ]diarrhea   Genitourinary: [ ] dysuria, [ ] hematuria  Neurologic: [ ] headaches [ ] tremors [ ] weakness [ ] lightheadedness  Skin: [ ] itching, [ ]burning, [ ] rashes  Endocrine: [ ] heat or cold intolerance  Musculoskeletal: [ ] joint pain or swelling; [ ] muscle, back, or extremity pain  Psychiatric: [ ] depression, [ ]anxiety, [ ]mood swings, or [ ]difficulty sleeping  Hematologic: [ ] easy bruising, [ ] bleeding gums    [  ] All remaining systems negative except as per above.   [x ] Unable to obtain    Vital Signs Last 24 Hrs  T(C): 36.8 (20 Oct 2017 12:04), Max: 37.4 (19 Oct 2017 20:41)  T(F): 98.3 (20 Oct 2017 12:04), Max: 99.4 (19 Oct 2017 20:41)  HR: 84 (20 Oct 2017 14:19) (84 - 98)  BP: 111/74 (20 Oct 2017 14:19) (96/70 - 116/56)  BP(mean): --  RR: 19 (20 Oct 2017 12:04) (16 - 19)  SpO2: 95% (20 Oct 2017 12:04) (95% - 100%)  I&O's Summary    19 Oct 2017 07:01  -  20 Oct 2017 07:00  --------------------------------------------------------  IN: 0 mL / OUT: 1500 mL / NET: -1500 mL        PHYSICAL EXAM:  General: Sitting up in bed in no distress  HEENT: EOMI, PERRL  Neck: Supple, No JVD  Lungs: Slight bibasilar crackles  Heart: Irregular rate and rhythm; No murmurs, rubs, or gallops  Abdomen: Nontender, bowel sounds present  Extremities: 1+ pitting B/ LE edema  Nervous system:  Alert & Oriented X2, follows commands  Psychiatric: Stares straight ahead, mostly flat affect  Skin: Sacral decubitus    LABS:  10-20    145  |  110<H>  |  22<H>  ----------------------------<  113<H>  3.9   |  29  |  0.79    Ca    8.1<L>      20 Oct 2017 07:49  Phos  2.5     10-19  Mg     2.0     10-19      Creatinine Trend: 0.79<--, 0.83<--, 0.82<--, 1.00<--, 1.11<--, 1.06<--                        8.6    8.5   )-----------( 224      ( 20 Oct 2017 07:49 )             30.0     PT/INR - ( 20 Oct 2017 07:49 )   PT: 31.9 sec;   INR: 2.86 ratio         PTT - ( 19 Oct 2017 07:30 )  PTT:43.9 sec PRESENTING CC: none    SUBJ:    In summary, this is an 83 yo M with dementia, 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 An echocardiogram performed 10/16/17 showed EF 5-10%, compared to 55%  reported on echo in 05/2017.     Overnight, there were no acute events. Patient diuresed net negative -1.5L. He still does not speak, but appears comfortable and shakes his head when I ask him about chest pain or dyspnea.   PMH: As above, also DM, NPH (no  shunt)      Allergies    penicillin (Unknown)      MEDICATIONS  (STANDING):  ALBUTerol/ipratropium for Nebulization 3 milliLiter(s) Nebulizer every 6 hours  ascorbic acid 500 milliGRAM(s) Oral daily  aspirin enteric coated 81 milliGRAM(s) Oral daily  dextrose 5%. 1000 milliLiter(s) (50 mL/Hr) IV Continuous <Continuous>  dextrose 50% Injectable 12.5 Gram(s) IV Push once  ferrous    sulfate Liquid 300 milliGRAM(s) Oral daily  folic acid 1 milliGRAM(s) Oral daily  furosemide    Tablet 40 milliGRAM(s) Oral daily  influenza   Vaccine 0.5 milliLiter(s) IntraMuscular once  insulin lispro (HumaLOG) corrective regimen sliding scale   SubCutaneous Before meals and at bedtime  lisinopril 5 milliGRAM(s) Oral daily  meropenem IVPB 500 milliGRAM(s) IV Intermittent every 8 hours  metoprolol 25 milliGRAM(s) Oral two times a day  pantoprazole   Suspension 40 milliGRAM(s) Oral before breakfast  polyethylene glycol 3350 17 Gram(s) Oral daily  sucralfate suspension 1 Gram(s) Oral every 6 hours  tamsulosin 0.4 milliGRAM(s) Oral at bedtime  warfarin 2 milliGRAM(s) Oral once  zinc sulfate 220 milliGRAM(s) Oral daily    MEDICATIONS  (PRN):  acetaminophen    Suspension 650 milliGRAM(s) Oral every 6 hours PRN For Temp greater than 38 C (100.4 F)  dextrose Gel 1 Dose(s) Oral once PRN Blood Glucose LESS THAN 70 milliGRAM(s)/deciLiter  glucagon  Injectable 1 milliGRAM(s) IntraMuscular once PRN Glucose <70 milliGRAM(s)/deciLiter      FAMILY HISTORY:    Unobtainable    SOCIAL HISTORY:  Smoking- Unobtainable  Alcohol- Unobtainable  Ilicit Drug use-Unobtainable      REVIEW OF SYSTEMS:  Constitutional: [ ] fever, [ ]weight loss,  [ ]fatigue  Eyes: [ ] visual changes  Respiratory: [ ]shortness of breath;  [ ] cough, [ ]wheezing, [ ]chills, [ ]hemoptysis  Cardiovascular: [ ] chest pain, [ ]palpitations, [ ]dizziness,  [ ]leg swelling  Gastrointestinal: [ ] abdominal pain, [ ]nausea, [ ]vomiting,  [ ]diarrhea   Genitourinary: [ ] dysuria, [ ] hematuria  Neurologic: [ ] headaches [ ] tremors [ ] weakness [ ] lightheadedness  Skin: [ ] itching, [ ]burning, [ ] rashes  Endocrine: [ ] heat or cold intolerance  Musculoskeletal: [ ] joint pain or swelling; [ ] muscle, back, or extremity pain  Psychiatric: [ ] depression, [ ]anxiety, [ ]mood swings, or [ ]difficulty sleeping  Hematologic: [ ] easy bruising, [ ] bleeding gums    [  ] All remaining systems negative except as per above.   [x ] Unable to obtain    Vital Signs Last 24 Hrs  T(C): 36.8 (20 Oct 2017 12:04), Max: 37.4 (19 Oct 2017 20:41)  T(F): 98.3 (20 Oct 2017 12:04), Max: 99.4 (19 Oct 2017 20:41)  HR: 84 (20 Oct 2017 14:19) (84 - 98)  BP: 111/74 (20 Oct 2017 14:19) (96/70 - 116/56)  BP(mean): --  RR: 19 (20 Oct 2017 12:04) (16 - 19)  SpO2: 95% (20 Oct 2017 12:04) (95% - 100%)  I&O's Summary    19 Oct 2017 07:01  -  20 Oct 2017 07:00  --------------------------------------------------------  IN: 0 mL / OUT: 1500 mL / NET: -1500 mL        PHYSICAL EXAM:  General: Sitting up in bed in no distress  HEENT: EOMI, PERRL  Neck: Supple, No JVD  Lungs: Slight bibasilar crackles  Heart: Irregular rate and rhythm; No murmurs, rubs, or gallops  Abdomen: Nontender, bowel sounds present  Extremities: 1+ pitting B/ LE edema  Nervous system:  Tracks movements, follows commands  Psychiatric: Can track movements; unable to assess  Skin: Sacral decubitus    LABS:  10-20    145  |  110<H>  |  22<H>  ----------------------------<  113<H>  3.9   |  29  |  0.79    Ca    8.1<L>      20 Oct 2017 07:49  Phos  2.5     10-19  Mg     2.0     10-19      Creatinine Trend: 0.79<--, 0.83<--, 0.82<--, 1.00<--, 1.11<--, 1.06<--                        8.6    8.5   )-----------( 224      ( 20 Oct 2017 07:49 )             30.0     PT/INR - ( 20 Oct 2017 07:49 )   PT: 31.9 sec;   INR: 2.86 ratio         PTT - ( 19 Oct 2017 07:30 )  PTT:43.9 sec

## 2017-10-20 NOTE — PROGRESS NOTE ADULT - SUBJECTIVE AND OBJECTIVE BOX
82y Male is under our care for bacteremia and infected sacral ulcer.  Patient is in same disposition with no overnight events. Patient continues to do well and does not appear in distress. Remains afebrile with normal wbc count.      REVIEW OF SYSTEMS:  [ X ] Not able to illicit    MEDS:  meropenem IVPB 500 milliGRAM(s) IV Intermittent every 8 hours    ALLERGIES: penicillin (Unknown)    VITALS:  Vital Signs Last 24 Hrs  T(C): 36.8 (20 Oct 2017 12:04), Max: 37.4 (19 Oct 2017 20:41)  T(F): 98.3 (20 Oct 2017 12:04), Max: 99.4 (19 Oct 2017 20:41)  HR: 84 (20 Oct 2017 14:19) (84 - 98)  BP: 111/74 (20 Oct 2017 14:19) (96/70 - 116/56)  BP(mean): --  RR: 19 (20 Oct 2017 12:04) (16 - 19)  SpO2: 95% (20 Oct 2017 12:04) (95% - 100%)      PHYSICAL EXAM:  HEENT: +NC @ 2L/ min  Neck: supple no LN's  Respiratory: bilateral base rales no wheezing  Cardiovascular: S1 S2 reg no murmurs  Gastrointestinal: +BS with soft, nondistended and nontender abdomen  +cabrera  Extremities: mild bilateral ankle edema; +right arm PICC  Skin: no rashes  Ortho: n/a  Neuro: Awake and alert      LABS/DIAGNOSTIC TESTS:                        8.6    8.5   )-----------( 224      ( 20 Oct 2017 07:49 )             30.0   10-20    145  |  110<H>  |  22<H>  ----------------------------<  113<H>  3.9   |  29  |  0.79    Ca    8.1<L>      20 Oct 2017 07:49  Phos  2.5     10-19  Mg     2.0     10-19      CULTURES:   .Blood Blood-Peripheral  10-14 @ 22:45   No growth to date.  --  --      .Blood Blood-Peripheral  10-11 @ 13:27   No growth at 5 days.  --  --      .Surgical Swab sacral decubitus  10-09 @ 13:21   Rare Proteus mirabilis ESBL  Few Enterococcus faecalis (vancomycin resistant)  Few Bacteroides thetaiotaomicron  --  Proteus mirabilis ESBL  Enterococcus faecalis (vancomycin resistant)      .Urine Clean Catch (Midstream)  10-05 @ 23:11   50,000 - 99,000 CFU/mL Proteus mirabilis ESBL  --  Proteus mirabilis ESBL      .Blood Blood  10-05 @ 23:07   Growth in aerobic bottle: Proteus mirabilis ESBL  Growth in anaerobic bottle: Streptococcus anginosus      RADIOLOGY:  No new studies

## 2017-10-20 NOTE — PROGRESS NOTE ADULT - PROBLEM SELECTOR PLAN 5
Dilated LV with severe global left ventricular dysfunction. EF 10% - Cards to repeat echo (limited)  BNP elevated at 33,000  continues to have good urine output,  c/w Lasix, BB, ACEI

## 2017-10-20 NOTE — PROGRESS NOTE ADULT - ASSESSMENT
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), A fib on warfarin (history of multiple DCCV and s/p AF ablation), bedbound from nursing home ([place]), who initially was brought to the hospital due to low H/H, admitted with GI bleed/anemia from duodenitis, and found to have ESBL infection of sacral decubitus ulcer, as well as PNA. He developed encephalopathy and acute resp failure requiring intubation 10/14, extubated 10/16. DNR/DNI. Downgraded to SCU

## 2017-10-20 NOTE — PROGRESS NOTE ADULT - ASSESSMENT
81 yo M with noted PMH including CAD s/p CABG and MVR and A fib on warfarin p/w respiratory failure in setting of pneumonia, also significant decrease in EF noted on echo.     1. Acute decompensated systolic HF:   -BNP elevated at 33,000, still volume overloaded on exam  -Good urine output, overnight was net negative -0.9L; continue furosemide  -Continue metoprolol, and lisinopril, can uptitrate over time.   -Repeat echo shows EF continues to be severely depressed.   -Can consider ischemic evaluation for cardiomyopathy pending family's goals of care (will discuss with patient's wife). If agreeable this would occur just before discharge when patient is otherwise medically optimized; can otherwise defer to outpatient basis.       2. A fib:  Rate controlled with metoprolol  -likely potentiated by underlying infection, should improve as the former gets treated  - INR now therapeutic, would resume warfarin    3. CAD: Low level troponins may be type II MI (demand ischemia) in setting of underlying infection as well as A fib with RVR  -continue aspirin, metoprolol  -Lipid panel shows , HDL 50, LDL 55, trigs 111; otherwise acceptable 83 yo M with noted PMH including CAD s/p CABG and MVR and A fib on warfarin p/w respiratory failure in setting of pneumonia, also significant decrease in EF noted on echo.     1. Acute decompensated systolic HF:   -Good urine output, overnight was net negative -1.5L  At this point patient is mostly euvolemic; agree with switch to PO furosemide  -Continue metoprolol, and lisinopril, can uptitrate over time.   -Repeat echo shows EF continues to be severely depressed.   -Discussed with family (patient's wife Daly), they prefer to proceed with less aggressive route of medical management at this time for patient's cardiomyopathy. Will therefore continue optimal medical therapy and repeat echocardiogram in 3 months. If the EF remains depressed at that time, we will re-address the question of whether to perform further testing (i.e. stress test) to assess for ischemic burden.       2. A fib:  Rate controlled with metoprolol  -likely potentiated by underlying infection, should improve as the former gets treated  - INR now therapeutic, would resume warfarin    3. CAD: Low level troponins may be type II MI (demand ischemia) in setting of underlying infection as well as A fib with RVR  -continue aspirin, metoprolol  -Lipid panel shows , HDL 50, LDL 55, trigs 111; otherwise acceptable

## 2017-10-20 NOTE — PROGRESS NOTE ADULT - ASSESSMENT
1.	Infected sacral decubitus  2.	Bacteremia - ESBL  3.	?Pneumonia - likely CHF  ·	cont meropenem 500mg IV q8h   ·	if patient dc'ed to rehab can be switched to primaxin 500mg IV q6h until 10/26

## 2017-10-20 NOTE — PROGRESS NOTE ADULT - SUBJECTIVE AND OBJECTIVE BOX
Med attending follow up:    s/p extubation, on BIPAP, NG tube, now on 402 bed  Patient is a 82y old  Male who presents with a chief complaint of low hb level in NH (05 Oct 2017 19:36  INTERVAL HPI/OVERNIGHT EVENTS: Interim events extubation-appears much better.    MEDICATIONS  (STANDING):  acetaminophen  IVPB. 1000 milliGRAM(s) IV Intermittent once  ALBUTerol    90 MICROgram(s) HFA Inhaler 2 Puff(s) Inhalation every 6 hours  ascorbic acid 500 milliGRAM(s) Oral daily  aspirin enteric coated 81 milliGRAM(s) Oral daily  dextrose 5%. 1000 milliLiter(s) (50 mL/Hr) IV Continuous <Continuous>  dextrose 50% Injectable 12.5 Gram(s) IV Push once  docusate sodium 100 milliGRAM(s) Oral two times a day  ertapenem  IVPB      ertapenem  IVPB 1000 milliGRAM(s) IV Intermittent every 24 hours  ferrous    sulfate 325 milliGRAM(s) Oral two times a day with meals  folic acid 1 milliGRAM(s) Oral daily  heparin  Injectable 5000 Unit(s) SubCutaneous every 12 hours  influenza   Vaccine 0.5 milliLiter(s) IntraMuscular once  insulin lispro (HumaLOG) corrective regimen sliding scale   SubCutaneous three times a day before meals  multivitamin 1 Tablet(s) Oral daily  pantoprazole    Tablet 40 milliGRAM(s) Oral before breakfast  polyethylene glycol 3350 17 Gram(s) Oral daily  sucralfate suspension 1 Gram(s) Oral every 6 hours  tamsulosin 0.4 milliGRAM(s) Oral at bedtime  tiotropium 18 MICROgram(s) Capsule 1 Capsule(s) Inhalation daily  warfarin 5 milliGRAM(s) Oral once  zinc sulfate 220 milliGRAM(s) Oral daily    MEDICATIONS  (PRN):  dextrose Gel 1 Dose(s) Oral once PRN Blood Glucose LESS THAN 70 milliGRAM(s)/deciLiter  glucagon  Injectable 1 milliGRAM(s) IntraMuscular once PRN Glucose <70 milliGRAM(s)/deciLiter    Vital Signs Last 24 Hrs  Vital Signs Last 24 Hrs  T(C): 36.8 (20 Oct 2017 12:04), Max: 37.4 (19 Oct 2017 20:41)  T(F): 98.3 (20 Oct 2017 12:04), Max: 99.4 (19 Oct 2017 20:41)  HR: 84 (20 Oct 2017 14:19) (84 - 98)  BP: 111/74 (20 Oct 2017 14:19) (96/70 - 116/56)  BP(mean): --  RR: 19 (20 Oct 2017 12:04) (16 - 19)  SpO2: 95% (20 Oct 2017 12:04) (95% - 100%)      __________________________________________________  REVIEW OF SYSTEMS:    debilitated,  details not available       ________________________________________________  PHYSICAL EXAM:  GENERAL: NAD  HEENT: Normocephalic;  conjunctivae and sclerae clear; moist mucous membranes;   NECK : supple  CHEST/LUNG: Clear to auscultation bilaterally with good air entry   HEART: S1 S2  regular; no murmurs, gallops or rubs  ABDOMEN: Soft, Nontender, Nondistended; Bowel sounds present.    EXTREMITIES: no cyanosis; no edema; no calf tenderness  SKIN: warm and dry; no rash.  Sacral dressing changed-stage 3  NERVOUS SYSTEM:  Awake and alert; Oriented  to place, person and time ; no new deficits    _________________________________________________  LABS:                                                                  8.6    8.5   )-----------( 224      ( 20 Oct 2017 07:49 )             30.0   10-20    145  |  110<H>  |  22<H>  ----------------------------<  113<H>  3.9   |  29  |  0.79    Ca    8.1<L>      20 Oct 2017 07:49  Phos  2.5     10-19  Mg     2.0     10-19          PT/INR - ( 13 Oct 2017 07:29 )   PT: 16.5 sec;   INR: 1.50 ratio             CAPILLARY BLOOD GLUCOSE      POCT Blood Glucose.: 169 mg/dL (13 Oct 2017 07:27)  POCT Blood Glucose.: 153 mg/dL (12 Oct 2017 21:45)  POCT Blood Glucose.: 128 mg/dL (12 Oct 2017 16:14)        RADIOLOGY & ADDITIONAL TESTS:    Imaging Personally Reviewed:  YES    Consultant(s) Notes Reviewed:   YES    Care Discussed with Consultants :     Plan of care was discussed with patient and /or primary care giver; all questions and concerns were addressed and care was aligned with patient's wishes.

## 2017-10-20 NOTE — PROGRESS NOTE ADULT - SUBJECTIVE AND OBJECTIVE BOX
DNR [ x]   DNI  [ x ]    INTERVAL HPI/OVERNIGHT EVENTS: no acute events    PRESSORS: [ ] YES [x ] NO  WHICH:    meropenem IVPB 500 milliGRAM(s) IV Intermittent every 8 hours  Day 4    Cardiovascular: Dilated LV with severe global left ventricular dysfunction. Mild concentric left ventricular  hypertrophy. Grade II diastolic dysfunction. EF 10%        furosemide   Injectable 40 milliGRAM(s) IV Push daily  lisinopril 5 milliGRAM(s) Oral daily  metoprolol 25 milliGRAM(s) Oral two times a day  tamsulosin 0.4 milliGRAM(s) Oral at bedtime    Pulmonary:  ALBUTerol/ipratropium for Nebulization 3 milliLiter(s) Nebulizer every 6 hours    Hematalogic:  aspirin enteric coated 81 milliGRAM(s) Oral daily    Other:  acetaminophen    Suspension 650 milliGRAM(s) Oral every 6 hours PRN  ascorbic acid 500 milliGRAM(s) Oral daily  dextrose 5%. 1000 milliLiter(s) IV Continuous <Continuous>  dextrose 50% Injectable 12.5 Gram(s) IV Push once  dextrose Gel 1 Dose(s) Oral once PRN  ferrous    sulfate Liquid 300 milliGRAM(s) Oral daily  folic acid 1 milliGRAM(s) Oral daily  glucagon  Injectable 1 milliGRAM(s) IntraMuscular once PRN  influenza   Vaccine 0.5 milliLiter(s) IntraMuscular once  insulin lispro (HumaLOG) corrective regimen sliding scale   SubCutaneous Before meals and at bedtime  pantoprazole  Injectable 40 milliGRAM(s) IV Push daily  polyethylene glycol 3350 17 Gram(s) Oral daily  sucralfate suspension 1 Gram(s) Oral every 6 hours  zinc sulfate 220 milliGRAM(s) Oral daily    acetaminophen    Suspension 650 milliGRAM(s) Oral every 6 hours PRN  ALBUTerol/ipratropium for Nebulization 3 milliLiter(s) Nebulizer every 6 hours  ascorbic acid 500 milliGRAM(s) Oral daily  aspirin enteric coated 81 milliGRAM(s) Oral daily  dextrose 5%. 1000 milliLiter(s) IV Continuous <Continuous>  dextrose 50% Injectable 12.5 Gram(s) IV Push once  dextrose Gel 1 Dose(s) Oral once PRN  ferrous    sulfate Liquid 300 milliGRAM(s) Oral daily  folic acid 1 milliGRAM(s) Oral daily  furosemide   Injectable 40 milliGRAM(s) IV Push daily  glucagon  Injectable 1 milliGRAM(s) IntraMuscular once PRN  influenza   Vaccine 0.5 milliLiter(s) IntraMuscular once  insulin lispro (HumaLOG) corrective regimen sliding scale   SubCutaneous Before meals and at bedtime  lisinopril 5 milliGRAM(s) Oral daily  meropenem IVPB 500 milliGRAM(s) IV Intermittent every 8 hours  metoprolol 25 milliGRAM(s) Oral two times a day  pantoprazole  Injectable 40 milliGRAM(s) IV Push daily  polyethylene glycol 3350 17 Gram(s) Oral daily  sucralfate suspension 1 Gram(s) Oral every 6 hours  tamsulosin 0.4 milliGRAM(s) Oral at bedtime  zinc sulfate 220 milliGRAM(s) Oral daily    Drug Dosing Weight  Height (cm): 177.8 (05 Oct 2017 16:02)  Weight (kg): 90 (15 Oct 2017 07:30)  BMI (kg/m2): 28.5 (15 Oct 2017 07:30)  BSA (m2): 2.08 (15 Oct 2017 07:30)    CENTRAL LINE: [ ] YES [x ] NO  LOCATION:   DATE INSERTED:  REMOVE: [ ] YES [ ] NO  EXPLAIN:    TURNER: [x ] YES [ ] NO    DATE INSERTED:  REMOVE:  [ ] YES [ ] NO  EXPLAIN:    A-LINE:  [ ] YES [x ] NO  LOCATION:   DATE INSERTED:  REMOVE:  [ ] YES [ ] NO  EXPLAIN:    PAST MEDICAL & SURGICAL HISTORY:  DM (diabetes mellitus)  Afib  CHF (congestive heart failure)  No significant past surgical history              10-18 @ 07:01  -  10-19 @ 07:00  --------------------------------------------------------  IN: 1510 mL / OUT: 2400 mL / NET: -890 mL            PHYSICAL EXAM:    GENERAL: Resting in bed in NAD,   HEAD:  Atraumatic, Normocephalic  EYES: EOMI, PERRLA, conjunctiva and sclera clear  ENMT: No tonsillar erythema, exudates, or enlargement;   NECK: Supple, No JVD, Normal thyroid  NERVOUS SYSTEM:  Alert, awake, responds appropriately to questions; follows simple commands   CHEST/LUNG: BLAE, diminished basilar BS  HEART: Regular rate and rhythm  ABDOMEN: Soft, Nontender, Nondistended; Bowel sounds present  EXTREMITIES:  peripheral pitting edema  LYMPH: No lymphadenopathy noted  SKIN: sacral decub       LABS:  CBC Full  -  ( 19 Oct 2017 07:30 )  WBC Count : 9.3 K/uL  Hemoglobin : 8.5 g/dL  Hematocrit : 28.1 %  Platelet Count - Automated : 206 K/uL  Mean Cell Volume : 86.5 fl  Mean Cell Hemoglobin : 26.3 pg  Mean Cell Hemoglobin Concentration : 30.4 gm/dL  Auto Neutrophil # : 6.5 K/uL  Auto Lymphocyte # : 1.7 K/uL  Auto Monocyte # : 0.6 K/uL  Auto Eosinophil # : 0.4 K/uL  Auto Basophil # : 0.1 K/uL  Auto Neutrophil % : 70.1 %  Auto Lymphocyte % : 18.6 %  Auto Monocyte % : 6.7 %  Auto Eosinophil % : 4.0 %  Auto Basophil % : 0.6 %    10-19    146<H>  |  111<H>  |  20<H>  ----------------------------<  109<H>  3.6   |  28  |  0.83    Ca    7.9<L>      19 Oct 2017 07:30  Phos  2.5     10-19  Mg     2.0     10-19      PT/INR - ( 19 Oct 2017 07:30 )   PT: 47.4 sec;   INR: 4.22 ratio         PTT - ( 19 Oct 2017 07:30 )  PTT:43.9 sec          [ x ]  DVT Prophylaxis - on coumadin with supratherpeutic INR  [ x ]  Nutrition, Brand, Rate - mechanical soft - [place]         Goal Rate         Abdominal Nutritional Status -  Malnutrition   Cachexia      Morbid Obesity BMI >/=40    RADIOLOGY & ADDITIONAL STUDIES:  cxr -cardiomegaly and bilat effusions    [x  ] Goals of Care Discussion with Family/Proxy/Other           Elements of Conversation Discussed: Patient/Family understanding of current illness   Advanced Directives                                                                       Prognosis  Treatment Options  Care Aligned with patient's wishes                                             TIME SPENT: 35 minutes

## 2017-10-20 NOTE — PROGRESS NOTE ADULT - ASSESSMENT
81 y/o M from [facility] w/ PMHx of Rheumatic heart disease, NPH  (unsteady gait used to ambulate with walker now bedbound for a couple of months) & Afib (on Coumadin) was sent from [facility] for low Hb.     Patient is poor historian and source of information is wife. Wife stated that patient is having cough with yellowish sputum for last 4 weeks with occasional temp spikes.    In the ED, patient is AxO x1, unable to interact. T 102.8, Hr 113, /69. S/p Vancomycni/Cefepime and Zithromax.  CXR showed interstitial prominence and opacity at the left lung base.     Patient was admitted to the medical floor with Sepsis 2/2 PNA vs. UTI      Unresponsiveness on 10/14/17 , s/p Extubation    ECho: EF 10%

## 2017-10-20 NOTE — PROGRESS NOTE ADULT - PROBLEM SELECTOR PLAN 2
ESBL bacteremia  ESBL in urine blood and sacral DU debridement tissue  cont meropenem 500mg IV q8h D5  Normal WBC, afebrile  repeat blood cultures negative to date

## 2017-10-21 LAB
ANION GAP SERPL CALC-SCNC: 6 MMOL/L — SIGNIFICANT CHANGE UP (ref 5–17)
APTT BLD: 36.8 SEC — SIGNIFICANT CHANGE UP (ref 27.5–37.4)
BASOPHILS # BLD AUTO: 0.1 K/UL — SIGNIFICANT CHANGE UP (ref 0–0.2)
BASOPHILS NFR BLD AUTO: 1.5 % — SIGNIFICANT CHANGE UP (ref 0–2)
BUN SERPL-MCNC: 21 MG/DL — HIGH (ref 7–18)
CALCIUM SERPL-MCNC: 8 MG/DL — LOW (ref 8.4–10.5)
CHLORIDE SERPL-SCNC: 107 MMOL/L — SIGNIFICANT CHANGE UP (ref 96–108)
CO2 SERPL-SCNC: 29 MMOL/L — SIGNIFICANT CHANGE UP (ref 22–31)
CREAT SERPL-MCNC: 0.76 MG/DL — SIGNIFICANT CHANGE UP (ref 0.5–1.3)
EOSINOPHIL # BLD AUTO: 0.4 K/UL — SIGNIFICANT CHANGE UP (ref 0–0.5)
EOSINOPHIL NFR BLD AUTO: 3.9 % — SIGNIFICANT CHANGE UP (ref 0–6)
GLUCOSE BLDC GLUCOMTR-MCNC: 137 MG/DL — HIGH (ref 70–99)
GLUCOSE BLDC GLUCOMTR-MCNC: 199 MG/DL — HIGH (ref 70–99)
GLUCOSE BLDC GLUCOMTR-MCNC: 218 MG/DL — HIGH (ref 70–99)
GLUCOSE BLDC GLUCOMTR-MCNC: 292 MG/DL — HIGH (ref 70–99)
GLUCOSE SERPL-MCNC: 125 MG/DL — HIGH (ref 70–99)
HCT VFR BLD CALC: 30.5 % — LOW (ref 39–50)
HGB BLD-MCNC: 8.8 G/DL — LOW (ref 13–17)
INR BLD: 2.36 RATIO — HIGH (ref 0.88–1.16)
LYMPHOCYTES # BLD AUTO: 1.6 K/UL — SIGNIFICANT CHANGE UP (ref 1–3.3)
LYMPHOCYTES # BLD AUTO: 17.2 % — SIGNIFICANT CHANGE UP (ref 13–44)
MCHC RBC-ENTMCNC: 24.7 PG — LOW (ref 27–34)
MCHC RBC-ENTMCNC: 28.8 GM/DL — LOW (ref 32–36)
MCV RBC AUTO: 85.7 FL — SIGNIFICANT CHANGE UP (ref 80–100)
MONOCYTES # BLD AUTO: 0.7 K/UL — SIGNIFICANT CHANGE UP (ref 0–0.9)
MONOCYTES NFR BLD AUTO: 7.9 % — SIGNIFICANT CHANGE UP (ref 2–14)
NEUTROPHILS # BLD AUTO: 6.5 K/UL — SIGNIFICANT CHANGE UP (ref 1.8–7.4)
NEUTROPHILS NFR BLD AUTO: 69.5 % — SIGNIFICANT CHANGE UP (ref 43–77)
PLATELET # BLD AUTO: 242 K/UL — SIGNIFICANT CHANGE UP (ref 150–400)
POTASSIUM SERPL-MCNC: 4 MMOL/L — SIGNIFICANT CHANGE UP (ref 3.5–5.3)
POTASSIUM SERPL-SCNC: 4 MMOL/L — SIGNIFICANT CHANGE UP (ref 3.5–5.3)
PROTHROM AB SERPL-ACNC: 26.2 SEC — HIGH (ref 9.8–12.7)
RBC # BLD: 3.56 M/UL — LOW (ref 4.2–5.8)
RBC # FLD: 24.8 % — HIGH (ref 10.3–14.5)
SODIUM SERPL-SCNC: 142 MMOL/L — SIGNIFICANT CHANGE UP (ref 135–145)
WBC # BLD: 9.4 K/UL — SIGNIFICANT CHANGE UP (ref 3.8–10.5)
WBC # FLD AUTO: 9.4 K/UL — SIGNIFICANT CHANGE UP (ref 3.8–10.5)

## 2017-10-21 PROCEDURE — 71010: CPT | Mod: 26

## 2017-10-21 RX ORDER — FUROSEMIDE 40 MG
40 TABLET ORAL ONCE
Refills: 0 | Status: COMPLETED | OUTPATIENT
Start: 2017-10-21 | End: 2017-10-21

## 2017-10-21 RX ORDER — WARFARIN SODIUM 2.5 MG/1
2 TABLET ORAL ONCE
Refills: 0 | Status: COMPLETED | OUTPATIENT
Start: 2017-10-21 | End: 2017-10-21

## 2017-10-21 RX ADMIN — Medication 81 MILLIGRAM(S): at 12:29

## 2017-10-21 RX ADMIN — Medication 3 MILLILITER(S): at 09:41

## 2017-10-21 RX ADMIN — Medication 1 GRAM(S): at 12:29

## 2017-10-21 RX ADMIN — MEROPENEM 200 MILLIGRAM(S): 1 INJECTION INTRAVENOUS at 21:26

## 2017-10-21 RX ADMIN — WARFARIN SODIUM 2 MILLIGRAM(S): 2.5 TABLET ORAL at 21:26

## 2017-10-21 RX ADMIN — Medication 3 MILLILITER(S): at 03:16

## 2017-10-21 RX ADMIN — TAMSULOSIN HYDROCHLORIDE 0.4 MILLIGRAM(S): 0.4 CAPSULE ORAL at 21:26

## 2017-10-21 RX ADMIN — ZINC SULFATE TAB 220 MG (50 MG ZINC EQUIVALENT) 220 MILLIGRAM(S): 220 (50 ZN) TAB at 12:29

## 2017-10-21 RX ADMIN — Medication 300 MILLIGRAM(S): at 12:30

## 2017-10-21 RX ADMIN — Medication 1: at 21:26

## 2017-10-21 RX ADMIN — Medication 3 MILLILITER(S): at 14:55

## 2017-10-21 RX ADMIN — Medication 3 MILLILITER(S): at 20:15

## 2017-10-21 RX ADMIN — Medication 1 MILLIGRAM(S): at 12:29

## 2017-10-21 RX ADMIN — MEROPENEM 200 MILLIGRAM(S): 1 INJECTION INTRAVENOUS at 07:43

## 2017-10-21 RX ADMIN — Medication 40 MILLIGRAM(S): at 07:42

## 2017-10-21 RX ADMIN — Medication 2: at 18:13

## 2017-10-21 RX ADMIN — Medication 40 MILLIGRAM(S): at 15:54

## 2017-10-21 RX ADMIN — Medication 500 MILLIGRAM(S): at 12:29

## 2017-10-21 RX ADMIN — Medication 3: at 12:30

## 2017-10-21 RX ADMIN — Medication 1 GRAM(S): at 07:43

## 2017-10-21 RX ADMIN — PANTOPRAZOLE SODIUM 40 MILLIGRAM(S): 20 TABLET, DELAYED RELEASE ORAL at 07:43

## 2017-10-21 RX ADMIN — Medication 1 GRAM(S): at 18:13

## 2017-10-21 RX ADMIN — MEROPENEM 200 MILLIGRAM(S): 1 INJECTION INTRAVENOUS at 14:13

## 2017-10-21 RX ADMIN — Medication 25 MILLIGRAM(S): at 18:25

## 2017-10-21 NOTE — PROGRESS NOTE ADULT - SUBJECTIVE AND OBJECTIVE BOX
Patient is a 82y old  Male who presents with a chief complaint of low hb level in NH (05 Oct 2017 19:36)      INTERVAL HPI/OVERNIGHT EVENTS:  no events overnight  nurse notes that patient appears tachypneic but patient denies chest pain or sob    T(C): 36.6 (10-21-17 @ 12:00), Max: 37.6 (10-20-17 @ 20:26)  HR: 69 (10-21-17 @ 12:00) (68 - 106)  BP: 103/75 (10-21-17 @ 12:00) (99/56 - 110/52)  RR: 19 (10-21-17 @ 12:00) (18 - 19)  SpO2: 96% (10-21-17 @ 12:00) (96% - 98%)  Wt(kg): --  I&O's Summary      LABS:                        8.8    9.4   )-----------( 242      ( 21 Oct 2017 06:55 )             30.5     10-21    142  |  107  |  21<H>  ----------------------------<  125<H>  4.0   |  29  |  0.76    Ca    8.0<L>      21 Oct 2017 06:55      PT/INR - ( 21 Oct 2017 06:55 )   PT: 26.2 sec;   INR: 2.36 ratio         PTT - ( 21 Oct 2017 06:55 )  PTT:36.8 sec    CAPILLARY BLOOD GLUCOSE      POCT Blood Glucose.: 292 mg/dL (21 Oct 2017 11:34)  POCT Blood Glucose.: 137 mg/dL (21 Oct 2017 07:58)  POCT Blood Glucose.: 196 mg/dL (20 Oct 2017 20:25)  POCT Blood Glucose.: 180 mg/dL (20 Oct 2017 16:15)        RADIOLOGY & ADDITIONAL TESTS:    Imaging Personally Reviewed:  [ ] YES  [ ] NO    Consultant(s) Notes Reviewed:  [ ] YES  [ ] NO    PHYSICAL EXAM:  GENERAL: NAD, well-groomed, well-developed, no respiratory distress  HEAD:  Atraumatic, Normocephalic  EYES: conjunctiva and sclera clear  NERVOUS SYSTEM:  Alert, nonfocal exam  CHEST/LUNG: Clear to percussion bilaterally; no wheeze  HEART: irregularly irregular  ABDOMEN: Soft, Nontender, distended; Bowel sounds present  EXTREMITIES:  No clubbing, cyanosis; 1+ edema  SKIN: No rashes    Care Discussed with Consultants/Other Providers [ ] YES  [ ] NO

## 2017-10-21 NOTE — PROGRESS NOTE ADULT - ASSESSMENT
83 y/o M from [facility] w/ PMHx of Rheumatic heart disease, NPH  (unsteady gait used to ambulate with walker now bedbound for a couple of months) & Afib (on Coumadin) was sent from [facility] for low Hb.     Patient is poor historian and source of information is wife. Wife stated that patient is having cough with yellowish sputum for last 4 weeks with occasional temp spikes.    In the ED, patient is AxO x1, unable to interact. T 102.8, Hr 113, /69. S/p Vancomycni/Cefepime and Zithromax.  CXR showed interstitial prominence and opacity at the left lung base.     Patient was admitted to the medical floor with Sepsis 2/2 PNA vs. UTI      Unresponsiveness on 10/14/17 , s/p Extubation    ECho: EF 10%

## 2017-10-21 NOTE — PROGRESS NOTE ADULT - ASSESSMENT
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 He developed encephalopathy and acute resp failure requiring intubation 10/14, extubated 10/16. DNR/DNI. Downgraded to SCU

## 2017-10-21 NOTE — PROGRESS NOTE ADULT - SUBJECTIVE AND OBJECTIVE BOX
Med attending follow up:    s/p extubation, on BIPAP, NG tube, now on 402 bed, found right forearm pitting swelling.  Patient is a 82y old  Male who presents with a chief complaint of low hb level in NH (05 Oct 2017 19:36  INTERVAL HPI/OVERNIGHT EVENTS: note.    MEDICATIONS  (STANDING):  acetaminophen  IVPB. 1000 milliGRAM(s) IV Intermittent once  ALBUTerol    90 MICROgram(s) HFA Inhaler 2 Puff(s) Inhalation every 6 hours  ascorbic acid 500 milliGRAM(s) Oral daily  aspirin enteric coated 81 milliGRAM(s) Oral daily  dextrose 5%. 1000 milliLiter(s) (50 mL/Hr) IV Continuous <Continuous>  dextrose 50% Injectable 12.5 Gram(s) IV Push once  docusate sodium 100 milliGRAM(s) Oral two times a day  ertapenem  IVPB      ertapenem  IVPB 1000 milliGRAM(s) IV Intermittent every 24 hours  ferrous    sulfate 325 milliGRAM(s) Oral two times a day with meals  folic acid 1 milliGRAM(s) Oral daily  heparin  Injectable 5000 Unit(s) SubCutaneous every 12 hours  influenza   Vaccine 0.5 milliLiter(s) IntraMuscular once  insulin lispro (HumaLOG) corrective regimen sliding scale   SubCutaneous three times a day before meals  multivitamin 1 Tablet(s) Oral daily  pantoprazole    Tablet 40 milliGRAM(s) Oral before breakfast  polyethylene glycol 3350 17 Gram(s) Oral daily  sucralfate suspension 1 Gram(s) Oral every 6 hours  tamsulosin 0.4 milliGRAM(s) Oral at bedtime  tiotropium 18 MICROgram(s) Capsule 1 Capsule(s) Inhalation daily  warfarin 5 milliGRAM(s) Oral once  zinc sulfate 220 milliGRAM(s) Oral daily    MEDICATIONS  (PRN):  dextrose Gel 1 Dose(s) Oral once PRN Blood Glucose LESS THAN 70 milliGRAM(s)/deciLiter  glucagon  Injectable 1 milliGRAM(s) IntraMuscular once PRN Glucose <70 milliGRAM(s)/deciLiter  Vital Signs Last 24 Hrs  T(C): 36.6 (21 Oct 2017 12:00), Max: 37.6 (20 Oct 2017 20:26)  T(F): 97.9 (21 Oct 2017 12:00), Max: 99.6 (20 Oct 2017 20:26)  HR: 93 (21 Oct 2017 18:23) (69 - 106)  BP: 143/71 (21 Oct 2017 18:23) (99/56 - 143/71)  BP(mean): --  RR: 19 (21 Oct 2017 12:00) (18 - 19)  SpO2: 96% (21 Oct 2017 12:00) (96% - 98%)      __________________________________________________  REVIEW OF SYSTEMS:    debilitated,  details not available       ________________________________________________  PHYSICAL EXAM:  GENERAL: NAD  HEENT: Normocephalic;  conjunctivae and sclerae clear; moist mucous membranes;   NECK : supple  CHEST/LUNG: Clear to auscultation bilaterally with good air entry   HEART: S1 S2  regular; no murmurs, gallops or rubs  ABDOMEN: Soft, Nontender, Nondistended; Bowel sounds present.    EXTREMITIES: no cyanosis; no edema; no calf tenderness  SKIN: warm and dry; no rash.  Sacral dressing changed-stage 3  NERVOUS SYSTEM:  Awake and alert; Oriented  to place, person and time ; no new deficits    _________________________________________________  LABS:                                   8.8    9.4   )-----------( 242      ( 21 Oct 2017 06:55 )             30.5   10-21    142  |  107  |  21<H>  ----------------------------<  125<H>  4.0   |  29  |  0.76    Ca    8.0<L>      21 Oct 2017 06:55       Plan of care was discussed with patient and /or primary care giver; all questions and concerns were addressed and care was aligned with patient's wishes.      ass: sepsis sec to pneumonia, anemia,  afib, RHD by history, Oa, debility, stage 3 sacral DU, s/p unresponsiveness, intubation/extubation.    continue IV ab  local warmth left arm, as d/w RN at bedside.

## 2017-10-22 LAB
ANION GAP SERPL CALC-SCNC: 7 MMOL/L — SIGNIFICANT CHANGE UP (ref 5–17)
APTT BLD: 34.4 SEC — SIGNIFICANT CHANGE UP (ref 27.5–37.4)
BASOPHILS # BLD AUTO: 0.1 K/UL — SIGNIFICANT CHANGE UP (ref 0–0.2)
BASOPHILS NFR BLD AUTO: 1.2 % — SIGNIFICANT CHANGE UP (ref 0–2)
BUN SERPL-MCNC: 26 MG/DL — HIGH (ref 7–18)
CALCIUM SERPL-MCNC: 8.1 MG/DL — LOW (ref 8.4–10.5)
CHLORIDE SERPL-SCNC: 104 MMOL/L — SIGNIFICANT CHANGE UP (ref 96–108)
CO2 SERPL-SCNC: 30 MMOL/L — SIGNIFICANT CHANGE UP (ref 22–31)
CREAT SERPL-MCNC: 0.86 MG/DL — SIGNIFICANT CHANGE UP (ref 0.5–1.3)
EOSINOPHIL # BLD AUTO: 0.2 K/UL — SIGNIFICANT CHANGE UP (ref 0–0.5)
EOSINOPHIL NFR BLD AUTO: 2.4 % — SIGNIFICANT CHANGE UP (ref 0–6)
GLUCOSE BLDC GLUCOMTR-MCNC: 141 MG/DL — HIGH (ref 70–99)
GLUCOSE BLDC GLUCOMTR-MCNC: 177 MG/DL — HIGH (ref 70–99)
GLUCOSE BLDC GLUCOMTR-MCNC: 200 MG/DL — HIGH (ref 70–99)
GLUCOSE BLDC GLUCOMTR-MCNC: 211 MG/DL — HIGH (ref 70–99)
GLUCOSE SERPL-MCNC: 157 MG/DL — HIGH (ref 70–99)
HCT VFR BLD CALC: 28.4 % — LOW (ref 39–50)
HGB BLD-MCNC: 8.5 G/DL — LOW (ref 13–17)
INR BLD: 1.98 RATIO — HIGH (ref 0.88–1.16)
LYMPHOCYTES # BLD AUTO: 1.5 K/UL — SIGNIFICANT CHANGE UP (ref 1–3.3)
LYMPHOCYTES # BLD AUTO: 14.8 % — SIGNIFICANT CHANGE UP (ref 13–44)
MAGNESIUM SERPL-MCNC: 2 MG/DL — SIGNIFICANT CHANGE UP (ref 1.6–2.6)
MCHC RBC-ENTMCNC: 25.7 PG — LOW (ref 27–34)
MCHC RBC-ENTMCNC: 30.1 GM/DL — LOW (ref 32–36)
MCV RBC AUTO: 85.3 FL — SIGNIFICANT CHANGE UP (ref 80–100)
MONOCYTES # BLD AUTO: 0.8 K/UL — SIGNIFICANT CHANGE UP (ref 0–0.9)
MONOCYTES NFR BLD AUTO: 8.2 % — SIGNIFICANT CHANGE UP (ref 2–14)
NEUTROPHILS # BLD AUTO: 7.4 K/UL — SIGNIFICANT CHANGE UP (ref 1.8–7.4)
NEUTROPHILS NFR BLD AUTO: 73.4 % — SIGNIFICANT CHANGE UP (ref 43–77)
PHOSPHATE SERPL-MCNC: 2.3 MG/DL — LOW (ref 2.5–4.5)
PLATELET # BLD AUTO: 234 K/UL — SIGNIFICANT CHANGE UP (ref 150–400)
POTASSIUM SERPL-MCNC: 4 MMOL/L — SIGNIFICANT CHANGE UP (ref 3.5–5.3)
POTASSIUM SERPL-SCNC: 4 MMOL/L — SIGNIFICANT CHANGE UP (ref 3.5–5.3)
PROTHROM AB SERPL-ACNC: 21.9 SEC — HIGH (ref 9.8–12.7)
RBC # BLD: 3.32 M/UL — LOW (ref 4.2–5.8)
RBC # FLD: 24.2 % — HIGH (ref 10.3–14.5)
SODIUM SERPL-SCNC: 141 MMOL/L — SIGNIFICANT CHANGE UP (ref 135–145)
WBC # BLD: 10.1 K/UL — SIGNIFICANT CHANGE UP (ref 3.8–10.5)
WBC # FLD AUTO: 10.1 K/UL — SIGNIFICANT CHANGE UP (ref 3.8–10.5)

## 2017-10-22 RX ORDER — WARFARIN SODIUM 2.5 MG/1
3 TABLET ORAL ONCE
Refills: 0 | Status: COMPLETED | OUTPATIENT
Start: 2017-10-22 | End: 2017-10-22

## 2017-10-22 RX ADMIN — Medication 3 MILLILITER(S): at 02:56

## 2017-10-22 RX ADMIN — Medication 81 MILLIGRAM(S): at 12:45

## 2017-10-22 RX ADMIN — ZINC SULFATE TAB 220 MG (50 MG ZINC EQUIVALENT) 220 MILLIGRAM(S): 220 (50 ZN) TAB at 12:45

## 2017-10-22 RX ADMIN — Medication 3 MILLILITER(S): at 21:22

## 2017-10-22 RX ADMIN — Medication 25 MILLIGRAM(S): at 06:23

## 2017-10-22 RX ADMIN — Medication 2: at 22:19

## 2017-10-22 RX ADMIN — LISINOPRIL 5 MILLIGRAM(S): 2.5 TABLET ORAL at 06:23

## 2017-10-22 RX ADMIN — Medication 40 MILLIGRAM(S): at 06:23

## 2017-10-22 RX ADMIN — Medication 300 MILLIGRAM(S): at 12:45

## 2017-10-22 RX ADMIN — Medication 1 GRAM(S): at 12:45

## 2017-10-22 RX ADMIN — WARFARIN SODIUM 3 MILLIGRAM(S): 2.5 TABLET ORAL at 22:16

## 2017-10-22 RX ADMIN — Medication 1 GRAM(S): at 06:23

## 2017-10-22 RX ADMIN — Medication 500 MILLIGRAM(S): at 12:45

## 2017-10-22 RX ADMIN — Medication 3 MILLILITER(S): at 14:57

## 2017-10-22 RX ADMIN — Medication 1 GRAM(S): at 17:55

## 2017-10-22 RX ADMIN — MEROPENEM 200 MILLIGRAM(S): 1 INJECTION INTRAVENOUS at 15:15

## 2017-10-22 RX ADMIN — TAMSULOSIN HYDROCHLORIDE 0.4 MILLIGRAM(S): 0.4 CAPSULE ORAL at 22:16

## 2017-10-22 RX ADMIN — Medication 1 MILLIGRAM(S): at 12:45

## 2017-10-22 RX ADMIN — Medication 1: at 12:46

## 2017-10-22 RX ADMIN — Medication 1: at 17:55

## 2017-10-22 RX ADMIN — Medication 25 MILLIGRAM(S): at 17:55

## 2017-10-22 RX ADMIN — MEROPENEM 200 MILLIGRAM(S): 1 INJECTION INTRAVENOUS at 06:23

## 2017-10-22 RX ADMIN — Medication 3 MILLILITER(S): at 08:24

## 2017-10-22 RX ADMIN — PANTOPRAZOLE SODIUM 40 MILLIGRAM(S): 20 TABLET, DELAYED RELEASE ORAL at 06:23

## 2017-10-22 RX ADMIN — MEROPENEM 200 MILLIGRAM(S): 1 INJECTION INTRAVENOUS at 22:16

## 2017-10-22 RX ADMIN — Medication 650 MILLIGRAM(S): at 17:56

## 2017-10-22 NOTE — PROGRESS NOTE ADULT - ASSESSMENT
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), A fib on warfarin (history of multiple DCCV and s/p AF ablation), bedbound from nursing home ([place]), who initially was brought to the hospital due to low H/H, admitted with GI bleed/anemia from duodenitis, and found to have ESBL infection of sacral decubitus ulcer, as well as PNA. He developed encephalopathy and acute resp failure requiring intubation 10/14, extubated 10/16. DNR/DNI. Downgraded to SCU

## 2017-10-22 NOTE — PROGRESS NOTE ADULT - PROBLEM SELECTOR PLAN 1
extubated on 10/16  patient is comfortable but tachypneic, denies sob, no respiratory distress, oxygen saturation adequate  CXR: R pleural effusion, possible infiltrate, but no fever, normal wbc  patient is on meropenem  continue oral lasix   continue supplemental oxygen

## 2017-10-22 NOTE — PROGRESS NOTE ADULT - ASSESSMENT
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 He developed encephalopathy and acute resp failure requiring intubation 10/14, extubated 10/16. DNR/DNI. Downgraded to SCU

## 2017-10-22 NOTE — PROGRESS NOTE ADULT - PROBLEM SELECTOR PLAN 2
ESBL bacteremia  ESBL in urine blood and sacral DU debridement tissue  cont meropenem 500mg IV q8h   Normal WBC, afebrile  repeat blood cultures negative to date

## 2017-10-22 NOTE — PROGRESS NOTE ADULT - SUBJECTIVE AND OBJECTIVE BOX
Patient is a 82y old  Male who presents with a chief complaint of low hb level in NH (05 Oct 2017 19:36)      INTERVAL HPI/OVERNIGHT EVENTS: No events overnight.     Vital Signs Last 24 Hrs  T(C): 36.9 (22 Oct 2017 06:04), Max: 36.9 (21 Oct 2017 20:45)  T(F): 98.5 (22 Oct 2017 06:04), Max: 98.5 (21 Oct 2017 20:45)  HR: 104 (22 Oct 2017 06:04) (69 - 104)  BP: 110/64 (22 Oct 2017 06:04) (103/75 - 143/71)  BP(mean): --  RR: 22 (22 Oct 2017 06:04) (19 - 24)  SpO2: 98% (22 Oct 2017 06:04) (96% - 100%))    LABS:                                     8.5    10.1  )-----------( 234      ( 22 Oct 2017 07:10 )             28.4   10-22    141  |  104  |  26<H>  ----------------------------<  157<H>  4.0   |  30  |  0.86    Ca    8.1<L>      22 Oct 2017 07:10  Phos  2.3     10-22  Mg     2.0     10-22      PHYSICAL EXAM:  GENERAL: NAD, well-groomed, well-developed, no respiratory distress  HEAD:  Atraumatic, Normocephalic  EYES: conjunctiva and sclera clear  NERVOUS SYSTEM:  Alert, nonfocal exam  CHEST/LUNG: Clear to percussion bilaterally; no wheeze  HEART: irregularly irregular  ABDOMEN: Soft, Nontender, distended; Bowel sounds present  EXTREMITIES:  No clubbing, cyanosis; 1+ edema  SKIN: No rashes    Care Discussed with Consultants/Other Providers [x ] YES  [ ] NO

## 2017-10-22 NOTE — PROGRESS NOTE ADULT - SUBJECTIVE AND OBJECTIVE BOX
Patient is a 82y old  Male who presents with a chief complaint of low hb level in NH (05 Oct 2017 19:36)      INTERVAL HPI/OVERNIGHT EVENTS: No events overnight.     Vital Signs Last 24 Hrs  T(C): 36.9 (22 Oct 2017 06:04), Max: 36.9 (21 Oct 2017 20:45)  T(F): 98.5 (22 Oct 2017 06:04), Max: 98.5 (21 Oct 2017 20:45)  HR: 104 (22 Oct 2017 06:04) (69 - 104)  BP: 110/64 (22 Oct 2017 06:04) (103/75 - 143/71)  BP(mean): --  RR: 22 (22 Oct 2017 06:04) (19 - 24)  SpO2: 98% (22 Oct 2017 06:04) (96% - 100%)    LABS:                          8.8    9.4   )-----------( 242      ( 21 Oct 2017 06:55 )             30.5             10-21    142  |  107  |  21<H>  ----------------------------<  125<H>  4.0   |  29  |  0.76    Ca    8.0<L>      21 Oct 2017 06:55                PHYSICAL EXAM:  GENERAL: NAD, well-groomed, well-developed, no respiratory distress  HEAD:  Atraumatic, Normocephalic  EYES: conjunctiva and sclera clear  NERVOUS SYSTEM:  Alert, nonfocal exam  CHEST/LUNG: Clear to percussion bilaterally; no wheeze  HEART: irregularly irregular  ABDOMEN: Soft, Nontender, distended; Bowel sounds present  EXTREMITIES:  No clubbing, cyanosis; 1+ edema  SKIN: No rashes    Care Discussed with Consultants/Other Providers [ ] YES  [ ] NO

## 2017-10-23 ENCOUNTER — TRANSCRIPTION ENCOUNTER (OUTPATIENT)
Age: 82
End: 2017-10-23

## 2017-10-23 LAB
ANION GAP SERPL CALC-SCNC: 9 MMOL/L — SIGNIFICANT CHANGE UP (ref 5–17)
APTT BLD: 33.4 SEC — SIGNIFICANT CHANGE UP (ref 27.5–37.4)
BASOPHILS NFR BLD AUTO: 1 % — SIGNIFICANT CHANGE UP (ref 0–2)
BUN SERPL-MCNC: 28 MG/DL — HIGH (ref 7–18)
CALCIUM SERPL-MCNC: 8.2 MG/DL — LOW (ref 8.4–10.5)
CHLORIDE SERPL-SCNC: 104 MMOL/L — SIGNIFICANT CHANGE UP (ref 96–108)
CO2 SERPL-SCNC: 28 MMOL/L — SIGNIFICANT CHANGE UP (ref 22–31)
CREAT SERPL-MCNC: 0.75 MG/DL — SIGNIFICANT CHANGE UP (ref 0.5–1.3)
EOSINOPHIL NFR BLD AUTO: 6 % — SIGNIFICANT CHANGE UP (ref 0–6)
GLUCOSE BLDC GLUCOMTR-MCNC: 121 MG/DL — HIGH (ref 70–99)
GLUCOSE BLDC GLUCOMTR-MCNC: 201 MG/DL — HIGH (ref 70–99)
GLUCOSE BLDC GLUCOMTR-MCNC: 213 MG/DL — HIGH (ref 70–99)
GLUCOSE BLDC GLUCOMTR-MCNC: 242 MG/DL — HIGH (ref 70–99)
GLUCOSE SERPL-MCNC: 110 MG/DL — HIGH (ref 70–99)
HCT VFR BLD CALC: 27.3 % — LOW (ref 39–50)
HGB BLD-MCNC: 7.8 G/DL — LOW (ref 13–17)
INR BLD: 2.04 RATIO — HIGH (ref 0.88–1.16)
LYMPHOCYTES # BLD AUTO: 18 % — SIGNIFICANT CHANGE UP (ref 13–44)
MAGNESIUM SERPL-MCNC: 2.1 MG/DL — SIGNIFICANT CHANGE UP (ref 1.6–2.6)
MCHC RBC-ENTMCNC: 24.6 PG — LOW (ref 27–34)
MCHC RBC-ENTMCNC: 28.6 GM/DL — LOW (ref 32–36)
MCV RBC AUTO: 85.9 FL — SIGNIFICANT CHANGE UP (ref 80–100)
MONOCYTES NFR BLD AUTO: 4 % — SIGNIFICANT CHANGE UP (ref 2–14)
NEUTROPHILS NFR BLD AUTO: 68 % — SIGNIFICANT CHANGE UP (ref 43–77)
PHOSPHATE SERPL-MCNC: 2.8 MG/DL — SIGNIFICANT CHANGE UP (ref 2.5–4.5)
PLATELET # BLD AUTO: 246 K/UL — SIGNIFICANT CHANGE UP (ref 150–400)
POTASSIUM SERPL-MCNC: 3.9 MMOL/L — SIGNIFICANT CHANGE UP (ref 3.5–5.3)
POTASSIUM SERPL-SCNC: 3.9 MMOL/L — SIGNIFICANT CHANGE UP (ref 3.5–5.3)
PROTHROM AB SERPL-ACNC: 22.6 SEC — HIGH (ref 9.8–12.7)
RBC # BLD: 3.18 M/UL — LOW (ref 4.2–5.8)
RBC # FLD: 24.4 % — HIGH (ref 10.3–14.5)
SODIUM SERPL-SCNC: 141 MMOL/L — SIGNIFICANT CHANGE UP (ref 135–145)
WBC # BLD: 8.4 K/UL — SIGNIFICANT CHANGE UP (ref 3.8–10.5)
WBC # FLD AUTO: 8.4 K/UL — SIGNIFICANT CHANGE UP (ref 3.8–10.5)

## 2017-10-23 PROCEDURE — 71010: CPT | Mod: 26

## 2017-10-23 RX ORDER — FUROSEMIDE 40 MG
20 TABLET ORAL ONCE
Refills: 0 | Status: COMPLETED | OUTPATIENT
Start: 2017-10-23 | End: 2017-10-23

## 2017-10-23 RX ORDER — FUROSEMIDE 40 MG
1 TABLET ORAL
Qty: 0 | Refills: 0 | DISCHARGE
Start: 2017-10-23

## 2017-10-23 RX ORDER — ASPIRIN/CALCIUM CARB/MAGNESIUM 324 MG
1 TABLET ORAL
Qty: 0 | Refills: 0 | DISCHARGE
Start: 2017-10-23

## 2017-10-23 RX ORDER — INSULIN LISPRO 100/ML
0 VIAL (ML) SUBCUTANEOUS
Qty: 0 | Refills: 0 | DISCHARGE
Start: 2017-10-23

## 2017-10-23 RX ORDER — POLYETHYLENE GLYCOL 3350 17 G/17G
17 POWDER, FOR SOLUTION ORAL
Qty: 0 | Refills: 0 | DISCHARGE
Start: 2017-10-23

## 2017-10-23 RX ORDER — ASCORBIC ACID 60 MG
1 TABLET,CHEWABLE ORAL
Qty: 0 | Refills: 0 | DISCHARGE
Start: 2017-10-23

## 2017-10-23 RX ORDER — ZINC SULFATE TAB 220 MG (50 MG ZINC EQUIVALENT) 220 (50 ZN) MG
1 TAB ORAL
Qty: 0 | Refills: 0 | DISCHARGE
Start: 2017-10-23

## 2017-10-23 RX ORDER — WARFARIN SODIUM 2.5 MG/1
3 TABLET ORAL ONCE
Refills: 0 | Status: COMPLETED | OUTPATIENT
Start: 2017-10-23 | End: 2017-10-23

## 2017-10-23 RX ORDER — TAMSULOSIN HYDROCHLORIDE 0.4 MG/1
1 CAPSULE ORAL
Qty: 0 | Refills: 0 | DISCHARGE
Start: 2017-10-23

## 2017-10-23 RX ORDER — SUCRALFATE 1 G
10 TABLET ORAL
Qty: 0 | Refills: 0 | DISCHARGE
Start: 2017-10-23

## 2017-10-23 RX ORDER — METOPROLOL TARTRATE 50 MG
1 TABLET ORAL
Qty: 0 | Refills: 0 | DISCHARGE
Start: 2017-10-23

## 2017-10-23 RX ORDER — PANTOPRAZOLE SODIUM 20 MG/1
40 TABLET, DELAYED RELEASE ORAL
Qty: 0 | Refills: 0 | DISCHARGE
Start: 2017-10-23

## 2017-10-23 RX ORDER — FOLIC ACID 0.8 MG
1 TABLET ORAL
Qty: 0 | Refills: 0 | DISCHARGE
Start: 2017-10-23

## 2017-10-23 RX ORDER — COLLAGENASE CLOSTRIDIUM HIST. 250 UNIT/G
1 OINTMENT (GRAM) TOPICAL DAILY
Refills: 0 | Status: DISCONTINUED | OUTPATIENT
Start: 2017-10-23 | End: 2017-10-26

## 2017-10-23 RX ORDER — FERROUS SULFATE 325(65) MG
5 TABLET ORAL
Qty: 0 | Refills: 0 | DISCHARGE
Start: 2017-10-23

## 2017-10-23 RX ORDER — COLLAGENASE CLOSTRIDIUM HIST. 250 UNIT/G
1 OINTMENT (GRAM) TOPICAL
Qty: 0 | Refills: 0 | DISCHARGE
Start: 2017-10-23

## 2017-10-23 RX ORDER — LISINOPRIL 2.5 MG/1
1 TABLET ORAL
Qty: 0 | Refills: 0 | DISCHARGE
Start: 2017-10-23

## 2017-10-23 RX ADMIN — Medication 300 MILLIGRAM(S): at 12:05

## 2017-10-23 RX ADMIN — Medication 1 GRAM(S): at 00:02

## 2017-10-23 RX ADMIN — Medication 2: at 18:11

## 2017-10-23 RX ADMIN — Medication 1 APPLICATION(S): at 12:00

## 2017-10-23 RX ADMIN — Medication 81 MILLIGRAM(S): at 12:04

## 2017-10-23 RX ADMIN — Medication 1 GRAM(S): at 23:21

## 2017-10-23 RX ADMIN — Medication 2: at 23:32

## 2017-10-23 RX ADMIN — Medication 3 MILLILITER(S): at 21:19

## 2017-10-23 RX ADMIN — POLYETHYLENE GLYCOL 3350 17 GRAM(S): 17 POWDER, FOR SOLUTION ORAL at 12:05

## 2017-10-23 RX ADMIN — TAMSULOSIN HYDROCHLORIDE 0.4 MILLIGRAM(S): 0.4 CAPSULE ORAL at 23:20

## 2017-10-23 RX ADMIN — Medication 40 MILLIGRAM(S): at 09:04

## 2017-10-23 RX ADMIN — MEROPENEM 200 MILLIGRAM(S): 1 INJECTION INTRAVENOUS at 06:06

## 2017-10-23 RX ADMIN — Medication 1 GRAM(S): at 18:12

## 2017-10-23 RX ADMIN — Medication 650 MILLIGRAM(S): at 18:13

## 2017-10-23 RX ADMIN — MEROPENEM 200 MILLIGRAM(S): 1 INJECTION INTRAVENOUS at 12:07

## 2017-10-23 RX ADMIN — Medication 1 GRAM(S): at 06:06

## 2017-10-23 RX ADMIN — Medication 3 MILLILITER(S): at 08:12

## 2017-10-23 RX ADMIN — Medication 1 GRAM(S): at 12:04

## 2017-10-23 RX ADMIN — PANTOPRAZOLE SODIUM 40 MILLIGRAM(S): 20 TABLET, DELAYED RELEASE ORAL at 06:06

## 2017-10-23 RX ADMIN — WARFARIN SODIUM 3 MILLIGRAM(S): 2.5 TABLET ORAL at 23:20

## 2017-10-23 RX ADMIN — Medication 20 MILLIGRAM(S): at 18:14

## 2017-10-23 RX ADMIN — Medication 1 MILLIGRAM(S): at 12:04

## 2017-10-23 RX ADMIN — MEROPENEM 200 MILLIGRAM(S): 1 INJECTION INTRAVENOUS at 23:19

## 2017-10-23 RX ADMIN — ZINC SULFATE TAB 220 MG (50 MG ZINC EQUIVALENT) 220 MILLIGRAM(S): 220 (50 ZN) TAB at 12:04

## 2017-10-23 RX ADMIN — INFLUENZA VIRUS VACCINE 0.5 MILLILITER(S): 15; 15; 15; 15 SUSPENSION INTRAMUSCULAR at 11:57

## 2017-10-23 RX ADMIN — Medication 3 MILLILITER(S): at 14:11

## 2017-10-23 RX ADMIN — Medication 2: at 11:55

## 2017-10-23 RX ADMIN — Medication 500 MILLIGRAM(S): at 12:04

## 2017-10-23 NOTE — PROGRESS NOTE ADULT - SUBJECTIVE AND OBJECTIVE BOX
82y Male is under our care for bacteremia and infected sacral ulcer.  Patient is stable, but appears for lethargic today. Had low grade temp of 100.1F yesterday, but no reoccurance.  Repeat CXR shows no significant change.    REVIEW OF SYSTEMS:  [ X ] Not able to illicit    MEDS:  meropenem IVPB 500 milliGRAM(s) IV Intermittent every 8 hours    ALLERGIES: penicillin (Unknown)    VITALS:  Vital Signs Last 24 Hrs  T(C): 37.2 (23 Oct 2017 14:00), Max: 37.8 (22 Oct 2017 17:48)  T(F): 99 (23 Oct 2017 14:00), Max: 100.1 (22 Oct 2017 17:48)  HR: 61 (23 Oct 2017 14:00) (60 - 103)  BP: 115/43 (23 Oct 2017 14:00) (97/54 - 115/43)  BP(mean): --  RR: 19 (23 Oct 2017 14:00) (18 - 24)  SpO2: 100% (23 Oct 2017 14:00) (96% - 100%)      PHYSICAL EXAM:  HEENT: +NC @ 2L/ min  Neck: supple no LN's  Respiratory: bilateral rales at bases  Cardiovascular: S1 S2 reg no murmurs  Gastrointestinal: +BS with soft, nondistended and nontender abdomen  +cabrera  Extremities: mild bilateral ankle edema; +right arm PICC  Skin: no rashes  Ortho: n/a  Neuro: arous      LABS/DIAGNOSTIC TESTS:                        8.6    8.5   )-----------( 224      ( 20 Oct 2017 07:49 )             30.0   10-20    145  |  110<H>  |  22<H>  ----------------------------<  113<H>  3.9   |  29  |  0.79    Ca    8.1<L>      20 Oct 2017 07:49  Phos  2.5     10-19  Mg     2.0     10-19      CULTURES:   .Blood Blood-Peripheral  10-14 @ 22:45   No growth to date.  --  --      .Blood Blood-Peripheral  10-11 @ 13:27   No growth at 5 days.  --  --      .Surgical Swab sacral decubitus  10-09 @ 13:21   Rare Proteus mirabilis ESBL  Few Enterococcus faecalis (vancomycin resistant)  Few Bacteroides thetaiotaomicron  --  Proteus mirabilis ESBL  Enterococcus faecalis (vancomycin resistant)      .Urine Clean Catch (Midstream)  10-05 @ 23:11   50,000 - 99,000 CFU/mL Proteus mirabilis ESBL  --  Proteus mirabilis ESBL      .Blood Blood  10-05 @ 23:07   Growth in aerobic bottle: Proteus mirabilis ESBL  Growth in anaerobic bottle: Streptococcus anginosus      RADIOLOGY:  No new studies 82y Male is under our care for bacteremia and infected sacral ulcer.  Patient is stable, but appears for lethargic today. Had low grade temp of 100.1F yesterday, but no reoccurance.  Repeat CXR shows no significant change.    REVIEW OF SYSTEMS:  [ X ] Not able to illicit    MEDS:  meropenem IVPB 500 milliGRAM(s) IV Intermittent every 8 hours    ALLERGIES: penicillin (Unknown)    VITALS:  Vital Signs Last 24 Hrs  T(C): 37.2 (23 Oct 2017 14:00), Max: 37.8 (22 Oct 2017 17:48)  T(F): 99 (23 Oct 2017 14:00), Max: 100.1 (22 Oct 2017 17:48)  HR: 61 (23 Oct 2017 14:00) (60 - 103)  BP: 115/43 (23 Oct 2017 14:00) (97/54 - 115/43)  BP(mean): --  RR: 19 (23 Oct 2017 14:00) (18 - 24)  SpO2: 100% (23 Oct 2017 14:00) (96% - 100%)      PHYSICAL EXAM:  HEENT: +NC @ 2L/ min  Neck: supple no LN's  Respiratory: bilateral rales at bases  Cardiovascular: S1 S2 reg no murmurs  Gastrointestinal: +BS with soft, nondistended and nontender abdomen  +cabrera  Extremities: +right arm PICC  Skin: no rashes  Ortho: n/a  Neuro: arousable, but lethargic      LABS/DIAGNOSTIC TESTS:                        7.8    8.4   )-----------( 246      ( 23 Oct 2017 07:44 )             27.3   10-23    141  |  104  |  28<H>  ----------------------------<  110<H>  3.9   |  28  |  0.75    Ca    8.2<L>      23 Oct 2017 07:44  Phos  2.8     10-23  Mg     2.1     10-23      CULTURES:   .Blood Blood-Peripheral  10-14 @ 22:45   No growth to date.  --  --      .Blood Blood-Peripheral  10-11 @ 13:27   No growth at 5 days.  --  --      .Surgical Swab sacral decubitus  10-09 @ 13:21   Rare Proteus mirabilis ESBL  Few Enterococcus faecalis (vancomycin resistant)  Few Bacteroides thetaiotaomicron  --  Proteus mirabilis ESBL  Enterococcus faecalis (vancomycin resistant)      .Urine Clean Catch (Midstream)  10-05 @ 23:11   50,000 - 99,000 CFU/mL Proteus mirabilis ESBL  --  Proteus mirabilis ESBL      .Blood Blood  10-05 @ 23:07   Growth in aerobic bottle: Proteus mirabilis ESBL  Growth in anaerobic bottle: Streptococcus anginosus      RADIOLOGY:    EXAM:  CHEST PORTABLE ROUTINE                        PROCEDURE DATE:  10/23/2017    INTERPRETATION:  CLINICAL STATEMENT: Follow-up chest pain.    TECHNIQUE: AP view of the chest.    COMPARISON: 10/21/2017    FINDINGS/  IMPRESSION:  Sternotomy wires, heart valve, right PICC line again noted. No   pneumothorax.    Small right pleural effusion with adjacent airspace opacity without   significant change given differences in technique. Stable small nodular   density overlies right upper lung.    Heart size cannot be accurately assessed in this projection. 82y Male is under our care for bacteremia and infected sacral ulcer.  Patient is stable, but appears more lethargic today. Had low grade temp of 100.1F yesterday, but no reoccurance.  Repeat CXR shows no significant change.    REVIEW OF SYSTEMS:  [ X ] Not able to illicit    MEDS:  meropenem IVPB 500 milliGRAM(s) IV Intermittent every 8 hours    ALLERGIES: penicillin (Unknown)    VITALS:  Vital Signs Last 24 Hrs  T(C): 37.2 (23 Oct 2017 14:00), Max: 37.8 (22 Oct 2017 17:48)  T(F): 99 (23 Oct 2017 14:00), Max: 100.1 (22 Oct 2017 17:48)  HR: 61 (23 Oct 2017 14:00) (60 - 103)  BP: 115/43 (23 Oct 2017 14:00) (97/54 - 115/43)  BP(mean): --  RR: 19 (23 Oct 2017 14:00) (18 - 24)  SpO2: 100% (23 Oct 2017 14:00) (96% - 100%)      PHYSICAL EXAM:  HEENT: +NC @ 2L/ min  Neck: supple no LN's  Respiratory: bilateral rales at bases  Cardiovascular: S1 S2 reg no murmurs  Gastrointestinal: +BS with soft, nondistended and nontender abdomen  +cabrera  Extremities: +right arm PICC  Skin: no rashes  Ortho: n/a  Neuro: arousable, but lethargic      LABS/DIAGNOSTIC TESTS:                        7.8    8.4   )-----------( 246      ( 23 Oct 2017 07:44 )             27.3   10-23    141  |  104  |  28<H>  ----------------------------<  110<H>  3.9   |  28  |  0.75    Ca    8.2<L>      23 Oct 2017 07:44  Phos  2.8     10-23  Mg     2.1     10-23      CULTURES:   .Blood Blood-Peripheral  10-14 @ 22:45   No growth to date.  --  --      .Blood Blood-Peripheral  10-11 @ 13:27   No growth at 5 days.  --  --      .Surgical Swab sacral decubitus  10-09 @ 13:21   Rare Proteus mirabilis ESBL  Few Enterococcus faecalis (vancomycin resistant)  Few Bacteroides thetaiotaomicron  --  Proteus mirabilis ESBL  Enterococcus faecalis (vancomycin resistant)      .Urine Clean Catch (Midstream)  10-05 @ 23:11   50,000 - 99,000 CFU/mL Proteus mirabilis ESBL  --  Proteus mirabilis ESBL      .Blood Blood  10-05 @ 23:07   Growth in aerobic bottle: Proteus mirabilis ESBL  Growth in anaerobic bottle: Streptococcus anginosus      RADIOLOGY:    EXAM:  CHEST PORTABLE ROUTINE                        PROCEDURE DATE:  10/23/2017    INTERPRETATION:  CLINICAL STATEMENT: Follow-up chest pain.    TECHNIQUE: AP view of the chest.    COMPARISON: 10/21/2017    FINDINGS/  IMPRESSION:  Sternotomy wires, heart valve, right PICC line again noted. No   pneumothorax.    Small right pleural effusion with adjacent airspace opacity without   significant change given differences in technique. Stable small nodular   density overlies right upper lung.    Heart size cannot be accurately assessed in this projection.

## 2017-10-23 NOTE — DISCHARGE NOTE ADULT - PLAN OF CARE
Remain stable extubated on 10/16  - patient was comfortable and saturating well over night on 2L nasal canula. ESBL bacteremia  ESBL in urine blood and sacral DU debridement tissue  was treated with meropenem 500mg IV q8h; changed to primaxin upon   DC continue until 10/26/17.   Normal WBC, afebrile  repeat blood cultures negative to date. received 1 unit PRBC this admission  H/H stable  c/w carafate and protonix. Dilated LV with severe global left ventricular dysfunction. EF 10% - Cards to repeat echo (limited)  BNP elevated at 33,000  continues to have good urine output, - was diuresed  with IV lasix now on PO  c/w Lasix, BB, ACEI. c/w home meds  monitor BP c/w lopressor oral for rate control  INR now therapeutic c/w with fingerstick monitoring  HISS received 2 unit PRBC this admission  H/H stable  c/w carafate and protonix. received 2 units PRBC this admission  H/H stable  c/w carafate, protonix, and iron supplements  monitor CBC ESBL bacteremia  ESBL in urine blood and sacral DU debridement tissue  treated with meropenem 500mg IV q8h; last dose tonight at 10p then can dc picc   Normal WBC, afebrile  repeat blood cultures negative to date. c/w lopressor oral for rate control  INR 1.78 ESBL bacteremia  ESBL in urine blood and sacral DU debridement tissue  treated with meropenem 500mg IV q8h; last dose tonight at 10p   Normal WBC, afebrile  repeat blood cultures negative to date.

## 2017-10-23 NOTE — DISCHARGE NOTE ADULT - MEDICATION SUMMARY - MEDICATIONS TO CHANGE
I will SWITCH the dose or number of times a day I take the medications listed below when I get home from the hospital:    Lasix 20 mg oral tablet  -- 1 tab(s) by mouth once a day    omeprazole 20 mg oral delayed release capsule  -- 1 cap(s) by mouth once a day I will SWITCH the dose or number of times a day I take the medications listed below when I get home from the hospital:    Lasix 20 mg oral tablet  -- 1 tab(s) by mouth once a day    omeprazole 20 mg oral delayed release capsule  -- 1 cap(s) by mouth once a day    warfarin 2 mg oral tablet  -- 1 tab(s) by mouth once a day

## 2017-10-23 NOTE — DISCHARGE NOTE ADULT - PATIENT PORTAL LINK FT
“You can access the [employer] Patient Portal, offered by [facility], by registering with the following website: [email or web address]”

## 2017-10-23 NOTE — DISCHARGE NOTE ADULT - HOSPITAL COURSE
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  Developed encephalopathy and acute resp failure requiring intubation 10/14, unclear why Extubated 10/16.  Found to have new CHFrEF (EF 10%)  Family declines cardiac cath.  Will continue abx per ID. Continue CHF meds (ACE, diuretic, beta blocker). Change lasix to PO.     DNR/DNI. Family does not want aggressive measures. MOLST form completed. 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  Developed encephalopathy and acute resp failure requiring intubation 10/14, unclear why Extubated 10/16.  Found to have new CHFrEF (EF 10%)  Family declines cardiac cath.  Will continue abx per ID. Continue CHF meds (ACE, diuretic, beta blocker). Change lasix to PO.     DNR/DNI. Family does not want aggressive measures. MOLST form completed.     For full account of hospital course please refer to actual medical records. As this is a brief summery. 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 Contact isolation. Developed encephalopathy and acute resp failure requiring intubation 10/14, unclear why Extubated 10/16.  Found to have new CHFrEF (EF 10%)  Family declines cardiac cath.  Will continue abx per ID. Continue CHF meds (ACE, diuretic, beta blocker). Change lasix to PO.     DNR/DNI. Family does not want aggressive measures. MOLST form completed.     For full account of hospital course please refer to actual medical records. As this is a brief summery. 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 Contact isolation. Developed encephalopathy and acute resp failure requiring intubation 10/14, unclear why Extubated 10/16.  Found to have new CHFrEF (EF 10%)  Family declines cardiac cath.  Will continue abx per ID right UE placed via IR on 10/13/2017. Continue CHF meds (ACE, diuretic, beta blocker). Change lasix to PO.     DNR/DNI. Family does not want aggressive measures. MOLST form completed.     For full account of hospital course please refer to actual medical records. As this is a brief summery. 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 Treated with IV abx until 10/26/17 as per ID. Developed encephalopathy and acute resp failure requiring intubation 10/14, unclear why Extubated 10/16.  Found to have new CHFrEF (EF 10%)  Family declines cardiac cath.  right UE PICC placed via IR on 10/13/2017. Can dc after last dose of abx. Continue CHF meds (ACE, diuretic, beta blocker). Changed lasix to PO.   DNR/DNI. Family does not want aggressive measures. MOLST form completed.     For full account of hospital course please refer to actual medical records. As this is a brief summery. 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 Treated with IV abx until 10/26/17 as per ID. Developed encephalopathy and acute resp failure requiring intubation 10/14, unclear why Extubated 10/16.  Found to have new CHFrEF (EF 10%)  Family declines cardiac cath.  Continue CHF meds (ACE, diuretic, beta blocker). Changed lasix to PO.   DNR/DNI. Family does not want aggressive measures. MOLST form completed.     For full account of hospital course please refer to actual medical records. As this is a brief summery.

## 2017-10-23 NOTE — PROGRESS NOTE ADULT - PROBLEM SELECTOR PLAN 2
received 1 unit PRBC this admission  H/H stable  c/w carafate and protonix. received 1 unit PRBC this admission  Hgb 7.8 today   will transfuse 1 unit  plan for dc tomorrow  c/w carafate and protonix.

## 2017-10-23 NOTE — ADVANCED PRACTICE NURSE CONSULT - RECOMMEDATIONS
-Clean all wounds with normal saline and apply skin prep to the surrounding skin  -Apply Collagenase Ointment to the slough areas of the Sacral wound, apply saline moistened gauze to the wound bed and cover with a Foam dressing Daily PRN  -Leave the Bilateral Feet wounds open to air  -Continue with the remaining prior wound care recommendations  -Elevate/float the patients heels using heel protectors and reposition the patient Q 2hrs using wedges or pillows

## 2017-10-23 NOTE — PROGRESS NOTE ADULT - ASSESSMENT
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 He developed encephalopathy and acute resp failure requiring intubation 10/14, extubated 10/16. DNR/DNI. Downgraded to SCU

## 2017-10-23 NOTE — DISCHARGE NOTE ADULT - ADDITIONAL INSTRUCTIONS
follow-up with MD at facility; Family considering palliative at facility; Dr. Phillips aware follow-up with MD at facility; Family considering palliative at facility; Dr. Phillips aware;   Unstageable Pressure Injury to the Sacrococcyx Area (16cm x 10cm x 2cm) with slough, granulation tissue, and drainage present  -There is an Unstageable Pressure Injury to the L. Lat Foot (0.5cm x 0.5cm) and R. Lat Foot (1cm x 1cm) without drainage  -Clean all wounds with normal saline and apply skin prep to the surrounding skin  -Apply Collagenase Ointment to the slough areas of the Sacral wound, apply saline moistened gauze to the wound bed and cover with a Foam dressing Daily PRN  -Leave the Bilateral Feet wounds open to air  -Continue with the remaining prior wound care recommendations  -Elevate/float the patients heels using heel protectors and reposition the patient Q 2hrs using wedges or pillows.

## 2017-10-23 NOTE — PROGRESS NOTE ADULT - SUBJECTIVE AND OBJECTIVE BOX
DNR [x ]   DNI  [ x ]    INTERVAL HPI/OVERNIGHT EVENTS:  No acute events overnight    PRESSORS: [ ] YES [ ] NO  WHICH:    meropenem IVPB 500 milliGRAM(s) IV Intermittent every 8 hours    Cardiovascular:  Severe left atrial enlargement. Severe left ventricular enlargement. Severe global left ventricular dysfunction.  Right ventricular enlargement with decreased RV function.    Heart Failure  Acute   Acute on Chronic  Chronic       furosemide    Tablet 40 milliGRAM(s) Oral daily  lisinopril 5 milliGRAM(s) Oral daily  metoprolol 25 milliGRAM(s) Oral two times a day  tamsulosin 0.4 milliGRAM(s) Oral at bedtime    Pulmonary:  ALBUTerol/ipratropium for Nebulization 3 milliLiter(s) Nebulizer every 6 hours    Hematalogic:  aspirin enteric coated 81 milliGRAM(s) Oral daily    Other:  acetaminophen    Suspension 650 milliGRAM(s) Oral every 6 hours PRN  ascorbic acid 500 milliGRAM(s) Oral daily  dextrose 5%. 1000 milliLiter(s) IV Continuous <Continuous>  dextrose 50% Injectable 12.5 Gram(s) IV Push once  dextrose Gel 1 Dose(s) Oral once PRN  ferrous    sulfate Liquid 300 milliGRAM(s) Oral daily  folic acid 1 milliGRAM(s) Oral daily  glucagon  Injectable 1 milliGRAM(s) IntraMuscular once PRN  influenza   Vaccine 0.5 milliLiter(s) IntraMuscular once  insulin lispro (HumaLOG) corrective regimen sliding scale   SubCutaneous Before meals and at bedtime  pantoprazole   Suspension 40 milliGRAM(s) Oral before breakfast  polyethylene glycol 3350 17 Gram(s) Oral daily  sucralfate suspension 1 Gram(s) Oral every 6 hours  zinc sulfate 220 milliGRAM(s) Oral daily    acetaminophen    Suspension 650 milliGRAM(s) Oral every 6 hours PRN  ALBUTerol/ipratropium for Nebulization 3 milliLiter(s) Nebulizer every 6 hours  ascorbic acid 500 milliGRAM(s) Oral daily  aspirin enteric coated 81 milliGRAM(s) Oral daily  dextrose 5%. 1000 milliLiter(s) IV Continuous <Continuous>  dextrose 50% Injectable 12.5 Gram(s) IV Push once  dextrose Gel 1 Dose(s) Oral once PRN  ferrous    sulfate Liquid 300 milliGRAM(s) Oral daily  folic acid 1 milliGRAM(s) Oral daily  furosemide    Tablet 40 milliGRAM(s) Oral daily  glucagon  Injectable 1 milliGRAM(s) IntraMuscular once PRN  influenza   Vaccine 0.5 milliLiter(s) IntraMuscular once  insulin lispro (HumaLOG) corrective regimen sliding scale   SubCutaneous Before meals and at bedtime  lisinopril 5 milliGRAM(s) Oral daily  meropenem IVPB 500 milliGRAM(s) IV Intermittent every 8 hours  metoprolol 25 milliGRAM(s) Oral two times a day  pantoprazole   Suspension 40 milliGRAM(s) Oral before breakfast  polyethylene glycol 3350 17 Gram(s) Oral daily  sucralfate suspension 1 Gram(s) Oral every 6 hours  tamsulosin 0.4 milliGRAM(s) Oral at bedtime  zinc sulfate 220 milliGRAM(s) Oral daily    Drug Dosing Weight  Height (cm): 177.8 (05 Oct 2017 16:02)  Weight (kg): 90 (15 Oct 2017 07:30)  BMI (kg/m2): 28.5 (15 Oct 2017 07:30)  BSA (m2): 2.08 (15 Oct 2017 07:30)    CENTRAL LINE: [ ] YES [x ] NO  LOCATION:   DATE INSERTED:  REMOVE: [ ] YES [ ] NO  EXPLAIN:    TURNER: [ ] YES [x ] NO    DATE INSERTED:  REMOVE:  [ ] YES [ ] NO  EXPLAIN:    A-LINE:  [ ] YES [ x] NO  LOCATION:   DATE INSERTED:  REMOVE:  [ ] YES [ ] NO  EXPLAIN:    PAST MEDICAL & SURGICAL HISTORY:  DM (diabetes mellitus)  Afib  CHF (congestive heart failure)  No significant past surgical history    PHYSICAL EXAM:    GENERAL: NAD, well-groomed, well-developed  HEAD:  Atraumatic, Normocephalic  EYES: EOMI, PERRLA, conjunctiva and sclera clear  ENMT: No tonsillar erythema, exudates, or enlargement; Moist mucous membranes, Good dentition, No lesions  NECK: Supple, No JVD, Normal thyroid  NERVOUS SYSTEM:  Alert & Oriented X3, Good concentration; Motor Strength 5/5 B/L upper and lower extremities; DTRs 2+ intact and symmetric  CHEST/LUNG: Clear to percussion bilaterally; No rales, rhonchi, wheezing, or rubs  HEART: Regular rate and rhythm; No murmurs, rubs, or gallops  ABDOMEN: Soft, Nontender, Nondistended; Bowel sounds present  EXTREMITIES:  2+ Peripheral Pulses, No clubbing, cyanosis, or edema  LYMPH: No lymphadenopathy noted  SKIN: No rashes or lesions      LABS:  CBC Full  -  ( 23 Oct 2017 07:44 )  WBC Count : 8.4 K/uL  Hemoglobin : 7.8 g/dL  Hematocrit : 27.3 %  Platelet Count - Automated : 246 K/uL  Mean Cell Volume : 85.9 fl  Mean Cell Hemoglobin : 24.6 pg  Mean Cell Hemoglobin Concentration : 28.6 gm/dL  Auto Neutrophil # : x  Auto Lymphocyte # : x  Auto Monocyte # : x  Auto Eosinophil # : x  Auto Basophil # : x  Auto Neutrophil % : x  Auto Lymphocyte % : x  Auto Monocyte % : x  Auto Eosinophil % : x  Auto Basophil % : x    10-23    141  |  104  |  28<H>  ----------------------------<  110<H>  3.9   |  28  |  0.75    Ca    8.2<L>      23 Oct 2017 07:44  Phos  2.8     10-23  Mg     2.1     10-23      PT/INR - ( 23 Oct 2017 07:44 )   PT: 22.6 sec;   INR: 2.04 ratio         PTT - ( 23 Oct 2017 07:44 )  PTT:33.4 sec          [x  ]  DVT Prophylaxis - on coumadin  [x  ]  Nutrition, Brand, Rate - dysphagia 1         Goal Rate         Abdominal Nutritional Status -  Malnutrition   Cachexia      Morbid Obesity BMI >/=40    RADIOLOGY & ADDITIONAL STUDIES:  ***    [  ] Goals of Care Discussion with Family/Proxy/Other           Elements of Conversation Discussed: Patient/Family understanding of current illness   Advanced Directives                                                                       Prognosis  Treatment Options  Care Aligned with patient's wishes                                             TIME SPENT: 35 minutes DNR [x ]   DNI  [ x ]    INTERVAL HPI/OVERNIGHT EVENTS:  No acute events overnight    PRESSORS: [ ] YES [x ] NO  WHICH:    meropenem IVPB 500 milliGRAM(s) IV Intermittent every 8 hours    Cardiovascular:  Severe left atrial enlargement. Severe left ventricular enlargement. Severe global left ventricular dysfunction.  Right ventricular enlargement with decreased RV function.    furosemide    Tablet 40 milliGRAM(s) Oral daily  lisinopril 5 milliGRAM(s) Oral daily  metoprolol 25 milliGRAM(s) Oral two times a day  tamsulosin 0.4 milliGRAM(s) Oral at bedtime    Pulmonary:  ALBUTerol/ipratropium for Nebulization 3 milliLiter(s) Nebulizer every 6 hours    Hematalogic:  aspirin enteric coated 81 milliGRAM(s) Oral daily    Other:  acetaminophen    Suspension 650 milliGRAM(s) Oral every 6 hours PRN  ascorbic acid 500 milliGRAM(s) Oral daily  dextrose 5%. 1000 milliLiter(s) IV Continuous <Continuous>  dextrose 50% Injectable 12.5 Gram(s) IV Push once  dextrose Gel 1 Dose(s) Oral once PRN  ferrous    sulfate Liquid 300 milliGRAM(s) Oral daily  folic acid 1 milliGRAM(s) Oral daily  glucagon  Injectable 1 milliGRAM(s) IntraMuscular once PRN  influenza   Vaccine 0.5 milliLiter(s) IntraMuscular once  insulin lispro (HumaLOG) corrective regimen sliding scale   SubCutaneous Before meals and at bedtime  pantoprazole   Suspension 40 milliGRAM(s) Oral before breakfast  polyethylene glycol 3350 17 Gram(s) Oral daily  sucralfate suspension 1 Gram(s) Oral every 6 hours  zinc sulfate 220 milliGRAM(s) Oral daily    acetaminophen    Suspension 650 milliGRAM(s) Oral every 6 hours PRN  ALBUTerol/ipratropium for Nebulization 3 milliLiter(s) Nebulizer every 6 hours  ascorbic acid 500 milliGRAM(s) Oral daily  aspirin enteric coated 81 milliGRAM(s) Oral daily  dextrose 5%. 1000 milliLiter(s) IV Continuous <Continuous>  dextrose 50% Injectable 12.5 Gram(s) IV Push once  dextrose Gel 1 Dose(s) Oral once PRN  ferrous    sulfate Liquid 300 milliGRAM(s) Oral daily  folic acid 1 milliGRAM(s) Oral daily  furosemide    Tablet 40 milliGRAM(s) Oral daily  glucagon  Injectable 1 milliGRAM(s) IntraMuscular once PRN  influenza   Vaccine 0.5 milliLiter(s) IntraMuscular once  insulin lispro (HumaLOG) corrective regimen sliding scale   SubCutaneous Before meals and at bedtime  lisinopril 5 milliGRAM(s) Oral daily  meropenem IVPB 500 milliGRAM(s) IV Intermittent every 8 hours  metoprolol 25 milliGRAM(s) Oral two times a day  pantoprazole   Suspension 40 milliGRAM(s) Oral before breakfast  polyethylene glycol 3350 17 Gram(s) Oral daily  sucralfate suspension 1 Gram(s) Oral every 6 hours  tamsulosin 0.4 milliGRAM(s) Oral at bedtime  zinc sulfate 220 milliGRAM(s) Oral daily    Drug Dosing Weight  Height (cm): 177.8 (05 Oct 2017 16:02)  Weight (kg): 90 (15 Oct 2017 07:30)  BMI (kg/m2): 28.5 (15 Oct 2017 07:30)  BSA (m2): 2.08 (15 Oct 2017 07:30)    CENTRAL LINE: [ ] YES [x ] NO  LOCATION:   DATE INSERTED:  REMOVE: [ ] YES [ ] NO  EXPLAIN:    TURNER: [ ] YES [x ] NO    DATE INSERTED:  REMOVE:  [ ] YES [ ] NO  EXPLAIN:    A-LINE:  [ ] YES [ x] NO  LOCATION:   DATE INSERTED:  REMOVE:  [ ] YES [ ] NO  EXPLAIN:    PAST MEDICAL & SURGICAL HISTORY:  DM (diabetes mellitus)  Afib  CHF (congestive heart failure)  No significant past surgical history    PHYSICAL EXAM:    GENERAL: NAD, well-groomed, well-developed  HEAD:  Atraumatic, Normocephalic  EYES: EOMI, PERRLA, conjunctiva and sclera clear  ENMT: No tonsillar erythema, exudates, or enlargement;  NECK: Supple, No JVD, Normal thyroid  NERVOUS SYSTEM:  Alert & Oriented X2, Good concentration;   CHEST/LUNG: Clear to percussion bilaterally; No rales, rhonchi, wheezing, or rubs  HEART: Regular rate and rhythm; No murmurs, rubs, or gallops  ABDOMEN: Soft, Nontender, Nondistended; Bowel sounds present  EXTREMITIES:  2+ Peripheral Pulses, No clubbing, cyanosis, or edema  LYMPH: No lymphadenopathy noted  SKIN: sacral decub      LABS:  CBC Full  -  ( 23 Oct 2017 07:44 )  WBC Count : 8.4 K/uL  Hemoglobin : 7.8 g/dL  Hematocrit : 27.3 %  Platelet Count - Automated : 246 K/uL  Mean Cell Volume : 85.9 fl  Mean Cell Hemoglobin : 24.6 pg  Mean Cell Hemoglobin Concentration : 28.6 gm/dL  Auto Neutrophil # : x  Auto Lymphocyte # : x  Auto Monocyte # : x  Auto Eosinophil # : x  Auto Basophil # : x  Auto Neutrophil % : x  Auto Lymphocyte % : x  Auto Monocyte % : x  Auto Eosinophil % : x  Auto Basophil % : x    10-23    141  |  104  |  28<H>  ----------------------------<  110<H>  3.9   |  28  |  0.75    Ca    8.2<L>      23 Oct 2017 07:44  Phos  2.8     10-23  Mg     2.1     10-23      PT/INR - ( 23 Oct 2017 07:44 )   PT: 22.6 sec;   INR: 2.04 ratio         PTT - ( 23 Oct 2017 07:44 )  PTT:33.4 sec          [x  ]  DVT Prophylaxis - on coumadin  [x  ]  Nutrition, Brand, Rate - dysphagia 1         Goal Rate         Abdominal Nutritional Status -  Malnutrition   Cachexia      Morbid Obesity BMI >/=40    RADIOLOGY & ADDITIONAL STUDIES:  ***    [  ] Goals of Care Discussion with Family/Proxy/Other           Elements of Conversation Discussed: Patient/Family understanding of current illness   Advanced Directives                                                                       Prognosis  Treatment Options  Care Aligned with patient's wishes                                             TIME SPENT: 35 minutes

## 2017-10-23 NOTE — ADVANCED PRACTICE NURSE CONSULT - ASSESSMENT
This is a 82yr old male patient admitted for Pneumonia, presenting with the following:  -There is an Unstageable Pressure Injury to the Sacrococcyx Area (16cm x 10cm x 2cm) with slough, granulation tissue, and drainage present  -There is an Unstageable Pressure Injury to the L. Lat Foot (0.5cm x 0.5cm) and R. Lat Foot (1cm x 1cm) without drainage

## 2017-10-23 NOTE — PROGRESS NOTE ADULT - ASSESSMENT
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 He developed encephalopathy and acute resp failure requiring intubation 10/14, extubated 10/16. DNR/DNI. Downgraded to SCU and now DC planning

## 2017-10-23 NOTE — DISCHARGE NOTE ADULT - CARE PLAN
Principal Discharge DX:	Acute on chronic respiratory failure with hypoxia and hypercapnia  Goal:	Remain stable  Instructions for follow-up, activity and diet:	extubated on 10/16  - patient was comfortable and saturating well over night on 2L nasal canula.  Secondary Diagnosis:	Bacteremia  Instructions for follow-up, activity and diet:	ESBL bacteremia  ESBL in urine blood and sacral DU debridement tissue  was treated with meropenem 500mg IV q8h; changed to primaxin upon   DC continue until 10/26/17.   Normal WBC, afebrile  repeat blood cultures negative to date.  Secondary Diagnosis:	Anemia  Instructions for follow-up, activity and diet:	received 1 unit PRBC this admission  H/H stable  c/w carafate and protonix.  Secondary Diagnosis:	CHF (congestive heart failure)  Instructions for follow-up, activity and diet:	Dilated LV with severe global left ventricular dysfunction. EF 10% - Cards to repeat echo (limited)  BNP elevated at 33,000  continues to have good urine output, - was diuresed  with IV lasix now on PO  c/w Lasix, BB, ACEI.  Secondary Diagnosis:	HTN (hypertension)  Instructions for follow-up, activity and diet:	c/w home meds  monitor BP  Secondary Diagnosis:	Afib  Instructions for follow-up, activity and diet:	c/w lopressor oral for rate control  INR now therapeutic  Secondary Diagnosis:	DM (diabetes mellitus)  Instructions for follow-up, activity and diet:	c/w with fingerstick monitoring  HISS Principal Discharge DX:	Acute on chronic respiratory failure with hypoxia and hypercapnia  Goal:	Remain stable  Instructions for follow-up, activity and diet:	extubated on 10/16  - patient was comfortable and saturating well over night on 2L nasal canula.  Secondary Diagnosis:	Bacteremia  Instructions for follow-up, activity and diet:	ESBL bacteremia  ESBL in urine blood and sacral DU debridement tissue  was treated with meropenem 500mg IV q8h; changed to primaxin upon   DC continue until 10/26/17.   Normal WBC, afebrile  repeat blood cultures negative to date.  Secondary Diagnosis:	Anemia  Instructions for follow-up, activity and diet:	received 2 unit PRBC this admission  H/H stable  c/w carafate and protonix.  Secondary Diagnosis:	CHF (congestive heart failure)  Instructions for follow-up, activity and diet:	Dilated LV with severe global left ventricular dysfunction. EF 10% - Cards to repeat echo (limited)  BNP elevated at 33,000  continues to have good urine output, - was diuresed  with IV lasix now on PO  c/w Lasix, BB, ACEI.  Secondary Diagnosis:	HTN (hypertension)  Instructions for follow-up, activity and diet:	c/w home meds  monitor BP  Secondary Diagnosis:	Afib  Instructions for follow-up, activity and diet:	c/w lopressor oral for rate control  INR now therapeutic  Secondary Diagnosis:	DM (diabetes mellitus)  Instructions for follow-up, activity and diet:	c/w with fingerstick monitoring  HISS Principal Discharge DX:	Acute on chronic respiratory failure with hypoxia and hypercapnia  Goal:	Remain stable  Instructions for follow-up, activity and diet:	extubated on 10/16  - patient was comfortable and saturating well over night on 2L nasal canula.  Secondary Diagnosis:	Bacteremia  Instructions for follow-up, activity and diet:	ESBL bacteremia  ESBL in urine blood and sacral DU debridement tissue  was treated with meropenem 500mg IV q8h; changed to primaxin upon   DC continue until 10/26/17.   Normal WBC, afebrile  repeat blood cultures negative to date.  Secondary Diagnosis:	Anemia  Instructions for follow-up, activity and diet:	received 2 units PRBC this admission  H/H stable  c/w carafate, protonix, and iron supplements  monitor CBC  Secondary Diagnosis:	CHF (congestive heart failure)  Instructions for follow-up, activity and diet:	Dilated LV with severe global left ventricular dysfunction. EF 10% - Cards to repeat echo (limited)  BNP elevated at 33,000  continues to have good urine output, - was diuresed  with IV lasix now on PO  c/w Lasix, BB, ACEI.  Secondary Diagnosis:	HTN (hypertension)  Instructions for follow-up, activity and diet:	c/w home meds  monitor BP  Secondary Diagnosis:	Afib  Instructions for follow-up, activity and diet:	c/w lopressor oral for rate control  INR now therapeutic  Secondary Diagnosis:	DM (diabetes mellitus)  Instructions for follow-up, activity and diet:	c/w with fingerstick monitoring  HISS Principal Discharge DX:	Acute on chronic respiratory failure with hypoxia and hypercapnia  Goal:	Remain stable  Instructions for follow-up, activity and diet:	extubated on 10/16  - patient was comfortable and saturating well over night on 2L nasal canula.  Secondary Diagnosis:	Bacteremia  Instructions for follow-up, activity and diet:	ESBL bacteremia  ESBL in urine blood and sacral DU debridement tissue  treated with meropenem 500mg IV q8h; last dose tonight at 10p then can dc picc   Normal WBC, afebrile  repeat blood cultures negative to date.  Secondary Diagnosis:	Anemia  Instructions for follow-up, activity and diet:	received 2 units PRBC this admission  H/H stable  c/w carafate, protonix, and iron supplements  monitor CBC  Secondary Diagnosis:	CHF (congestive heart failure)  Instructions for follow-up, activity and diet:	Dilated LV with severe global left ventricular dysfunction. EF 10% - Cards to repeat echo (limited)  BNP elevated at 33,000  continues to have good urine output, - was diuresed  with IV lasix now on PO  c/w Lasix, BB, ACEI.  Secondary Diagnosis:	HTN (hypertension)  Instructions for follow-up, activity and diet:	c/w home meds  monitor BP  Secondary Diagnosis:	Afib  Instructions for follow-up, activity and diet:	c/w lopressor oral for rate control  INR 1.78  Secondary Diagnosis:	DM (diabetes mellitus)  Instructions for follow-up, activity and diet:	c/w with fingerstick monitoring  HISS Principal Discharge DX:	Acute on chronic respiratory failure with hypoxia and hypercapnia  Goal:	Remain stable  Instructions for follow-up, activity and diet:	extubated on 10/16  - patient was comfortable and saturating well over night on 2L nasal canula.  Secondary Diagnosis:	Bacteremia  Instructions for follow-up, activity and diet:	ESBL bacteremia  ESBL in urine blood and sacral DU debridement tissue  treated with meropenem 500mg IV q8h; last dose tonight at 10p   Normal WBC, afebrile  repeat blood cultures negative to date.  Secondary Diagnosis:	Anemia  Instructions for follow-up, activity and diet:	received 2 units PRBC this admission  H/H stable  c/w carafate, protonix, and iron supplements  monitor CBC  Secondary Diagnosis:	CHF (congestive heart failure)  Instructions for follow-up, activity and diet:	Dilated LV with severe global left ventricular dysfunction. EF 10% - Cards to repeat echo (limited)  BNP elevated at 33,000  continues to have good urine output, - was diuresed  with IV lasix now on PO  c/w Lasix, BB, ACEI.  Secondary Diagnosis:	HTN (hypertension)  Instructions for follow-up, activity and diet:	c/w home meds  monitor BP  Secondary Diagnosis:	Afib  Instructions for follow-up, activity and diet:	c/w lopressor oral for rate control  INR 1.78  Secondary Diagnosis:	DM (diabetes mellitus)  Instructions for follow-up, activity and diet:	c/w with fingerstick monitoring  HISS

## 2017-10-23 NOTE — DISCHARGE NOTE ADULT - CARE PROVIDER_API CALL
Zay Valles), Internal Medicine  [address] [place] [address]  Phone: [phone]  Fax: [phone]

## 2017-10-23 NOTE — DISCHARGE NOTE ADULT - MEDICATION SUMMARY - MEDICATIONS TO TAKE
I will START or STAY ON the medications listed below when I get home from the hospital:    aspirin 81 mg oral delayed release tablet  -- 1 tab(s) by mouth once a day  -- Indication: For Afib    lisinopril 5 mg oral tablet  -- 1 tab(s) by mouth once a day  -- Indication: For HTN (hypertension)    tamsulosin 0.4 mg oral capsule  -- 1 cap(s) by mouth once a day (at bedtime)  -- Indication: For BPH    warfarin 2 mg oral tablet  -- 1 tab(s) by mouth once a day  -- Indication: For Afib    metFORMIN 1000 mg oral tablet  -- 1 tab(s) by mouth 2 times a day  -- Indication: For DM (diabetes mellitus)    insulin lispro 100 units/mL subcutaneous solution  --  subcutaneous 4 times a day (before meals and at bedtime); 1 Unit(s) if Glucose 151 - 200  2 Unit(s) if Glucose 201 - 250  3 Unit(s) if Glucose 251 - 300  4 Unit(s) if Glucose 301 - 350  5 Unit(s) if Glucose 351 - 400  6 Unit(s) if Glucose GREATER THAN 400  -- Indication: For DM (diabetes mellitus)    metoprolol tartrate 25 mg oral tablet  -- 1 tab(s) by mouth 2 times a day  -- Indication: For Afib    ipratropium-albuterol 0.5 mg-2.5 mg/3 mLinhalation solution  -- 3 milliliter(s) inhaled 4 times a day  -- Indication: For SOB    Primaxin  mg injection  -- 500 milligram(s) injectable every 6 hours until 10/26/17.  -- Indication: For Bacteremia    collagenase 250 units/g topical ointment  -- 1 application on skin once a day  -- Indication: For Sacral wound    furosemide 40 mg oral tablet  -- 1 tab(s) by mouth once a day  -- Indication: For CHF (congestive heart failure)    ferrous sulfate 300 mg/5 mL (60 mg elemental iron) oral liquid  -- 5 milliliter(s) by mouth once a day  -- Indication: For Anemia    polyethylene glycol 3350 oral powder for reconstitution  -- 17 gram(s) by mouth once a day  -- Indication: For Constipation    docusate sodium 100 mg oral capsule  -- 1 cap(s) by mouth 2 times a day  -- Indication: For Constipation    zinc sulfate 220 mg oral capsule  -- 1 cap(s) by mouth once a day  -- Indication: For Supplement    sucralfate 1 g/10 mL oral suspension  -- 10 milliliter(s) by mouth every 6 hours  -- Indication: For Duodenitis    pantoprazole 40 mg oral granule, enteric coated  -- 40 milligram(s) by mouth once a day  -- Indication: For GI bleed    folic acid 1 mg oral tablet  -- 1 tab(s) by mouth once a day  -- Indication: For Anemia    ascorbic acid 500 mg oral tablet  -- 1 tab(s) by mouth once a day  -- Indication: For Supplement I will START or STAY ON the medications listed below when I get home from the hospital:    aspirin 81 mg oral delayed release tablet  -- 1 tab(s) by mouth once a day  -- Indication: For Afib    lisinopril 5 mg oral tablet  -- 1 tab(s) by mouth once a day  -- Indication: For HTN (hypertension)    tamsulosin 0.4 mg oral capsule  -- 1 cap(s) by mouth once a day (at bedtime)  -- Indication: For BPH    warfarin 3 mg oral tablet  -- 3 milligram(s) by mouth once a day (at bedtime)  INR goal 2-3  -- Indication: For Afib    metFORMIN 1000 mg oral tablet  -- 1 tab(s) by mouth 2 times a day  -- Indication: For DM (diabetes mellitus)    insulin lispro 100 units/mL subcutaneous solution  --  subcutaneous 4 times a day (before meals and at bedtime); 1 Unit(s) if Glucose 151 - 200  2 Unit(s) if Glucose 201 - 250  3 Unit(s) if Glucose 251 - 300  4 Unit(s) if Glucose 301 - 350  5 Unit(s) if Glucose 351 - 400  6 Unit(s) if Glucose GREATER THAN 400  -- Indication: For DM (diabetes mellitus)    metoprolol tartrate 25 mg oral tablet  -- 1 tab(s) by mouth 2 times a day  -- Indication: For Afib    ipratropium-albuterol 0.5 mg-2.5 mg/3 mLinhalation solution  -- 3 milliliter(s) inhaled 4 times a day  -- Indication: For SOB    Primaxin  mg injection  -- 500 milligram(s) injectable every 6 hours until 10/26/17.  -- Indication: For Bacteremia    collagenase 250 units/g topical ointment  -- 1 application on skin once a day  -- Indication: For Sacral wound    furosemide 40 mg oral tablet  -- 1 tab(s) by mouth once a day  -- Indication: For CHF (congestive heart failure)    ferrous sulfate 300 mg/5 mL (60 mg elemental iron) oral liquid  -- 5 milliliter(s) by mouth once a day  -- Indication: For Anemia    polyethylene glycol 3350 oral powder for reconstitution  -- 17 gram(s) by mouth once a day  -- Indication: For Constipation    docusate sodium 100 mg oral capsule  -- 1 cap(s) by mouth 2 times a day  -- Indication: For Constipation    zinc sulfate 220 mg oral capsule  -- 1 cap(s) by mouth once a day  -- Indication: For Supplement    sucralfate 1 g/10 mL oral suspension  -- 10 milliliter(s) by mouth every 6 hours  -- Indication: For Duodenitis    pantoprazole 40 mg oral granule, enteric coated  -- 40 milligram(s) by mouth once a day  -- Indication: For GI bleed    folic acid 1 mg oral tablet  -- 1 tab(s) by mouth once a day  -- Indication: For Anemia    ascorbic acid 500 mg oral tablet  -- 1 tab(s) by mouth once a day  -- Indication: For Supplement I will START or STAY ON the medications listed below when I get home from the hospital:    aspirin 81 mg oral delayed release tablet  -- 1 tab(s) by mouth once a day  -- Indication: For Afib    lisinopril 5 mg oral tablet  -- 1 tab(s) by mouth once a day  -- Indication: For HTN (hypertension)    tamsulosin 0.4 mg oral capsule  -- 1 cap(s) by mouth once a day (at bedtime)  -- Indication: For BPH    warfarin 3 mg oral tablet  -- 3 milligram(s) by mouth once a day (at bedtime)  INR goal 2-3  -- Indication: For Afib    insulin lispro 100 units/mL subcutaneous solution  --  subcutaneous 4 times a day (before meals and at bedtime); 1 Unit(s) if Glucose 151 - 200  2 Unit(s) if Glucose 201 - 250  3 Unit(s) if Glucose 251 - 300  4 Unit(s) if Glucose 301 - 350  5 Unit(s) if Glucose 351 - 400  6 Unit(s) if Glucose GREATER THAN 400  -- Indication: For DM (diabetes mellitus)    metFORMIN 1000 mg oral tablet  -- 1 tab(s) by mouth 2 times a day  -- Indication: For DM (diabetes mellitus)    metoprolol tartrate 25 mg oral tablet  -- 1 tab(s) by mouth 2 times a day  -- Indication: For Afib    ipratropium-albuterol 0.5 mg-2.5 mg/3 mLinhalation solution  -- 3 milliliter(s) inhaled 4 times a day  -- Indication: For SOB    meropenem 500 mg intravenous injection  -- 500 milligram(s) intravenous every 8 hours. Last dose tonight  -- Indication: For Bacteremia    collagenase 250 units/g topical ointment  -- 1 application on skin once a day  -- Indication: For Sacral wound    furosemide 40 mg oral tablet  -- 1 tab(s) by mouth once a day  -- Indication: For CHF (congestive heart failure)    ferrous sulfate 300 mg/5 mL (60 mg elemental iron) oral liquid  -- 5 milliliter(s) by mouth once a day  -- Indication: For Anemia    polyethylene glycol 3350 oral powder for reconstitution  -- 17 gram(s) by mouth once a day  -- Indication: For Constipation    docusate sodium 100 mg oral capsule  -- 1 cap(s) by mouth 2 times a day  -- Indication: For Constipation    zinc sulfate 220 mg oral capsule  -- 1 cap(s) by mouth once a day  -- Indication: For Supplement    sucralfate 1 g/10 mL oral suspension  -- 10 milliliter(s) by mouth every 6 hours  -- Indication: For Duodenitis    pantoprazole 40 mg oral granule, enteric coated  -- 40 milligram(s) by mouth once a day  -- Indication: For GI bleed    folic acid 1 mg oral tablet  -- 1 tab(s) by mouth once a day  -- Indication: For Anemia    ascorbic acid 500 mg oral tablet  -- 1 tab(s) by mouth once a day  -- Indication: For Supplement

## 2017-10-23 NOTE — PROGRESS NOTE ADULT - ASSESSMENT
1.	Infected sacral decubitus  2.	Bacteremia - ESBL  3.	?Pneumonia - likely CHF  ·	cont meropenem 500mg IV q8h   ·	if patient dc'ed to rehab can be switched to primaxin 500mg IV q6h until 10/26 1.	Infected sacral decubitus  2.	Bacteremia - ESBL  3.	?Pneumonia vs CHF  ·	dc planning tomorrow  ·	cont meropenem 500mg IV q8h   ·	upon dc can be switched to primaxin 500mg IV q6h until 10/26

## 2017-10-23 NOTE — PROGRESS NOTE ADULT - SUBJECTIVE AND OBJECTIVE BOX
Patient is a 82y old  Male who presents with a chief complaint of low hb level in NH (05 Oct 2017 19:36)      INTERVAL HPI/OVERNIGHT EVENTS: No events overnight, low h/h    Vital Signs Last 24 Hrs  T(C): 36.9 (22 Oct 2017 06:04), Max: 36.9 (21 Oct 2017 20:45)  T(F): 98.5 (22 Oct 2017 06:04), Max: 98.5 (21 Oct 2017 20:45)  HR: 104 (22 Oct 2017 06:04) (69 - 104)  BP: 110/64 (22 Oct 2017 06:04) (103/75 - 143/71)  BP(mean): --  RR: 22 (22 Oct 2017 06:04) (19 - 24)  SpO2: 98% (22 Oct 2017 06:04) (96% - 100%))    LABS:                          7.8    8.4   )-----------( 246      ( 23 Oct 2017 07:44 )             27.3   10-23    141  |  104  |  28<H>  ----------------------------<  110<H>  3.9   |  28  |  0.75    Ca    8.2<L>      23 Oct 2017 07:44  Phos  2.8     10-23  Mg     2.1     10-23    PHYSICAL EXAM:  GENERAL: NAD, well-groomed, well-developed, no respiratory distress  HEAD:  Atraumatic, Normocephalic  EYES: conjunctiva and sclera clear  NERVOUS SYSTEM:  Alert, nonfocal exam  CHEST/LUNG: Clear to percussion bilaterally; no wheeze  HEART: irregularly irregular  ABDOMEN: Soft, Nontender, distended; Bowel sounds present  EXTREMITIES:  No clubbing, cyanosis; 1+ edema  SKIN: No rashes    Care Discussed with Consultants/Other Providers [x ] YES  [ ] NO

## 2017-10-24 LAB
ANION GAP SERPL CALC-SCNC: 5 MMOL/L — SIGNIFICANT CHANGE UP (ref 5–17)
BUN SERPL-MCNC: 34 MG/DL — HIGH (ref 7–18)
CALCIUM SERPL-MCNC: 8.3 MG/DL — LOW (ref 8.4–10.5)
CHLORIDE SERPL-SCNC: 104 MMOL/L — SIGNIFICANT CHANGE UP (ref 96–108)
CO2 SERPL-SCNC: 32 MMOL/L — HIGH (ref 22–31)
CREAT SERPL-MCNC: 0.84 MG/DL — SIGNIFICANT CHANGE UP (ref 0.5–1.3)
GLUCOSE BLDC GLUCOMTR-MCNC: 154 MG/DL — HIGH (ref 70–99)
GLUCOSE BLDC GLUCOMTR-MCNC: 179 MG/DL — HIGH (ref 70–99)
GLUCOSE BLDC GLUCOMTR-MCNC: 187 MG/DL — HIGH (ref 70–99)
GLUCOSE BLDC GLUCOMTR-MCNC: 192 MG/DL — HIGH (ref 70–99)
GLUCOSE SERPL-MCNC: 124 MG/DL — HIGH (ref 70–99)
HCT VFR BLD CALC: 28.8 % — LOW (ref 39–50)
HGB BLD-MCNC: 9 G/DL — LOW (ref 13–17)
INR BLD: 1.8 RATIO — HIGH (ref 0.88–1.16)
MCHC RBC-ENTMCNC: 26.9 PG — LOW (ref 27–34)
MCHC RBC-ENTMCNC: 31.2 GM/DL — LOW (ref 32–36)
MCV RBC AUTO: 86.1 FL — SIGNIFICANT CHANGE UP (ref 80–100)
PLATELET # BLD AUTO: 251 K/UL — SIGNIFICANT CHANGE UP (ref 150–400)
POTASSIUM SERPL-MCNC: 3.8 MMOL/L — SIGNIFICANT CHANGE UP (ref 3.5–5.3)
POTASSIUM SERPL-SCNC: 3.8 MMOL/L — SIGNIFICANT CHANGE UP (ref 3.5–5.3)
PROTHROM AB SERPL-ACNC: 19.9 SEC — HIGH (ref 9.8–12.7)
RBC # BLD: 3.35 M/UL — LOW (ref 4.2–5.8)
RBC # FLD: 23.4 % — HIGH (ref 10.3–14.5)
SODIUM SERPL-SCNC: 141 MMOL/L — SIGNIFICANT CHANGE UP (ref 135–145)
WBC # BLD: 9.2 K/UL — SIGNIFICANT CHANGE UP (ref 3.8–10.5)
WBC # FLD AUTO: 9.2 K/UL — SIGNIFICANT CHANGE UP (ref 3.8–10.5)

## 2017-10-24 RX ORDER — WARFARIN SODIUM 2.5 MG/1
3 TABLET ORAL ONCE
Refills: 0 | Status: COMPLETED | OUTPATIENT
Start: 2017-10-24 | End: 2017-10-24

## 2017-10-24 RX ADMIN — Medication 40 MILLIGRAM(S): at 05:41

## 2017-10-24 RX ADMIN — Medication 3 MILLILITER(S): at 21:02

## 2017-10-24 RX ADMIN — Medication 3 MILLILITER(S): at 14:14

## 2017-10-24 RX ADMIN — Medication 1 GRAM(S): at 05:41

## 2017-10-24 RX ADMIN — Medication 1 GRAM(S): at 12:20

## 2017-10-24 RX ADMIN — Medication 650 MILLIGRAM(S): at 09:22

## 2017-10-24 RX ADMIN — Medication 1 APPLICATION(S): at 12:21

## 2017-10-24 RX ADMIN — MEROPENEM 200 MILLIGRAM(S): 1 INJECTION INTRAVENOUS at 21:30

## 2017-10-24 RX ADMIN — MEROPENEM 200 MILLIGRAM(S): 1 INJECTION INTRAVENOUS at 05:40

## 2017-10-24 RX ADMIN — Medication 1 GRAM(S): at 17:23

## 2017-10-24 RX ADMIN — Medication 1: at 17:22

## 2017-10-24 RX ADMIN — MEROPENEM 200 MILLIGRAM(S): 1 INJECTION INTRAVENOUS at 12:24

## 2017-10-24 RX ADMIN — Medication 1: at 12:20

## 2017-10-24 RX ADMIN — Medication 300 MILLIGRAM(S): at 12:21

## 2017-10-24 RX ADMIN — Medication 25 MILLIGRAM(S): at 05:40

## 2017-10-24 RX ADMIN — TAMSULOSIN HYDROCHLORIDE 0.4 MILLIGRAM(S): 0.4 CAPSULE ORAL at 21:30

## 2017-10-24 RX ADMIN — Medication 500 MILLIGRAM(S): at 12:20

## 2017-10-24 RX ADMIN — ZINC SULFATE TAB 220 MG (50 MG ZINC EQUIVALENT) 220 MILLIGRAM(S): 220 (50 ZN) TAB at 12:21

## 2017-10-24 RX ADMIN — Medication 3 MILLILITER(S): at 08:35

## 2017-10-24 RX ADMIN — Medication 81 MILLIGRAM(S): at 12:20

## 2017-10-24 RX ADMIN — WARFARIN SODIUM 3 MILLIGRAM(S): 2.5 TABLET ORAL at 21:30

## 2017-10-24 RX ADMIN — Medication 1 MILLIGRAM(S): at 12:21

## 2017-10-24 RX ADMIN — LISINOPRIL 5 MILLIGRAM(S): 2.5 TABLET ORAL at 05:41

## 2017-10-24 RX ADMIN — Medication 1: at 21:31

## 2017-10-24 RX ADMIN — PANTOPRAZOLE SODIUM 40 MILLIGRAM(S): 20 TABLET, DELAYED RELEASE ORAL at 09:19

## 2017-10-24 NOTE — PROGRESS NOTE ADULT - ASSESSMENT
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 He developed encephalopathy and acute resp failure requiring intubation 10/14, extubated 10/16. DNR/DNI. Downgraded to SCU and now DC planning

## 2017-10-24 NOTE — PROGRESS NOTE ADULT - ASSESSMENT
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 He developed encephalopathy and acute resp failure requiring intubation 10/14, extubated 10/16. DNR/DNI. Downgraded to SCU

## 2017-10-24 NOTE — PROGRESS NOTE ADULT - SUBJECTIVE AND OBJECTIVE BOX
DNR [x ]   DNI  [ x ]    INTERVAL HPI/OVERNIGHT EVENTS: s/p 1 unit PRBC; no acute events overnight    PRESSORS: [ ] YES [x ] NO  WHICH:    meropenem IVPB 500 milliGRAM(s) IV Intermittent every 8 hours    Cardiovascular: Severe left ventricular enlargement. Severe global left ventricular dysfunction.  Right ventricular enlargement with decreased RV function.    furosemide    Tablet 40 milliGRAM(s) Oral daily  lisinopril 5 milliGRAM(s) Oral daily  metoprolol 25 milliGRAM(s) Oral two times a day  tamsulosin 0.4 milliGRAM(s) Oral at bedtime    Pulmonary:  ALBUTerol/ipratropium for Nebulization 3 milliLiter(s) Nebulizer every 6 hours    Hematalogic:  aspirin enteric coated 81 milliGRAM(s) Oral daily    Other:  acetaminophen    Suspension 650 milliGRAM(s) Oral every 6 hours PRN  ascorbic acid 500 milliGRAM(s) Oral daily  collagenase Ointment 1 Application(s) Topical daily  dextrose 5%. 1000 milliLiter(s) IV Continuous <Continuous>  dextrose 50% Injectable 12.5 Gram(s) IV Push once  dextrose Gel 1 Dose(s) Oral once PRN  ferrous    sulfate Liquid 300 milliGRAM(s) Oral daily  folic acid 1 milliGRAM(s) Oral daily  glucagon  Injectable 1 milliGRAM(s) IntraMuscular once PRN  insulin lispro (HumaLOG) corrective regimen sliding scale   SubCutaneous Before meals and at bedtime  pantoprazole   Suspension 40 milliGRAM(s) Oral before breakfast  polyethylene glycol 3350 17 Gram(s) Oral daily  sucralfate suspension 1 Gram(s) Oral every 6 hours  zinc sulfate 220 milliGRAM(s) Oral daily    acetaminophen    Suspension 650 milliGRAM(s) Oral every 6 hours PRN  ALBUTerol/ipratropium for Nebulization 3 milliLiter(s) Nebulizer every 6 hours  ascorbic acid 500 milliGRAM(s) Oral daily  aspirin enteric coated 81 milliGRAM(s) Oral daily  collagenase Ointment 1 Application(s) Topical daily  dextrose 5%. 1000 milliLiter(s) IV Continuous <Continuous>  dextrose 50% Injectable 12.5 Gram(s) IV Push once  dextrose Gel 1 Dose(s) Oral once PRN  ferrous    sulfate Liquid 300 milliGRAM(s) Oral daily  folic acid 1 milliGRAM(s) Oral daily  furosemide    Tablet 40 milliGRAM(s) Oral daily  glucagon  Injectable 1 milliGRAM(s) IntraMuscular once PRN  insulin lispro (HumaLOG) corrective regimen sliding scale   SubCutaneous Before meals and at bedtime  lisinopril 5 milliGRAM(s) Oral daily  meropenem IVPB 500 milliGRAM(s) IV Intermittent every 8 hours  metoprolol 25 milliGRAM(s) Oral two times a day  pantoprazole   Suspension 40 milliGRAM(s) Oral before breakfast  polyethylene glycol 3350 17 Gram(s) Oral daily  sucralfate suspension 1 Gram(s) Oral every 6 hours  tamsulosin 0.4 milliGRAM(s) Oral at bedtime  zinc sulfate 220 milliGRAM(s) Oral daily    Drug Dosing Weight  Height (cm): 177.8 (05 Oct 2017 16:02)  Weight (kg): 90 (15 Oct 2017 07:30)  BMI (kg/m2): 28.5 (15 Oct 2017 07:30)  BSA (m2): 2.08 (15 Oct 2017 07:30)    CENTRAL LINE: [ ] YES [x ] NO  LOCATION:   DATE INSERTED:  REMOVE: [ ] YES [ ] NO  EXPLAIN:    TURNER: [ ] YES [x ] NO    DATE INSERTED:  REMOVE:  [ ] YES [ ] NO  EXPLAIN:    A-LINE:  [ ] YES [x ] NO  LOCATION:   DATE INSERTED:  REMOVE:  [ ] YES [ ] NO  EXPLAIN:    PAST MEDICAL & SURGICAL HISTORY:  DM (diabetes mellitus)  Afib  CHF (congestive heart failure)  No significant past surgical history              10-23 @ 07:01  -  10-24 @ 07:00  --------------------------------------------------------  IN: 350 mL / OUT: 0 mL / NET: 350 mL            PHYSICAL EXAM:    GENERAL: NAD, well-groomed, well-developed  HEAD:  Atraumatic, Normocephalic  EYES: EOMI, PERRLA, conjunctiva and sclera clear  ENMT: No tonsillar erythema, exudates, or enlargement;   NECK: Supple, No JVD, Normal thyroid  NERVOUS SYSTEM:  Alert, awake, confused   CHEST/LUNG: Clear to percussion bilaterally; No rales, rhonchi, wheezing, or rubs  HEART: Regular rate and rhythm; No murmurs, rubs, or gallops  ABDOMEN: Soft, Nontender, Nondistended; Bowel sounds present  EXTREMITIES:  2+ Peripheral Pulses, No clubbing, cyanosis, or edema  LYMPH: No lymphadenopathy noted  SKIN:  sacral decub      LABS:  CBC Full  -  ( 24 Oct 2017 08:45 )  WBC Count : 9.2 K/uL  Hemoglobin : 9.0 g/dL  Hematocrit : 28.8 %  Platelet Count - Automated : 251 K/uL  Mean Cell Volume : 86.1 fl  Mean Cell Hemoglobin : 26.9 pg  Mean Cell Hemoglobin Concentration : 31.2 gm/dL  Auto Neutrophil # : x  Auto Lymphocyte # : x  Auto Monocyte # : x  Auto Eosinophil # : x  Auto Basophil # : x  Auto Neutrophil % : x  Auto Lymphocyte % : x  Auto Monocyte % : x  Auto Eosinophil % : x  Auto Basophil % : x    10-24    141  |  104  |  34<H>  ----------------------------<  124<H>  3.8   |  32<H>  |  0.84    Ca    8.3<L>      24 Oct 2017 08:45  Phos  2.8     10-23  Mg     2.1     10-23      PT/INR - ( 24 Oct 2017 08:45 )   PT: 19.9 sec;   INR: 1.80 ratio         PTT - ( 23 Oct 2017 07:44 )  PTT:33.4 sec          [x  ]  DVT Prophylaxis - full dose AC  [x  ]  Nutrition, Brand, Rate - pureed nectar consistency         Goal Rate         Abdominal Nutritional Status -  Malnutrition   Cachexia      Morbid Obesity BMI >/=40    RADIOLOGY & ADDITIONAL STUDIES:  ***    [  ] Goals of Care Discussion with Family/Proxy/Other           Elements of Conversation Discussed: Patient/Family understanding of current illness   Advanced Directives                                                                       Prognosis  Treatment Options  Care Aligned with patient's wishes                                             TIME SPENT: 35 minutes

## 2017-10-24 NOTE — PROGRESS NOTE ADULT - PROBLEM SELECTOR PLAN 2
ESBL bacteremia  ESBL in urine blood and sacral DU debridement tissue  cont meropenem 500mg IV q8h while in hospital can change to primaxin upon DC cont abx until 10/26 as per ID  Normal WBC, afebrile  repeat blood cultures negative to date.

## 2017-10-24 NOTE — PROGRESS NOTE ADULT - SUBJECTIVE AND OBJECTIVE BOX
10-24    141  |  104  |  34<H>  ----------------------------<  124<H>  3.8   |  32<H>  |  0.84    Ca    8.3<L>      24 Oct 2017 08:45  Phos  2.8     10-23  Mg     2.1     10-23    Patient is a 82y old  Male who presents with a chief complaint of low hb level in NH (05 Oct 2017 19:36)      INTERVAL HPI/OVERNIGHT EVENTS: No events overnight, s/p PRBc 1 u transfusion.    Vital Signs Last 24 Hrs  T(C): 37.1 (24 Oct 2017 12:56), Max: 37.4 (23 Oct 2017 20:44)  T(F): 98.7 (24 Oct 2017 12:56), Max: 99.3 (23 Oct 2017 20:44)  HR: 86 (24 Oct 2017 12:56) (86 - 106)  BP: 95/56 (24 Oct 2017 12:56) (94/57 - 109/67)  BP(mean): --  RR: 17 (24 Oct 2017 12:56) (14 - 18)  SpO2: 98% (24 Oct 2017 12:56) (98% - 100%)    LABS:  	                        9.0    9.2   )-----------( 251      ( 24 Oct 2017 08:45 )             28.8           PHYSICAL EXAM:  GENERAL: NAD, well-groomed, well-developed, no respiratory distress  HEAD:  Atraumatic, Normocephalic  EYES: conjunctiva and sclera clear  NERVOUS SYSTEM:  Alert, nonfocal exam  CHEST/LUNG: Clear to percussion bilaterally; no wheeze  HEART: irregularly irregular  ABDOMEN: Soft, Nontender, distended; Bowel sounds present  EXTREMITIES:  No clubbing, cyanosis; 1+ edema  SKIN: No rashes    Care Discussed with Consultants/Other Providers [x ] YES  [ ] NO

## 2017-10-25 LAB
GLUCOSE BLDC GLUCOMTR-MCNC: 101 MG/DL — HIGH (ref 70–99)
GLUCOSE BLDC GLUCOMTR-MCNC: 133 MG/DL — HIGH (ref 70–99)
GLUCOSE BLDC GLUCOMTR-MCNC: 202 MG/DL — HIGH (ref 70–99)
INR BLD: 1.69 RATIO — HIGH (ref 0.88–1.16)
PROTHROM AB SERPL-ACNC: 18.6 SEC — HIGH (ref 9.8–12.7)

## 2017-10-25 RX ORDER — IPRATROPIUM/ALBUTEROL SULFATE 18-103MCG
3 AEROSOL WITH ADAPTER (GRAM) INHALATION ONCE
Refills: 0 | Status: DISCONTINUED | OUTPATIENT
Start: 2017-10-25 | End: 2017-10-26

## 2017-10-25 RX ORDER — WARFARIN SODIUM 2.5 MG/1
3 TABLET ORAL
Qty: 0 | Refills: 0 | DISCHARGE
Start: 2017-10-25

## 2017-10-25 RX ORDER — WARFARIN SODIUM 2.5 MG/1
5 TABLET ORAL ONCE
Refills: 0 | Status: COMPLETED | OUTPATIENT
Start: 2017-10-25 | End: 2017-10-25

## 2017-10-25 RX ADMIN — Medication 81 MILLIGRAM(S): at 11:59

## 2017-10-25 RX ADMIN — Medication 2: at 22:26

## 2017-10-25 RX ADMIN — Medication 300 MILLIGRAM(S): at 12:00

## 2017-10-25 RX ADMIN — Medication 3 MILLILITER(S): at 09:44

## 2017-10-25 RX ADMIN — Medication 1 GRAM(S): at 12:00

## 2017-10-25 RX ADMIN — Medication 500 MILLIGRAM(S): at 11:59

## 2017-10-25 RX ADMIN — Medication 1 GRAM(S): at 23:50

## 2017-10-25 RX ADMIN — Medication 1 APPLICATION(S): at 12:02

## 2017-10-25 RX ADMIN — Medication 40 MILLIGRAM(S): at 06:24

## 2017-10-25 RX ADMIN — TAMSULOSIN HYDROCHLORIDE 0.4 MILLIGRAM(S): 0.4 CAPSULE ORAL at 22:28

## 2017-10-25 RX ADMIN — LISINOPRIL 5 MILLIGRAM(S): 2.5 TABLET ORAL at 06:25

## 2017-10-25 RX ADMIN — MEROPENEM 200 MILLIGRAM(S): 1 INJECTION INTRAVENOUS at 13:07

## 2017-10-25 RX ADMIN — Medication 1: at 13:45

## 2017-10-25 RX ADMIN — POLYETHYLENE GLYCOL 3350 17 GRAM(S): 17 POWDER, FOR SOLUTION ORAL at 11:59

## 2017-10-25 RX ADMIN — Medication 1 GRAM(S): at 06:25

## 2017-10-25 RX ADMIN — ZINC SULFATE TAB 220 MG (50 MG ZINC EQUIVALENT) 220 MILLIGRAM(S): 220 (50 ZN) TAB at 11:59

## 2017-10-25 RX ADMIN — Medication 25 MILLIGRAM(S): at 06:25

## 2017-10-25 RX ADMIN — MEROPENEM 200 MILLIGRAM(S): 1 INJECTION INTRAVENOUS at 06:23

## 2017-10-25 RX ADMIN — MEROPENEM 200 MILLIGRAM(S): 1 INJECTION INTRAVENOUS at 22:28

## 2017-10-25 RX ADMIN — Medication 1 MILLIGRAM(S): at 11:59

## 2017-10-25 RX ADMIN — Medication 1 GRAM(S): at 00:06

## 2017-10-25 RX ADMIN — PANTOPRAZOLE SODIUM 40 MILLIGRAM(S): 20 TABLET, DELAYED RELEASE ORAL at 06:24

## 2017-10-25 RX ADMIN — Medication 1 GRAM(S): at 17:12

## 2017-10-25 RX ADMIN — WARFARIN SODIUM 5 MILLIGRAM(S): 2.5 TABLET ORAL at 22:32

## 2017-10-25 NOTE — PROGRESS NOTE ADULT - SUBJECTIVE AND OBJECTIVE BOX
DNR [x ]   DNI  [x  ]    INTERVAL HPI/OVERNIGHT EVENTS: Stable H/H; no acute events    PRESSORS: [ ] YES [x ] NO  WHICH:    meropenem IVPB 500 milliGRAM(s) IV Intermittent every 8 hours    furosemide    Tablet 40 milliGRAM(s) Oral daily  lisinopril 5 milliGRAM(s) Oral daily  metoprolol 25 milliGRAM(s) Oral two times a day  tamsulosin 0.4 milliGRAM(s) Oral at bedtime    Pulmonary:  ALBUTerol/ipratropium for Nebulization 3 milliLiter(s) Nebulizer every 6 hours    Hematalogic:  aspirin enteric coated 81 milliGRAM(s) Oral daily    Other:  acetaminophen    Suspension 650 milliGRAM(s) Oral every 6 hours PRN  ascorbic acid 500 milliGRAM(s) Oral daily  collagenase Ointment 1 Application(s) Topical daily  dextrose 5%. 1000 milliLiter(s) IV Continuous <Continuous>  dextrose 50% Injectable 12.5 Gram(s) IV Push once  dextrose Gel 1 Dose(s) Oral once PRN  ferrous    sulfate Liquid 300 milliGRAM(s) Oral daily  folic acid 1 milliGRAM(s) Oral daily  glucagon  Injectable 1 milliGRAM(s) IntraMuscular once PRN  insulin lispro (HumaLOG) corrective regimen sliding scale   SubCutaneous Before meals and at bedtime  pantoprazole   Suspension 40 milliGRAM(s) Oral before breakfast  polyethylene glycol 3350 17 Gram(s) Oral daily  sucralfate suspension 1 Gram(s) Oral every 6 hours  zinc sulfate 220 milliGRAM(s) Oral daily    acetaminophen    Suspension 650 milliGRAM(s) Oral every 6 hours PRN  ALBUTerol/ipratropium for Nebulization 3 milliLiter(s) Nebulizer every 6 hours  ascorbic acid 500 milliGRAM(s) Oral daily  aspirin enteric coated 81 milliGRAM(s) Oral daily  collagenase Ointment 1 Application(s) Topical daily  dextrose 5%. 1000 milliLiter(s) IV Continuous <Continuous>  dextrose 50% Injectable 12.5 Gram(s) IV Push once  dextrose Gel 1 Dose(s) Oral once PRN  ferrous    sulfate Liquid 300 milliGRAM(s) Oral daily  folic acid 1 milliGRAM(s) Oral daily  furosemide    Tablet 40 milliGRAM(s) Oral daily  glucagon  Injectable 1 milliGRAM(s) IntraMuscular once PRN  insulin lispro (HumaLOG) corrective regimen sliding scale   SubCutaneous Before meals and at bedtime  lisinopril 5 milliGRAM(s) Oral daily  meropenem IVPB 500 milliGRAM(s) IV Intermittent every 8 hours  metoprolol 25 milliGRAM(s) Oral two times a day  pantoprazole   Suspension 40 milliGRAM(s) Oral before breakfast  polyethylene glycol 3350 17 Gram(s) Oral daily  sucralfate suspension 1 Gram(s) Oral every 6 hours  tamsulosin 0.4 milliGRAM(s) Oral at bedtime  zinc sulfate 220 milliGRAM(s) Oral daily    Drug Dosing Weight  Height (cm): 177.8 (05 Oct 2017 16:02)  Weight (kg): 90 (15 Oct 2017 07:30)  BMI (kg/m2): 28.5 (15 Oct 2017 07:30)  BSA (m2): 2.08 (15 Oct 2017 07:30)    CENTRAL LINE: [ ] YES [ ] NO  LOCATION:   DATE INSERTED: 10/13/17 right upper arm single lumen PICC placed in RI  REMOVE: [ ] YES [ ] NO  EXPLAIN:    TURNER: [ ] YES [x ] NO    DATE INSERTED:  REMOVE:  [ ] YES [ ] NO  EXPLAIN:    A-LINE:  [ ] YES [ x] NO  LOCATION:   DATE INSERTED:  REMOVE:  [ ] YES [ ] NO  EXPLAIN:    PAST MEDICAL & SURGICAL HISTORY:  DM (diabetes mellitus)  Afib  CHF (congestive heart failure)  No significant past surgical history    PHYSICAL EXAM:    GENERAL: Resting in bed in NAD,  HEAD:  Atraumatic, Normocephalic  EYES: EOMI, PERRLA, conjunctiva and sclera clear  ENMT: No tonsillar erythema, exudates, or enlargement;  NECK: Supple, No JVD, Normal thyroid  NERVOUS SYSTEM:  Alert & Oriented X 1-2   CHEST/LUNG: Clear to percussion bilaterally; No rales, rhonchi, wheezing, or rubs  HEART: Regular rate and rhythm; No murmurs, rubs, or gallops  ABDOMEN: Soft, Nontender, Nondistended; Bowel sounds present  EXTREMITIES:  2+ Peripheral Pulses, No clubbing, cyanosis, or edema  LYMPH: No lymphadenopathy noted  SKIN: Unstageable Pressure Injury to the Sacrococcyx Area (16cm x 10cm x 2cm) with slough, granulation tissue, and drainage present  -There is an Unstageable Pressure Injury to the L. Lat Foot (0.5cm x 0.5cm) and R. Lat Foot (1cm x 1cm) without drainage      LABS:  CBC Full  -  ( 24 Oct 2017 08:45 )  WBC Count : 9.2 K/uL  Hemoglobin : 9.0 g/dL  Hematocrit : 28.8 %  Platelet Count - Automated : 251 K/uL  Mean Cell Volume : 86.1 fl  Mean Cell Hemoglobin : 26.9 pg  Mean Cell Hemoglobin Concentration : 31.2 gm/dL  Auto Neutrophil # : x  Auto Lymphocyte # : x  Auto Monocyte # : x  Auto Eosinophil # : x  Auto Basophil # : x  Auto Neutrophil % : x  Auto Lymphocyte % : x  Auto Monocyte % : x  Auto Eosinophil % : x  Auto Basophil % : x    10-24    141  |  104  |  34<H>  ----------------------------<  124<H>  3.8   |  32<H>  |  0.84    Ca    8.3<L>      24 Oct 2017 08:45      PT/INR - ( 24 Oct 2017 08:45 )   PT: 19.9 sec;   INR: 1.80 ratio                   [x]  DVT Prophylaxis full dose AC  [ x ]  Nutrition, Brand, Rate - dysphagia 1 pureed nectar         Goal Rate         Abdominal Nutritional Status -  Malnutrition   Cachexia      Morbid Obesity BMI >/=40    RADIOLOGY & ADDITIONAL STUDIES:  ***    [  ] Goals of Care Discussion with Family/Proxy/Other           Elements of Conversation Discussed: Patient/Family understanding of current illness   Advanced Directives                                                                       Prognosis  Treatment Options  Care Aligned with patient's wishes                                             TIME SPENT: 35 minutes

## 2017-10-25 NOTE — PROGRESS NOTE ADULT - ASSESSMENT
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 He developed encephalopathy and acute resp failure requiring intubation 10/14, extubated 10/16. DNR/DNI. Downgraded to SCU

## 2017-10-25 NOTE — PROGRESS NOTE ADULT - PROBLEM SELECTOR PLAN 4
c/w lopressor oral for rate control  c/w coumadin for goal inr 2-3. c/w lopressor oral for rate control  INR today - 1.69  c/w coumadin for goal inr 2-3.

## 2017-10-25 NOTE — PROGRESS NOTE ADULT - SUBJECTIVE AND OBJECTIVE BOX
Patient is a 82y old  Male who presents with a chief complaint of low hb level in NH (05 Oct 2017 19:36)      INTERVAL HPI/OVERNIGHT EVENTS: No events.    Vital Signs Last 24 Hrs  T(C): 37.1 (25 Oct 2017 13:51), Max: 37.1 (25 Oct 2017 13:51)  T(F): 98.8 (25 Oct 2017 13:51), Max: 98.8 (25 Oct 2017 13:51)  HR: 104 (25 Oct 2017 05:47) (50 - 104)  BP: 89/56 (25 Oct 2017 13:51) (89/56 - 113/58)  BP(mean): --  RR: 14 (25 Oct 2017 13:51) (14 - 18)  SpO2: 96% (25 Oct 2017 13:51) (95% - 100%)    LABS:                        9.0    9.2   )-----------( 251      ( 24 Oct 2017 08:45 )             2    141  |  104  |  34<H>  ----------------------------<  124<H>  3.8   |  32<H>  |  0.84    Ca    8.3<L>      24 Oct 2017 08:45            Ca    8.2<L>      23 Oct 2017 07:44  Phos  2.8     10-23  Mg     2.1     10-23    PHYSICAL EXAM:  GENERAL: NAD, well-groomed, well-developed, no respiratory distress  HEAD:  Atraumatic, Normocephalic  EYES: conjunctiva and sclera clear  NERVOUS SYSTEM:  Alert, nonfocal exam  CHEST/LUNG: Clear to percussion bilaterally; no wheeze  HEART: irregularly irregular  ABDOMEN: Soft, Nontender, distended; Bowel sounds present  EXTREMITIES:  No clubbing, cyanosis; 1+ edema  SKIN: No rashes    Care Discussed with Consultants/Other Providers [x ] YES  [ ] NO

## 2017-10-25 NOTE — PROGRESS NOTE ADULT - SUBJECTIVE AND OBJECTIVE BOX
82y Male is under our care for bacteremia and infected sacral ulcer.  Patient was found sleeping and did not wish to be awakened. Has not had any fevers. Patient is still awaiting authorization to go to rehab.  Completes course of antibiotics tomorrow.    REVIEW OF SYSTEMS:  [ X ] Not able to illicit    MEDS:  meropenem IVPB 500 milliGRAM(s) IV Intermittent every 8 hours    ALLERGIES: penicillin (Unknown)    VITALS:  Vital Signs Last 24 Hrs  T(C): 37.1 (25 Oct 2017 13:51), Max: 37.1 (25 Oct 2017 13:51)  T(F): 98.8 (25 Oct 2017 13:51), Max: 98.8 (25 Oct 2017 13:51)  HR: 104 (25 Oct 2017 05:47) (50 - 104)  BP: 89/56 (25 Oct 2017 13:51) (89/56 - 113/58)  BP(mean): --  RR: 14 (25 Oct 2017 13:51) (14 - 18)  SpO2: 96% (25 Oct 2017 13:51) (95% - 100%)      PHYSICAL EXAM:  HEENT: +NC @ 2L/ min  Neck: n/a  Respiratory: bilateral scattered rales  Cardiovascular: S1 S2 reg no murmurs  Gastrointestinal: +BS with soft, nondistended and nontender abdomen    Extremities: +right arm PICC  Skin: no rashes  Ortho: n/a  Neuro: sleeping      LABS/DIAGNOSTIC TESTS: No new labs       CULTURES:   .Blood Blood-Peripheral  10-14 @ 22:45   No growth to date.  --  --      .Blood Blood-Peripheral  10-11 @ 13:27   No growth at 5 days.  --  --      .Surgical Swab sacral decubitus  10-09 @ 13:21   Rare Proteus mirabilis ESBL  Few Enterococcus faecalis (vancomycin resistant)  Few Bacteroides thetaiotaomicron  --  Proteus mirabilis ESBL  Enterococcus faecalis (vancomycin resistant)      .Urine Clean Catch (Midstream)  10-05 @ 23:11   50,000 - 99,000 CFU/mL Proteus mirabilis ESBL  --  Proteus mirabilis ESBL      .Blood Blood  10-05 @ 23:07   Growth in aerobic bottle: Proteus mirabilis ESBL  Growth in anaerobic bottle: Streptococcus anginosus      RADIOLOGY:  No new studies

## 2017-10-25 NOTE — PROGRESS NOTE ADULT - ASSESSMENT
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 He developed encephalopathy and acute resp failure requiring intubation 10/14, extubated 10/16. DNR/DNI. Downgraded to SCU and now DC planning; awaiting insurance auth.

## 2017-10-25 NOTE — PROGRESS NOTE ADULT - ASSESSMENT
1.	Infected sacral decubitus  2.	Bacteremia - ESBL  3.	?Pneumonia   ·	awaiting rehab authorization  ·	cont meropenem 500mg IV q8h, needs one more day of tx

## 2017-10-26 VITALS
DIASTOLIC BLOOD PRESSURE: 76 MMHG | SYSTOLIC BLOOD PRESSURE: 117 MMHG | HEART RATE: 87 BPM | TEMPERATURE: 99 F | OXYGEN SATURATION: 100 % | RESPIRATION RATE: 20 BRPM

## 2017-10-26 LAB
ANION GAP SERPL CALC-SCNC: 4 MMOL/L — LOW (ref 5–17)
BUN SERPL-MCNC: 31 MG/DL — HIGH (ref 7–18)
CALCIUM SERPL-MCNC: 8.4 MG/DL — SIGNIFICANT CHANGE UP (ref 8.4–10.5)
CHLORIDE SERPL-SCNC: 107 MMOL/L — SIGNIFICANT CHANGE UP (ref 96–108)
CO2 SERPL-SCNC: 33 MMOL/L — HIGH (ref 22–31)
CREAT SERPL-MCNC: 0.68 MG/DL — SIGNIFICANT CHANGE UP (ref 0.5–1.3)
GLUCOSE BLDC GLUCOMTR-MCNC: 112 MG/DL — HIGH (ref 70–99)
GLUCOSE SERPL-MCNC: 120 MG/DL — HIGH (ref 70–99)
HCT VFR BLD CALC: 29.3 % — LOW (ref 39–50)
HGB BLD-MCNC: 8.8 G/DL — LOW (ref 13–17)
INR BLD: 1.78 RATIO — HIGH (ref 0.88–1.16)
MAGNESIUM SERPL-MCNC: 2 MG/DL — SIGNIFICANT CHANGE UP (ref 1.6–2.6)
MCHC RBC-ENTMCNC: 25.8 PG — LOW (ref 27–34)
MCHC RBC-ENTMCNC: 30.1 GM/DL — LOW (ref 32–36)
MCV RBC AUTO: 85.6 FL — SIGNIFICANT CHANGE UP (ref 80–100)
PHOSPHATE SERPL-MCNC: 2.7 MG/DL — SIGNIFICANT CHANGE UP (ref 2.5–4.5)
PLATELET # BLD AUTO: 265 K/UL — SIGNIFICANT CHANGE UP (ref 150–400)
POTASSIUM SERPL-MCNC: 3.5 MMOL/L — SIGNIFICANT CHANGE UP (ref 3.5–5.3)
POTASSIUM SERPL-SCNC: 3.5 MMOL/L — SIGNIFICANT CHANGE UP (ref 3.5–5.3)
PROTHROM AB SERPL-ACNC: 19.6 SEC — HIGH (ref 9.8–12.7)
RBC # BLD: 3.42 M/UL — LOW (ref 4.2–5.8)
RBC # FLD: 23.2 % — HIGH (ref 10.3–14.5)
SODIUM SERPL-SCNC: 144 MMOL/L — SIGNIFICANT CHANGE UP (ref 135–145)
WBC # BLD: 9.1 K/UL — SIGNIFICANT CHANGE UP (ref 3.8–10.5)
WBC # FLD AUTO: 9.1 K/UL — SIGNIFICANT CHANGE UP (ref 3.8–10.5)

## 2017-10-26 PROCEDURE — 82728 ASSAY OF FERRITIN: CPT

## 2017-10-26 PROCEDURE — 76937 US GUIDE VASCULAR ACCESS: CPT

## 2017-10-26 PROCEDURE — 93005 ELECTROCARDIOGRAM TRACING: CPT

## 2017-10-26 PROCEDURE — 94640 AIRWAY INHALATION TREATMENT: CPT

## 2017-10-26 PROCEDURE — 87449 NOS EACH ORGANISM AG IA: CPT

## 2017-10-26 PROCEDURE — 94002 VENT MGMT INPAT INIT DAY: CPT

## 2017-10-26 PROCEDURE — 96374 THER/PROPH/DIAG INJ IV PUSH: CPT | Mod: XU

## 2017-10-26 PROCEDURE — 82550 ASSAY OF CK (CPK): CPT

## 2017-10-26 PROCEDURE — 87186 SC STD MICRODIL/AGAR DIL: CPT

## 2017-10-26 PROCEDURE — 95957 EEG DIGITAL ANALYSIS: CPT

## 2017-10-26 PROCEDURE — 84100 ASSAY OF PHOSPHORUS: CPT

## 2017-10-26 PROCEDURE — 86900 BLOOD TYPING SEROLOGIC ABO: CPT

## 2017-10-26 PROCEDURE — 97530 THERAPEUTIC ACTIVITIES: CPT

## 2017-10-26 PROCEDURE — 80202 ASSAY OF VANCOMYCIN: CPT

## 2017-10-26 PROCEDURE — 96375 TX/PRO/DX INJ NEW DRUG ADDON: CPT

## 2017-10-26 PROCEDURE — 71250 CT THORAX DX C-: CPT

## 2017-10-26 PROCEDURE — 71045 X-RAY EXAM CHEST 1 VIEW: CPT

## 2017-10-26 PROCEDURE — 80048 BASIC METABOLIC PNL TOTAL CA: CPT

## 2017-10-26 PROCEDURE — 87070 CULTURE OTHR SPECIMN AEROBIC: CPT

## 2017-10-26 PROCEDURE — C1751: CPT

## 2017-10-26 PROCEDURE — 85610 PROTHROMBIN TIME: CPT

## 2017-10-26 PROCEDURE — 87184 SC STD DISK METHOD PER PLATE: CPT

## 2017-10-26 PROCEDURE — 88304 TISSUE EXAM BY PATHOLOGIST: CPT

## 2017-10-26 PROCEDURE — 82272 OCCULT BLD FECES 1-3 TESTS: CPT

## 2017-10-26 PROCEDURE — 86901 BLOOD TYPING SEROLOGIC RH(D): CPT

## 2017-10-26 PROCEDURE — 85027 COMPLETE CBC AUTOMATED: CPT

## 2017-10-26 PROCEDURE — 92610 EVALUATE SWALLOWING FUNCTION: CPT

## 2017-10-26 PROCEDURE — P9040: CPT

## 2017-10-26 PROCEDURE — 97110 THERAPEUTIC EXERCISES: CPT

## 2017-10-26 PROCEDURE — 84443 ASSAY THYROID STIM HORMONE: CPT

## 2017-10-26 PROCEDURE — 88305 TISSUE EXAM BY PATHOLOGIST: CPT

## 2017-10-26 PROCEDURE — 83550 IRON BINDING TEST: CPT

## 2017-10-26 PROCEDURE — 87150 DNA/RNA AMPLIFIED PROBE: CPT

## 2017-10-26 PROCEDURE — 87040 BLOOD CULTURE FOR BACTERIA: CPT

## 2017-10-26 PROCEDURE — 86850 RBC ANTIBODY SCREEN: CPT

## 2017-10-26 PROCEDURE — 94667 MNPJ CHEST WALL 1ST: CPT

## 2017-10-26 PROCEDURE — 70450 CT HEAD/BRAIN W/O DYE: CPT

## 2017-10-26 PROCEDURE — 95819 EEG AWAKE AND ASLEEP: CPT

## 2017-10-26 PROCEDURE — 81001 URINALYSIS AUTO W/SCOPE: CPT

## 2017-10-26 PROCEDURE — 99291 CRITICAL CARE FIRST HOUR: CPT | Mod: 25

## 2017-10-26 PROCEDURE — 85045 AUTOMATED RETICULOCYTE COUNT: CPT

## 2017-10-26 PROCEDURE — 80053 COMPREHEN METABOLIC PANEL: CPT

## 2017-10-26 PROCEDURE — 84466 ASSAY OF TRANSFERRIN: CPT

## 2017-10-26 PROCEDURE — 74176 CT ABD & PELVIS W/O CONTRAST: CPT

## 2017-10-26 PROCEDURE — 93308 TTE F-UP OR LMTD: CPT

## 2017-10-26 PROCEDURE — 36415 COLL VENOUS BLD VENIPUNCTURE: CPT

## 2017-10-26 PROCEDURE — 97162 PT EVAL MOD COMPLEX 30 MIN: CPT

## 2017-10-26 PROCEDURE — 86480 TB TEST CELL IMMUN MEASURE: CPT

## 2017-10-26 PROCEDURE — 90686 IIV4 VACC NO PRSV 0.5 ML IM: CPT

## 2017-10-26 PROCEDURE — 84484 ASSAY OF TROPONIN QUANT: CPT

## 2017-10-26 PROCEDURE — 83010 ASSAY OF HAPTOGLOBIN QUANT: CPT

## 2017-10-26 PROCEDURE — 87086 URINE CULTURE/COLONY COUNT: CPT

## 2017-10-26 PROCEDURE — P9059: CPT

## 2017-10-26 PROCEDURE — 83615 LACTATE (LD) (LDH) ENZYME: CPT

## 2017-10-26 PROCEDURE — 83605 ASSAY OF LACTIC ACID: CPT

## 2017-10-26 PROCEDURE — 94003 VENT MGMT INPAT SUBQ DAY: CPT

## 2017-10-26 PROCEDURE — 87899 AGENT NOS ASSAY W/OPTIC: CPT

## 2017-10-26 PROCEDURE — 36430 TRANSFUSION BLD/BLD COMPNT: CPT

## 2017-10-26 PROCEDURE — 77001 FLUOROGUIDE FOR VEIN DEVICE: CPT

## 2017-10-26 PROCEDURE — 94660 CPAP INITIATION&MGMT: CPT

## 2017-10-26 PROCEDURE — 83880 ASSAY OF NATRIURETIC PEPTIDE: CPT

## 2017-10-26 PROCEDURE — 82962 GLUCOSE BLOOD TEST: CPT

## 2017-10-26 PROCEDURE — 94668 MNPJ CHEST WALL SBSQ: CPT

## 2017-10-26 PROCEDURE — 93306 TTE W/DOPPLER COMPLETE: CPT

## 2017-10-26 PROCEDURE — 85730 THROMBOPLASTIN TIME PARTIAL: CPT

## 2017-10-26 PROCEDURE — 88312 SPECIAL STAINS GROUP 1: CPT

## 2017-10-26 PROCEDURE — 83735 ASSAY OF MAGNESIUM: CPT

## 2017-10-26 PROCEDURE — 82553 CREATINE MB FRACTION: CPT

## 2017-10-26 PROCEDURE — 86920 COMPATIBILITY TEST SPIN: CPT

## 2017-10-26 PROCEDURE — 82803 BLOOD GASES ANY COMBINATION: CPT

## 2017-10-26 PROCEDURE — 80061 LIPID PANEL: CPT

## 2017-10-26 PROCEDURE — 36569 INSJ PICC 5 YR+ W/O IMAGING: CPT

## 2017-10-26 PROCEDURE — 83036 HEMOGLOBIN GLYCOSYLATED A1C: CPT

## 2017-10-26 RX ORDER — MEROPENEM 1 G/30ML
500 INJECTION INTRAVENOUS
Qty: 0 | Refills: 0 | DISCHARGE
Start: 2017-10-26

## 2017-10-26 RX ORDER — MEROPENEM 1 G/30ML
500 INJECTION INTRAVENOUS EVERY 8 HOURS
Refills: 0 | Status: DISCONTINUED | OUTPATIENT
Start: 2017-10-26 | End: 2017-10-26

## 2017-10-26 RX ORDER — WARFARIN SODIUM 2.5 MG/1
3 TABLET ORAL ONCE
Refills: 0 | Status: DISCONTINUED | OUTPATIENT
Start: 2017-10-26 | End: 2017-10-26

## 2017-10-26 RX ADMIN — Medication 1 GRAM(S): at 18:14

## 2017-10-26 RX ADMIN — Medication 1 GRAM(S): at 12:44

## 2017-10-26 RX ADMIN — Medication 81 MILLIGRAM(S): at 12:44

## 2017-10-26 RX ADMIN — Medication 300 MILLIGRAM(S): at 12:44

## 2017-10-26 RX ADMIN — Medication 1 GRAM(S): at 05:53

## 2017-10-26 RX ADMIN — POLYETHYLENE GLYCOL 3350 17 GRAM(S): 17 POWDER, FOR SOLUTION ORAL at 12:45

## 2017-10-26 RX ADMIN — PANTOPRAZOLE SODIUM 40 MILLIGRAM(S): 20 TABLET, DELAYED RELEASE ORAL at 05:54

## 2017-10-26 RX ADMIN — Medication 1 APPLICATION(S): at 15:37

## 2017-10-26 RX ADMIN — Medication 1 MILLIGRAM(S): at 12:44

## 2017-10-26 RX ADMIN — ZINC SULFATE TAB 220 MG (50 MG ZINC EQUIVALENT) 220 MILLIGRAM(S): 220 (50 ZN) TAB at 12:44

## 2017-10-26 RX ADMIN — MEROPENEM 200 MILLIGRAM(S): 1 INJECTION INTRAVENOUS at 15:38

## 2017-10-26 RX ADMIN — MEROPENEM 200 MILLIGRAM(S): 1 INJECTION INTRAVENOUS at 05:53

## 2017-10-26 RX ADMIN — Medication 500 MILLIGRAM(S): at 12:44

## 2017-10-26 RX ADMIN — Medication 2: at 12:43

## 2017-10-26 NOTE — PROGRESS NOTE ADULT - PROBLEM SELECTOR PLAN 2
ESBL bacteremia  ESBL in urine blood and sacral DU debridement tissue  cont meropenem 500mg IV q8h last dose today   Normal WBC, afebrile  repeat blood cultures negative to date.

## 2017-10-26 NOTE — PROGRESS NOTE ADULT - PROBLEM SELECTOR PROBLEM 2
Bacteremia
Anemia, unspecified type
Anemia, unspecified type
Anemia
Anemia, unspecified type
Bacteremia
Anemia, unspecified type

## 2017-10-26 NOTE — PROGRESS NOTE ADULT - PROBLEM SELECTOR PROBLEM 4
Afib
Acute on chronic systolic congestive heart failure
Afib
DM (diabetes mellitus)
Afib
Afib

## 2017-10-26 NOTE — PROGRESS NOTE ADULT - PROVIDER SPECIALTY LIST ADULT
Anesthesia
Cardiology
Cardiology
Critical Care
Infectious Disease
Internal Medicine
Surgery
Critical Care
Critical Care
Cardiology
Cardiology
Internal Medicine

## 2017-10-26 NOTE — PROGRESS NOTE ADULT - PROBLEM SELECTOR PROBLEM 3
INR (international normal ratio) abnormal
Anemia
INR (international normal ratio) abnormal
INR (international normal ratio) abnormal
Afib
Anemia
Bacteremia
INR (international normal ratio) abnormal
Anemia
INR (international normal ratio) abnormal
INR (international normal ratio) abnormal

## 2017-10-26 NOTE — PROGRESS NOTE ADULT - SUBJECTIVE AND OBJECTIVE BOX
DNR [x ]   DNI  [  x]    INTERVAL HPI/OVERNIGHT EVENTS: *No acute events overnight.   PRESSORS: [ ] YES [x ] NO  WHICH:    meropenem IVPB 500 milliGRAM(s) IV Intermittent every 8 hours    Cardiovascular:  Heart Failure  Acute   Acute on Chronic  Chronic       furosemide    Tablet 40 milliGRAM(s) Oral daily  lisinopril 5 milliGRAM(s) Oral daily  metoprolol 25 milliGRAM(s) Oral two times a day  tamsulosin 0.4 milliGRAM(s) Oral at bedtime    Pulmonary:  ALBUTerol/ipratropium for Nebulization. 3 milliLiter(s) Nebulizer once PRN    Hematalogic:  aspirin enteric coated 81 milliGRAM(s) Oral daily    Other:  acetaminophen    Suspension 650 milliGRAM(s) Oral every 6 hours PRN  ascorbic acid 500 milliGRAM(s) Oral daily  collagenase Ointment 1 Application(s) Topical daily  dextrose 5%. 1000 milliLiter(s) IV Continuous <Continuous>  dextrose 50% Injectable 12.5 Gram(s) IV Push once  dextrose Gel 1 Dose(s) Oral once PRN  ferrous    sulfate Liquid 300 milliGRAM(s) Oral daily  folic acid 1 milliGRAM(s) Oral daily  glucagon  Injectable 1 milliGRAM(s) IntraMuscular once PRN  insulin lispro (HumaLOG) corrective regimen sliding scale   SubCutaneous Before meals and at bedtime  pantoprazole   Suspension 40 milliGRAM(s) Oral before breakfast  polyethylene glycol 3350 17 Gram(s) Oral daily  sucralfate suspension 1 Gram(s) Oral every 6 hours  zinc sulfate 220 milliGRAM(s) Oral daily    acetaminophen    Suspension 650 milliGRAM(s) Oral every 6 hours PRN  ALBUTerol/ipratropium for Nebulization. 3 milliLiter(s) Nebulizer once PRN  ascorbic acid 500 milliGRAM(s) Oral daily  aspirin enteric coated 81 milliGRAM(s) Oral daily  collagenase Ointment 1 Application(s) Topical daily  dextrose 5%. 1000 milliLiter(s) IV Continuous <Continuous>  dextrose 50% Injectable 12.5 Gram(s) IV Push once  dextrose Gel 1 Dose(s) Oral once PRN  ferrous    sulfate Liquid 300 milliGRAM(s) Oral daily  folic acid 1 milliGRAM(s) Oral daily  furosemide    Tablet 40 milliGRAM(s) Oral daily  glucagon  Injectable 1 milliGRAM(s) IntraMuscular once PRN  insulin lispro (HumaLOG) corrective regimen sliding scale   SubCutaneous Before meals and at bedtime  lisinopril 5 milliGRAM(s) Oral daily  meropenem IVPB 500 milliGRAM(s) IV Intermittent every 8 hours  metoprolol 25 milliGRAM(s) Oral two times a day  pantoprazole   Suspension 40 milliGRAM(s) Oral before breakfast  polyethylene glycol 3350 17 Gram(s) Oral daily  sucralfate suspension 1 Gram(s) Oral every 6 hours  tamsulosin 0.4 milliGRAM(s) Oral at bedtime  zinc sulfate 220 milliGRAM(s) Oral daily    Drug Dosing Weight  Height (cm): 177.8 (05 Oct 2017 16:02)  Weight (kg): 90 (15 Oct 2017 07:30)  BMI (kg/m2): 28.5 (15 Oct 2017 07:30)  BSA (m2): 2.08 (15 Oct 2017 07:30)    CENTRAL LINE: [ ] YES [ ] NO  LOCATION:   DATE INSERTED:  REMOVE: [ ] YES [ ] NO  EXPLAIN:    TURNER: [ ] YES [ ] NO    DATE INSERTED:  REMOVE:  [ ] YES [ ] NO  EXPLAIN:    A-LINE:  [ ] YES [ ] NO  LOCATION:   DATE INSERTED:  REMOVE:  [ ] YES [ ] NO  EXPLAIN:    PAST MEDICAL & SURGICAL HISTORY:  DM (diabetes mellitus)  Afib  CHF (congestive heart failure)  No significant past surgical history                    PHYSICAL EXAM:    GENERAL: NAD, well-groomed, well-developed  HEAD:  Atraumatic, Normocephalic  EYES: EOMI, PERRLA, conjunctiva and sclera clear  ENMT: No tonsillar erythema, exudates, or enlargement; Moist mucous membranes, Good dentition, No lesions  NECK: Supple, No JVD, Normal thyroid  NERVOUS SYSTEM:  Alert & Oriented X3, Good concentration; Motor Strength 5/5 B/L upper and lower extremities; DTRs 2+ intact and symmetric  CHEST/LUNG: Clear to percussion bilaterally; No rales, rhonchi, wheezing, or rubs  HEART: Regular rate and rhythm; No murmurs, rubs, or gallops  ABDOMEN: Soft, Nontender, Nondistended; Bowel sounds present  EXTREMITIES:  2+ Peripheral Pulses, No clubbing, cyanosis, or edema  LYMPH: No lymphadenopathy noted  SKIN: No rashes or lesions      LABS:  CBC Full  -  ( 26 Oct 2017 08:21 )  WBC Count : 9.1 K/uL  Hemoglobin : 8.8 g/dL  Hematocrit : 29.3 %  Platelet Count - Automated : 265 K/uL  Mean Cell Volume : 85.6 fl  Mean Cell Hemoglobin : 25.8 pg  Mean Cell Hemoglobin Concentration : 30.1 gm/dL  Auto Neutrophil # : x  Auto Lymphocyte # : x  Auto Monocyte # : x  Auto Eosinophil # : x  Auto Basophil # : x  Auto Neutrophil % : x  Auto Lymphocyte % : x  Auto Monocyte % : x  Auto Eosinophil % : x  Auto Basophil % : x    10-26    144  |  107  |  31<H>  ----------------------------<  120<H>  3.5   |  33<H>  |  0.68    Ca    8.4      26 Oct 2017 08:21      PT/INR - ( 26 Oct 2017 08:21 )   PT: 19.6 sec;   INR: 1.78 ratio                   [  ]  DVT Prophylaxis  [  ]  Nutrition, Brand, Rate         Goal Rate         Abdominal Nutritional Status -  Malnutrition   Cachexia      Morbid Obesity BMI >/=40    RADIOLOGY & ADDITIONAL STUDIES:  ***    [  ] Goals of Care Discussion with Family/Proxy/Other           Elements of Conversation Discussed: Patient/Family understanding of current illness   Advanced Directives                                                                       Prognosis  Treatment Options  Care Aligned with patient's wishes                                             TIME SPENT: 35 minutes DNR [x ]   DNI  [  x]    INTERVAL HPI/OVERNIGHT EVENTS: *No acute events overnight.   PRESSORS: [ ] YES [x ] NO  WHICH:    meropenem IVPB 500 milliGRAM(s) IV Intermittent every 8 hours    Cardiovascular:  Severe global left ventricular dysfunction. Right ventricular enlargement with decreased RV  function.    furosemide    Tablet 40 milliGRAM(s) Oral daily  lisinopril 5 milliGRAM(s) Oral daily  metoprolol 25 milliGRAM(s) Oral two times a day  tamsulosin 0.4 milliGRAM(s) Oral at bedtime    Pulmonary:  ALBUTerol/ipratropium for Nebulization. 3 milliLiter(s) Nebulizer once PRN    Hematalogic:  aspirin enteric coated 81 milliGRAM(s) Oral daily    Other:  acetaminophen    Suspension 650 milliGRAM(s) Oral every 6 hours PRN  ascorbic acid 500 milliGRAM(s) Oral daily  collagenase Ointment 1 Application(s) Topical daily  dextrose 5%. 1000 milliLiter(s) IV Continuous <Continuous>  dextrose 50% Injectable 12.5 Gram(s) IV Push once  dextrose Gel 1 Dose(s) Oral once PRN  ferrous    sulfate Liquid 300 milliGRAM(s) Oral daily  folic acid 1 milliGRAM(s) Oral daily  glucagon  Injectable 1 milliGRAM(s) IntraMuscular once PRN  insulin lispro (HumaLOG) corrective regimen sliding scale   SubCutaneous Before meals and at bedtime  pantoprazole   Suspension 40 milliGRAM(s) Oral before breakfast  polyethylene glycol 3350 17 Gram(s) Oral daily  sucralfate suspension 1 Gram(s) Oral every 6 hours  zinc sulfate 220 milliGRAM(s) Oral daily    acetaminophen    Suspension 650 milliGRAM(s) Oral every 6 hours PRN  ALBUTerol/ipratropium for Nebulization. 3 milliLiter(s) Nebulizer once PRN  ascorbic acid 500 milliGRAM(s) Oral daily  aspirin enteric coated 81 milliGRAM(s) Oral daily  collagenase Ointment 1 Application(s) Topical daily  dextrose 5%. 1000 milliLiter(s) IV Continuous <Continuous>  dextrose 50% Injectable 12.5 Gram(s) IV Push once  dextrose Gel 1 Dose(s) Oral once PRN  ferrous    sulfate Liquid 300 milliGRAM(s) Oral daily  folic acid 1 milliGRAM(s) Oral daily  furosemide    Tablet 40 milliGRAM(s) Oral daily  glucagon  Injectable 1 milliGRAM(s) IntraMuscular once PRN  insulin lispro (HumaLOG) corrective regimen sliding scale   SubCutaneous Before meals and at bedtime  lisinopril 5 milliGRAM(s) Oral daily  meropenem IVPB 500 milliGRAM(s) IV Intermittent every 8 hours  metoprolol 25 milliGRAM(s) Oral two times a day  pantoprazole   Suspension 40 milliGRAM(s) Oral before breakfast  polyethylene glycol 3350 17 Gram(s) Oral daily  sucralfate suspension 1 Gram(s) Oral every 6 hours  tamsulosin 0.4 milliGRAM(s) Oral at bedtime  zinc sulfate 220 milliGRAM(s) Oral daily    Drug Dosing Weight  Height (cm): 177.8 (05 Oct 2017 16:02)  Weight (kg): 90 (15 Oct 2017 07:30)  BMI (kg/m2): 28.5 (15 Oct 2017 07:30)  BSA (m2): 2.08 (15 Oct 2017 07:30)    CENTRAL LINE: [ ] YES [ x] NO  LOCATION:   DATE INSERTED:  REMOVE: [ ] YES [ ] NO  EXPLAIN:    TURNER: [ ] YES [x ] NO    DATE INSERTED:  REMOVE:  [ ] YES [ ] NO  EXPLAIN:    A-LINE:  [ ] YES [x ] NO  LOCATION:   DATE INSERTED:  REMOVE:  [ ] YES [ ] NO  EXPLAIN:    PAST MEDICAL & SURGICAL HISTORY:  DM (diabetes mellitus)  Afib  CHF (congestive heart failure)  No significant past surgical history    PHYSICAL EXAM:    GENERAL: Resting in bed in NAD,  HEAD:  Atraumatic, Normocephalic  EYES: EOMI, PERRLA, conjunctiva and sclera clear  ENMT: No tonsillar erythema, exudates, or enlargement;  NECK: Supple, No JVD, Normal thyroid  NERVOUS SYSTEM:  Alert & Oriented X2,   CHEST/LUNG: Clear to percussion bilaterally; No rales, rhonchi, wheezing, or rubs  HEART: Regular rate and rhythm; No murmurs, rubs, or gallops  ABDOMEN: Soft, Nontender, Nondistended; Bowel sounds present  EXTREMITIES:  2+ Peripheral Pulses, No clubbing, cyanosis, or edema  LYMPH: No lymphadenopathy noted  SKIN: sacral decub      LABS:  CBC Full  -  ( 26 Oct 2017 08:21 )  WBC Count : 9.1 K/uL  Hemoglobin : 8.8 g/dL  Hematocrit : 29.3 %  Platelet Count - Automated : 265 K/uL  Mean Cell Volume : 85.6 fl  Mean Cell Hemoglobin : 25.8 pg  Mean Cell Hemoglobin Concentration : 30.1 gm/dL  Auto Neutrophil # : x  Auto Lymphocyte # : x  Auto Monocyte # : x  Auto Eosinophil # : x  Auto Basophil # : x  Auto Neutrophil % : x  Auto Lymphocyte % : x  Auto Monocyte % : x  Auto Eosinophil % : x  Auto Basophil % : x    10-26    144  |  107  |  31<H>  ----------------------------<  120<H>  3.5   |  33<H>  |  0.68    Ca    8.4      26 Oct 2017 08:21      PT/INR - ( 26 Oct 2017 08:21 )   PT: 19.6 sec;   INR: 1.78 ratio                   [  ]  DVT Prophylaxis  [  ]  Nutrition, Brand, Rate         Goal Rate         Abdominal Nutritional Status -  Malnutrition   Cachexia      Morbid Obesity BMI >/=40    RADIOLOGY & ADDITIONAL STUDIES:  ***    [  ] Goals of Care Discussion with Family/Proxy/Other           Elements of Conversation Discussed: Patient/Family understanding of current illness   Advanced Directives                                                                       Prognosis  Treatment Options  Care Aligned with patient's wishes                                             TIME SPENT: 35 minutes DNR [x ]   DNI  [  x]    INTERVAL HPI/OVERNIGHT EVENTS: *No acute events overnight.   PRESSORS: [ ] YES [x ] NO  WHICH:    meropenem IVPB 500 milliGRAM(s) IV Intermittent every 8 hours    Cardiovascular:  Severe global left ventricular dysfunction. Right ventricular enlargement with decreased RV  function.    furosemide    Tablet 40 milliGRAM(s) Oral daily  lisinopril 5 milliGRAM(s) Oral daily  metoprolol 25 milliGRAM(s) Oral two times a day  tamsulosin 0.4 milliGRAM(s) Oral at bedtime    Pulmonary:  ALBUTerol/ipratropium for Nebulization. 3 milliLiter(s) Nebulizer once PRN    Hematalogic:  aspirin enteric coated 81 milliGRAM(s) Oral daily    Other:  acetaminophen    Suspension 650 milliGRAM(s) Oral every 6 hours PRN  ascorbic acid 500 milliGRAM(s) Oral daily  collagenase Ointment 1 Application(s) Topical daily  dextrose 5%. 1000 milliLiter(s) IV Continuous <Continuous>  dextrose 50% Injectable 12.5 Gram(s) IV Push once  dextrose Gel 1 Dose(s) Oral once PRN  ferrous    sulfate Liquid 300 milliGRAM(s) Oral daily  folic acid 1 milliGRAM(s) Oral daily  glucagon  Injectable 1 milliGRAM(s) IntraMuscular once PRN  insulin lispro (HumaLOG) corrective regimen sliding scale   SubCutaneous Before meals and at bedtime  pantoprazole   Suspension 40 milliGRAM(s) Oral before breakfast  polyethylene glycol 3350 17 Gram(s) Oral daily  sucralfate suspension 1 Gram(s) Oral every 6 hours  zinc sulfate 220 milliGRAM(s) Oral daily    acetaminophen    Suspension 650 milliGRAM(s) Oral every 6 hours PRN  ALBUTerol/ipratropium for Nebulization. 3 milliLiter(s) Nebulizer once PRN  ascorbic acid 500 milliGRAM(s) Oral daily  aspirin enteric coated 81 milliGRAM(s) Oral daily  collagenase Ointment 1 Application(s) Topical daily  dextrose 5%. 1000 milliLiter(s) IV Continuous <Continuous>  dextrose 50% Injectable 12.5 Gram(s) IV Push once  dextrose Gel 1 Dose(s) Oral once PRN  ferrous    sulfate Liquid 300 milliGRAM(s) Oral daily  folic acid 1 milliGRAM(s) Oral daily  furosemide    Tablet 40 milliGRAM(s) Oral daily  glucagon  Injectable 1 milliGRAM(s) IntraMuscular once PRN  insulin lispro (HumaLOG) corrective regimen sliding scale   SubCutaneous Before meals and at bedtime  lisinopril 5 milliGRAM(s) Oral daily  meropenem IVPB 500 milliGRAM(s) IV Intermittent every 8 hours  metoprolol 25 milliGRAM(s) Oral two times a day  pantoprazole   Suspension 40 milliGRAM(s) Oral before breakfast  polyethylene glycol 3350 17 Gram(s) Oral daily  sucralfate suspension 1 Gram(s) Oral every 6 hours  tamsulosin 0.4 milliGRAM(s) Oral at bedtime  zinc sulfate 220 milliGRAM(s) Oral daily    Drug Dosing Weight  Height (cm): 177.8 (05 Oct 2017 16:02)  Weight (kg): 90 (15 Oct 2017 07:30)  BMI (kg/m2): 28.5 (15 Oct 2017 07:30)  BSA (m2): 2.08 (15 Oct 2017 07:30)    CENTRAL LINE: [ ] YES [ x] NO  LOCATION:   DATE INSERTED:  REMOVE: [ ] YES [ ] NO  EXPLAIN:    TURNER: [ ] YES [x ] NO    DATE INSERTED:  REMOVE:  [ ] YES [ ] NO  EXPLAIN:    A-LINE:  [ ] YES [x ] NO  LOCATION:   DATE INSERTED:  REMOVE:  [ ] YES [ ] NO  EXPLAIN:    PAST MEDICAL & SURGICAL HISTORY:  DM (diabetes mellitus)  Afib  CHF (congestive heart failure)  No significant past surgical history    PHYSICAL EXAM:    GENERAL: Resting in bed in NAD,  HEAD:  Atraumatic, Normocephalic  EYES: EOMI, PERRLA, conjunctiva and sclera clear  ENMT: No tonsillar erythema, exudates, or enlargement;  NECK: Supple, No JVD, Normal thyroid  NERVOUS SYSTEM:  Alert & Oriented X2,   CHEST/LUNG: Clear to percussion bilaterally; No rales, rhonchi, wheezing, or rubs  HEART: Regular rate and rhythm; No murmurs, rubs, or gallops  ABDOMEN: Soft, Nontender, Nondistended; Bowel sounds present  EXTREMITIES:  2+ Peripheral Pulses, No clubbing, cyanosis, or edema  LYMPH: No lymphadenopathy noted  SKIN: sacral decub      LABS:  CBC Full  -  ( 26 Oct 2017 08:21 )  WBC Count : 9.1 K/uL  Hemoglobin : 8.8 g/dL  Hematocrit : 29.3 %  Platelet Count - Automated : 265 K/uL  Mean Cell Volume : 85.6 fl  Mean Cell Hemoglobin : 25.8 pg  Mean Cell Hemoglobin Concentration : 30.1 gm/dL  Auto Neutrophil # : x  Auto Lymphocyte # : x  Auto Monocyte # : x  Auto Eosinophil # : x  Auto Basophil # : x  Auto Neutrophil % : x  Auto Lymphocyte % : x  Auto Monocyte % : x  Auto Eosinophil % : x  Auto Basophil % : x    10-26    144  |  107  |  31<H>  ----------------------------<  120<H>  3.5   |  33<H>  |  0.68    Ca    8.4      26 Oct 2017 08:21      PT/INR - ( 26 Oct 2017 08:21 )   PT: 19.6 sec;   INR: 1.78 ratio                   [ x ]  DVT Prophylaxis - full dose AC  [x  ]  Nutrition, Brand, Rate - pureed nectar         Goal Rate         Abdominal Nutritional Status -  Malnutrition   Cachexia      Morbid Obesity BMI >/=40    RADIOLOGY & ADDITIONAL STUDIES:  ***    [  ] Goals of Care Discussion with Family/Proxy/Other           Elements of Conversation Discussed: Patient/Family understanding of current illness   Advanced Directives                                                                       Prognosis  Treatment Options  Care Aligned with patient's wishes                                             TIME SPENT: 35 minutes

## 2017-10-26 NOTE — PROGRESS NOTE ADULT - PROBLEM SELECTOR PLAN 9
Spoke with HCP, Daly, regarding advanced directives. HCP states she wants DNR/DNI to remain with patient in the community. Will complete MOLST. see [facility] documentation in sun[place]. [id number]
GOC discussion held with Pt's wife  Pt is full code.  MOLST form filled out.   30 min.
GOC discussion held with Pt's wife  Pt is full code.  MOLST form filled out.   30 min.
patient is DNR/DNI
GOC discussion held with Pt's wife  Pt is full code.  MOLST form filled out.   30 min.
Spoke with HCP, Daly, regarding advanced directives. HCP states she wants DNR/DNI to remain with patient in the community. Will complete MOLST. see [facility] documentation in sunrise. [id number]
patient is DNR/DNI
Spoke with HCP, Daly, regarding advanced directives. HCP states she wants DNR/DNI to remain with patient in the community. Will complete MOLST. see [facility] documentation in sunrise. [id number]
GOC discussion held with Pt's wife  Pt is full code.  MOLST form filled out.   30 min.

## 2017-10-26 NOTE — PROGRESS NOTE ADULT - I WAS PHYSICALLY PRESENT FOR THE KEY PORTIONS OF THE EVALUATION AND MANAGEMENT (E/M) SERVICE PROVIDED.  I AGREE WITH THE ABOVE HISTORY, PHYSICAL, AND PLAN WHICH I HAVE REVIEWED AND EDITED WHERE APPROPRIATE

## 2017-10-26 NOTE — PROGRESS NOTE ADULT - ASSESSMENT
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 He developed encephalopathy and acute resp failure requiring intubation 10/14, extubated 10/16. DNR/DNI. Downgraded to SCU and doing better. Ready for discharge. DC planning; awaiting insurance auth.

## 2017-10-26 NOTE — PROGRESS NOTE ADULT - PROBLEM SELECTOR PROBLEM 7
HTN (hypertension)
HTN (hypertension)
Wound
HTN (hypertension)
HTN (hypertension)
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
HTN (hypertension)
Wound
HTN (hypertension)

## 2017-10-26 NOTE — PROGRESS NOTE ADULT - ASSESSMENT
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 He developed encephalopathy and acute resp failure requiring intubation 10/14, extubated 10/16. DNR/DNI. Downgraded to SCU

## 2017-10-26 NOTE — PROGRESS NOTE ADULT - PROBLEM SELECTOR PLAN 6
-Surgical path results positive for rare proteus mirabilis ESBL,   and few enterococcus (vanco resistant).  Sensitive to Ertapenem.
-Surgical path results positive for rare proteus mirabilis ESBL,   and few enterococcus (vanco resistant).  Sensitive to Ertapenem.
c/w insulin sliding scale and BG monitoring
Patient has sacral wound  -Wound care nurse consulted  -Dr Cueva, surgery, performed debridement surgical wound/surgical swab pendin.    Surgery to perform debridement on Monday.    NPO after MN Sunday night
Patient has sacral wound  -Wound care nurse consulted  -Dr Cueva, surgery, performed debridement surgical wound/surgical swab pendin.    Surgery to perform debridement on Monday.    NPO after MN Sunday night
-Surgical path results positive for rare proteus mirabilis ESBL,   and few enterococcus (vanco resistant).  Sensitive to Ertapenem.
Patient has sacral wound  -Wound care nurse consulted  -Dr Cueva, surgery, consulted and patient needs surgical debridement.    Surgery to perform debridement on Monday.    NPO after MN Sunday night
Patient has sacral wound  -Wound care nurse consulted  -Dr Cueva, surgery, performed debridement surgical wound/surgical swab pendin.    Surgery to perform debridement on Monday.    NPO after MN Sunday night
c/w insulin sliding scale and BG monitoring
c/w insulin sliding scale and BG monitoring.
received 1 unit PRBC last night   Hgb 9.0  today   plan for dc today  c/w carafate and protonix.
c/w insulin sliding scale and BG monitoring
-Surgical path results positive for rare proteus mirabilis ESBL,   and few enterococcus (vanco resistant).  Sensitive to Ertapenem.

## 2017-10-26 NOTE — PROGRESS NOTE ADULT - SUBJECTIVE AND OBJECTIVE BOX
82y Male is under our care for bacteremia and infected sacral ulcer.  Patient is in same disposition with no overnight events. Has not had any fevers and wbc count is normal. Completes course of antibiotics today.  Due for dc to rehab today on no antibiotics.    REVIEW OF SYSTEMS:  [ X ] Not able to illicit    MEDS:  meropenem IVPB 500 milliGRAM(s) IV Intermittent every 8 hours    ALLERGIES: penicillin (Unknown)    VITALS:  Vital Signs Last 24 Hrs  T(C): 37.1 (26 Oct 2017 14:25), Max: 37.2 (26 Oct 2017 06:16)  T(F): 98.7 (26 Oct 2017 14:25), Max: 99 (26 Oct 2017 06:16)  HR: 87 (26 Oct 2017 14:25) (61 - 113)  BP: 117/76 (26 Oct 2017 14:25) (93/57 - 117/76)  BP(mean): --  RR: 20 (26 Oct 2017 14:25) (14 - 20)  SpO2: 100% (26 Oct 2017 14:25) (95% - 100%)      PHYSICAL EXAM:  HEENT: +NC @ 2L/ min  Neck: n/a  Respiratory: bilateral L>R rales  Cardiovascular: S1 S2 reg no murmurs  Gastrointestinal: +BS with soft, nondistended and nontender abdomen    Extremities: +right arm PICC  Skin: no rashes  Ortho: n/a  Neuro: sleeping      LABS/DIAGNOSTIC TESTS:                         8.8    9.1   )-----------( 265      ( 26 Oct 2017 08:21 )             29.3   10-26    144  |  107  |  31<H>  ----------------------------<  120<H>  3.5   |  33<H>  |  0.68    Ca    8.4      26 Oct 2017 08:21  Phos  2.7     10-26  Mg     2.0     10-26       CULTURES:   .Blood Blood-Peripheral  10-14 @ 22:45   No growth to date.  --  --      .Blood Blood-Peripheral  10-11 @ 13:27   No growth at 5 days.  --  --      .Surgical Swab sacral decubitus  10-09 @ 13:21   Rare Proteus mirabilis ESBL  Few Enterococcus faecalis (vancomycin resistant)  Few Bacteroides thetaiotaomicron  --  Proteus mirabilis ESBL  Enterococcus faecalis (vancomycin resistant)      .Urine Clean Catch (Midstream)  10-05 @ 23:11   50,000 - 99,000 CFU/mL Proteus mirabilis ESBL  --  Proteus mirabilis ESBL      .Blood Blood  10-05 @ 23:07   Growth in aerobic bottle: Proteus mirabilis ESBL  Growth in anaerobic bottle: Streptococcus anginosus      RADIOLOGY:  No new studies

## 2017-10-26 NOTE — PROGRESS NOTE ADULT - PROBLEM SELECTOR PROBLEM 8
CHF (congestive heart failure)
CHF (congestive heart failure)
Hypernatremia
CHF (congestive heart failure)
Hypernatremia
CHF (congestive heart failure)

## 2017-10-26 NOTE — PROGRESS NOTE ADULT - PROBLEM SELECTOR PROBLEM 1
Sepsis
Sepsis
Acute on chronic respiratory failure with hypoxia and hypercapnia
Respiratory failure
Sepsis
Wound
Wound
Respiratory failure
Sepsis
Sepsis
Respiratory failure
Sepsis

## 2017-10-26 NOTE — PROGRESS NOTE ADULT - PROBLEM SELECTOR PROBLEM 9
Goals of care, counseling/discussion

## 2017-10-26 NOTE — PROGRESS NOTE ADULT - PROBLEM SELECTOR PROBLEM 5
DM (diabetes mellitus)
DM (diabetes mellitus)
Acute on chronic systolic congestive heart failure
DM (diabetes mellitus)
DM (diabetes mellitus)
Acute on chronic systolic congestive heart failure
Afib
CHF (congestive heart failure)
CHF (congestive heart failure)
DM (diabetes mellitus)
Wound
Acute on chronic systolic congestive heart failure
DM (diabetes mellitus)

## 2017-10-26 NOTE — PROGRESS NOTE ADULT - PROBLEM SELECTOR PROBLEM 6
Wound
Wound
DM (diabetes mellitus)
Wound
Wound
Anemia
DM (diabetes mellitus)
Wound
DM (diabetes mellitus)
Wound

## 2017-10-26 NOTE — PROGRESS NOTE ADULT - PROBLEM SELECTOR PLAN 8
Patient has h/o CHF   -Echo in may 2017: EF = 55 to 60%. Grade II diastolic dysfunction.  Lasix on hold
Patient has h/o CHF   -Echo in may 2017: EF = 55 to 60%. Grade II diastolic dysfunction.  -Lasix held because of sepsis  -Patient received 2.5L bolus in ED.  -Gentle hydration IVF of 40 ml/hr
corrected
Patient has h/o CHF   -Echo in may 2017: EF = 55 to 60%. Grade II diastolic dysfunction.  -Lasix held because of sepsis  -Patient received 2.5L bolus in ED.  -Gentle hydration IVF of 40 ml/hr
Patient has h/o CHF   -Echo in may 2017: EF = 55 to 60%. Grade II diastolic dysfunction.  Lasix on hold
corrected
trending down  monitor BMP
trending down  monitor BMP
corrected
Patient has h/o CHF   -Echo in may 2017: EF = 55 to 60%. Grade II diastolic dysfunction.  Lasix on hold

## 2017-10-26 NOTE — PROGRESS NOTE ADULT - ASSESSMENT
1.	Infected sacral decubitus  2.	Bacteremia - ESBL  3.	?Pneumonia   ·	dc planning today  ·	completes course of meropenem today  ·	reconsult prn

## 2017-10-26 NOTE — PROGRESS NOTE ADULT - PROBLEM SELECTOR PLAN 3
Resolved
Resolved
received 1 unit PRBC this admission  H/H stable  c/w carafate and protonix
Resolved
Resolved
ESBL bacteremia  ESBL in urine blood and sacral DU debridement tissue  cont meropenem 500mg IV q8h while in hospital can change to primaxin upon DC cont abx until 10/26 as per ID  Normal WBC, afebrile  repeat blood cultures negative to date.
Resolved
Supratherapeutic INR   Patient was on warfarin with supra therapeutic INR, likely secondary to recent antibiotic use.   INR 6.34  -S/p 5 mg iv Vitamin K and 1 unit FFP in ED  -Warfarin held  -Goal INR 2-3  -Repeat INR 1.46  -Warfarin 2 mg tonight with followup PT/INR in am/resolved
c/w lopressor oral for rate control  c/w coumadin for goal inr 2-3.  INR today - 1.8
received 1 unit PRBC this admission  H/H stable  c/w carafate and protonix
received 2 units this admission  one unit on 10/23/17    H/H improved  plan for dc today; awaiting insurance auth  c/w carafate, protonix, and iron supplements
received 2 units this admission  one unit on 10/23/17    H/H stable  c/w carafate, protonix, and iron supplements.
received 1 unit PRBC this admission  H/H stable  c/w carafate and protonix
Resolved

## 2017-10-26 NOTE — PROGRESS NOTE ADULT - ATTENDING COMMENTS
d/w house staff, INR therapeutic.
Patient seen and examined, d/w daughter, d/w NP, agree with management.  Moderate risk, no contraindication for debridment.
Thank you for the courtesy of a consultation, please contact me for any additional questions
Moderate risk, no contraindication for debridement.  To  transfer 1 U PRBC and check post transfusion CBC.
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  Developed encephalopathy and acute resp failure requiring intubation 10/14, unlcear why.  Will obtain CT brain and possibly EEG.  Try to exutbate today.  Total CC time 35 min.
I agree with above
Stable for discharge to long term facility on IV Antibiotics
Stable for discharge to long term facility on IV Antibiotics
on floor now, d/w RN at bedside
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  Developed encephalopathy and acute resp failure requiring intubation 10/14, unclear why.  Found to have new CHFrEF (EF 10%)   Extubated 10/16.    Will continue abx per ID, continue CHF meds (ACE, diuretic, beta blocker).  Family declines cardiac cath.   DNR/DNI per family.   May be ready for special care unit soon.
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  Developed encephalopathy and acute resp failure requiring intubation 10/14, unclear why.  Found to have new CHFrEF (EF 10%)   Extubated 10/16.    Will continue abx per ID, continue CHF meds (ACE, diuretic, beta blocker).  PT eval  DNR/DNI per family.
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  Developed encephalopathy and acute resp failure requiring intubation 10/14, unlcear why.  Extubated 10/16.    Will obtain EEG, neruo consult.  Palliative care consult.  Total CC time 35 min.
Pt seen and examined with ID resident
Thank you for the courtesy of a consultation, please contact me for any additional questions
pt seen and examined with ID resident
pt seen and examined with ID resident
I agree with above
82 year old male with dementia, Mitral Stenosis s/p MVR, CAD s/p CABG, Afib on coumadin , bed bound who p/w low h/h. workup noted to have ESBL sacral decub with bacteremia. s/p resp failure   s/p transfusion yesterday  now h/h improved  pt no sob, cp, palp, n/v   on exam comfortale.     Assessment  Anemia  downtrending s/p transfusion  sepsis due to PNA and bacteremia   afib on coumadin  VHD  CAD    plan  f/u cbc in am  continue a.c  upon dc can be switched to primaxin 500mg IV q6h until 10/26  d/c planning today
82 year old male with dementia, Mitral Stenosis s/p MVR, CAD s/p CABG, Afib on coumadin , bed bound who p/w low h/h. workup noted to have ESBL sacral decub with bacteremia. s/p resp failure     no new events  awaiting auth for d/c    Assessment  Anemia  downtrending s/p transfusion  sepsis due to PNA and bacteremia   afib on coumadin  VHD  CAD    plan  repeat CBC In am  continue a.c  upon dc can be switched to primaxin 500mg IV q6h until 10/26 (last day of abx silvia)  d/c planning depending on auth.
-pat will be transfer to [facility] and will complete antibx in facility
82 year old male with dementia, Mitral Stenosis s/p MVr, CAD s/p CABG, Afib on coumadin , bed bound hwo p/w low h/h. workup noted to have ESBL sacral decub with bacteremia. s/p resp failure     Assessment  Anemia  downtrending  sepsis due to PNA and bacteremia   afib on coumadin  VHD  CAD    plan  f/u cbc in am  continue a.c  cont meropenem 500mg IV q8h   upon dc can be switched to primaxin 500mg IV q6h until 10/26  dc planning this week
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 He developed encephalopathy and acute resp failure requiring intubation 10/14, extubated 10/16. DNR/DNI. Downgraded to SCU     Problem/Plan - 1:  ·  Problem: Respiratory failure.  Plan: extubated on 10/16  - patient was comfortable and saturating well over night on 2L nasal canula.      Problem/Plan - 2:  ·  Problem: Bacteremia.  Plan: ESBL bacteremia  ESBL in urine blood and sacral DU debridement tissue  cont meropenem 500mg IV q8h D5  Normal WBC, afebrile  repeat blood cultures negative to date.      Problem/Plan - 3:  ·  Problem: Anemia.  Plan: received 1 unit PRBC this admission  H/H stable  c/w carafate and protonix.      Problem/Plan - 4:  ·  Problem: Afib.  Plan: c/w lopressor oral for rate control  restart coumadin for goal inr 2-3.      Problem/Plan - 5:  ·  Problem: Acute on chronic systolic congestive heart failure.  Plan: Dilated LV with severe global left ventricular dysfunction. EF 10% - Cards to repeat echo (limited)  BNP elevated at 33,000  continues to have good urine output,  c/w Lasix, BB, ACEI.      Problem/Plan - 6:  Problem: DM (diabetes mellitus). Plan: c/w insulin sliding scale and BG monitoring.     Problem/Plan - 7:  ·  Problem: Wound.  Plan: sacral decub  c/w local wound care.      Problem/Plan - 8:  ·  Problem: Hypernatremia.  Plan: trending down  monitor BMP.      Problem/Plan - 9:  ·  Problem: Goals of care, counseling/discussion.  Plan: Spoke with HCP, Daly, regarding advanced directives. HCP states she wants DNR/DNI to remain with patient in the community. Will complete MOLST. see [facility] documentation in [facility]. [id number].
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 He developed encephalopathy and acute resp failure requiring intubation 10/14, extubated 10/16. DNR/DNI. Downgraded to SCU     Problem/Plan - 1:  ·  Problem: Respiratory failure.  Plan: extubated on 10/16  - patient is comfortable but tachypneic, denies sob, no respiratory distress, oxygen saturation adequate  CXR: R pleural effusion, ? infiltrate, but no fever, normal wbc  - lasix IV x 1 today  - c/w lasix po.      Problem/Plan - 2:  ·  Problem: Bacteremia.  Plan: ESBL bacteremia  ESBL in urine blood and sacral DU debridement tissue  cont meropenem 500mg IV q8h D5  Normal WBC, afebrile  repeat blood cultures negative to date.      Problem/Plan - 3:  ·  Problem: Anemia.  Plan: received 1 unit PRBC this admission  H/H stable  c/w carafate and protonix.      Problem/Plan - 4:  ·  Problem: Afib.  Plan: c/w lopressor oral for rate control  restart coumadin for goal inr 2-3.      Problem/Plan - 5:  ·  Problem: Acute on chronic systolic congestive heart failure.  Plan: Dilated LV with severe global left ventricular dysfunction. EF 10%  decompensation corrected.      Problem/Plan - 6:  Problem: DM (diabetes mellitus). Plan: c/w insulin sliding scale and BG monitoring.     Problem/Plan - 7:  ·  Problem: Wound.  Plan: sacral decub  c/w local wound care.      Problem/Plan - 8:  ·  Problem: Hypernatremia.  Plan: corrected.
d/w  resident, d/c planning, may be discharge to [place] in am, right hand swelling improoved.
d/w  resident, d/c planning, need 1 u prbc.
for discharge today as spoke to MISBAH Carrizales, as auth done.
h/h: better-to discharge to long term today
waiting for discharge, alexx ins co.
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 He developed encephalopathy and acute resp failure requiring intubation 10/14, extubated 10/16. DNR/DNI. Downgraded to SCU     Problem/Plan - 1:  ·  Problem: Respiratory failure.  Plan: extubated on 10/16  patient is comfortable but tachypneic, denies sob, no respiratory distress, oxygen saturation adequate  CXR: R pleural effusion, possible infiltrate, but no fever, normal wbc  patient is on meropenem  continue oral lasix   continue supplemental oxygen.      Problem/Plan - 2:  ·  Problem: Bacteremia.  Plan: ESBL bacteremia  ESBL in urine blood and sacral DU debridement tissue  cont meropenem 500mg IV q8h   Normal WBC, afebrile  repeat blood cultures negative to date.      Problem/Plan - 3:  ·  Problem: Anemia.  Plan: received 1 unit PRBC this admission  H/H stable  c/w carafate and protonix.      Problem/Plan - 4:  ·  Problem: Afib.  Plan: c/w lopressor oral for rate control  restart coumadin for goal inr 2-3.      Problem/Plan - 5:  ·  Problem: Acute on chronic systolic congestive heart failure.  Plan: Dilated LV with severe global left ventricular dysfunction. EF 10%  decompensation corrected.      Problem/Plan - 6:  Problem: DM (diabetes mellitus). Plan: c/w insulin sliding scale and BG monitoring.     Problem/Plan - 7:  ·  Problem: Wound.  Plan: sacral decub  c/w local wound care.      Problem/Plan - 8:  ·  Problem: Hypernatremia.  Plan: corrected.      Problem/Plan - 9:  ·  Problem: Goals of care, counseling/discussion.  Plan: patient is DNR/DNI
Patient seen and examined, d/w daughter, d/w NP, agree with management.  Moderate risk, no contraindication for debridment.
to get GI consult in view of dropping h/h.
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  Developed encephalopathy and acute resp failure requiring intubation 10/14, unclear why.  Found to have new CHFrEF (EF 10%)   Extubated 10/16.    Will continue abx per ID.  Continue CHF meds (ACE, diuretic, beta blocker). Change lasix to PO.  Repeat 2D echo as per request of family.  Family declines cardiac cath.   DNR/DNI.
Patient seen and examined, d/w NP, clinically improoving.
d/w house staff in ICU, being transferred to floor, d/w PGY 1, will call surgical folllow up for DU, debriedment follow up.
d/w house staff,  coumadin on hold  Overall prognosis poor, palliative care consult noted., d/w family, appears better.
d/w house staff, INR 3.36, coumadin on hold  Overall prognosis poor, palliative care consult noted., d/w family.
follow PT-INR today, s/p PICC line.  Discharge planning being done.
d/w ID, d/w NP  Discharge planning being done.

## 2017-10-26 NOTE — PROGRESS NOTE ADULT - NSHPATTENDINGPLANDISCUSS_GEN_ALL_CORE
ICU staff.
medicine team resident
ICU attending Dr. Chauhan
ICU team resident and attending Dr. Chauhan
medicine-ICU team attending Dr. Chauhan
NP on rounds
icu team
NP
ICU staff.
NP

## 2017-10-26 NOTE — PROGRESS NOTE ADULT - SUBJECTIVE AND OBJECTIVE BOX
Patient is a 82y old  Male who presents with a chief complaint of low hb level in NH (05 Oct 2017 19:36)      INTERVAL HPI/OVERNIGHT EVENTS: No events.  Vital Signs Last 24 Hrs  T(C): 37.1 (26 Oct 2017 14:25), Max: 37.2 (26 Oct 2017 06:16)  T(F): 98.7 (26 Oct 2017 14:25), Max: 99 (26 Oct 2017 06:16)  HR: 87 (26 Oct 2017 14:25) (61 - 113)  BP: 117/76 (26 Oct 2017 14:25) (93/57 - 117/76)  BP(mean): --  RR: 20 (26 Oct 2017 14:25) (14 - 20)  SpO2: 100% (26 Oct 2017 14:25) (95% - 100%)    LABS:                                            8.8    9.1   )-----------( 265      ( 26 Oct 2017 08:21 )             29.3   10-26    144  |  107  |  31<H>  ----------------------------<  120<H>  3.5   |  33<H>  |  0.68    Ca    8.4      26 Oct 2017 08:21  Phos  2.7     10-26  Mg     2.0     10-26                Ca    8.2<L>      23 Oct 2017 07:44  Phos  2.8     10-23  Mg     2.1     10-23    PHYSICAL EXAM:  GENERAL: NAD, well-groomed, well-developed, no respiratory distress  HEAD:  Atraumatic, Normocephalic  EYES: conjunctiva and sclera clear  NERVOUS SYSTEM:  Alert, nonfocal exam  CHEST/LUNG: Clear to percussion bilaterally; no wheeze  HEART: irregularly irregular  ABDOMEN: Soft, Nontender, distended; Bowel sounds present  EXTREMITIES:  No clubbing, cyanosis; 1+ edema  SKIN: No rashes    Care Discussed with Consultants/Other Providers [x ] YES  [ ] NO

## 2017-10-26 NOTE — PROGRESS NOTE ADULT - PROBLEM SELECTOR PLAN 7
Antihypertensives held, as pat  -Restart as appropiate.
Antihypertensives held, as patient is septic  -Restart as appropiate.
sacral decub  c/w local wound care
Antihypertensives held, as patient is septic  -Restart as appropiate.
Antihypertensives held, as patient is septic  -Restart as appropiate.
Antihypertensives held, as pat  -Restart as appropiate.
Antihypertensives held, as patient is septic  -Restart as appropiate.
Unstageable Pressure Injury to the Sacrococcyx Area (16cm x 10cm x 2cm) with slough, granulation tissue, and drainage present  -There is an Unstageable Pressure Injury to the L. Lat Foot (0.5cm x 0.5cm) and R. Lat Foot (1cm x 1cm) without drainage  -Clean all wounds with normal saline and apply skin prep to the surrounding skin  -Apply Collagenase Ointment to the slough areas of the Sacral wound, apply saline moistened gauze to the wound bed and cover with a Foam dressing Daily PRN  -Leave the Bilateral Feet wounds open to air  -Continue with the remaining prior wound care recommendations  -Elevate/float the patients heels using heel protectors and reposition the patient Q 2hrs using wedges or pillows
c/w supportive care
sacral decub  c/w local wound care
sacral decub s/p debridement  c/w local wound care.
sacral decub  c/w local wound care
Antihypertensives held, as pat  -Restart as appropiate.

## 2017-10-30 DIAGNOSIS — J18.9 PNEUMONIA, UNSPECIFIED ORGANISM: ICD-10-CM

## 2017-10-30 DIAGNOSIS — Z79.01 LONG TERM (CURRENT) USE OF ANTICOAGULANTS: ICD-10-CM

## 2017-10-30 DIAGNOSIS — Z51.5 ENCOUNTER FOR PALLIATIVE CARE: ICD-10-CM

## 2017-10-30 DIAGNOSIS — F03.90 UNSPECIFIED DEMENTIA WITHOUT BEHAVIORAL DISTURBANCE: ICD-10-CM

## 2017-10-30 DIAGNOSIS — I11.0 HYPERTENSIVE HEART DISEASE WITH HEART FAILURE: ICD-10-CM

## 2017-10-30 DIAGNOSIS — I05.0 RHEUMATIC MITRAL STENOSIS: ICD-10-CM

## 2017-10-30 DIAGNOSIS — K29.81 DUODENITIS WITH BLEEDING: ICD-10-CM

## 2017-10-30 DIAGNOSIS — B96.89 OTHER SPECIFIED BACTERIAL AGENTS AS THE CAUSE OF DISEASES CLASSIFIED ELSEWHERE: ICD-10-CM

## 2017-10-30 DIAGNOSIS — B95.2 ENTEROCOCCUS AS THE CAUSE OF DISEASES CLASSIFIED ELSEWHERE: ICD-10-CM

## 2017-10-30 DIAGNOSIS — Z74.01 BED CONFINEMENT STATUS: ICD-10-CM

## 2017-10-30 DIAGNOSIS — J96.22 ACUTE AND CHRONIC RESPIRATORY FAILURE WITH HYPERCAPNIA: ICD-10-CM

## 2017-10-30 DIAGNOSIS — Z23 ENCOUNTER FOR IMMUNIZATION: ICD-10-CM

## 2017-10-30 DIAGNOSIS — E44.0 MODERATE PROTEIN-CALORIE MALNUTRITION: ICD-10-CM

## 2017-10-30 DIAGNOSIS — I50.23 ACUTE ON CHRONIC SYSTOLIC (CONGESTIVE) HEART FAILURE: ICD-10-CM

## 2017-10-30 DIAGNOSIS — Z66 DO NOT RESUSCITATE: ICD-10-CM
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
## 2017-10-30 DIAGNOSIS — R56.9 UNSPECIFIED CONVULSIONS: ICD-10-CM

## 2017-10-30 DIAGNOSIS — Z88.0 ALLERGY STATUS TO PENICILLIN: ICD-10-CM

## 2017-10-30 DIAGNOSIS — J45.909 UNSPECIFIED ASTHMA, UNCOMPLICATED: ICD-10-CM

## 2017-10-30 DIAGNOSIS — I24.8 OTHER FORMS OF ACUTE ISCHEMIC HEART DISEASE: ICD-10-CM

## 2017-10-30 DIAGNOSIS — R53.2 FUNCTIONAL QUADRIPLEGIA: ICD-10-CM

## 2017-10-30 DIAGNOSIS — G91.2 (IDIOPATHIC) NORMAL PRESSURE HYDROCEPHALUS: ICD-10-CM

## 2017-10-30 DIAGNOSIS — Z79.84 LONG TERM (CURRENT) USE OF ORAL HYPOGLYCEMIC DRUGS: ICD-10-CM

## 2017-10-30 DIAGNOSIS — A41.9 SEPSIS, UNSPECIFIED ORGANISM: ICD-10-CM

## 2017-10-30 DIAGNOSIS — R79.1 ABNORMAL COAGULATION PROFILE: ICD-10-CM

## 2017-10-30 DIAGNOSIS — Z79.82 LONG TERM (CURRENT) USE OF ASPIRIN: ICD-10-CM

## 2017-10-30 DIAGNOSIS — I48.91 UNSPECIFIED ATRIAL FIBRILLATION: ICD-10-CM

## 2017-10-30 DIAGNOSIS — I42.9 CARDIOMYOPATHY, UNSPECIFIED: ICD-10-CM

## 2017-10-30 DIAGNOSIS — K29.70 GASTRITIS, UNSPECIFIED, WITHOUT BLEEDING: ICD-10-CM

## 2017-10-30 DIAGNOSIS — B96.4 PROTEUS (MIRABILIS) (MORGANII) AS THE CAUSE OF DISEASES CLASSIFIED ELSEWHERE: ICD-10-CM

## 2017-10-30 DIAGNOSIS — N39.0 URINARY TRACT INFECTION, SITE NOT SPECIFIED: ICD-10-CM

## 2017-10-30 DIAGNOSIS — L89.154 PRESSURE ULCER OF SACRAL REGION, STAGE 4: ICD-10-CM

## 2017-10-30 DIAGNOSIS — I45.10 UNSPECIFIED RIGHT BUNDLE-BRANCH BLOCK: ICD-10-CM

## 2017-10-30 DIAGNOSIS — N17.9 ACUTE KIDNEY FAILURE, UNSPECIFIED: ICD-10-CM

## 2017-10-30 DIAGNOSIS — J96.21 ACUTE AND CHRONIC RESPIRATORY FAILURE WITH HYPOXIA: ICD-10-CM

## 2017-10-30 DIAGNOSIS — D64.9 ANEMIA, UNSPECIFIED: ICD-10-CM

## 2017-10-30 DIAGNOSIS — K20.9 ESOPHAGITIS, UNSPECIFIED: ICD-10-CM

## 2017-10-30 DIAGNOSIS — G92 TOXIC ENCEPHALOPATHY: ICD-10-CM

## 2017-10-30 DIAGNOSIS — E11.9 TYPE 2 DIABETES MELLITUS WITHOUT COMPLICATIONS: ICD-10-CM

## 2019-06-20 NOTE — PROVIDER CONTACT NOTE (CRITICAL VALUE NOTIFICATION) - NAME OF MD/NP/PA/DO NOTIFIED:
Anesthesia ROS/Med Hx    Overall Review:    Pt. has no history of anesthetic complications  Pt. does not have difficult airway    Pulmonary Review:    The patient is not a smoker.    Cardiovascular Review:    Pt. positive for hypertension  Pt. positive for hyperlipidemia    End/Other Review:    Pt. positive for obesity class II - 35.00 - 39.99      Anesthesia Plan     ASA Status: 2  Anesthesia Type: General  Induction: Intravenous  Preferred Airway Type: ETT  Reviewed: Medications, Problem List, Past Med History, Allergies, NPO Status, Patient Summary, Pre-Induction Reassessment and Lab Results  The proposed anesthetic plan, including its risks and benefits, have been discussed with the Patient - along with the risks and benefits of alternatives.  Questions were encouraged and answered and the patient and/or representative agrees to proceed.  Blood Products: Not Anticipated  Plan Comments: Risks of anesthesia discussed with patient.  Risks including but not limited to sore throat, PONV, dental trauma, reaction to medications, pulmonary and cardiac complications, and potential change in anesthestic plan depending upon situation were discussed. Pt/family agree with risks and wish to proceed.  All questions were answered.      Physical Exam  Mallampati: II  TM Distance: >3 FB  Neck ROM: Full  cardiovascular exam normal  pulmonary exam normal  abdominal exam normal  dental exam normal                   Dr Galloway

## 2020-08-11 NOTE — PROGRESS NOTE ADULT - PROBLEM SELECTOR PLAN 2
Visual field test done   Pt stated no latex allergies used coverlet patch      mrx    -1.25+sph  -2.25+0.75x115   -H&H dropped again and pt given one unit PRBC.  -10/11 BRYAN Lewis, to consult tonight.  Coumadin held

## 2020-11-30 NOTE — DIETITIAN INITIAL EVALUATION ADULT. - FACTORS AFF FOOD INTAKE
PNA/other (specify) Griseofulvin Counseling:  I discussed with the patient the risks of griseofulvin including but not limited to photosensitivity, cytopenia, liver damage, nausea/vomiting and severe allergy.  The patient understands that this medication is best absorbed when taken with a fatty meal (e.g., ice cream or french fries).

## 2021-07-22 NOTE — ED ADULT NURSE NOTE - CAS EDN DISCHARGE ASSESSMENT
From: Ifrah Antunez  To: Shahab Costa  Sent: 9/17/2020 2:06 PM CDT  Subject: Non-Urgent Medical Question    I have been taking the prescription you sent me and thought I was getting better slowly but last night I woke up all stuffed up and then began coughing violently for about 10 minutes. I took some OTC cough medicine that finally allowed me to go back to sleep. Today I have had one coughing fit and fairly constant clearing of my throat. I have no pain and no fever. Please advise.   Awake Consent 1/Introductory Paragraph: The rationale for Mohs was explained to the patient and consent was obtained. The risks, benefits and alternatives to therapy were discussed in detail. Specifically, the risks of infection, scarring, bleeding, prolonged wound healing, incomplete removal, allergy to anesthesia, nerve injury and recurrence were addressed. Prior to the procedure, the treatment site was clearly identified and confirmed by the patient. All components of Universal Protocol/PAUSE Rule completed.

## 2022-04-08 RX ORDER — METOPROLOL TARTRATE 50 MG
1 TABLET ORAL
Qty: 0 | Refills: 0 | DISCHARGE

## 2022-04-08 RX ORDER — LISINOPRIL 2.5 MG/1
0 TABLET ORAL
Qty: 0 | Refills: 0 | DISCHARGE

## 2022-04-08 RX ORDER — METFORMIN HYDROCHLORIDE 850 MG/1
1 TABLET ORAL
Qty: 0 | Refills: 0 | DISCHARGE

## 2022-04-08 RX ORDER — OMEPRAZOLE 10 MG/1
1 CAPSULE, DELAYED RELEASE ORAL
Qty: 0 | Refills: 0 | DISCHARGE

## 2022-04-08 RX ORDER — FUROSEMIDE 40 MG
0.5 TABLET ORAL
Qty: 0 | Refills: 0 | DISCHARGE

## 2022-04-08 RX ORDER — METOPROLOL TARTRATE 50 MG
50 TABLET ORAL
Qty: 0 | Refills: 0 | DISCHARGE

## 2022-04-08 RX ORDER — METFORMIN HYDROCHLORIDE 850 MG/1
0 TABLET ORAL
Qty: 0 | Refills: 0 | DISCHARGE

## 2022-04-08 RX ORDER — OMEPRAZOLE 10 MG/1
0 CAPSULE, DELAYED RELEASE ORAL
Qty: 0 | Refills: 0 | DISCHARGE

## 2022-04-08 RX ORDER — TAMSULOSIN HYDROCHLORIDE 0.4 MG/1
1 CAPSULE ORAL
Qty: 0 | Refills: 0 | DISCHARGE

## 2022-04-08 RX ORDER — METOPROLOL TARTRATE 50 MG
25 TABLET ORAL
Qty: 0 | Refills: 0 | DISCHARGE

## 2022-04-08 RX ORDER — FERROUS SULFATE 325(65) MG
1 TABLET ORAL
Qty: 0 | Refills: 0 | DISCHARGE

## 2022-04-08 RX ORDER — FUROSEMIDE 40 MG
1 TABLET ORAL
Qty: 0 | Refills: 0 | DISCHARGE

## 2022-04-08 RX ORDER — METOPROLOL TARTRATE 50 MG
0 TABLET ORAL
Qty: 0 | Refills: 0 | DISCHARGE

## 2022-04-08 RX ORDER — WARFARIN SODIUM 2.5 MG/1
1 TABLET ORAL
Qty: 0 | Refills: 0 | DISCHARGE

## 2022-04-08 RX ORDER — IMIPENEM AND CILASTATIN 250; 250 MG/100ML; MG/100ML
500 INJECTION, POWDER, FOR SOLUTION INTRAVENOUS
Qty: 0 | Refills: 0 | DISCHARGE

## 2022-04-08 RX ORDER — MULTIVIT-MIN/FERROUS GLUCONATE 9 MG/15 ML
1 LIQUID (ML) ORAL
Qty: 0 | Refills: 0 | DISCHARGE

## 2022-04-08 RX ORDER — CICLOPIROX OLAMINE 7.7 MG/G
5 CREAM TOPICAL
Qty: 0 | Refills: 0 | DISCHARGE

## 2022-04-08 RX ORDER — ACETAMINOPHEN 500 MG
1 TABLET ORAL
Qty: 0 | Refills: 0 | DISCHARGE

## 2022-04-08 RX ORDER — IPRATROPIUM/ALBUTEROL SULFATE 18-103MCG
3 AEROSOL WITH ADAPTER (GRAM) INHALATION
Qty: 0 | Refills: 0 | DISCHARGE

## 2022-04-08 RX ORDER — METFORMIN HYDROCHLORIDE 850 MG/1
1000 TABLET ORAL
Qty: 0 | Refills: 0 | DISCHARGE

## 2022-04-08 RX ORDER — POLYETHYLENE GLYCOL 3350 17 G/17G
17 POWDER, FOR SOLUTION ORAL
Qty: 0 | Refills: 0 | DISCHARGE

## 2022-04-08 RX ORDER — LISINOPRIL 2.5 MG/1
10 TABLET ORAL
Qty: 0 | Refills: 0 | DISCHARGE

## 2025-03-27 NOTE — PHYSICAL THERAPY INITIAL EVALUATION ADULT - REHAB POTENTIAL, PT EVAL
